# Patient Record
Sex: FEMALE | Race: WHITE | NOT HISPANIC OR LATINO | Employment: OTHER | URBAN - NONMETROPOLITAN AREA
[De-identification: names, ages, dates, MRNs, and addresses within clinical notes are randomized per-mention and may not be internally consistent; named-entity substitution may affect disease eponyms.]

---

## 2020-02-13 ENCOUNTER — HOSPITAL ENCOUNTER (OUTPATIENT)
Facility: HOSPITAL | Age: 85
Setting detail: OBSERVATION
Discharge: HOME | End: 2020-02-14
Attending: EMERGENCY MEDICINE | Admitting: FAMILY MEDICINE
Payer: MEDICARE

## 2020-02-13 ENCOUNTER — APPOINTMENT (EMERGENCY)
Dept: RADIOLOGY | Facility: HOSPITAL | Age: 85
End: 2020-02-13
Payer: MEDICARE

## 2020-02-13 ENCOUNTER — APPOINTMENT (EMERGENCY)
Dept: CT IMAGING | Facility: HOSPITAL | Age: 85
End: 2020-02-13
Payer: MEDICARE

## 2020-02-13 DIAGNOSIS — R42 DIZZINESS: Primary | ICD-10-CM

## 2020-02-13 PROBLEM — I10 HYPERTENSION: Status: ACTIVE | Noted: 2020-02-13

## 2020-02-13 PROBLEM — I25.10 CORONARY ARTERY DISEASE: Status: ACTIVE | Noted: 2020-02-13

## 2020-02-13 PROBLEM — J45.909 ASTHMA: Status: ACTIVE | Noted: 2020-02-13

## 2020-02-13 LAB
ALBUMIN SERPL BCP-MCNC: 3.5 G/DL (ref 3.5–5)
ALP SERPL-CCNC: 50 U/L (ref 46–116)
ALT SERPL W P-5'-P-CCNC: 17 U/L (ref 12–78)
ANION GAP SERPL CALCULATED.3IONS-SCNC: 8 MMOL/L (ref 4–13)
APTT PPP: 33 SECONDS (ref 23–37)
AST SERPL W P-5'-P-CCNC: 29 U/L (ref 5–45)
ATRIAL RATE: 59 BPM
ATRIAL RATE: 63 BPM
BASOPHILS # BLD AUTO: 0.04 THOUSANDS/ΜL (ref 0–0.1)
BASOPHILS NFR BLD AUTO: 1 % (ref 0–1)
BILIRUB SERPL-MCNC: 0.31 MG/DL (ref 0.2–1)
BUN SERPL-MCNC: 12 MG/DL (ref 5–25)
CALCIUM SERPL-MCNC: 8.7 MG/DL (ref 8.3–10.1)
CHLORIDE SERPL-SCNC: 102 MMOL/L (ref 100–108)
CO2 SERPL-SCNC: 27 MMOL/L (ref 21–32)
CREAT SERPL-MCNC: 0.97 MG/DL (ref 0.6–1.3)
EOSINOPHIL # BLD AUTO: 0.36 THOUSAND/ΜL (ref 0–0.61)
EOSINOPHIL NFR BLD AUTO: 7 % (ref 0–6)
ERYTHROCYTE [DISTWIDTH] IN BLOOD BY AUTOMATED COUNT: 12.9 % (ref 11.6–15.1)
GFR SERPL CREATININE-BSD FRML MDRD: 52 ML/MIN/1.73SQ M
GLUCOSE SERPL-MCNC: 97 MG/DL (ref 65–140)
HCT VFR BLD AUTO: 35.2 % (ref 34.8–46.1)
HGB BLD-MCNC: 11.8 G/DL (ref 11.5–15.4)
IMM GRANULOCYTES # BLD AUTO: 0.02 THOUSAND/UL (ref 0–0.2)
IMM GRANULOCYTES NFR BLD AUTO: 0 % (ref 0–2)
INR PPP: 0.92 (ref 0.84–1.19)
LACTATE SERPL-SCNC: 1.1 MMOL/L (ref 0.5–2)
LYMPHOCYTES # BLD AUTO: 2.12 THOUSANDS/ΜL (ref 0.6–4.47)
LYMPHOCYTES NFR BLD AUTO: 40 % (ref 14–44)
MCH RBC QN AUTO: 32.6 PG (ref 26.8–34.3)
MCHC RBC AUTO-ENTMCNC: 33.5 G/DL (ref 31.4–37.4)
MCV RBC AUTO: 97 FL (ref 82–98)
MONOCYTES # BLD AUTO: 0.56 THOUSAND/ΜL (ref 0.17–1.22)
MONOCYTES NFR BLD AUTO: 11 % (ref 4–12)
NEUTROPHILS # BLD AUTO: 2.22 THOUSANDS/ΜL (ref 1.85–7.62)
NEUTS SEG NFR BLD AUTO: 41 % (ref 43–75)
NRBC BLD AUTO-RTO: 0 /100 WBCS
P AXIS: 60 DEGREES
PLATELET # BLD AUTO: 147 THOUSANDS/UL (ref 149–390)
PLATELET # BLD AUTO: 161 THOUSANDS/UL (ref 149–390)
PMV BLD AUTO: 10.7 FL (ref 8.9–12.7)
PMV BLD AUTO: 10.9 FL (ref 8.9–12.7)
POTASSIUM SERPL-SCNC: 3.9 MMOL/L (ref 3.5–5.3)
PR INTERVAL: 180 MS
PROT SERPL-MCNC: 7.1 G/DL (ref 6.4–8.2)
PROTHROMBIN TIME: 12.3 SECONDS (ref 11.6–14.5)
QRS AXIS: 63 DEGREES
QRS AXIS: 67 DEGREES
QRSD INTERVAL: 86 MS
QRSD INTERVAL: 88 MS
QT INTERVAL: 422 MS
QT INTERVAL: 456 MS
QTC INTERVAL: 431 MS
QTC INTERVAL: 451 MS
RBC # BLD AUTO: 3.62 MILLION/UL (ref 3.81–5.12)
SODIUM SERPL-SCNC: 137 MMOL/L (ref 136–145)
T WAVE AXIS: 66 DEGREES
T WAVE AXIS: 69 DEGREES
TROPONIN I SERPL-MCNC: <0.02 NG/ML
VENTRICULAR RATE: 59 BPM
VENTRICULAR RATE: 63 BPM
WBC # BLD AUTO: 5.32 THOUSAND/UL (ref 4.31–10.16)

## 2020-02-13 PROCEDURE — 96374 THER/PROPH/DIAG INJ IV PUSH: CPT

## 2020-02-13 PROCEDURE — 85610 PROTHROMBIN TIME: CPT | Performed by: EMERGENCY MEDICINE

## 2020-02-13 PROCEDURE — 83605 ASSAY OF LACTIC ACID: CPT | Performed by: EMERGENCY MEDICINE

## 2020-02-13 PROCEDURE — 36415 COLL VENOUS BLD VENIPUNCTURE: CPT | Performed by: EMERGENCY MEDICINE

## 2020-02-13 PROCEDURE — 85025 COMPLETE CBC W/AUTO DIFF WBC: CPT | Performed by: EMERGENCY MEDICINE

## 2020-02-13 PROCEDURE — 96375 TX/PRO/DX INJ NEW DRUG ADDON: CPT

## 2020-02-13 PROCEDURE — NC001 PR NO CHARGE: Performed by: EMERGENCY MEDICINE

## 2020-02-13 PROCEDURE — 71046 X-RAY EXAM CHEST 2 VIEWS: CPT

## 2020-02-13 PROCEDURE — 80053 COMPREHEN METABOLIC PANEL: CPT | Performed by: EMERGENCY MEDICINE

## 2020-02-13 PROCEDURE — 93005 ELECTROCARDIOGRAM TRACING: CPT

## 2020-02-13 PROCEDURE — 99285 EMERGENCY DEPT VISIT HI MDM: CPT | Performed by: EMERGENCY MEDICINE

## 2020-02-13 PROCEDURE — 85730 THROMBOPLASTIN TIME PARTIAL: CPT | Performed by: EMERGENCY MEDICINE

## 2020-02-13 PROCEDURE — 99220 PR INITIAL OBSERVATION CARE/DAY 70 MINUTES: CPT | Performed by: FAMILY MEDICINE

## 2020-02-13 PROCEDURE — 85049 AUTOMATED PLATELET COUNT: CPT | Performed by: FAMILY MEDICINE

## 2020-02-13 PROCEDURE — 99285 EMERGENCY DEPT VISIT HI MDM: CPT

## 2020-02-13 PROCEDURE — 84484 ASSAY OF TROPONIN QUANT: CPT | Performed by: EMERGENCY MEDICINE

## 2020-02-13 PROCEDURE — 70450 CT HEAD/BRAIN W/O DYE: CPT

## 2020-02-13 PROCEDURE — 96361 HYDRATE IV INFUSION ADD-ON: CPT

## 2020-02-13 RX ORDER — PANTOPRAZOLE SODIUM 40 MG/1
40 TABLET, DELAYED RELEASE ORAL DAILY
Status: DISCONTINUED | OUTPATIENT
Start: 2020-02-13 | End: 2020-02-14 | Stop reason: HOSPADM

## 2020-02-13 RX ORDER — LIDOCAINE 40 MG/G
CREAM TOPICAL AS NEEDED
COMMUNITY
End: 2020-02-13

## 2020-02-13 RX ORDER — ASPIRIN 81 MG/1
81 TABLET ORAL DAILY
Status: ON HOLD | COMMUNITY
End: 2021-05-07 | Stop reason: SDUPTHER

## 2020-02-13 RX ORDER — METOPROLOL SUCCINATE 25 MG/1
12.5 TABLET, EXTENDED RELEASE ORAL DAILY
Status: DISCONTINUED | OUTPATIENT
Start: 2020-02-13 | End: 2020-02-14 | Stop reason: HOSPADM

## 2020-02-13 RX ORDER — FAMOTIDINE 20 MG/1
20 TABLET, FILM COATED ORAL 2 TIMES DAILY
Status: DISCONTINUED | OUTPATIENT
Start: 2020-02-13 | End: 2020-02-14 | Stop reason: HOSPADM

## 2020-02-13 RX ORDER — ONDANSETRON 2 MG/ML
4 INJECTION INTRAMUSCULAR; INTRAVENOUS EVERY 6 HOURS PRN
Status: DISCONTINUED | OUTPATIENT
Start: 2020-02-13 | End: 2020-02-14 | Stop reason: HOSPADM

## 2020-02-13 RX ORDER — ACETAMINOPHEN 325 MG/1
650 TABLET ORAL EVERY 6 HOURS PRN
Status: DISCONTINUED | OUTPATIENT
Start: 2020-02-13 | End: 2020-02-13

## 2020-02-13 RX ORDER — MECLIZINE HYDROCHLORIDE 25 MG/1
25 TABLET ORAL ONCE
Status: COMPLETED | OUTPATIENT
Start: 2020-02-13 | End: 2020-02-13

## 2020-02-13 RX ORDER — BUDESONIDE AND FORMOTEROL FUMARATE DIHYDRATE 80; 4.5 UG/1; UG/1
2 AEROSOL RESPIRATORY (INHALATION) 2 TIMES DAILY
Status: DISCONTINUED | OUTPATIENT
Start: 2020-02-13 | End: 2020-02-14 | Stop reason: HOSPADM

## 2020-02-13 RX ORDER — BUDESONIDE AND FORMOTEROL FUMARATE DIHYDRATE 80; 4.5 UG/1; UG/1
2 AEROSOL RESPIRATORY (INHALATION) 2 TIMES DAILY
COMMUNITY
End: 2022-07-31

## 2020-02-13 RX ORDER — FAMOTIDINE 20 MG/1
20 TABLET, FILM COATED ORAL 2 TIMES DAILY
COMMUNITY
End: 2022-07-31

## 2020-02-13 RX ORDER — FLUTICASONE PROPIONATE 50 MCG
1 SPRAY, SUSPENSION (ML) NASAL DAILY PRN
Status: DISCONTINUED | OUTPATIENT
Start: 2020-02-13 | End: 2020-02-14 | Stop reason: HOSPADM

## 2020-02-13 RX ORDER — AMOXICILLIN 250 MG
1 CAPSULE ORAL DAILY
Status: DISCONTINUED | OUTPATIENT
Start: 2020-02-13 | End: 2020-02-14 | Stop reason: HOSPADM

## 2020-02-13 RX ORDER — ALBUTEROL SULFATE 90 UG/1
2 AEROSOL, METERED RESPIRATORY (INHALATION) EVERY 6 HOURS PRN
Status: DISCONTINUED | OUTPATIENT
Start: 2020-02-13 | End: 2020-02-14 | Stop reason: HOSPADM

## 2020-02-13 RX ORDER — GUAIFENESIN 600 MG
1200 TABLET, EXTENDED RELEASE 12 HR ORAL EVERY 12 HOURS SCHEDULED
COMMUNITY
End: 2021-05-07 | Stop reason: HOSPADM

## 2020-02-13 RX ORDER — ALBUTEROL SULFATE 90 UG/1
2 AEROSOL, METERED RESPIRATORY (INHALATION) EVERY 6 HOURS PRN
COMMUNITY

## 2020-02-13 RX ORDER — DIAZEPAM 2 MG/1
2 TABLET ORAL EVERY 6 HOURS PRN
Status: DISCONTINUED | OUTPATIENT
Start: 2020-02-13 | End: 2020-02-14 | Stop reason: HOSPADM

## 2020-02-13 RX ORDER — ONDANSETRON 2 MG/ML
4 INJECTION INTRAMUSCULAR; INTRAVENOUS ONCE
Status: COMPLETED | OUTPATIENT
Start: 2020-02-13 | End: 2020-02-13

## 2020-02-13 RX ORDER — NITROGLYCERIN 0.4 MG/1
0.4 TABLET SUBLINGUAL
Status: DISCONTINUED | OUTPATIENT
Start: 2020-02-13 | End: 2020-02-14 | Stop reason: HOSPADM

## 2020-02-13 RX ORDER — MECLIZINE HCL 12.5 MG/1
12.5 TABLET ORAL EVERY 8 HOURS SCHEDULED
Status: DISCONTINUED | OUTPATIENT
Start: 2020-02-13 | End: 2020-02-14 | Stop reason: HOSPADM

## 2020-02-13 RX ORDER — METOPROLOL SUCCINATE 25 MG/1
12.5 TABLET, EXTENDED RELEASE ORAL DAILY
COMMUNITY

## 2020-02-13 RX ORDER — NITROGLYCERIN 0.4 MG/1
0.4 TABLET SUBLINGUAL
COMMUNITY
End: 2021-05-07 | Stop reason: HOSPADM

## 2020-02-13 RX ORDER — ACETAMINOPHEN 325 MG/1
650 TABLET ORAL EVERY 6 HOURS PRN
Status: DISCONTINUED | OUTPATIENT
Start: 2020-02-13 | End: 2020-02-14 | Stop reason: HOSPADM

## 2020-02-13 RX ORDER — PANTOPRAZOLE SODIUM 40 MG/1
40 TABLET, DELAYED RELEASE ORAL DAILY
COMMUNITY

## 2020-02-13 RX ORDER — FLUTICASONE PROPIONATE 50 MCG
1 SPRAY, SUSPENSION (ML) NASAL DAILY
COMMUNITY

## 2020-02-13 RX ORDER — DIAZEPAM 5 MG/ML
2.5 INJECTION, SOLUTION INTRAMUSCULAR; INTRAVENOUS ONCE
Status: COMPLETED | OUTPATIENT
Start: 2020-02-13 | End: 2020-02-13

## 2020-02-13 RX ORDER — ASPIRIN 81 MG/1
81 TABLET, CHEWABLE ORAL DAILY
Status: DISCONTINUED | OUTPATIENT
Start: 2020-02-13 | End: 2020-02-14 | Stop reason: HOSPADM

## 2020-02-13 RX ORDER — AMOXICILLIN 250 MG
1 CAPSULE ORAL DAILY
Status: ON HOLD | COMMUNITY
End: 2022-07-31 | Stop reason: SDUPTHER

## 2020-02-13 RX ADMIN — BUDESONIDE AND FORMOTEROL FUMARATE DIHYDRATE 2 PUFF: 80; 4.5 AEROSOL RESPIRATORY (INHALATION) at 17:28

## 2020-02-13 RX ADMIN — FAMOTIDINE 20 MG: 20 TABLET ORAL at 13:06

## 2020-02-13 RX ADMIN — Medication 400 MG: at 13:05

## 2020-02-13 RX ADMIN — ACETAMINOPHEN 650 MG: 325 TABLET ORAL at 10:52

## 2020-02-13 RX ADMIN — ASPIRIN 81 MG 81 MG: 81 TABLET ORAL at 13:06

## 2020-02-13 RX ADMIN — Medication 100 MCG: at 13:05

## 2020-02-13 RX ADMIN — PANTOPRAZOLE SODIUM 40 MG: 40 TABLET, DELAYED RELEASE ORAL at 13:05

## 2020-02-13 RX ADMIN — METOPROLOL SUCCINATE 12.5 MG: 25 TABLET, EXTENDED RELEASE ORAL at 13:33

## 2020-02-13 RX ADMIN — MECLIZINE 12.5 MG: 12.5 TABLET ORAL at 13:06

## 2020-02-13 RX ADMIN — FAMOTIDINE 20 MG: 20 TABLET ORAL at 17:27

## 2020-02-13 RX ADMIN — Medication 1 TABLET: at 13:06

## 2020-02-13 RX ADMIN — MECLIZINE HYDROCHLORIDE 25 MG: 25 TABLET ORAL at 08:05

## 2020-02-13 RX ADMIN — MECLIZINE 12.5 MG: 12.5 TABLET ORAL at 22:54

## 2020-02-13 RX ADMIN — BUDESONIDE AND FORMOTEROL FUMARATE DIHYDRATE 2 PUFF: 80; 4.5 AEROSOL RESPIRATORY (INHALATION) at 13:07

## 2020-02-13 RX ADMIN — DIAZEPAM 2.5 MG: 10 INJECTION, SOLUTION INTRAMUSCULAR; INTRAVENOUS at 08:07

## 2020-02-13 RX ADMIN — ENOXAPARIN SODIUM 30 MG: 30 INJECTION SUBCUTANEOUS at 13:09

## 2020-02-13 RX ADMIN — ONDANSETRON 4 MG: 2 INJECTION INTRAMUSCULAR; INTRAVENOUS at 08:05

## 2020-02-13 RX ADMIN — SENNOSIDES AND DOCUSATE SODIUM 1 TABLET: 8.6; 5 TABLET ORAL at 13:06

## 2020-02-13 RX ADMIN — SODIUM CHLORIDE 500 ML: 0.9 INJECTION, SOLUTION INTRAVENOUS at 08:09

## 2020-02-13 NOTE — ED NOTES
Ambulated pt through hallway w/1 assist  Pt stated her dizziness has improved but still felling "wabbly and off"  Dr Darya Castrejon aware        Rabia Calvert, RN  02/13/20 4625

## 2020-02-13 NOTE — H&P
H&P- [facility] 1/11/1932, 80 y o  female MRN: [id number]    Unit/Bed#: ED 11 Encounter: [id number]    Primary Care Provider: Kingsley Ashley MD   Date and time admitted to hospital: 2/13/2020  7:17 AM        * Dizziness  Assessment & Plan  · Patient came to the hospital with sudden onset of dizziness  Patient reported that she gets dizzy and feels like the room is is spinning when she changes position  She also gets nausea and vomiting associated with it  History suggestive of benign positional vertigo  · Will admit the patient PT OT evaluation  · Meclizine RTC and p r n  Valium  · Patient had a CT scan head today which was unremarkable,  · Had a recent MRI by her neurologist which was also negative for any acute pathology  · May benefit from vestibular rehab    Asthma  Assessment & Plan  · Patient is on Symbicort and also on albuterol as an outpatient which we will continue  · Respiratory protocol    Hypertension  Assessment & Plan  · Blood pressure remained stable  · Monitor  · Continue with the outpatient medication    Coronary artery disease  Assessment & Plan  · Patient do not have any chest pain or any shortness of breath  · Continue with aspirin and metoprolol      VTE Prophylaxis: Enoxaparin (Lovenox)  / sequential compression device   Code Status: dnr/ dni  POLST: POLST form is not discussed and not completed at this time  Discussion with family:     Anticipated Length of Stay:  Patient will be admitted on an Observation basis with an anticipated length of stay of  less than 2 midnights  Justification for Hospital Stay:  Vertigo    Total Time for Visit, including Counseling / Coordination of Care: 1 hour  Greater than 50% of this total time spent on direct patient counseling and coordination of care  Chief Complaint:   Dizziness    History of Present Illness:    Tayler Geller is a 80 y o  female who presents with dizziness    Patient reported that she woke up in the middle of the night and was trying to go to the bathroom and she was feeling dizzy and off balance  Patient reported that she has to hold onto things to get to the bathroom  Patient reported that she had dizziness in the past and is currently getting worked up by her neurologist   Patient had a recent MRI which was unremarkable  Patient reported that she had realignment of ear canal in the past approximately 10 years ago and her symptoms resolved instantaneously  Patient reported that she has chronic shortness of breath and it has not changed recently  Denies any fever or any chills  Denies any upper respiratory infection  Denies any focal weakness    Review of Systems:    Review of Systems   Constitutional: Negative  HENT: Positive for sinus pressure and tinnitus  Decreased hearing   Respiratory: Negative  Cardiovascular: Negative  Gastrointestinal: Negative  Genitourinary: Negative  Musculoskeletal: Negative  Neurological: Positive for dizziness  Hematological: Negative  Psychiatric/Behavioral: Negative  Past Medical and Surgical History:     Past Medical History:   Diagnosis Date    Cancer [facility])     skin cancer    Cardiac disease     Neuropathy     Spinal stenosis        Past Surgical History:   Procedure Laterality Date    APPENDECTOMY      BACK SURGERY      CATARACT EXTRACTION W/  INTRAOCULAR LENS IMPLANT Bilateral     CORONARY ANGIOPLASTY WITH STENT PLACEMENT  09/23/2011    REPLACEMENT TOTAL KNEE BILATERAL         Meds/Allergies:    Prior to Admission medications    Medication Sig Start Date End Date Taking?  Authorizing Provider   albuterol (PROVENTIL HFA,VENTOLIN HFA) 90 mcg/act inhaler Inhale 2 puffs every 6 (six) hours as needed for wheezing   Yes Historical Provider, MD   aspirin (ECOTRIN LOW STRENGTH) 81 mg EC tablet Take 81 mg by mouth daily   Yes Historical Provider, MD   budesonide-formoterol (SYMBICORT) 80-4 5 MCG/ACT inhaler Inhale 2 puffs 2 (two) times a day Rinse mouth after use  Yes Historical Provider, MD   cyanocobalamin (VITAMIN B-12) 100 MCG tablet Take 100 mcg by mouth daily   Yes Historical Provider, MD   denosumab (PROLIA) 60 mg/mL Inject 60 mg under the skin once   Yes Historical Provider, MD   famotidine (PEPCID) 20 mg tablet Take 20 mg by mouth 2 (two) times a day   Yes Historical Provider, MD   fluticasone (FLONASE) 50 mcg/act nasal spray 1 spray into each nostril daily   Yes Historical Provider, MD   guaiFENesin (MUCINEX) 600 mg 12 hr tablet Take 1,200 mg by mouth every 12 (twelve) hours   Yes Historical Provider, MD   MAGNESIUM SULFATE PO Take 100 mg by mouth daily   Yes Historical Provider, MD   metoprolol succinate (TOPROL-XL) 25 mg 24 hr tablet Take 12 5 mg by mouth daily   Yes Historical Provider, MD   Multiple Vitamins-Minerals (CENTRUM SILVER 50+WOMEN PO) Take 1 tablet by mouth   Yes Historical Provider, MD   NAPROXEN PO Take 220 mg by mouth as needed   Yes Historical Provider, MD   nitroglycerin (NITROSTAT) 0 4 mg SL tablet Place 0 4 mg under the tongue every 5 (five) minutes as needed for chest pain   Yes Historical Provider, MD   pantoprazole (PROTONIX) 40 mg tablet Take 40 mg by mouth daily   Yes Historical Provider, MD   senna-docusate sodium (SENOKOT S) 8 6-50 mg per tablet Take 1 tablet by mouth daily   Yes Historical Provider, MD   fluticasone-salmeterol (ADVAIR, WIXELA) 100-50 mcg/dose inhaler Inhale 1 puff 2 (two) times a day Rinse mouth after use   2/13/20 Yes Historical Provider, MD   lidocaine (LMX) 4 % cream Apply topically as needed for mild pain  2/13/20 Yes Historical Provider, MD     I have reviewed home medications with patient personally  Allergies: Allergies   Allergen Reactions    Penicillins Rash       Social History:     Marital Status:     Occupation: retired  Patient Pre-hospital Living Situation:   Patient Pre-hospital Level of Mobility:   Patient Pre-hospital Diet Restrictions:   Substance Use History:   Social History Substance and Sexual Activity   Alcohol Use Yes    Comment: occassionally     Social History     Tobacco Use   Smoking Status Never Smoker   Smokeless Tobacco Never Used     Social History     Substance and Sexual Activity   Drug Use Never       Family History:    History reviewed  No pertinent family history  Physical Exam:     Vitals:   Blood Pressure: 137/82 (02/13/20 1342)  Pulse: (!) 107 (02/13/20 1342)  Temperature: 97 5 °F (36 4 °C) (02/13/20 1342)  Temp Source: Oral (02/13/20 0725)  Respirations: (!) 30 (02/13/20 1342)  Height: 5' 3 5" (161 3 cm) (02/13/20 0725)  Weight - Scale: 64 9 kg (143 lb) (02/13/20 0725)  SpO2: 96 % (02/13/20 1342)    Physical Exam   Constitutional: She appears well-developed  No distress  HENT:   Head: Normocephalic and atraumatic  Eyes: Right eye exhibits no discharge  Left eye exhibits no discharge  Neck: No JVD present  Cardiovascular: Normal rate and regular rhythm  No murmur heard  Pulmonary/Chest: Effort normal and breath sounds normal  No stridor  No respiratory distress  Abdominal: Soft  She exhibits no distension  There is no tenderness  Musculoskeletal: Normal range of motion  She exhibits no edema  Neurological: She is alert  No cranial nerve deficit  No focal neurological deficits  Symptoms are reproducible with movement of the head   Skin: Skin is warm  Psychiatric: She has a normal mood and affect  Additional Data:     Lab Results: I have personally reviewed pertinent reports        Results from last 7 days   Lab Units 02/13/20  1728 02/13/20  0741   WBC Thousand/uL  --  5 32   HEMOGLOBIN g/dL  --  11 8   HEMATOCRIT %  --  35 2   PLATELETS Thousands/uL 147* 161   NEUTROS PCT %  --  41*   LYMPHS PCT %  --  40   MONOS PCT %  --  11   EOS PCT %  --  7*     Results from last 7 days   Lab Units 02/13/20  0741   SODIUM mmol/L 137   POTASSIUM mmol/L 3 9   CHLORIDE mmol/L 102   CO2 mmol/L 27   BUN mg/dL 12   CREATININE mg/dL 0 97   ANION GAP mmol/L 8   CALCIUM mg/dL 8 7   ALBUMIN g/dL 3 5   TOTAL BILIRUBIN mg/dL 0 31   ALK PHOS U/L 50   ALT U/L 17   AST U/L 29   GLUCOSE RANDOM mg/dL 97     Results from last 7 days   Lab Units 02/13/20  0741   INR  0 92             Results from last 7 days   Lab Units 02/13/20  0741   LACTIC ACID mmol/L 1 1       Imaging: I have personally reviewed pertinent reports  XR chest 2 views   Final Result by Nettie June MD (02/13 2041)      No acute cardiopulmonary disease  Workstation performed: [id number]         CT head without contrast   Final Result by Lyndsey Foreman MD (02/13 1853)      No acute intracranial abnormality  Workstation performed: [id number]             EKG, Pathology, and Other Studies Reviewed on Admission:   · EKG:     Allscripts / Epic Records Reviewed: Yes     ** Please Note: This note has been constructed using a voice recognition system   **

## 2020-02-13 NOTE — ASSESSMENT & PLAN NOTE
· Patient do not have any chest pain or any shortness of breath  · Continue with aspirin and metoprolol

## 2020-02-13 NOTE — ED PROVIDER NOTES
History  Chief Complaint   Patient presents with    Dizziness     pt c/o dizziness/lightheadedness w/nausea,sob and headache upon awakening around 0300 when going to the bathroom  denies v/d/fevers     Patient developed sudden onset of dizziness with a headache and shortness of breath and nausea starting at 3:00 a m  This morning  Got worse with certain movements  Felt like the whole room was moving  Does have a history of dizziness which she describes off balance but this is worse than her normal   Had an MRI on February 5th which was unremarkable  No meds taken for for her symptoms  No fevers or chills  No recent cough or cold symptoms  No rash  History provided by:  Patient   used: No    Dizziness   Quality:  Head spinning  Severity:  Mild  Onset quality:  Sudden  Duration:  4 hours  Timing:  Constant  Progression:  Waxing and waning  Chronicity:  Recurrent  Context: head movement and standing up    Relieved by:  Being still  Worsened by:  Nothing  Ineffective treatments:  None tried  Associated symptoms: headaches, nausea and shortness of breath    Associated symptoms: no blood in stool, no chest pain, no diarrhea, no hearing loss, no palpitations, no vision changes and no vomiting        Prior to Admission Medications   Prescriptions Last Dose Informant Patient Reported? Taking?   aspirin (ECOTRIN LOW STRENGTH) 81 mg EC tablet  Self Yes Yes   Sig: Take 81 mg by mouth daily      Facility-Administered Medications: None       Past Medical History:   Diagnosis Date    Cancer ([facility] [address] )     skin cancer    Cardiac disease        Past Surgical History:   Procedure Laterality Date    APPENDECTOMY      BACK SURGERY      CATARACT EXTRACTION W/  INTRAOCULAR LENS IMPLANT Bilateral     CORONARY ANGIOPLASTY WITH STENT PLACEMENT  09/23/2011    REPLACEMENT TOTAL KNEE BILATERAL         History reviewed  No pertinent family history    I have reviewed and agree with the history as documented  Social History     Tobacco Use    Smoking status: Never Smoker    Smokeless tobacco: Never Used   Substance Use Topics    Alcohol use: Yes     Comment: occassionally    Drug use: Never       Review of Systems   Constitutional: Negative for chills and fever  HENT: Negative for ear pain, hearing loss, sore throat, trouble swallowing and voice change  Eyes: Negative for pain and discharge  Respiratory: Positive for shortness of breath  Negative for cough and wheezing  Cardiovascular: Negative for chest pain and palpitations  Gastrointestinal: Positive for nausea  Negative for abdominal pain, blood in stool, constipation, diarrhea and vomiting  Genitourinary: Negative for dysuria, flank pain, frequency and hematuria  Musculoskeletal: Negative for joint swelling, neck pain and neck stiffness  Skin: Negative for rash and wound  Neurological: Positive for dizziness and headaches  Negative for seizures, syncope, facial asymmetry, speech difficulty and numbness  Psychiatric/Behavioral: Negative for hallucinations, self-injury and suicidal ideas  All other systems reviewed and are negative  Physical Exam  Physical Exam   Constitutional: She is oriented to person, place, and time  She appears well-developed and well-nourished  No distress  HENT:   Head: Normocephalic and atraumatic  Right Ear: External ear normal    Left Ear: External ear normal    Eyes: Pupils are equal, round, and reactive to light  Conjunctivae and EOM are normal    Neck: Normal range of motion  Neck supple  Cardiovascular: Normal rate, regular rhythm and normal heart sounds  No murmur heard  Pulmonary/Chest: Effort normal and breath sounds normal  She has no wheezes  She has no rales  Abdominal: Soft  Bowel sounds are normal  She exhibits no distension  There is no tenderness  There is no rebound and no guarding  Musculoskeletal: Normal range of motion  She exhibits no deformity     Neurological: She is alert and oriented to person, place, and time  No cranial nerve deficit  Skin: Skin is warm and dry  No rash noted  Psychiatric: She has a normal mood and affect  Her behavior is normal    Nursing note and vitals reviewed        Vital Signs  ED Triage Vitals   Temperature Pulse Respirations Blood Pressure SpO2   02/13/20 0725 02/13/20 0725 02/13/20 0725 02/13/20 0725 02/13/20 0723   98 1 °F (36 7 °C) 79 18 (!) 178/86 98 %      Temp Source Heart Rate Source Patient Position - Orthostatic VS BP Location FiO2 (%)   02/13/20 0725 02/13/20 0725 02/13/20 0725 02/13/20 0725 --   Oral Monitor Lying Left arm       Pain Score       02/13/20 0725       No Pain           Vitals:    02/13/20 0730 02/13/20 0815 02/13/20 0830 02/13/20 0900   BP: 157/70 146/74 142/71 152/72   Pulse: 66 79 57 62   Patient Position - Orthostatic VS:  Lying Lying Lying         Visual Acuity  Visual Acuity      Most Recent Value   L Pupil Size (mm)  3   R Pupil Size (mm)  3          ED Medications  Medications   meclizine (ANTIVERT) tablet 25 mg (25 mg Oral Given 2/13/20 0805)   sodium chloride 0 9 % bolus 500 mL (0 mL Intravenous Stopped 2/13/20 0850)   ondansetron (ZOFRAN) injection 4 mg (4 mg Intravenous Given 2/13/20 0805)   diazepam (VALIUM) injection 2 5 mg (2 5 mg Intravenous Given 2/13/20 0807)       Diagnostic Studies  Results Reviewed     Procedure Component Value Units Date/Time    Comprehensive metabolic panel [[id number]] Collected:  02/13/20 0741    Lab Status:  Final result Specimen:  Blood from Arm, Right Updated:  02/13/20 0829     Sodium 137 mmol/L      Potassium 3 9 mmol/L      Chloride 102 mmol/L      CO2 27 mmol/L      ANION GAP 8 mmol/L      BUN 12 mg/dL      Creatinine 0 97 mg/dL      Glucose 97 mg/dL      Calcium 8 7 mg/dL      AST 29 U/L      ALT 17 U/L      Alkaline Phosphatase 50 U/L      Total Protein 7 1 g/dL      Albumin 3 5 g/dL      Total Bilirubin 0 31 mg/dL      eGFR 52 ml/min/1 73sq m     Narrative: [place] guidelines for Chronic Kidney Disease (CKD):     Stage 1 with normal or high GFR (GFR > 90 mL/min/1 73 square meters)    Stage 2 Mild CKD (GFR = 60-89 mL/min/1 73 square meters)    Stage 3A Moderate CKD (GFR = 45-59 mL/min/1 73 square meters)    Stage 3B Moderate CKD (GFR = 30-44 mL/min/1 73 square meters)    Stage 4 Severe CKD (GFR = 15-29 mL/min/1 73 square meters)    Stage 5 End Stage CKD (GFR <15 mL/min/1 73 square meters)  Note: GFR calculation is accurate only with a steady state creatinine    Lactic acid, plasma x2 [[id number]]  (Normal) Collected:  02/13/20 0741    Lab Status:  Final result Specimen:  Blood from Arm, Right Updated:  02/13/20 5241     LACTIC ACID 1 1 mmol/L     Narrative:       Result may be elevated if tourniquet was used during collection      Troponin I [[id number]]  (Normal) Collected:  02/13/20 0741    Lab Status:  Final result Specimen:  Blood from Arm, Right Updated:  02/13/20 0821     Troponin I <0 02 ng/mL     Protime-INR [[id number]]  (Normal) Collected:  02/13/20 0741    Lab Status:  Final result Specimen:  Blood from Arm, Right Updated:  02/13/20 0813     Protime 12 3 seconds      INR 0 92    APTT [[id number]]  (Normal) Collected:  02/13/20 0741    Lab Status:  Final result Specimen:  Blood from Arm, Right Updated:  02/13/20 0813     PTT 33 seconds     CBC and differential [[id number]]  (Abnormal) Collected:  02/13/20 0741    Lab Status:  Final result Specimen:  Blood from Arm, Right Updated:  02/13/20 0800     WBC 5 32 Thousand/uL      RBC 3 62 Million/uL      Hemoglobin 11 8 g/dL      Hematocrit 35 2 %      MCV 97 fL      MCH 32 6 pg      MCHC 33 5 g/dL      RDW 12 9 %      MPV 10 9 fL      Platelets 997 Thousands/uL      nRBC 0 /100 WBCs      Neutrophils Relative 41 %      Immat GRANS % 0 %      Lymphocytes Relative 40 %      Monocytes Relative 11 %      Eosinophils Relative 7 %      Basophils Relative 1 %      Neutrophils Absolute 2 22 Thousands/µL      Immature Grans Absolute 0 02 Thousand/uL      Lymphocytes Absolute 2 12 Thousands/µL      Monocytes Absolute 0 56 Thousand/µL      Eosinophils Absolute 0 36 Thousand/µL      Basophils Absolute 0 04 Thousands/µL                  XR chest 2 views   Final Result by Nettie June MD (02/13 8217)      No acute cardiopulmonary disease  Workstation performed: [id number]         CT head without contrast   Final Result by Lyndsey Foreman MD (02/13 1409)      No acute intracranial abnormality  Workstation performed: [id number]                    Procedures  ECG 12 Lead Documentation Only  Date/Time: 2/13/2020 7:39 AM  Performed by: Jewel Beltran MD  Authorized by: Jewel Beltran MD     Patient location:  ED  Previous ECG:     Previous ECG:  Unavailable  Rate:     ECG rate:  60  Rhythm:     Rhythm: sinus rhythm    Ectopy:     Ectopy: PAC    QRS:     QRS axis:  Normal    ECG 12 Lead Documentation Only  Date/Time: 2/13/2020 8:25 AM  Performed by: Jewel Beltran MD  Authorized by: Jewel Beltran MD     ECG reviewed by me, the ED Provider: yes    Rate:     ECG rate:  60  Rhythm:     Rhythm: sinus rhythm    Ectopy:     Ectopy: none    QRS:     QRS axis:  Normal             ED Course  ED Course as of Feb 13 0912   u Feb 13, 2020   0905 Discussed with cardiology about the patient's EKG  Read is atrial bigeminy  4732 Patient seen  Had difficulty ambulating  Very unsteady on feet  Needed assistance and holding onto nourished while walking                                    [facility]  Number of Diagnoses or Management Options     Amount and/or Complexity of Data Reviewed  Clinical lab tests: reviewed  Obtain history from someone other than the patient: yes  Review and summarize past medical records: yes          Disposition  Final diagnoses:   Dizziness     Time reflects when diagnosis was documented in both MDM as applicable and the Disposition within this note     Time User Action Codes Description Comment    2/13/2020  9:11 AM Ti Melo Add [R42] Dizziness       ED Disposition     ED Disposition Condition Date/Time Comment    Admit Stable Thu Feb 13, 2020  9:11 AM Case was discussed with Dr Delia Paul and the patient's admission status was agreed to be to the service of Dr Delia Paul  Follow-up Information    None         Patient's Medications   Discharge Prescriptions    No medications on file     No discharge procedures on file      PDMP Review     None          ED Provider  Electronically Signed by           Cordelia Scanlon MD  02/13/20 6958

## 2020-02-13 NOTE — ASSESSMENT & PLAN NOTE
· Patient is on Symbicort and also on albuterol as an outpatient which we will continue  · Respiratory protocol

## 2020-02-13 NOTE — ED NOTES
Pt returned from CT via liter   Friend and call brunner within reach     Christina Nielsen RN  02/13/20 9576

## 2020-02-13 NOTE — ASSESSMENT & PLAN NOTE
· Patient came to the hospital with sudden onset of dizziness  Patient reported that she gets dizzy and feels like the room is is spinning when she changes position  She also gets nausea and vomiting associated with it  History suggestive of benign positional vertigo  · Will admit the patient PT OT evaluation  · Meclizine RTC and p r n   Valium  · Patient had a CT scan head today which was unremarkable,  · Had a recent MRI by her neurologist which was also negative for any acute pathology  · May benefit from vestibular rehab

## 2020-02-14 VITALS
SYSTOLIC BLOOD PRESSURE: 173 MMHG | RESPIRATION RATE: 20 BRPM | HEIGHT: 64 IN | HEART RATE: 64 BPM | WEIGHT: 143 LBS | BODY MASS INDEX: 24.41 KG/M2 | DIASTOLIC BLOOD PRESSURE: 95 MMHG | TEMPERATURE: 97.6 F | OXYGEN SATURATION: 96 %

## 2020-02-14 LAB
ALBUMIN SERPL BCP-MCNC: 3.3 G/DL (ref 3.5–5)
ALP SERPL-CCNC: 45 U/L (ref 46–116)
ALT SERPL W P-5'-P-CCNC: 18 U/L (ref 12–78)
ANION GAP SERPL CALCULATED.3IONS-SCNC: 4 MMOL/L (ref 4–13)
AST SERPL W P-5'-P-CCNC: 31 U/L (ref 5–45)
BASOPHILS # BLD AUTO: 0.03 THOUSANDS/ΜL (ref 0–0.1)
BASOPHILS NFR BLD AUTO: 1 % (ref 0–1)
BILIRUB SERPL-MCNC: 0.3 MG/DL (ref 0.2–1)
BUN SERPL-MCNC: 10 MG/DL (ref 5–25)
CALCIUM SERPL-MCNC: 8.5 MG/DL (ref 8.3–10.1)
CHLORIDE SERPL-SCNC: 103 MMOL/L (ref 100–108)
CO2 SERPL-SCNC: 32 MMOL/L (ref 21–32)
CREAT SERPL-MCNC: 0.93 MG/DL (ref 0.6–1.3)
EOSINOPHIL # BLD AUTO: 0.4 THOUSAND/ΜL (ref 0–0.61)
EOSINOPHIL NFR BLD AUTO: 7 % (ref 0–6)
ERYTHROCYTE [DISTWIDTH] IN BLOOD BY AUTOMATED COUNT: 13 % (ref 11.6–15.1)
GFR SERPL CREATININE-BSD FRML MDRD: 55 ML/MIN/1.73SQ M
GLUCOSE P FAST SERPL-MCNC: 83 MG/DL (ref 65–99)
GLUCOSE SERPL-MCNC: 83 MG/DL (ref 65–140)
HCT VFR BLD AUTO: 36.3 % (ref 34.8–46.1)
HGB BLD-MCNC: 11.9 G/DL (ref 11.5–15.4)
IMM GRANULOCYTES # BLD AUTO: 0.01 THOUSAND/UL (ref 0–0.2)
IMM GRANULOCYTES NFR BLD AUTO: 0 % (ref 0–2)
LYMPHOCYTES # BLD AUTO: 2.2 THOUSANDS/ΜL (ref 0.6–4.47)
LYMPHOCYTES NFR BLD AUTO: 41 % (ref 14–44)
MAGNESIUM SERPL-MCNC: 2.3 MG/DL (ref 1.6–2.6)
MCH RBC QN AUTO: 32.3 PG (ref 26.8–34.3)
MCHC RBC AUTO-ENTMCNC: 32.8 G/DL (ref 31.4–37.4)
MCV RBC AUTO: 99 FL (ref 82–98)
MONOCYTES # BLD AUTO: 0.5 THOUSAND/ΜL (ref 0.17–1.22)
MONOCYTES NFR BLD AUTO: 9 % (ref 4–12)
NEUTROPHILS # BLD AUTO: 2.23 THOUSANDS/ΜL (ref 1.85–7.62)
NEUTS SEG NFR BLD AUTO: 42 % (ref 43–75)
NRBC BLD AUTO-RTO: 0 /100 WBCS
PLATELET # BLD AUTO: 156 THOUSANDS/UL (ref 149–390)
PMV BLD AUTO: 10.6 FL (ref 8.9–12.7)
POTASSIUM SERPL-SCNC: 4.4 MMOL/L (ref 3.5–5.3)
PROT SERPL-MCNC: 7 G/DL (ref 6.4–8.2)
RBC # BLD AUTO: 3.68 MILLION/UL (ref 3.81–5.12)
SODIUM SERPL-SCNC: 139 MMOL/L (ref 136–145)
WBC # BLD AUTO: 5.37 THOUSAND/UL (ref 4.31–10.16)

## 2020-02-14 PROCEDURE — 83735 ASSAY OF MAGNESIUM: CPT | Performed by: FAMILY MEDICINE

## 2020-02-14 PROCEDURE — 97163 PT EVAL HIGH COMPLEX 45 MIN: CPT

## 2020-02-14 PROCEDURE — 97116 GAIT TRAINING THERAPY: CPT

## 2020-02-14 PROCEDURE — 97535 SELF CARE MNGMENT TRAINING: CPT

## 2020-02-14 PROCEDURE — 85025 COMPLETE CBC W/AUTO DIFF WBC: CPT | Performed by: FAMILY MEDICINE

## 2020-02-14 PROCEDURE — 80053 COMPREHEN METABOLIC PANEL: CPT | Performed by: FAMILY MEDICINE

## 2020-02-14 PROCEDURE — 36415 COLL VENOUS BLD VENIPUNCTURE: CPT | Performed by: FAMILY MEDICINE

## 2020-02-14 PROCEDURE — 99217 PR OBSERVATION CARE DISCHARGE MANAGEMENT: CPT | Performed by: FAMILY MEDICINE

## 2020-02-14 PROCEDURE — 97167 OT EVAL HIGH COMPLEX 60 MIN: CPT

## 2020-02-14 RX ORDER — MECLIZINE HCL 12.5 MG/1
TABLET ORAL
Qty: 30 TABLET | Refills: 0 | Status: SHIPPED | OUTPATIENT
Start: 2020-02-14 | End: 2021-05-07 | Stop reason: HOSPADM

## 2020-02-14 RX ADMIN — ASPIRIN 81 MG 81 MG: 81 TABLET ORAL at 08:53

## 2020-02-14 RX ADMIN — PANTOPRAZOLE SODIUM 40 MG: 40 TABLET, DELAYED RELEASE ORAL at 08:54

## 2020-02-14 RX ADMIN — METOPROLOL SUCCINATE 12.5 MG: 25 TABLET, EXTENDED RELEASE ORAL at 08:54

## 2020-02-14 RX ADMIN — MECLIZINE 12.5 MG: 12.5 TABLET ORAL at 05:29

## 2020-02-14 RX ADMIN — BUDESONIDE AND FORMOTEROL FUMARATE DIHYDRATE 2 PUFF: 80; 4.5 AEROSOL RESPIRATORY (INHALATION) at 08:59

## 2020-02-14 RX ADMIN — MECLIZINE 12.5 MG: 12.5 TABLET ORAL at 13:14

## 2020-02-14 RX ADMIN — ACETAMINOPHEN 650 MG: 325 TABLET ORAL at 08:44

## 2020-02-14 RX ADMIN — Medication 1 TABLET: at 08:54

## 2020-02-14 RX ADMIN — SENNOSIDES AND DOCUSATE SODIUM 1 TABLET: 8.6; 5 TABLET ORAL at 09:00

## 2020-02-14 RX ADMIN — ENOXAPARIN SODIUM 30 MG: 30 INJECTION SUBCUTANEOUS at 08:57

## 2020-02-14 RX ADMIN — Medication 100 MCG: at 08:54

## 2020-02-14 RX ADMIN — Medication 400 MG: at 08:54

## 2020-02-14 RX ADMIN — FAMOTIDINE 20 MG: 20 TABLET ORAL at 08:53

## 2020-02-14 NOTE — UTILIZATION REVIEW
Initial Clinical Review    Admission: Date/Time/Statement: Admission Orders (From admission, onward)     Ordered        02/13/20 0912  Place in Observation (expected length of stay for this patient is less than two midnights)  Once                   Orders Placed This Encounter   Procedures    Place in Observation (expected length of stay for this patient is less than two midnights)     Standing Status:   Standing     Number of Occurrences:   1     Order Specific Question:   Admitting Physician     Answer:   Catherine dAams [[id number]]     Order Specific Question:   Level of Care     Answer:   Med Surg [[id number]]     ED Arrival Information     Expected Arrival Acuity Means of Arrival Escorted By Service Admission Type    - 2/13/2020 07:17 Emergent Ambulance [facility] Emergency    Arrival Complaint    Dizziness        Chief Complaint   Patient presents with    Dizziness     pt c/o dizziness/lightheadedness w/nausea,sob and headache upon awakening around 0300 when going to the bathroom  denies v/d/fevers     Assessment/Plan: 80year old female to the ED from home via EMS with complaints of dizziness, lightheadedness with nausea, shortness of breath and headache  Admitted under observation for dizziness, asthma, hypertension  She has sinus pressure, tinnitus, dizziness  Patient reported that she woke up in the middle of the night and was trying to go to the bathroom and she was feeling dizzy and off balance  Patient reported that she has to hold onto things to get to the bathroom  Patient reported that she had dizziness in the past and is currently getting worked up by her neurologist   Patient had a recent MRI which was unremarkable  * Dizziness  Assessment & Plan  · Patient came to the hospital with sudden onset of dizziness  Patient reported that she gets dizzy and feels like the room is is spinning when she changes position  She also gets nausea and vomiting associated with it    History suggestive of benign positional vertigo  · Will admit the patient PT OT evaluation  · Meclizine RTC and p r n   Valium  · Patient had a CT scan head today which was unremarkable,  · Had a recent MRI by her neurologist which was also negative for any acute pathology  · May benefit from vestibular rehab     Asthma  Assessment & Plan  · Patient is on Symbicort and also on albuterol as an outpatient which we will continue  · Respiratory protocol     Hypertension  Assessment & Plan  · Blood pressure remained stable  · Monitor  · Continue with the outpatient medication     Coronary artery disease  Assessment & Plan  · Patient do not have any chest pain or any shortness of breath  · Continue with aspirin and metoprolol    ED Triage Vitals   Temperature Pulse Respirations Blood Pressure SpO2   02/13/20 0725 02/13/20 0725 02/13/20 0725 02/13/20 0725 02/13/20 0723   98 1 °F (36 7 °C) 79 18 (!) 178/86 98 %      Temp Source Heart Rate Source Patient Position - Orthostatic VS BP Location FiO2 (%)   02/13/20 0725 02/13/20 0725 02/13/20 0725 02/13/20 0725 --   Oral Monitor Lying Left arm       Pain Score       02/13/20 0725       No Pain        Wt Readings from Last 1 Encounters:   02/13/20 64 9 kg (143 lb)     Additional Vital Signs:  Date/Time  Temp  Pulse  Resp  BP  MAP (mmHg)  SpO2  O2 Device  Patient Position - Orthostatic VS   02/14/20 0525  97 6 °F (36 4 °C)  65  18  164/80  --  96 %  None (Room air)  Sitting   02/14/20 0054  97 4 °F (36 3 °C)Abnormal   65  16  148/69  --  97 %  None (Room air)  Lying   02/13/20 2350  --  --  --  --  --  96 %  --  --   02/13/20 2255  --  62  18  --  --  95 %  --  --   02/13/20 1950  --  58  20  120/64  --  95 %  None (Room air)  Lying   02/13/20 1342  97 5 °F (36 4 °C)  107Abnormal   30Abnormal   137/82  --  96 %  None (Room air)  Lying   02/13/20 1030  --  62  18  144/61  88  97 %  None (Room air)  Lying   02/13/20 0930  --  63  13  165/70  101  98 %  None (Room air)  Lying   02/13/20 0900 --  62  17  152/72  --  96 %  None (Room air)  Lying   02/13/20 0830  --  57  19  142/71  100  100 %  None (Room air)  Lying   02/13/20 0815  --  79  18  146/74  104  95 %         Pertinent Labs/Diagnostic Test Results:   CXR 2/13:  No acute cardiopulmonary disease  2/13 CT: No acute intracranial abnormality    Results from last 7 days   Lab Units 02/14/20  0526 02/13/20  1728 02/13/20  0741   WBC Thousand/uL 5 37  --  5 32   HEMOGLOBIN g/dL 11 9  --  11 8   HEMATOCRIT % 36 3  --  35 2   PLATELETS Thousands/uL 156 147* 161   NEUTROS ABS Thousands/µL 2 23  --  2 22         Results from last 7 days   Lab Units 02/14/20  0526 02/13/20  0741   SODIUM mmol/L 139 137   POTASSIUM mmol/L 4 4 3 9   CHLORIDE mmol/L 103 102   CO2 mmol/L 32 27   ANION GAP mmol/L 4 8   BUN mg/dL 10 12   CREATININE mg/dL 0 93 0 97   EGFR ml/min/1 73sq m 55 52   CALCIUM mg/dL 8 5 8 7   MAGNESIUM mg/dL 2 3  --      Results from last 7 days   Lab Units 02/14/20  0526 02/13/20  0741   AST U/L 31 29   ALT U/L 18 17   ALK PHOS U/L 45* 50   TOTAL PROTEIN g/dL 7 0 7 1   ALBUMIN g/dL 3 3* 3 5   TOTAL BILIRUBIN mg/dL 0 30 0 31         Results from last 7 days   Lab Units 02/14/20  0526 02/13/20  0741   GLUCOSE RANDOM mg/dL 83 97     Results from last 7 days   Lab Units 02/13/20  0741   TROPONIN I ng/mL <0 02         Results from last 7 days   Lab Units 02/13/20  0741   PROTIME seconds 12 3   INR  0 92   PTT seconds 33     Results from last 7 days   Lab Units 02/13/20  0741   LACTIC ACID mmol/L 1 1     ED Treatment:   Medication Administration from 02/13/2020 0717 to 02/14/2020 0806       Date/Time Order Dose Route Action     02/13/2020 0805 meclizine (ANTIVERT) tablet 25 mg 25 mg Oral Given     02/13/2020 0809 sodium chloride 0 9 % bolus 500 mL 500 mL Intravenous New Bag     02/13/2020 0805 ondansetron (ZOFRAN) injection 4 mg 4 mg Intravenous Given     02/13/2020 0807 diazepam (VALIUM) injection 2 5 mg 2 5 mg Intravenous Given     02/13/2020 1050 acetaminophen (TYLENOL) tablet 650 mg 650 mg Oral Given     02/13/2020 1306 aspirin chewable tablet 81 mg 81 mg Oral Given     02/13/2020 1728 budesonide-formoterol (SYMBICORT) 80-4 5 MCG/ACT inhaler 2 puff 2 puff Inhalation Given     02/13/2020 1307 budesonide-formoterol (SYMBICORT) 80-4 5 MCG/ACT inhaler 2 puff 2 puff Inhalation Given     02/13/2020 1305 cyanocobalamin (VITAMIN B-12) tablet 100 mcg 100 mcg Oral Given     02/13/2020 1727 famotidine (PEPCID) tablet 20 mg 20 mg Oral Given     02/13/2020 1306 famotidine (PEPCID) tablet 20 mg 20 mg Oral Given     02/13/2020 1305 magnesium oxide (MAG-OX) tablet 400 mg 400 mg Oral Given     02/13/2020 1333 metoprolol succinate (TOPROL-XL) 24 hr tablet 12 5 mg 12 5 mg Oral Given     02/13/2020 1306 multivitamin-minerals (CENTRUM) tablet 1 tablet 1 tablet Oral Given     02/13/2020 1305 pantoprazole (PROTONIX) EC tablet 40 mg 40 mg Oral Given     02/13/2020 1306 senna-docusate sodium (SENOKOT S) 8 6-50 mg per tablet 1 tablet 1 tablet Oral Given     02/13/2020 1309 enoxaparin (LOVENOX) subcutaneous injection 30 mg 30 mg Subcutaneous Given     02/14/2020 0529 meclizine (ANTIVERT) tablet 12 5 mg 12 5 mg Oral Given     02/13/2020 2254 meclizine (ANTIVERT) tablet 12 5 mg 12 5 mg Oral Given     02/13/2020 1306 meclizine (ANTIVERT) tablet 12 5 mg 12 5 mg Oral Given        Past Medical History:   Diagnosis Date    Cancer ([facility] [address] )     skin cancer    Cardiac disease     Neuropathy     Spinal stenosis      Admitting Diagnosis: Dizziness [R42]  Age/Sex: 80 y o  female  Admission Orders:  PT/OT  Scheduled Medications:    Medications:  aspirin 81 mg Oral Daily   budesonide-formoterol 2 puff Inhalation BID   cyanocobalamin 100 mcg Oral Daily   enoxaparin 30 mg Subcutaneous Daily   famotidine 20 mg Oral BID   magnesium oxide 400 mg Oral Daily   meclizine 12 5 mg Oral Q8H [address]   metoprolol succinate 12 5 mg Oral Daily   multivitamin-minerals 1 tablet Oral Daily   pantoprazole 40 mg Oral Daily   senna-docusate sodium 1 tablet Oral Daily     Continuous IV Infusions:     PRN Meds:    acetaminophen 650 mg Oral Q6H PRN   albuterol 2 puff Inhalation Q6H PRN   diazepam 2 mg Oral Q6H PRN   fluticasone 1 spray Nasal Daily PRN   nitroglycerin 0 4 mg Sublingual Q5 Min PRN   ondansetron 4 mg Intravenous Q6H PRN       Network Utilization Review Department  [email or web address]  ATTENTION: Please call with any questions or concerns to [phone] and carefully listen to the prompts so that you are directed to the right person  All voicemails are confidential   Taz Los all requests for admission clinical reviews, approved or denied determinations and any other requests to dedicated fax number below belonging to the campus where the patient is receiving treatment   List of dedicated fax numbers for the Facilities:  [address] DENIALS (Administrative/Medical Necessity) [phone]   [address] (Maternity/NICU/Pediatrics) [phone]   [facility] [phone]   [facility] [phone]   [facility] [phone]   Christean Half [phone]   1205 [facility] [address] [place] [address] [phone]   [facility]  [phone]   2205 [facility]  2401 [facility] 1000 [address] [facility] [phone]

## 2020-02-14 NOTE — ED NOTES
Tiger text sent to provider regarding pt request for referral for in home nursing services        Gypsy Stock RN  02/14/20 4575

## 2020-02-14 NOTE — SOCIAL WORK
CM met with patient at the bedside,baseline information was obtained  CM discussed the role of CM in helping the patient develop a discharge plan and assist the patient in carry out their plan  Pt lives alone in a 1 SH, 1 DENISA  Pt completes most ADLs and IADLs independently  Pt ambulates independently at home but does have a cane, rollator, and electric scooter for use in the community when she is feeling unsteady  Pt has a private caregiver (friend Umm Morton) that is with her M-W-F for 4 + hours and transport pt in the community any time she needs  Pt also has home therapy with [facility]  Pt states the owner comes bi-weekly and his assistant comes 3x's week  PCP: Dr Jerson Martin at [facility]: [address] [place] [address] in [address] [place] [address] Ave: Reymundo Santos (caregiver) [phone]  Pt states her children Jag Luis (son in [place]) [phone] and Marisol Frank (daughter in [place]) [phone] share POA  Pt caregiver Umm Morton at bedside will provide transportation home  CM spoke at length with caregiver Umm Morton states she is able to help pt as much as she needs however pt sometime declines extra help  CM discussed with pt a need for additional support  CM suggested additional help at home  Pt requesting [address]  [facility] referral was sent and scheduled for [facility] Monday, 02/16/20  CM scheduled a PCP appointment   All information has been added to the AVS

## 2020-02-14 NOTE — PHYSICAL THERAPY NOTE
PHYSICAL THERAPY EVALUATION  NAME:  Azul CACERES [facility]  DATE: 02/14/20    AGE:   80 y o  Mrn:   [id number]  ADMIT DX:  Dizziness [R42]    Past Medical History:   Diagnosis Date    Cancer [facility])     skin cancer    Cardiac disease     Neuropathy     Spinal stenosis      Length Of Stay: 0  Performed at least 2 patient identifiers during session: Name and Birthday  PHYSICAL THERAPY EVALUATION :      02/14/20 0808   Note Type   Note type Eval/Treat   Pain Assessment   Pain Assessment No/denies pain   Pain Score No Pain   Home Living   Type of Home House  ([facility])   Home Layout One level  (1 DENISA)   Bathroom Shower/Tub Walk-in shower   Bathroom Toilet Standard   Bathroom Equipment Shower chair;Grab bars in [address] [address] bars;Cane;Electric scooter  (rollator)   Additional Comments Pt reports not using AD within home, but has assistance outside of the home, but has "alot of canes"   Prior Function   Level of [place] Independent with ADLs and functional mobility   Lives With Alone   Receives Help From Family   ADL Assistance Independent   IADLs Needs assistance   Falls in the last 6 months 0   Comments Pt reports completing ADLs and functional mobility with no AD  Pt has an aide that comes 3x/wk for cleaning, community mobility, cooking PRN and S during showering  Restrictions/Precautions   Other Precautions Bed Alarm; Chair Alarm; Fall Risk;Telemetry;Multiple lines   Cognition   Orientation Level Oriented X4   Following Commands Follows all commands and directions without difficulty   RLE Assessment   RLE Assessment WFL   LLE Assessment   LLE Assessment WFL   Coordination   Movements are Fluid and Coordinated 1  (heel to shin and alternating ankle DF/PF B)   Light Touch   RLE Light Touch Grossly intact   LLE Light Touch Grossly intact   Bed Mobility   Supine to Sit 6  Modified independent   Additional items Increased time required   Sit to Supine 6  Modified independent   Additional items Increased time required   Additional Comments Pt supine with HOB flat without bedrails  Head rolls right and left without nystagmus or c/o dizziness  supine to long sit modified (I) with slight c/o dizziness  Sitting EOB with slight c/o dizziness resolving within 2'  With hip and trunk flexion reaching toward feet, patient without c/o dizziness  Transfers   Sit to Stand 3  Moderate assistance   Additional items Assist x 1; Increased time required;Verbal cues  (post lean upon standing, LOB inc c/o dizziness  no nystagmus)   Stand to Sit   (steadying assistance)   Additional items Increased time required;Verbal cues  (VCs for hand placement  no c/o dizziness with stand to sit)   Stand pivot 3  Moderate assistance   Additional items Verbal cues; Increased time required  (wide DYANA, reaching for external support)   Additional Comments No AD with transfers and ambulaiton  increased DYANA and pt reaching for external support due to decreasedbalance with c/o dizziness, but no nystagmus noted  Ambulation/Elevation   Gait pattern Excessively slow; Short stride; Wide DYANA  (guarded reaching for external support)   Gait Assistance 3  Moderate assist   Additional items Assist x 1;Verbal cues  (VCs for inc step length)   Assistive Device None   Distance 26' without AD    Balance   Static Sitting Fair +   Dynamic Sitting Fair   Static Standing Poor   Dynamic Standing Poor   Ambulatory Poor   Endurance Deficit   Endurance Deficit Yes   Endurance Deficit Description HR increasing from 90s to 120s at times  Activity Tolerance   Activity Tolerance Patient limited by fatigue   Medical Staff Made Aware Heather PUENTE   Nurse Made Aware RNStephen   Assessment   Prognosis Good   Problem List Decreased strength;Decreased endurance; Impaired balance;Decreased mobility; Impaired judgement;Decreased safety awareness   Barriers to Discharge Decreased caregiver support   Barriers to Discharge Comments Pending availablility of consistent assistance with mobility, recommend STR versus home with assistance and home PT   Goals   Patient Goals "To go home "   STG Expiration Date 02/24/20   PT Treatment Day 1   Plan   Treatment/Interventions Functional transfer training;LE strengthening/ROM; Elevations; Therapeutic exercise; Endurance training;Patient/family training;Equipment eval/education; Bed mobility;Gait training; Compensatory technique education;Spoke to nursing;Spoke to case management;OT   PT Frequency Other (Comment)  (3-5x/week)   Recommendation   Recommendation Home with family support;24 hour supervision/assist;Home PT; Short-term skilled PT  (Home versus STR pending availability of support)   Equipment Recommended   (pt has rollator; recommend walker)   Additional Comments [facility] 6 clicks 05/36 08/31/99 0810 02/14/20 0818   Vitals   Pulse 84 71   Heart Rate Source Monitor Monitor   Respirations 20  --    Blood Pressure 149/72  (supine) 155/69  (seated EOB)   MAP (mmHg)  --  99   BP Location Left arm Left arm   BP Method Automatic Automatic     (Please find full objective findings from PT assessment regarding body systems outlined above)  Assessment: Pt is a 80 y o  female seen for PT evaluation s/p admission to [address] [place] [address] on 2/13/2020 with Dizziness  CT head (-)  Order placed for PT services  Upon evaluation: Pt is presenting with impaired functional mobility due to decreased strength, decreased endurance, impaired balance, gait deviations, decreased safety awareness, impaired judgment and fall risk requiring moderate assistance for transfers and moderate assistance for ambulation with out AD   Pt's clinical presentation is currently unstable/unpredictable given the functional mobility deficits above, especially weakness, decreased endurance, gait deviations, decreased activity tolerance, decreased functional mobility tolerance, decreased safety awareness, impaired judgement and decreased balance, coupled with fall risks as indicated by AM-PAC 6-Clicks: 65/45 as well as impaired balance, polypharmacy, impaired judgement and decreased safety awareness and combined with medical complications of tachycardia, abnormal CBC, fear/retreat and need for input for mobility technique/safety  Pt's PMHx and comorbidities that may affect physical performance and progress include: HTN, asthma, CAD, cancer history and/or treatment, neuropathy and spinal stenosis  Personal factors affecting pt at time of IE include: limited home support, advanced age, inability to perform IADLs, inability to perform ADLs, inability to navigate level surfaces without external assistance and inability to ambulate household distances  Pt will benefit from continued skilled PT services to address deficits as defined above and to maximize level of functional mobility to facilitate return toward PLOF and improved QOL  From PT/mobility standpoint, recommendation at time of d/c would be Home PT vs  STR, home with family support and with walker pending progress in order to reduce fall risk and maximize pt's functional independence and consistency with mobility in order to facilitate return to PLOF  Recommend trial with walker next 1-2 sessions and ther ex next 1-2 sessions  Goals: Pt will: Pt will perform transfers with modified I to increase Indep in home environment, decrease burden of care and decrease risk for falls and prepare for ambulation  Pt will ambulate with least restrictive AD for >/= 200' with  modified I  to increase Indep in home environment, decrease burden of care and decrease risk for falls and to access home environment  Pt will complete >/= 4 steps with with unilateral handrail with Supervision to access community environment  Theron Damon, PT,DPT         PHYSICAL THERAPY TREATMENT NOTE  Time In:0830  Time [id number]  Total Time: 8'      S:  "I reach for furniture at home "  O:  Sit to stand with minAx1 with min cues for hand placement for safety with RW  Wide DYANA  Dynamic standing balance with steadying assistance with 1 UE support  Ambulated 100' with RW with steadying assistance to stand by assistance with increased path deviation and minimal cues for increased step length  Stand to sit with steadying assistance with min cues for hand placement  BP at end of session at 152/72  Patient without c/o dizziness during ambulation, but unsteadiness  A:  Pt was able to ambulate increased distance with decreased assistance with use of RW compared to no AD  She had minimal c/o dizziness with transfers during treatment and requires cues for posture, balance and improved gait pattern  She is limited by decreased balance, strength, endurance and safety  She will require consistent assistance and use of RW should she return to home, however would benefit from STR upon D/C for safe D/C and to maximize LOF prior to return to home     P:  Recommend addition of therapeutic exercises to current functional mobility program      Wendy Wilsl, PT, DPT

## 2020-02-14 NOTE — PLAN OF CARE
Problem: OCCUPATIONAL THERAPY ADULT  Goal: Performs self-care activities at highest level of function for planned discharge setting  See evaluation for individualized goals  Description  Treatment Interventions: ADL retraining, Functional transfer training, UE strengthening/ROM, Endurance training, Patient/family training, Equipment evaluation/education, Compensatory technique education, Continued evaluation, Energy conservation, Activityengagement          See flowsheet documentation for full assessment, interventions and recommendations  Note:   Limitation: Decreased ADL status, Decreased UE strength, Decreased Safe judgement during ADL, Decreased endurance, Decreased self-care trans, Decreased high-level ADLs  Prognosis: Good  Assessment: Pt is an 81 y/o F admitted to [address] [place] [address] 2/13/2020 d/t experiencing increase dizziness  Dx: dizziness  Pt with PMHx impacting performance during functional tasks including: neuropathy, spinal stenosis, cardiac disease, skin CA  Pt reports living in a 1 story home with 1 DENISA in [facility]  Pt has a walk-in shower with grab bars and a shower chair  Pt reports not utilizing any AD while ambulating in the home, "I use furniture for support"  Pt reports completing ADLs and functional mobility @ I  Pt has an aide that comes in 3x/wk to assist with IADLs, S for bathing, and community mobility  On evaluation, pt A&Ox4  Pt completing bed mobility @ Mod I  Pt sitting EOB while maintaining F+/G- balance  Pt completing donning socks @ S with reports of dizziness when leaning over  Pt completing LB dressing @ Min A d/t Pt being unsteady when standing and requiring assistance for balance as well as for CM  STS from EOB @ Mod A d/t posterior LOB and requiring Mod A to self-correct and regain balance  For remainder of treatment session, please refer to treatment note  Pt scoring 50/100 on Barthel Index   Pt presents with decrease activity tolerance, decrease standing balance, decrease sitting balance, decrease performance during ADL tasks, decrease safety awareness , decrease UB MS, decrease generalized strength, decrease activity engagement and decrease performance during functional transfers     Recommendation: ([place] OT with 24/7 assistance d/t significant dizziness)  OT Discharge Recommendation: [facility] with 24/7 assistance d/t significant dizziness)

## 2020-02-14 NOTE — PLAN OF CARE
Problem: PHYSICAL THERAPY ADULT  Goal: Performs mobility at highest level of function for planned discharge setting  See evaluation for individualized goals  Description  Treatment/Interventions: Functional transfer training, LE strengthening/ROM, Elevations, Therapeutic exercise, Endurance training, Patient/family training, Equipment eval/education, Bed mobility, Gait training, Compensatory technique education, Spoke to nursing, Spoke to case management, OT  Equipment Recommended: (pt has rollator; recommend walker)       See flowsheet documentation for full assessment, interventions and recommendations  Note:   Prognosis: Good  Problem List: Decreased strength, Decreased endurance, Impaired balance, Decreased mobility, Impaired judgement, Decreased safety awareness  Assessment: Pt is a 80 y o  female seen for PT evaluation s/p admission to [address] [place] [address] on 2/13/2020 with Dizziness  CT head (-)  Order placed for PT services  Upon evaluation: Pt is presenting with impaired functional mobility due to decreased strength, decreased endurance, impaired balance, gait deviations, decreased safety awareness, impaired judgment and fall risk requiring moderate assistance for transfers and moderate assistance for ambulation with out AD  Pt's clinical presentation is currently unstable/unpredictable given the functional mobility deficits above, especially weakness, decreased endurance, gait deviations, decreased activity tolerance, decreased functional mobility tolerance, decreased safety awareness, impaired judgement and decreased balance, coupled with fall risks as indicated by AM-PAC 6-Clicks: 39/29 as well as impaired balance, polypharmacy, impaired judgement and decreased safety awareness and combined with medical complications of tachycardia, abnormal CBC, fear/retreat and need for input for mobility technique/safety    Pt's PMHx and comorbidities that may affect physical performance and progress include: HTN, asthma, CAD, cancer history and/or treatment, neuropathy and spinal stenosis  Personal factors affecting pt at time of IE include: limited home support, advanced age, inability to perform IADLs, inability to perform ADLs, inability to navigate level surfaces without external assistance and inability to ambulate household distances  Pt will benefit from continued skilled PT services to address deficits as defined above and to maximize level of functional mobility to facilitate return toward PLOF and improved QOL  From PT/mobility standpoint, recommendation at time of d/c would be Home PT vs  STR, home with family support and with walker pending progress in order to reduce fall risk and maximize pt's functional independence and consistency with mobility in order to facilitate return to PLOF  Recommend trial with walker next 1-2 sessions and ther ex next 1-2 sessions  Barriers to Discharge: Decreased caregiver support  Barriers to Discharge Comments: Pending availablility of consistent assistance with mobility, recommend STR versus home with assistance and home PT  Recommendation: Home with family support, 24 hour supervision/assist, Home PT, Short-term skilled PT(Home versus STR pending availability of support)          See flowsheet documentation for full assessment

## 2020-02-14 NOTE — OCCUPATIONAL THERAPY NOTE
Occupational Therapy Evaluation   Evaluation- [id number]  Treatment- 5722-4793  Patient Name: Umm ORRBBMARVIN Date: 2/14/2020  Problem List  Principal Problem:    Dizziness  Active Problems:    Coronary artery disease    Hypertension    Asthma    Past Medical History  Past Medical History:   Diagnosis Date    Cancer [facility])     skin cancer    Cardiac disease     Neuropathy     Spinal stenosis      Past Surgical History  Past Surgical History:   Procedure Laterality Date    APPENDECTOMY      BACK SURGERY      CATARACT EXTRACTION W/  INTRAOCULAR LENS IMPLANT Bilateral     CORONARY ANGIOPLASTY WITH STENT PLACEMENT  09/23/2011    REPLACEMENT TOTAL KNEE BILATERAL             02/14/20 8952   Note Type   Note type Eval/Treat   Restrictions/Precautions   Other Precautions Fall Risk; Chair Alarm; Bed Alarm   Pain Assessment   Pain Assessment No/denies pain   Pain Score No Pain   Home Living   Type of Home House  ([place])   Home Layout One level  (1 [address]ISA)   Bathroom Shower/Tub Walk-in shower   Bathroom Toilet Standard   Bathroom Equipment Shower chair;Grab bars in [address] [address] bars; Walker;Cane;Electric scooter  (rollator)   Additional Comments Pt reports living in a 1 story home with 1 DENISA  Pt reports not utilizing AD in home   Prior Function   Level of Gantt Independent with ADLs and functional mobility   Lives With Alone   Receives Help From Family   ADL Assistance Independent   IADLs Needs assistance   Falls in the last 6 months 0   Comments Pt reports completing ADLs and functional mobility with no AD  Pt has an aide that comes 3x/wk for cleaning, community mobility, cooking PRN and S during showering  Lifestyle   Autonomy Pt reports being I with ADLs and functional mobility   Reciprocal Relationships Pt reports living alone but has assistance from aides      Service to Others P is currently retired   Intrinsic Gratification Pt enjoys riding around [employer] on her scooting    ADL Grooming Assistance   (CGA )   Grooming Deficit Steadying;Verbal cueing;Supervision/safety; Increased time to complete;Standing with assistive device   LB Dressing Assistance 4  Minimal Assistance   LB Dressing Deficit Steadying; Requires assistive device for steadying;Verbal cueing;Supervision/safety; Increased time to complete;Pull up over hips   Toileting Assistance  4  Minimal Assistance   Toileting Deficit Steadying;Verbal cueing;Supervison/safety; Increased time to complete;Clothing management up;Clothing management down;Perineal hygiene   Bed Mobility   Supine to Sit 6  Modified independent   Sit to Supine 6  Modified independent   Transfers   Sit to Stand 3  Moderate assistance   Additional items Increased time required;Verbal cues  (posterior LOB d/t "dizziness")   Stand to Sit 5  Supervision   Stand pivot 3  Moderate assistance   Additional items Increased time required;Verbal cues  (Mod A progressing to Min A)   Additional Comments Pt experiencing increased dizziness during functional transfers and during transitions    Functional Mobility   Functional Mobility 4  Minimal assistance  (Mod A progressing to Min A)   Additional Comments Mod with no AD  Min with RW   Balance   Static Sitting Fair +   Dynamic Sitting Fair   Static Standing Poor +   Dynamic Standing Poor   Activity Tolerance   Activity Tolerance Patient limited by fatigue   Medical Staff Made Aware Spoke with PTSudha   Nurse Made Aware Spoke with RN, Homa Manzanares Assessment   RUE Assessment WFL   LUE Assessment   LUE Assessment WFL   Hand Function   Gross Motor Coordination Functional   Fine Motor Coordination Functional   Cognition   Overall Cognitive Status WFL   Arousal/Participation Alert; Responsive; Cooperative   Attention Within functional limits   Orientation Level Oriented X4   Memory Within functional limits   Following Commands Follows all commands and directions without difficulty   Assessment   Limitation Decreased ADL status; Decreased UE strength;Decreased Safe judgement during ADL;Decreased endurance;Decreased self-care trans;Decreased high-level ADLs   Prognosis Good   Assessment Pt is an 79 y/o F admitted to [address] [place] [address] 2/13/2020 d/t experiencing increase dizziness  Dx: dizziness  Pt with PMHx impacting performance during functional tasks including: neuropathy, spinal stenosis, cardiac disease, skin CA  Pt reports living in a 1 story home with 1 DENISA in [facility]  Pt has a walk-in shower with grab bars and a shower chair  Pt reports not utilizing any AD while ambulating in the home, "I use furniture for support"  Pt reports completing ADLs and functional mobility @ I  Pt has an aide that comes in 3x/wk to assist with IADLs, S for bathing, and community mobility  On evaluation, pt A&Ox4  Pt completing bed mobility @ Mod I  Pt sitting EOB while maintaining F+/G- balance  Pt completing donning socks @ S with reports of dizziness when leaning over  Pt completing LB dressing @ Min A d/t Pt being unsteady when standing and requiring assistance for balance as well as for CM  STS from EOB @ Mod A d/t posterior LOB and requiring Mod A to self-correct and regain balance  Pt's vitals were charted t/o evaluation, BP consistently around 152/72  For remainder of treatment session, please refer to treatment note  Pt scoring 50/100 on Barthel Index  Pt presents with decrease activity tolerance, decrease standing balance, decrease sitting balance, decrease performance during ADL tasks, decrease safety awareness , decrease UB MS, decrease generalized strength, decrease activity engagement and decrease performance during functional transfers  Plan   Treatment Interventions ADL retraining;Functional transfer training;UE strengthening/ROM; Endurance training;Patient/family training;Equipment evaluation/education; Compensatory technique education;Continued evaluation; Energy conservation; Activityengagement   Goal Expiration Date 02/24/20   OT Treatment Day 1   OT Frequency 3-5x/wk   Additional Treatment Session   Start Time 1821   End Time 1438   Treatment Assessment Pt seen for treatment session #1 this date  Pt alert and agreeable to participate at this time  Pt completing SPT with use of RW @ Min A for balance, safety, and technique  Pt completing short distance ambulation to restroom with use of RW @ Min A  Pt completing toileting @ Min A for CM, thorough posterior pericare and balance  Pt then completing hygiene while standing at sinkside @ CGA with UB support PRN  Pt has potential to make good progress towards goals, although is limited by decrease activity tolerance, decrease standing balance, decrease safety awareness , decrease UB MS, decrease generalized strength, decrease activity engagement and decrease performance during functional transfers, increased dizziness  Pt would benefit from continued acute OT services to address deficits as well as  OT with 24/7 assistance upon d/t from [address] [place] [address] d/t Pt experiencing LOB on multiple occasions d/t dizziness  If dizziness is resolved, Pt may return home with previous assistance  Additional Treatment Day 1   Recommendation   Recommendation   ([facility] OT with 24/7 assistance d/t significant dizziness)   OT Discharge Recommendation   ([facility] OT with 24/7 assistance d/t significant dizziness)   Barthel Index   Feeding 10   Bathing 0   Grooming Score 5   Dressing Score 5   Bladder Score 5   Bowels Score 10   Toilet Use Score 5   Transfers (Bed/Chair) Score 10   Mobility (Level Surface) Score 0   Stairs Score 0   Barthel Index Score 50     Pt goals to bet met by 2/24/2020    Pt will demonstrate ability to complete LB dressing @ Mod I in order to increase safety and independence during meaningful tasks  1  Pt will demonstrate ability to kolton/doff socks/shoes while sitting EOB @ I in order to increase safety and independence during meaningful tasks     2  Pt will demonstrate ability to complete toileting tasks including CM and pericare @ Mod I in order to increase safety and independence during meaningful tasks  3  Pt will demonstrate ability to complete EOB, chair, toilet/commode transfers @ Mod I in order to increase performance and participation during functional tasks  4  Pt will demonstrate ability to stand for 20 minutes while maintaining G balance with use of RW for UB support PRN  5  Pt will demonstrate ability to complete light housekeeping IADL tasks @ Mod I with use of AD for safety and balance in order to return home at [facility]  6  Pt will demonstrate ability to tolerate 30-35 minute OT session with no vc'ing for deep breathing or use of energy conservation techniques in order to increase activity tolerance during functional tasks  7  Pt will demonstrate Good carryover of use of energy conservation/compensatory strategies during ADLs and functional tasks in order to increase safety and reduce risk for falls  8  Pt will demonstrate Good attention and participation in continued evaluation of functional ambulation house hold distances in order to assist with safe d/c planning  9  Pt will attend to continued cognitive assessments 100% of the time in order to provide most appropriate d/c recommendations  10  Pt will demonstrate 100% carryover of BUE HEP in order to increase BUE MS and increase performance during functional tasks upon d/c home      Yvonne Ontiveros OTR/L

## 2020-02-14 NOTE — ED NOTES
Provided pt with OptiChamber device to use with symbicort metered dose inhaler  Demonstrated and gave verbal instruction on the proper use  Pt verbalized understanding and demonstrated proper use of the device        Jane Gavin RN  02/14/20 5816

## 2020-02-14 NOTE — RESPIRATORY THERAPY NOTE
RT Protocol Note  Westley Older Mimm 80 y o  female MRN: [id number]  Unit/Bed#: ED 11 Encounter: [id number]    Assessment    Principal Problem:    Dizziness  Active Problems:    Coronary artery disease    Hypertension    Asthma      Home Pulmonary Medications:  MDI for rescue       Past Medical History:   Diagnosis Date    Cancer ([address] )     skin cancer    Cardiac disease     Neuropathy     Spinal stenosis      Social History     Socioeconomic History    Marital status:       Spouse name: None    Number of children: None    Years of education: None    Highest education level: None   Occupational History    None   Social Needs    Financial resource strain: None    Food insecurity:     Worry: None     Inability: None    Transportation needs:     Medical: None     Non-medical: None   Tobacco Use    Smoking status: Never Smoker    Smokeless tobacco: Never Used   Substance and Sexual Activity    Alcohol use: Yes     Comment: occassionally    Drug use: Never    Sexual activity: None   Lifestyle    Physical activity:     Days per week: None     Minutes per session: None    Stress: None   Relationships    Social connections:     Talks on phone: None     Gets together: None     Attends Hoahaoism service: None     Active member of club or organization: None     Attends meetings of clubs or organizations: None     Relationship status: None    Intimate partner violence:     Fear of current or ex partner: None     Emotionally abused: None     Physically abused: None     Forced sexual activity: None   Other Topics Concern    None   Social History Narrative    None       Subjective         Objective    Physical Exam:   Assessment Type: Assess only  General Appearance: Awake, Alert  Respiratory Pattern: Normal, Dyspnea with exertion  Chest Assessment: Chest expansion symmetrical, Trachea midline  Bilateral Breath Sounds: Clear  Location Specific: No  Cough: Non-productive    Vitals:  Blood pressure 120/64, pulse 62, temperature 97 5 °F (36 4 °C), resp  rate 18, height 5' 3 5" (1 613 m), weight 64 9 kg (143 lb), SpO2 95 %  Imaging and other studies: I have personally reviewed pertinent reports  Plan    Respiratory Plan: Home Bronchodilator Patient pathway        Resp Comments: Pt stable on room air - states Hx of COPD with rescue MDI  No home O2  Mild cough

## 2020-02-14 NOTE — ED NOTES
Patient ambulating to and from bathroom with assistance, patient very unsteady on feet       Octavia Cockayne, RN  02/14/20 0783

## 2020-02-14 NOTE — DISCHARGE INSTRUCTIONS
Dizziness, Ambulatory Care   GENERAL INFORMATION:   Dizziness  is a term used to describe a feeling of being off balance or unsteady  Common causes of dizziness are an inner ear fluid imbalance or a lack of oxygen in your blood  Your dizziness may be acute (lasts 3 days or less) or chronic (lasts longer than 3 days)  You may have dizzy spells that last from seconds to a few hours  Common symptoms related to dizziness include the following:   · A feeling that your surroundings are moving even though you are standing still    · Ringing in your ears or hearing loss     · Feeling faint or lightheaded     · Weakness or unsteadiness     · Double vision or eye movements you cannot control    · Nausea or vomiting     · Confusion  Seek immediate care for the following symptoms:   · You have a headache that does not go away with medicine  · You have shaking chills and a fever  · You vomit over and over with no relief  · Your vomit or bowel movements are red or black  · You have pain in your chest, back, or abdomen  · You have numbness, especially in your face, arms, or legs  · You have trouble moving your arms or legs  Treatment for dizziness  depends on the cause of your dizziness  You may need medicines to decrease your dizziness or nausea  You may need to be admitted to the hospital for treatment  Manage your symptoms:  Get up slowly from sitting or lying down  Do not drive or operate machinery when you are dizzy  Follow up with your healthcare provider as directed:  Write down your questions so you remember to ask them during your visits  CARE AGREEMENT:   You have the right to help plan your care  Learn about your health condition and how it may be treated  Discuss treatment options with your caregivers to decide what care you want to receive  You always have the right to refuse treatment  The above information is an  only   It is not intended as medical advice for individual conditions or treatments  Talk to your doctor, nurse or pharmacist before following any medical regimen to see if it is safe and effective for you  © 2014 [address] is for End User's use only and may not be sold, redistributed or otherwise used for commercial purposes  All illustrations and images included in CareNotes® are the copyrighted property of A D A M , Inc  or Rafi Jaylen  Dizziness   WHAT YOU NEED TO KNOW:   Dizziness is a feeling of being off balance or unsteady  Common causes of dizziness are an inner ear fluid imbalance or a lack of oxygen in your blood  Dizziness may be acute (lasts 3 days or less) or chronic (lasts longer than 3 days)  You may have dizzy spells that last from seconds to a few hours  DISCHARGE INSTRUCTIONS:   Return to the emergency department if:   · You have a headache and a stiff neck  · You have shaking chills and a fever  · You vomit over and over with no relief  · Your vomit or bowel movements are red or black  · You have pain in your chest, back, or abdomen  · You have numbness, especially in your face, arms, or legs  · You have trouble moving your arms or legs  · You are confused  Contact your healthcare provider if:   · You have a fever  · Your symptoms do not get better with treatment  · You have questions or concerns about your condition or care  Manage your symptoms:   · Do not drive  or operate heavy machinery when you are dizzy  · Get up slowly  from sitting or lying down  · Drink plenty of liquids  Liquids help prevent dehydration  Ask how much liquid to drink each day and which liquids are best for you  Follow up with your healthcare provider as directed:  Write down your questions so you remember to ask them during your visits     © 2017 [address] Information is for End User's use only and may not be sold, redistributed or otherwise used for commercial purposes  All illustrations and images included in CareNotes® are the copyrighted property of A D A M , Inc  or [employer]i York  The above information is an  only  It is not intended as medical advice for individual conditions or treatments  Talk to your doctor, nurse or pharmacist before following any medical regimen to see if it is safe and effective for you

## 2020-02-14 NOTE — ED NOTES
Pt offered shower, pt refused  Pt provided with personal hygiene items at the bedside        Salty Caldera RN  02/14/20 7204

## 2020-02-14 NOTE — PLAN OF CARE
Problem: PHYSICAL THERAPY ADULT  Goal: Performs mobility at highest level of function for planned discharge setting  See evaluation for individualized goals  Description  Treatment/Interventions: Functional transfer training, LE strengthening/ROM, Elevations, Therapeutic exercise, Endurance training, Patient/family training, Equipment eval/education, Bed mobility, Gait training, Compensatory technique education, Spoke to nursing, Spoke to case management, OT  Equipment Recommended: (pt has rollator; recommend walker)       See flowsheet documentation for full assessment, interventions and recommendations  3/75/0147 [id number] by Lizabeth Deng PT  Outcome: Progressing  Note:   Prognosis: Good  Problem List: Decreased strength, Decreased endurance, Impaired balance, Decreased mobility, Impaired judgement, Decreased safety awareness  Assessment: Pt is a 80 y o  female seen for PT evaluation s/p admission to [address] [place] [address] on 2/13/2020 with Dizziness  CT head (-)  Order placed for PT services  Upon evaluation: Pt is presenting with impaired functional mobility due to decreased strength, decreased endurance, impaired balance, gait deviations, decreased safety awareness, impaired judgment and fall risk requiring moderate assistance for transfers and moderate assistance for ambulation with out AD  Pt's clinical presentation is currently unstable/unpredictable given the functional mobility deficits above, especially weakness, decreased endurance, gait deviations, decreased activity tolerance, decreased functional mobility tolerance, decreased safety awareness, impaired judgement and decreased balance, coupled with fall risks as indicated by AM-PAC 6-Clicks: 11/92 as well as impaired balance, polypharmacy, impaired judgement and decreased safety awareness and combined with medical complications of tachycardia, abnormal CBC, fear/retreat and need for input for mobility technique/safety    Pt's PMHx and comorbidities that may affect physical performance and progress include: HTN, asthma, CAD, cancer history and/or treatment, neuropathy and spinal stenosis  Personal factors affecting pt at time of IE include: limited home support, advanced age, inability to perform IADLs, inability to perform ADLs, inability to navigate level surfaces without external assistance and inability to ambulate household distances  Pt will benefit from continued skilled PT services to address deficits as defined above and to maximize level of functional mobility to facilitate return toward PLOF and improved QOL  From PT/mobility standpoint, recommendation at time of d/c would be Home PT vs  STR, home with family support and with walker pending progress in order to reduce fall risk and maximize pt's functional independence and consistency with mobility in order to facilitate return to PLOF  Recommend trial with walker next 1-2 sessions and ther ex next 1-2 sessions  Barriers to Discharge: Decreased caregiver support  Pt was able to ambulate increased distance with decreased assistance with use of RW compared to no AD  She had minimal c/o dizziness with transfers during treatment and requires cues for posture, balance and improved gait pattern  She is limited by decreased balance, strength, endurance and safety  She will require consistent assistance and use of RW should she return to home, however would benefit from STR upon D/C for safe D/C and to maximize LOF prior to return to home  Recommendation: Home with family support, 24 hour supervision/assist, Home PT, Short-term skilled PT(Home versus STR pending availability of support)          See flowsheet documentation for full assessment

## 2020-02-15 NOTE — ASSESSMENT & PLAN NOTE
· Patient came to the hospital with sudden onset of dizziness  Patient reported that she gets dizzy and feels like the room is is spinning when she changes position  She also gets nausea and vomiting associated with it  History suggestive of benign positional vertigo  · PT evaluation appreciated-recommending rehab but the patient adamant about going home  · Recommended outpatient follow-up with the Neurology and arranging vestibular rehab through the primary care physician  · Symptomatically improved-recommended to take meclizine around the clock for the next 2-3 days and then make it p r n    · Patient reported that there will be with her 247 until she feels better - Currently on immunotherapy monthly with nivolumab, seems to have possible disease progression  -as per onc, plan for outpatient follow up

## 2020-02-15 NOTE — DISCHARGE SUMMARY
Discharge- Erika Rawls [facility] 1/11/1932, 80 y o  female MRN: [id number]    Unit/Bed#: ED 11 Encounter: [id number]    Primary Care Provider: Chavo Ayon MD   Date and time admitted to hospital: 2/13/2020  7:17 AM        * Dizziness  Assessment & Plan  · Patient came to the hospital with sudden onset of dizziness  Patient reported that she gets dizzy and feels like the room is is spinning when she changes position  She also gets nausea and vomiting associated with it  History suggestive of benign positional vertigo  · PT evaluation appreciated-recommending rehab but the patient adamant about going home  · Recommended outpatient follow-up with the Neurology and arranging vestibular rehab through the primary care physician  · Symptomatically improved-recommended to take meclizine around the clock for the next 2-3 days and then make it p r n    · Patient reported that there will be with her 200 until she feels better    Asthma  Assessment & Plan  · Patient is on Symbicort and also on albuterol as an outpatient which we will continue  · Respiratory protocol    Hypertension  Assessment & Plan  · Blood pressure remained stable  · Monitor  · Continue with the outpatient medication    Coronary artery disease  Assessment & Plan  · Patient do not have any chest pain or any shortness of breath  · Continue with aspirin and metoprolol        Discharging Physician / Practitioner: Shahla Wright MD  PCP: Chavo Ayon MD  Admission Date:   Admission Orders (From admission, onward)     Ordered        02/13/20 0912  Place in Observation (expected length of stay for this patient is less than two midnights)  Once                   Discharge Date:  2/14/2020    Resolved Problems  Date Reviewed: 2/15/2020    None          Consultations During Hospital Stay:  ·     Procedures Performed:   ·     Significant Findings / Test Results:   · CT head-no acute intracranial pathology  · Chest  x-ray-no acute cardiopulmonary disease    Incidental Findings:   ·     Test Results Pending at Discharge (will require follow up):   ·      Outpatient Tests Requested:  ·     Complications:   none    Reason for Admission:  Dizziness    Hospital Course:     Zully Burden is a 80 y o  female patient who originally presented to the hospital on 2/13/2020 due to dizziness  Patient reported that she woke up from sleep and she was feeling dizzy and lightheaded and feels like the room was spinning  Patient was also having difficulty ambulating because of the room spinning sensation  Patient also vomited once before coming to the hospital from the dizziness  Her symptoms were suggestive of benign positional vertigo  Patient had a CT scan which was unremarkable  Patient with history of chronic dizziness and is followed by neurologist as an outpatient she had a recent MRI done about a week ago which was unremarkable  Patient was started on meclizine with improvement in her symptoms  PT evaluated the patient and recommended rehabilitation but patient was insisting on going home with caregivers at home  Patient need to follow-up with the PCP and Neurology as an outpatient for further management recommended to get vestibular rehabilitation done through the primary care physician  Patient remained hemodynamically stable and afebrile for details refer to the chart        Please see above list of diagnoses and related plan for additional information  Condition at Discharge: good     Discharge Day Visit / Exam:     Subjective:  Patient seen and examined  Patient reported that she is feeling better    Wants to go home rather than going to the rehab  Vitals: Blood Pressure: (!) 173/95 (02/14/20 1525)  Pulse: 64 (02/14/20 1525)  Temperature: 97 6 °F (36 4 °C) (02/14/20 0525)  Temp Source: Oral (02/14/20 0525)  Respirations: 20 (02/14/20 1525)  Height: 5' 3 5" (161 3 cm) (02/13/20 0725)  Weight - Scale: 64 9 kg (143 lb) (02/13/20 0725)  SpO2: 96 % (02/14/20 1525)  Exam: Physical Exam   Constitutional: She appears well-developed  No distress  HENT:   Head: Normocephalic and atraumatic  Eyes: Right eye exhibits no discharge  Left eye exhibits no discharge  Neck: No JVD present  Cardiovascular: Normal rate and regular rhythm  No murmur heard  Pulmonary/Chest: Effort normal  No respiratory distress  Abdominal: Soft  Musculoskeletal: Normal range of motion  Neurological: She is alert  Skin: Skin is warm  Discussion with Family:  Patient    Discharge instructions/Information to patient and family:   See after visit summary for information provided to patient and family  Provisions for Follow-Up Care:  See after visit summary for information related to follow-up care and any pertinent home health orders  Disposition:     Home    For Discharges to [facility]:   · Not Applicable to this Patient - Not Applicable to this Patient    Planned Readmission:  none     Discharge Statement:  I spent  45  minutes discharging the patient  This time was spent on the day of discharge  I had direct contact with the patient on the day of discharge  Greater than 50% of the total time was spent examining patient, answering all patient questions, arranging and discussing plan of care with patient as well as directly providing post-discharge instructions  Additional time then spent on discharge activities  Discharge Medications:  See after visit summary for reconciled discharge medications provided to patient and family        ** Please Note: This note has been constructed using a voice recognition system **

## 2020-06-24 ENCOUNTER — HOSPITAL ENCOUNTER (EMERGENCY)
Facility: HOSPITAL | Age: 85
Discharge: HOME | End: 2020-06-24
Attending: EMERGENCY MEDICINE | Admitting: EMERGENCY MEDICINE
Payer: MEDICARE

## 2020-06-24 VITALS
HEART RATE: 85 BPM | WEIGHT: 143.08 LBS | DIASTOLIC BLOOD PRESSURE: 60 MMHG | SYSTOLIC BLOOD PRESSURE: 136 MMHG | OXYGEN SATURATION: 96 % | RESPIRATION RATE: 18 BRPM | BODY MASS INDEX: 24.95 KG/M2 | TEMPERATURE: 98.7 F

## 2020-06-24 DIAGNOSIS — N39.0 UTI (URINARY TRACT INFECTION): Primary | ICD-10-CM

## 2020-06-24 DIAGNOSIS — E87.1 HYPONATREMIA: ICD-10-CM

## 2020-06-24 LAB
ALBUMIN SERPL BCP-MCNC: 3.4 G/DL (ref 3.5–5)
ALP SERPL-CCNC: 48 U/L (ref 46–116)
ALT SERPL W P-5'-P-CCNC: 17 U/L (ref 12–78)
ANION GAP SERPL CALCULATED.3IONS-SCNC: 5 MMOL/L (ref 4–13)
APTT PPP: 33 SECONDS (ref 23–37)
AST SERPL W P-5'-P-CCNC: 23 U/L (ref 5–45)
BACTERIA UR QL AUTO: ABNORMAL /HPF
BASOPHILS # BLD AUTO: 0.07 THOUSANDS/ΜL (ref 0–0.1)
BASOPHILS NFR BLD AUTO: 1 % (ref 0–1)
BILIRUB SERPL-MCNC: 0.42 MG/DL (ref 0.2–1)
BILIRUB UR QL STRIP: NEGATIVE
BUN SERPL-MCNC: 11 MG/DL (ref 5–25)
CALCIUM SERPL-MCNC: 9 MG/DL (ref 8.3–10.1)
CHLORIDE SERPL-SCNC: 97 MMOL/L (ref 100–108)
CLARITY UR: CLEAR
CO2 SERPL-SCNC: 30 MMOL/L (ref 21–32)
COLOR UR: YELLOW
CREAT SERPL-MCNC: 1.12 MG/DL (ref 0.6–1.3)
EOSINOPHIL # BLD AUTO: 0.45 THOUSAND/ΜL (ref 0–0.61)
EOSINOPHIL NFR BLD AUTO: 8 % (ref 0–6)
ERYTHROCYTE [DISTWIDTH] IN BLOOD BY AUTOMATED COUNT: 13 % (ref 11.6–15.1)
GFR SERPL CREATININE-BSD FRML MDRD: 44 ML/MIN/1.73SQ M
GLUCOSE SERPL-MCNC: 97 MG/DL (ref 65–140)
GLUCOSE UR STRIP-MCNC: NEGATIVE MG/DL
HCT VFR BLD AUTO: 33 % (ref 34.8–46.1)
HGB BLD-MCNC: 11.1 G/DL (ref 11.5–15.4)
HGB UR QL STRIP.AUTO: NEGATIVE
IMM GRANULOCYTES # BLD AUTO: 0.02 THOUSAND/UL (ref 0–0.2)
IMM GRANULOCYTES NFR BLD AUTO: 0 % (ref 0–2)
INR PPP: 0.98 (ref 0.84–1.19)
KETONES UR STRIP-MCNC: NEGATIVE MG/DL
LACTATE SERPL-SCNC: 0.5 MMOL/L (ref 0.5–2)
LEUKOCYTE ESTERASE UR QL STRIP: ABNORMAL
LIPASE SERPL-CCNC: 92 U/L (ref 73–393)
LYMPHOCYTES # BLD AUTO: 1.97 THOUSANDS/ΜL (ref 0.6–4.47)
LYMPHOCYTES NFR BLD AUTO: 33 % (ref 14–44)
MCH RBC QN AUTO: 31.8 PG (ref 26.8–34.3)
MCHC RBC AUTO-ENTMCNC: 33.6 G/DL (ref 31.4–37.4)
MCV RBC AUTO: 95 FL (ref 82–98)
MONOCYTES # BLD AUTO: 0.49 THOUSAND/ΜL (ref 0.17–1.22)
MONOCYTES NFR BLD AUTO: 8 % (ref 4–12)
NEUTROPHILS # BLD AUTO: 2.91 THOUSANDS/ΜL (ref 1.85–7.62)
NEUTS SEG NFR BLD AUTO: 50 % (ref 43–75)
NITRITE UR QL STRIP: NEGATIVE
NON-SQ EPI CELLS URNS QL MICRO: ABNORMAL /HPF
NRBC BLD AUTO-RTO: 0 /100 WBCS
PH UR STRIP.AUTO: 7 [PH]
PLATELET # BLD AUTO: 164 THOUSANDS/UL (ref 149–390)
PMV BLD AUTO: 11.3 FL (ref 8.9–12.7)
POTASSIUM SERPL-SCNC: 4 MMOL/L (ref 3.5–5.3)
PROT SERPL-MCNC: 6.8 G/DL (ref 6.4–8.2)
PROT UR STRIP-MCNC: NEGATIVE MG/DL
PROTHROMBIN TIME: 12.9 SECONDS (ref 11.6–14.5)
RBC # BLD AUTO: 3.49 MILLION/UL (ref 3.81–5.12)
RBC #/AREA URNS AUTO: ABNORMAL /HPF
SODIUM SERPL-SCNC: 132 MMOL/L (ref 136–145)
SP GR UR STRIP.AUTO: 1.01 (ref 1–1.03)
UROBILINOGEN UR QL STRIP.AUTO: 0.2 E.U./DL
WBC # BLD AUTO: 5.91 THOUSAND/UL (ref 4.31–10.16)
WBC #/AREA URNS AUTO: ABNORMAL /HPF

## 2020-06-24 PROCEDURE — 96375 TX/PRO/DX INJ NEW DRUG ADDON: CPT

## 2020-06-24 PROCEDURE — 93005 ELECTROCARDIOGRAM TRACING: CPT

## 2020-06-24 PROCEDURE — 96374 THER/PROPH/DIAG INJ IV PUSH: CPT

## 2020-06-24 PROCEDURE — 87086 URINE CULTURE/COLONY COUNT: CPT | Performed by: EMERGENCY MEDICINE

## 2020-06-24 PROCEDURE — 83605 ASSAY OF LACTIC ACID: CPT | Performed by: EMERGENCY MEDICINE

## 2020-06-24 PROCEDURE — 85730 THROMBOPLASTIN TIME PARTIAL: CPT | Performed by: EMERGENCY MEDICINE

## 2020-06-24 PROCEDURE — 85025 COMPLETE CBC W/AUTO DIFF WBC: CPT | Performed by: EMERGENCY MEDICINE

## 2020-06-24 PROCEDURE — 99284 EMERGENCY DEPT VISIT MOD MDM: CPT

## 2020-06-24 PROCEDURE — 81001 URINALYSIS AUTO W/SCOPE: CPT | Performed by: EMERGENCY MEDICINE

## 2020-06-24 PROCEDURE — 96361 HYDRATE IV INFUSION ADD-ON: CPT

## 2020-06-24 PROCEDURE — 99285 EMERGENCY DEPT VISIT HI MDM: CPT | Performed by: EMERGENCY MEDICINE

## 2020-06-24 PROCEDURE — 85610 PROTHROMBIN TIME: CPT | Performed by: EMERGENCY MEDICINE

## 2020-06-24 PROCEDURE — 83690 ASSAY OF LIPASE: CPT | Performed by: EMERGENCY MEDICINE

## 2020-06-24 PROCEDURE — 36415 COLL VENOUS BLD VENIPUNCTURE: CPT | Performed by: EMERGENCY MEDICINE

## 2020-06-24 PROCEDURE — 80053 COMPREHEN METABOLIC PANEL: CPT | Performed by: EMERGENCY MEDICINE

## 2020-06-24 RX ORDER — CEPHALEXIN 500 MG/1
500 CAPSULE ORAL 4 TIMES DAILY
Qty: 20 CAPSULE | Refills: 0 | Status: SHIPPED | OUTPATIENT
Start: 2020-06-24 | End: 2020-06-29

## 2020-06-24 RX ORDER — ONDANSETRON 2 MG/ML
4 INJECTION INTRAMUSCULAR; INTRAVENOUS ONCE
Status: COMPLETED | OUTPATIENT
Start: 2020-06-24 | End: 2020-06-24

## 2020-06-24 RX ORDER — KETOROLAC TROMETHAMINE 30 MG/ML
15 INJECTION, SOLUTION INTRAMUSCULAR; INTRAVENOUS ONCE
Status: COMPLETED | OUTPATIENT
Start: 2020-06-24 | End: 2020-06-24

## 2020-06-24 RX ORDER — CEPHALEXIN 250 MG/1
500 CAPSULE ORAL ONCE
Status: COMPLETED | OUTPATIENT
Start: 2020-06-24 | End: 2020-06-24

## 2020-06-24 RX ORDER — ACETAMINOPHEN 325 MG/1
975 TABLET ORAL ONCE
Status: COMPLETED | OUTPATIENT
Start: 2020-06-24 | End: 2020-06-24

## 2020-06-24 RX ADMIN — SODIUM CHLORIDE 1000 ML: 0.9 INJECTION, SOLUTION INTRAVENOUS at 13:26

## 2020-06-24 RX ADMIN — SODIUM CHLORIDE 1000 ML: 0.9 INJECTION, SOLUTION INTRAVENOUS at 11:42

## 2020-06-24 RX ADMIN — CEPHALEXIN 500 MG: 250 CAPSULE ORAL at 14:22

## 2020-06-24 RX ADMIN — KETOROLAC TROMETHAMINE 15 MG: 30 INJECTION, SOLUTION INTRAMUSCULAR at 11:49

## 2020-06-24 RX ADMIN — ONDANSETRON 4 MG: 2 INJECTION INTRAMUSCULAR; INTRAVENOUS at 11:48

## 2020-06-24 RX ADMIN — ACETAMINOPHEN 975 MG: 325 TABLET ORAL at 11:48

## 2020-06-25 LAB — BACTERIA UR CULT: NORMAL

## 2020-06-29 LAB
ATRIAL RATE: 71 BPM
P AXIS: 78 DEGREES
PR INTERVAL: 184 MS
QRS AXIS: 69 DEGREES
QRSD INTERVAL: 88 MS
QT INTERVAL: 406 MS
QTC INTERVAL: 441 MS
T WAVE AXIS: 71 DEGREES
VENTRICULAR RATE: 71 BPM

## 2020-06-29 PROCEDURE — 93010 ELECTROCARDIOGRAM REPORT: CPT | Performed by: INTERNAL MEDICINE

## 2020-08-18 ENCOUNTER — TRANSCRIBE ORDERS (OUTPATIENT)
Dept: PHYSICAL THERAPY | Facility: CLINIC | Age: 85
End: 2020-08-18

## 2020-08-18 ENCOUNTER — EVALUATION (OUTPATIENT)
Dept: PHYSICAL THERAPY | Facility: CLINIC | Age: 85
End: 2020-08-18
Payer: MEDICARE

## 2020-08-18 DIAGNOSIS — M62.81 MUSCLE WEAKNESS: ICD-10-CM

## 2020-08-18 DIAGNOSIS — R42 VERTIGO: Primary | ICD-10-CM

## 2020-08-18 DIAGNOSIS — R29.6 FALLS FREQUENTLY: ICD-10-CM

## 2020-08-18 PROCEDURE — 97162 PT EVAL MOD COMPLEX 30 MIN: CPT | Performed by: PHYSICAL THERAPIST

## 2020-08-18 PROCEDURE — 97535 SELF CARE MNGMENT TRAINING: CPT | Performed by: PHYSICAL THERAPIST

## 2020-08-18 PROCEDURE — 97140 MANUAL THERAPY 1/> REGIONS: CPT | Performed by: PHYSICAL THERAPIST

## 2020-08-18 NOTE — PROGRESS NOTES
PT Evaluation   Today's date: 2020  Patient name: Estefany Smith  : 1932  MRN: [id number]  Referring provider: Curly Muller MD  Dx:   Encounter Diagnosis     ICD-10-CM    1  Vertigo  R42    2  Muscle weakness  M62 81    3  Falls frequently  R29 6      Assessment  Assessment details: Patient is unsteady with her gait and is a fall risk  Impairments: abnormal gait, activity intolerance, impaired balance, safety issue and weight-bearing intolerance  Understanding of Dx/Px/POC: excellent   Prognosis: good    Goals  STG 2-4 weeks:   Increase gait speed   5 ft/sec  Increase B LE strength by 3-8 lbs  Increase standing and walking tolerance to >20 minutes  Patient to navigate 5 steps / stairs with unilateral railing  Reduce C/O dizziness  Decrease pain by 25-50% with standing, walking, and stairs  Increase ROM by 2-5 degrees  Initiate HEP  LTG 8-10 weeks:   Increase gait speed 1 0 ft/sec  Increase B LE strength 15-20 lbs  Increase standing and walking tolerances and distances  Increase static supine flexion to normal    No C/O dizziness  Improve gait pattern and balance  No C/O falls  DC with HEP  Plan  Plan details: All planned modality interventions and planned therapy interventions are provided PRN    Patient would benefit from: PT eval and skilled physical therapy  Planned therapy interventions: manual therapy, neuromuscular re-education, patient education, postural training, self care, balance, balance/weight bearing training, strengthening, stretching, therapeutic activities, therapeutic exercise, transfer training, therapeutic training, home exercise program, graded exercise, gait training, flexibility, coordination and canalith repositioning  Frequency: 3x week  Duration in weeks: 12  Treatment plan discussed with: patient and caregiver      Subjective Evaluation    Pain  No pain reported  Aggravating factors: running, stair climbing, walking and standing  Progression: worsening    Treatments  Current treatment: physical therapy  Patient Goals  Patient goals for therapy: improved balance, increased motion, return to work, return to [employer] activities, independence with ADLs/IADLs and increased strength      Date of onset:  8/10/2020    Date of Surgery:  None    History of Present Episode: 8/18/2020  Hobson Day states she has had vertigo and balance issues for several years  She is worse right now and comes for treatment  No current history of accident or injury  Past Medical History:    8/18/2020  Hobson Day reports chronic vertigo / balance issues with several acute episodes  Heart stint placement  B TKR  Two back surgery  Previous Level of Functional Ability:  8/18/2020  Hobson Day states gradual decline with strength, endurance, energy, balance and increased fall risk  Inspection / Palpation:  8/18/2020  Mesomorphic body type  No signs of infection  No signs of wounds  No signs of drainage  No signs of ecchymotic regions  No signs of erythremic regions  No signs of muscle spasm  No signs of muscle guarding  No signs of tenderness reported to palpation  No signs of swelling  Positive signs of a surgery site  Current conditions appears consistent with recent acute episode  Chief Complaints:  8/18/2020  Hobson Day reports mild to moderate difficulty with standing  Hobson Day reports mild to moderate difficulty with walking  Hobson Day reports no difficulty with movement / use of her neck region  Hobson Day reports no difficulty with use of her arms  Hobson Day reports mild to moderate difficulty with sleeping  Hobson Day reports mild to moderate difficulty with her strength and endurance  Hobson Day reports mild to moderate limitations with her neck range of motion  Hobson Day reports no difficulty lying on her neck region  Hobson Day reports mild to moderate difficulty twisting / turning her neck region  Hobson Day reports moderate issues with her balance    Hobson Day reports moderate to severe symptoms with her dizziness  [place] Advanced Balance Scale (DELMER): Normal = 40   Date A B C D E F G H I J Total   8/18/2020 1 2 1 2 1 1 1 1 0 1 11     A )  (0, 1, 2, 3, 4)   Standing with feet together, eyes closed  B )  (0, 1, 2, 3, 4)  Reaching forward to an object (pencil) held at shoulder height with outstretched arm    C )   (0, 1, 2, 3, 4)   Turn 360°  D )  (0, 1, 2, 3, 4)  Tandem walk  E )  (0, 1, 2, 3, 4)  Standing on foam with eyes closed  F )  (0, 1, 2, 3, 4)  Walk with head turned  G )  (0, 1, 2, 3, 4)  Step up and over a 6   H )  (0, 1, 2, 3, 4)  Standing on one leg   I )  (0, 1, 2, 3, 4)  Two-footed jump for distance  J )  (0, 1, 2, 3, 4)  Reactive postural control      NECK PAIN  Resting Palpation Bending  Forward Rotate  Right Rotate  Left   8/18/2020 0 0 0 0 0     NECK PAIN Driving Sleeping Moving Extending Overhead   8/18/2020 0 0 0 0 0     NECK AROM Flexion Extension Rotation Right   8/18/2020 41° 42° 75°     NECK AROM Rotation Left Side Bend Right Side Bend Left   8/18/2020 76° 19° 21°     LE MMT / PAIN Dorsiflexion Plantarflexion Knee flexion Knee extension   8/18/2020 Rt 0/10  18 lbs 0/10  25 lbs 0/10  18 lbs 0/10  17 lbs   8/18/2020 Lt 0/10  24 lbs 0/10  32 lbs 0/10  25 lbs 0/10  26 lbs     LE MMT / PAIN Hip Flexion Hip Abduction Hip Adduction   8/18/2020  Rt 0/10  14 lbs 0/10  18 lbs 0/10  20 lbs   8/18/2020  Lt 0/10  21 lbs 0/10  22 lbs 0/10  25 lbs     Neck Screen Compression Distraction Slump Test Epting / Vertigo   8/18/2020 Rt Negative Negative Negative Negative   8/18/2020 Lt Negative Negative Negative Negative     Neck Screen Swallowing Valsalva Adson TMJ   8/18/2020 Rt Negative Negative Negative Negative   8/18/2020 Lt Negative Negative Negative Negative     Neck Screen Referred Pain Thoracic outlet Crepitus   8/18/2020 Rt Negative Negative Negative   8/18/2020 Lt Negative Negative Negative     Precautions:  Vertigo / Balance Issues    All treatments below will be provided with a focus on neural issues involving balance,   coordination and proprioception plus potential numbness and tingling issues without   ignoring strengthening, flexibility, ROM  Postural, endurance and any possible swelling  and pain which may be present which is also important and necessary to provide full   functional mobility and quality care        Daily Treatment Log  Manual  8/18       MT  ROM 15'       HEP 15'       Neur ExerLog 8/18       Balance Board        P-Bars - Chair squat        P-Bars-GT Forward/Backward/Side - level and dips - eyes open        P-Bars-GT Forward/Backward/Side - level and dips - eyes closed        BOSU-Walk        Foam Pad        Foam Beam        GT - Gym        Fitter-Slalom        Monster Steps        Wall sits        Lunges        Walking with head turned        Walking with ball toss         W/P- Hip Abd/Add/Fl/Ex        T/G-Squats/PF        NuStep        NK Table        TM        Bike        Stepper                Modalities  8/18

## 2020-08-18 NOTE — LETTER
2020  PT Evaluation Plan of Care  Evaristo Gomez MD  [address] [address]    Patient: Kristopher Smith   YOB: 1932   Date of Visit: 2020     Encounter Diagnosis     ICD-10-CM    1  Vertigo  R42    2  Muscle weakness  M62 81    3  Falls frequently  R29 6        Dear Dr Alicia Rice: Thank you for your recent referral of Kristopher Smith  Please review the attached evaluation summary from Angela's recent visit  Please verify that you agree with the plan of care by signing the attached order  If you have any questions or concerns, please do not hesitate to call  I sincerely appreciate the opportunity to share in the care of one of your patients and hope to have another opportunity to work with you in the near future  Sincerely,    Romayne Bay, PT    Referring Provider:      I certify that I have read the below Plan of Care and certify the need for these services furnished under this plan of treatment while under my care  Evaristo Gomez MD  [address] [address] [address]: [phone]    Please SIGN ABOVE and return THIS PAGE ONLY to Fax # [phone]        PT Evaluation   Today's date: 2020  Patient name: Kristopher Smith  : 1932  MRN: [id number]  Referring provider: Amber Mathews MD  Dx:   Encounter Diagnosis     ICD-10-CM    1  Vertigo  R42    2  Muscle weakness  M62 81    3  Falls frequently  R29 6      Assessment  Assessment details: Patient is unsteady with her gait and is a fall risk  Impairments: abnormal gait, activity intolerance, impaired balance, safety issue and weight-bearing intolerance  Understanding of Dx/Px/POC: excellent   Prognosis: good    Goals  STG 2-4 weeks:   Increase gait speed   5 ft/sec  Increase B LE strength by 3-8 lbs  Increase standing and walking tolerance to >20 minutes  Patient to navigate 5 steps / stairs with unilateral railing  Reduce C/O dizziness    Decrease pain by 25-50% with standing, walking, and stairs  Increase ROM by 2-5 degrees  Initiate HEP  LTG 8-10 weeks:   Increase gait speed 1 0 ft/sec  Increase B LE strength 15-20 lbs  Increase standing and walking tolerances and distances  Increase static supine flexion to normal    No C/O dizziness  Improve gait pattern and balance  No C/O falls  DC with HEP  Plan  Plan details: All planned modality interventions and planned therapy interventions are provided PRN  Patient would benefit from: PT eval and skilled physical therapy  Planned therapy interventions: manual therapy, neuromuscular re-education, patient education, postural training, self care, balance, balance/weight bearing training, strengthening, stretching, therapeutic activities, therapeutic exercise, transfer training, therapeutic training, home exercise program, graded exercise, gait training, flexibility, coordination and canalith repositioning  Frequency: 3x week  Duration in weeks: 12  Treatment plan discussed with: patient and caregiver      Subjective Evaluation    Pain  No pain reported  Aggravating factors: running, stair climbing, walking and standing  Progression: worsening    Treatments  Current treatment: physical therapy  Patient Goals  Patient goals for therapy: improved balance, increased motion, return to work, return to sport/leisure activities, independence with ADLs/IADLs and increased strength      Date of onset:  8/10/2020    Date of Surgery:  None    History of Present Episode: 8/18/2020  Kary Schmitz states she has had vertigo and balance issues for several years  She is worse right now and comes for treatment  No current history of accident or injury  Past Medical History:    8/18/2020  Kary Schmitz reports chronic vertigo / balance issues with several acute episodes  Heart stint placement  B TKR  Two back surgery      Previous Level of Functional Ability:  8/18/2020  Kary Schmitz states gradual decline with strength, endurance, energy, balance and increased fall risk  Inspection / Palpation:  8/18/2020  Mesomorphic body type  No signs of infection  No signs of wounds  No signs of drainage  No signs of ecchymotic regions  No signs of erythremic regions  No signs of muscle spasm  No signs of muscle guarding  No signs of tenderness reported to palpation  No signs of swelling  Positive signs of a surgery site  Current conditions appears consistent with recent acute episode  Chief Complaints:  8/18/2020  Nicole Anderson reports mild to moderate difficulty with standing  Nicole Anderson reports mild to moderate difficulty with walking  Nicole Anderson reports no difficulty with movement / use of her neck region  Nicole Anderson reports no difficulty with use of her arms  Nicole Anderson reports mild to moderate difficulty with sleeping  Nicole Anderson reports mild to moderate difficulty with her strength and endurance  Nicole Anderson reports mild to moderate limitations with her neck range of motion  Nicole Anderson reports no difficulty lying on her neck region  Nicole Anderson reports mild to moderate difficulty twisting / turning her neck region  Nicole Anderson reports moderate issues with her balance  Nicole Anderson reports moderate to severe symptoms with her dizziness  [place] Advanced Balance Scale (DELMER): Normal = 40   Date A B C D E F G H I J Total   8/18/2020 1 2 1 2 1 1 1 1 0 1 11     A )  (0, 1, 2, 3, 4)   Standing with feet together, eyes closed  B )  (0, 1, 2, 3, 4)  Reaching forward to an object (pencil) held at shoulder height with outstretched arm    C )   (0, 1, 2, 3, 4)   Turn 360°  D )  (0, 1, 2, 3, 4)  Tandem walk  E )  (0, 1, 2, 3, 4)  Standing on foam with eyes closed  F )  (0, 1, 2, 3, 4)  Walk with head turned  G )  (0, 1, 2, 3, 4)  Step up and over a 6   H )  (0, 1, 2, 3, 4)  Standing on one leg   I )  (0, 1, 2, 3, 4)  Two-footed jump for distance  J )  (0, 1, 2, 3, 4)  Reactive postural control      NECK PAIN  Resting Palpation Bending  Forward Rotate  Right Rotate  Left   8/18/2020 0 0 0 0 0     NECK PAIN Driving Sleeping Moving Extending Overhead   8/18/2020 0 0 0 0 0     NECK AROM Flexion Extension Rotation Right   8/18/2020 41° 42° 75°     NECK AROM Rotation Left Side Bend Right Side Bend Left   8/18/2020 76° 19° 21°     LE MMT / PAIN Dorsiflexion Plantarflexion Knee flexion Knee extension   8/18/2020 Rt 0/10  18 lbs 0/10  25 lbs 0/10  18 lbs 0/10  17 lbs   8/18/2020 Lt 0/10  24 lbs 0/10  32 lbs 0/10  25 lbs 0/10  26 lbs     LE MMT / PAIN Hip Flexion Hip Abduction Hip Adduction   8/18/2020  Rt 0/10  14 lbs 0/10  18 lbs 0/10  20 lbs   8/18/2020  Lt 0/10  21 lbs 0/10  22 lbs 0/10  25 lbs     Neck Screen Compression Distraction Slump Test Epting / Vertigo   8/18/2020 Rt Negative Negative Negative Negative   8/18/2020 Lt Negative Negative Negative Negative     Neck Screen Swallowing Valsalva Adson TMJ   8/18/2020 Rt Negative Negative Negative Negative   8/18/2020 Lt Negative Negative Negative Negative     Neck Screen Referred Pain Thoracic outlet Crepitus   8/18/2020 Rt Negative Negative Negative   8/18/2020 Lt Negative Negative Negative     Precautions:  Vertigo / Balance Issues    All treatments below will be provided with a focus on neural issues involving balance,   coordination and proprioception plus potential numbness and tingling issues without   ignoring strengthening, flexibility, ROM  Postural, endurance and any possible swelling  and pain which may be present which is also important and necessary to provide full   functional mobility and quality care        Daily Treatment Log  Manual  8/18       MT  ROM 15'       HEP 15'       Neur ExerLog 8/18       Balance Board        P-Bars - Chair squat        P-Bars-GT Forward/Backward/Side - level and dips - eyes open        P-Bars-GT Forward/Backward/Side - level and dips - eyes closed        BOSU-Walk        Foam Pad        Foam Beam        GT - Gym        Fitter-Slalom        Monster Steps        Wall sits        Lunges        Walking with head turned Walking with ball toss         W/P- Hip Abd/Add/Fl/Ex        T/G-Squats/PF        NuStep        NK Table        TM        Bike        Stepper                Modalities  8/18

## 2020-08-19 ENCOUNTER — OFFICE VISIT (OUTPATIENT)
Dept: PHYSICAL THERAPY | Facility: CLINIC | Age: 85
End: 2020-08-19
Payer: MEDICARE

## 2020-08-19 DIAGNOSIS — R42 VERTIGO: Primary | ICD-10-CM

## 2020-08-19 DIAGNOSIS — M62.81 MUSCLE WEAKNESS: ICD-10-CM

## 2020-08-19 DIAGNOSIS — R29.6 FALLS FREQUENTLY: ICD-10-CM

## 2020-08-19 PROCEDURE — 97110 THERAPEUTIC EXERCISES: CPT | Performed by: PHYSICAL THERAPIST

## 2020-08-19 PROCEDURE — 97140 MANUAL THERAPY 1/> REGIONS: CPT | Performed by: PHYSICAL THERAPIST

## 2020-08-19 PROCEDURE — 97112 NEUROMUSCULAR REEDUCATION: CPT | Performed by: PHYSICAL THERAPIST

## 2020-08-19 NOTE — PROGRESS NOTES
Today's date: 2020  Patient name: Justen Smith  : 1932  MRN: [id number]  Referring provider: Brenda Anthony MD  Dx:   Encounter Diagnosis     ICD-10-CM    1  Vertigo  R42    2  Muscle weakness  M62 81    3  Falls frequently  R29 6      Subjective:  Loraine Weiss states her balance feels better after treatment  Objective: See treatment log below  Loraine Weiss continues to be advised to follow her home exercise program as tolerated  When Loraine Weiss is feeling good she follows her rehab exercises in the gym and on other days she follows her home exercise program at home as tolerated  In the future we plan on having Loraine Weiss stay at home and follow her home exercise program for four to six weeks and than assess for either more Rehab treatments or discharge from Rehab care  Assessment: Tolerated treatment well  Patient exhibited good technique with therapeutic exercises and would benefit from continued PT  Angela's goals are to continue to improve with her rehab program and improve with functional mobility, speed, repetition and decreased c/o pain with her gym and home exercise program   Angela's final goal for her rehab is to be discharged from Rehab care after obtaining her full functional rehab potential     Plan: Continue per plan of care  Progress treatment as tolerated  Precautions:  Vertigo / Balance Issues    All treatments below will be provided with a focus on neural issues involving balance,   coordination and proprioception plus potential numbness and tingling issues without   ignoring strengthening, flexibility, ROM  Postural, endurance and any possible swelling  and pain which may be present which is also important and necessary to provide full   functional mobility and quality care        Daily Treatment Log  Manual        MT  ROM 15' 15'      HEP 15'       Neur ExerLog       Balance Board        P-Bars - Chair squat        P-Bars-GT Forward/Backward/Side - level and dips - eyes open 20x      P-Bars-GT Forward/Backward/Side - level and dips - eyes closed  20x      BOSU-Walk        Foam Pad        Foam Beam        GT - Gym        Fitter-Slalom        Monster Steps        Wall sits        Lunges        Walking with head turned  10x      Walking with ball toss         W/P- Hip Abd/Add/Fl/Ex        T/G-Squats/PF        NuStep  10' L5      NK Table        TM        Bike        Stepper                Modalities  8/18 8/19

## 2020-08-24 ENCOUNTER — APPOINTMENT (OUTPATIENT)
Dept: PHYSICAL THERAPY | Facility: CLINIC | Age: 85
End: 2020-08-24
Payer: MEDICARE

## 2020-08-24 NOTE — PROGRESS NOTES
Today's date: 2020  Patient name: Jolanta Smith  : 1932  MRN: [id number]  Referring provider: Duncan Madison MD  Dx:   Encounter Diagnosis     ICD-10-CM    1  Vertigo  R42    2  Muscle weakness  M62 81    3  Falls frequently  R29 6      Subjective:  No new c/o pain today  Objective: See treatment log below  Cole Bray continues to be advised to follow her home exercise program as tolerated  When Cole Bray is feeling good she follows her rehab exercises in the gym and on other days she follows her home exercise program at home as tolerated  In the future we plan on having Cole Bray stay at home and follow her home exercise program for four to six weeks and than assess for either more Rehab treatments or discharge from Rehab care  Assessment: Tolerated treatment well  Patient exhibited good technique with therapeutic exercises and would benefit from continued PT to increase ROM/strength and endurance to improve mobility  Angela's goals are to continue to improve with her rehab program and improve with functional mobility, speed, repetition and decreased c/o pain with her gym and home exercise program   Angela's final goal for her rehab is to be discharged from Rehab care after obtaining her full functional rehab potential     Plan: Continue per plan of care  Progress treatment as tolerated  Precautions:  Vertigo / Balance Issues    All treatments below will be provided with a focus on neural issues involving balance,   coordination and proprioception plus potential numbness and tingling issues without   ignoring strengthening, flexibility, ROM  Postural, endurance and any possible swelling  and pain which may be present which is also important and necessary to provide full   functional mobility and quality care        Daily Treatment Log  Manual       MT  ROM 15' 15'      HEP 15'       Neur ExerLog      Balance Board        P-Bars - Chair squat        P-Bars-GT Forward/Backward/Side - level and dips - eyes open  20x      P-Bars-GT Forward/Backward/Side - level and dips - eyes closed  20x      BOSU-Walk        Foam Pad        Foam Beam        GT - Gym        Fitter-Slalom        Monster Steps        Wall sits        Lunges        Walking with head turned  10x      Walking with ball toss         W/P- Hip Abd/Add/Fl/Ex        T/G-Squats/PF        NuStep  10' L5      NK Table        TM        Bike        Stepper                Modalities  8/18 8/19 8/24

## 2020-09-09 NOTE — PROGRESS NOTES
PT Discharge  Today's date: 2020  Patient name: Trip Smith  : 1932  MRN: [id number]  Referring provider: Louie Gusman MD  Dx:   Encounter Diagnosis     ICD-10-CM    1  Vertigo  R42    2  Muscle weakness  M62 81    3  Falls frequently  R29 6      Assessment/Plan    Subjective    Objective     2020  Shamar Smith has not returned for more treatment  Shamar CACERES Mimm did not attend today's appointment  I cannot provide you with a current progress report but I can provide you with information based on previous performance  Trip Smith is discharged at this time

## 2021-03-19 DIAGNOSIS — C18.2 MALIGNANT NEOPLASM OF ASCENDING COLON (HCC): ICD-10-CM

## 2021-03-19 DIAGNOSIS — R42 DIZZINESS AND GIDDINESS: ICD-10-CM

## 2021-03-19 DIAGNOSIS — I25.10 ATHEROSCLEROTIC HEART DISEASE OF NATIVE CORONARY ARTERY WITHOUT ANGINA PECTORIS: ICD-10-CM

## 2021-03-24 ENCOUNTER — HOSPITAL ENCOUNTER (OUTPATIENT)
Dept: NON INVASIVE DIAGNOSTICS | Facility: HOSPITAL | Age: 86
Discharge: HOME | End: 2021-03-24
Payer: MEDICARE

## 2021-03-24 DIAGNOSIS — R42 DIZZINESS AND GIDDINESS: ICD-10-CM

## 2021-03-24 DIAGNOSIS — I25.10 ATHEROSCLEROTIC HEART DISEASE OF NATIVE CORONARY ARTERY WITHOUT ANGINA PECTORIS: ICD-10-CM

## 2021-03-24 PROCEDURE — 93880 EXTRACRANIAL BILAT STUDY: CPT

## 2021-03-25 PROCEDURE — 93880 EXTRACRANIAL BILAT STUDY: CPT | Performed by: SURGERY

## 2021-03-29 ENCOUNTER — APPOINTMENT (EMERGENCY)
Dept: RADIOLOGY | Facility: HOSPITAL | Age: 86
End: 2021-03-29
Payer: MEDICARE

## 2021-03-29 ENCOUNTER — HOSPITAL ENCOUNTER (EMERGENCY)
Facility: HOSPITAL | Age: 86
Discharge: HOME | End: 2021-03-29
Attending: EMERGENCY MEDICINE
Payer: MEDICARE

## 2021-03-29 ENCOUNTER — APPOINTMENT (EMERGENCY)
Dept: CT IMAGING | Facility: HOSPITAL | Age: 86
End: 2021-03-29
Payer: MEDICARE

## 2021-03-29 VITALS
TEMPERATURE: 97.5 F | OXYGEN SATURATION: 97 % | RESPIRATION RATE: 20 BRPM | HEART RATE: 62 BPM | DIASTOLIC BLOOD PRESSURE: 64 MMHG | SYSTOLIC BLOOD PRESSURE: 153 MMHG | WEIGHT: 293 LBS | BODY MASS INDEX: 57.7 KG/M2

## 2021-03-29 DIAGNOSIS — J32.9 SINUSITIS: ICD-10-CM

## 2021-03-29 DIAGNOSIS — S09.90XA INJURY OF HEAD, INITIAL ENCOUNTER: ICD-10-CM

## 2021-03-29 DIAGNOSIS — S92.424A CLOSED NONDISPLACED FRACTURE OF DISTAL PHALANX OF RIGHT GREAT TOE, INITIAL ENCOUNTER: ICD-10-CM
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
## 2021-03-29 LAB
ALBUMIN SERPL BCP-MCNC: 3.3 G/DL (ref 3.5–5)
ALP SERPL-CCNC: 51 U/L (ref 46–116)
ALT SERPL W P-5'-P-CCNC: 15 U/L (ref 12–78)
ANION GAP SERPL CALCULATED.3IONS-SCNC: 3 MMOL/L (ref 4–13)
APTT PPP: 32 SECONDS (ref 23–37)
AST SERPL W P-5'-P-CCNC: 23 U/L (ref 5–45)
BASOPHILS # BLD AUTO: 0.06 THOUSANDS/ΜL (ref 0–0.1)
BASOPHILS NFR BLD AUTO: 1 % (ref 0–1)
BILIRUB SERPL-MCNC: 0.36 MG/DL (ref 0.2–1)
BUN SERPL-MCNC: 14 MG/DL (ref 5–25)
CALCIUM ALBUM COR SERPL-MCNC: 10.1 MG/DL (ref 8.3–10.1)
CALCIUM SERPL-MCNC: 9.5 MG/DL (ref 8.3–10.1)
CHLORIDE SERPL-SCNC: 99 MMOL/L (ref 100–108)
CO2 SERPL-SCNC: 29 MMOL/L (ref 21–32)
CREAT SERPL-MCNC: 1.08 MG/DL (ref 0.6–1.3)
EOSINOPHIL # BLD AUTO: 0.48 THOUSAND/ΜL (ref 0–0.61)
EOSINOPHIL NFR BLD AUTO: 6 % (ref 0–6)
ERYTHROCYTE [DISTWIDTH] IN BLOOD BY AUTOMATED COUNT: 13.1 % (ref 11.6–15.1)
GFR SERPL CREATININE-BSD FRML MDRD: 46 ML/MIN/1.73SQ M
GLUCOSE SERPL-MCNC: 99 MG/DL (ref 65–140)
HCT VFR BLD AUTO: 31.7 % (ref 34.8–46.1)
HGB BLD-MCNC: 10.5 G/DL (ref 11.5–15.4)
IMM GRANULOCYTES # BLD AUTO: 0.02 THOUSAND/UL (ref 0–0.2)
IMM GRANULOCYTES NFR BLD AUTO: 0 % (ref 0–2)
INR PPP: 0.97 (ref 0.84–1.19)
LYMPHOCYTES # BLD AUTO: 2.34 THOUSANDS/ΜL (ref 0.6–4.47)
LYMPHOCYTES NFR BLD AUTO: 31 % (ref 14–44)
MCH RBC QN AUTO: 31.1 PG (ref 26.8–34.3)
MCHC RBC AUTO-ENTMCNC: 33.1 G/DL (ref 31.4–37.4)
MCV RBC AUTO: 94 FL (ref 82–98)
MONOCYTES # BLD AUTO: 0.6 THOUSAND/ΜL (ref 0.17–1.22)
MONOCYTES NFR BLD AUTO: 8 % (ref 4–12)
NEUTROPHILS # BLD AUTO: 4.04 THOUSANDS/ΜL (ref 1.85–7.62)
NEUTS SEG NFR BLD AUTO: 54 % (ref 43–75)
NRBC BLD AUTO-RTO: 0 /100 WBCS
PLATELET # BLD AUTO: 181 THOUSANDS/UL (ref 149–390)
PMV BLD AUTO: 10.8 FL (ref 8.9–12.7)
POTASSIUM SERPL-SCNC: 4.5 MMOL/L (ref 3.5–5.3)
PROT SERPL-MCNC: 7 G/DL (ref 6.4–8.2)
PROTHROMBIN TIME: 12.7 SECONDS (ref 11.6–14.5)
RBC # BLD AUTO: 3.38 MILLION/UL (ref 3.81–5.12)
SODIUM SERPL-SCNC: 131 MMOL/L (ref 136–145)
WBC # BLD AUTO: 7.54 THOUSAND/UL (ref 4.31–10.16)

## 2021-03-29 PROCEDURE — 73110 X-RAY EXAM OF WRIST: CPT

## 2021-03-29 PROCEDURE — 93005 ELECTROCARDIOGRAM TRACING: CPT

## 2021-03-29 PROCEDURE — 73660 X-RAY EXAM OF TOE(S): CPT

## 2021-03-29 PROCEDURE — 85610 PROTHROMBIN TIME: CPT | Performed by: PHYSICIAN ASSISTANT

## 2021-03-29 PROCEDURE — 80053 COMPREHEN METABOLIC PANEL: CPT | Performed by: PHYSICIAN ASSISTANT

## 2021-03-29 PROCEDURE — 73564 X-RAY EXAM KNEE 4 OR MORE: CPT

## 2021-03-29 PROCEDURE — 72100 X-RAY EXAM L-S SPINE 2/3 VWS: CPT

## 2021-03-29 PROCEDURE — 72170 X-RAY EXAM OF PELVIS: CPT

## 2021-03-29 PROCEDURE — 99284 EMERGENCY DEPT VISIT MOD MDM: CPT

## 2021-03-29 PROCEDURE — 72125 CT NECK SPINE W/O DYE: CPT

## 2021-03-29 PROCEDURE — 71045 X-RAY EXAM CHEST 1 VIEW: CPT

## 2021-03-29 PROCEDURE — 70450 CT HEAD/BRAIN W/O DYE: CPT

## 2021-03-29 PROCEDURE — 36415 COLL VENOUS BLD VENIPUNCTURE: CPT | Performed by: PHYSICIAN ASSISTANT

## 2021-03-29 PROCEDURE — 99284 EMERGENCY DEPT VISIT MOD MDM: CPT | Performed by: PHYSICIAN ASSISTANT

## 2021-03-29 PROCEDURE — 85025 COMPLETE CBC W/AUTO DIFF WBC: CPT | Performed by: PHYSICIAN ASSISTANT

## 2021-03-29 PROCEDURE — 85730 THROMBOPLASTIN TIME PARTIAL: CPT | Performed by: PHYSICIAN ASSISTANT

## 2021-03-29 RX ORDER — AZITHROMYCIN 250 MG/1
TABLET, FILM COATED ORAL
Qty: 6 TABLET | Refills: 0 | Status: SHIPPED | OUTPATIENT
Start: 2021-03-29 | End: 2021-04-02

## 2021-03-29 RX ORDER — ACETAMINOPHEN 325 MG/1
650 TABLET ORAL ONCE
Status: COMPLETED | OUTPATIENT
Start: 2021-03-29 | End: 2021-03-29

## 2021-03-29 RX ADMIN — ACETAMINOPHEN 650 MG: 325 TABLET ORAL at 14:41

## 2021-03-29 NOTE — ED PROVIDER NOTES
Emergency Department Trauma Note  [facility] 80 [facility] MRN: [id number]  Unit/Bed#: ED 06/ED 06 Encounter: [id number]      Trauma Alert: Trauma Acuity: Trauma Evaluation  Model of Arrival:   via    Trauma Team: Current Providers  Attending Provider: Eze Mack DO  Physician Assistant: Chloe De Leon PA-C  Consultants: None      History of Present Illness     Chief Complaint:   Chief Complaint   Patient presents with    Fall     pt fell when trying to sit on toilet @0245 today   [place] forward striking top of head on shower door,then  landed on both knees   takes [id number] daily  HPI:  Fior Mccullough is a 80 y o  female who presents with headache, neck pain, left wrist pain, right great toe pain, bilateral knee back, back pain s/p fall 3 am today  Mechanism:Details of Incident: pt  was a,bulating into bathroom, while attempting to pull underwear downa nd sit on toilet, pt  had a loss of balance and fall forward into glass shower dooor, pt  states she went unconcious for an uknown amount of time, pt  ambulated back into bed, caretaker arrived at 0800 and was informed about fall, called pcp and pt  instructed to come to ED for eval Injury Date: 03/29/21 Injury Time: 0245      The patient is an 51-year-old female who presents emergency department today escorted by her caretaker for fall  Patient states around 3:00 a m  Today she was in the bathroom attempting to urinate when she bent over at the toilet she fell forward striking her head off the bathroom shower  Patient states she landed on both of her knees  Patient had injured her right great toe left wrist low back, neck and has a headache currently  Patient was able to get up the fall  Patient does not believe she had any loss of consciousness    Patient takes daily 81 mg of aspirin      Fall  Mechanism of injury: fall    Injury location:  Head/neck, leg, shoulder/arm and toe  Head/neck injury location:  Head  Shoulder/arm injury location:  L wrist  Leg injury location:  R knee and L knee  Toe injury location:  R great toe  Incident location:  Bathroom  Arrived directly from scene: no    Fall:     Fall occurred: bending over     Impact surface:  Hard floor    Point of impact:  Head, knees and feet    Entrapped after fall: no    Suspicion of alcohol use: no    Suspicion of drug use: no    Tetanus status:  Unknown  Prior to arrival data:     Patient ambulatory at scene: yes      Blood loss:  None    Responsiveness at scene:  Alert    Orientation at scene:  Person, place, situation and time    Loss of consciousness: no      Amnesic to event: no      Airway interventions:  None    Breathing interventions:  None    IV access status:  None    IO access:  None    Fluids administered:  None    Cardiac interventions:  None    Medications administered:  None    Immobilization:  None    Airway condition since incident:  Stable    Breathing condition since incident:  Stable    Circulation condition since incident:  Stable    Mental status condition since incident:  Stable    Disability condition since incident:  Stable  Associated symptoms: headaches and neck pain    Associated symptoms: no abdominal pain, no back pain, no blindness, no chest pain, no difficulty breathing, no hearing loss, no loss of consciousness, no nausea, no seizures and no vomiting    Headaches:     Severity:  Moderate    Onset quality:  Gradual    Timing:  Constant    Progression:  Unchanged    Chronicity:  New    Review of Systems   Constitutional: Negative for chills and fever  HENT: Negative for ear pain, hearing loss and sore throat  Eyes: Negative for blindness, pain and visual disturbance  Respiratory: Negative for cough, shortness of breath and wheezing  Cardiovascular: Negative for chest pain, palpitations and leg swelling  Gastrointestinal: Negative for abdominal pain, nausea and vomiting  Genitourinary: Negative for dysuria and hematuria     Musculoskeletal: Positive for neck pain  Negative for arthralgias and back pain  Skin: Negative for color change and rash  Neurological: Positive for headaches  Negative for dizziness, seizures, loss of consciousness and syncope  Historical Information     Immunizations: There is no immunization history on file for this patient  Past Medical History:   Diagnosis Date    Cancer [facility])     skin cancer    Cardiac disease     Neuropathy     Skin cancer     forehead    Spinal stenosis     UTI (urinary tract infection)      History reviewed  No pertinent family history  Past Surgical History:   Procedure Laterality Date    APPENDECTOMY      BACK SURGERY      x2    CATARACT EXTRACTION W/  INTRAOCULAR LENS IMPLANT Bilateral     CORONARY ANGIOPLASTY WITH STENT PLACEMENT  09/23/2011    REPLACEMENT TOTAL KNEE BILATERAL       Social History     Tobacco Use    Smoking status: Never Smoker    Smokeless tobacco: Never Used   Substance Use Topics    Alcohol use: Yes     Comment: occassionally    Drug use: Never     E-Cigarette/Vaping    E-Cigarette Use Never User      E-Cigarette/Vaping Substances       Family History: History reviewed  No pertinent family history  Meds/Allergies   Prior to Admission Medications   Prescriptions Last Dose Informant Patient Reported? Taking? MAGNESIUM SULFATE PO  Self Yes No   Sig: Take 100 mg by mouth daily   Multiple Vitamins-Minerals (CENTRUM SILVER 50+WOMEN PO)  Self Yes No   Sig: Take 1 tablet by mouth   NAPROXEN PO  Self Yes No   Sig: Take 220 mg by mouth as needed   albuterol (PROVENTIL HFA,VENTOLIN HFA) 90 mcg/act inhaler  Self Yes No   Sig: Inhale 2 puffs every 6 (six) hours as needed for wheezing   aspirin (ECOTRIN LOW STRENGTH) 81 mg EC tablet  Self Yes No   Sig: Take 81 mg by mouth daily   budesonide-formoterol (SYMBICORT) 80-4 5 MCG/ACT inhaler  Self Yes No   Sig: Inhale 2 puffs 2 (two) times a day Rinse mouth after use     cyanocobalamin (VITAMIN B-12) 100 MCG tablet  Self Yes No Sig: Take 100 mcg by mouth daily   denosumab (PROLIA) 60 mg/mL  Self Yes No   Sig: Inject 60 mg under the skin once   famotidine (PEPCID) 20 mg tablet  Self Yes No   Sig: Take 20 mg by mouth 2 (two) times a day   fluticasone (FLONASE) 50 mcg/act nasal spray  Self Yes No   Si spray into each nostril daily   guaiFENesin (MUCINEX) 600 mg 12 hr tablet  Self Yes No   Sig: Take 1,200 mg by mouth every 12 (twelve) hours   meclizine (ANTIVERT) 12 5 MG tablet   No No   Sig: Take 1 tablets 3 times daily for 3 days then tab 3 times daily as needed   metoprolol succinate (TOPROL-XL) 25 mg 24 hr tablet  Self Yes No   Sig: Take 12 5 mg by mouth daily   nitroglycerin (NITROSTAT) 0 4 mg SL tablet  Self Yes No   Sig: Place 0 4 mg under the tongue every 5 (five) minutes as needed for chest pain   pantoprazole (PROTONIX) 40 mg tablet  Self Yes No   Sig: Take 40 mg by mouth daily   senna-docusate sodium (SENOKOT S) 8 6-50 mg per tablet  Self Yes No   Sig: Take 1 tablet by mouth daily      Facility-Administered Medications: None       Allergies   Allergen Reactions    Ace Inhibitors Cough    Angiotensin Receptor Blockers      Other reaction(s): WEAK AND SOB    Atorvastatin      Other reaction(s): NOT SURE    Ezetimibe      Other reaction(s): N&V    Formoterol      Other reaction(s): NOT SURE, SHAKINESS    Gabapentin      Other reaction(s): SWELLING    Hydrocodone-Acetaminophen Vomiting     Other reaction(s): Nausea and Vomiting    Lovastatin      Other reaction(s): MUSCLE PAIN    Methylprednisolone      Other reaction(s): FLUSHING    Mometasone Furoate      Other reaction(s): HOARSENESS    Niacin      Other reaction(s): NOT SURE    Nsaids     Other      abx - pt cant remember name - got body aches and headache    Oxycodone GI Intolerance    Oxytetracycline      Other reaction(s): NOT SURE    Pravastatin     Rosuvastatin      Other reaction(s): MUSCLE PAIN    Statins      Other reaction(s):  Other (See Comments)  "bone pain"    Tramadol      Other reaction(s): N&V    Latex Other (See Comments) and Rash     "skin peeling"    Penicillins Rash and Hives     Other reaction(s): RASH       PHYSICAL EXAM    PE limited by: N/A    Objective   Vitals:   First set: Temperature: 98 8 °F (37 1 °C) (03/29/21 1240)  Pulse: 95 (03/29/21 1240)  Respirations: 16 (03/29/21 1240)  Blood Pressure: 108/89 (03/29/21 1240)  SpO2: 93 % (03/29/21 1240)    Primary Survey:   (A) Airway: normal  (B) Breathing: normal  (C) Circulation: Pulses:   normal  (D) Disabliity:  GCS Total:  15  (E) Expose:  Completed    Secondary Survey: (Click on Physical Exam tab above)  Physical Exam  Vitals signs and nursing note reviewed  Constitutional:       General: She is not in acute distress  Appearance: She is well-developed  Interventions: Cervical collar in place  HENT:      Head: Normocephalic and atraumatic  Mouth/Throat:      Pharynx: Oropharynx is clear  Uvula midline  Eyes:      Conjunctiva/sclera: Conjunctivae normal    Neck:      Musculoskeletal: Neck supple  Muscular tenderness present  No spinous process tenderness  Cardiovascular:      Rate and Rhythm: Normal rate and regular rhythm  Heart sounds: No murmur  Pulmonary:      Effort: Pulmonary effort is normal  No respiratory distress  Breath sounds: Normal breath sounds  Abdominal:      Palpations: Abdomen is soft  Tenderness: There is no abdominal tenderness  Musculoskeletal:      Right shoulder: Normal       Left shoulder: Normal       Left wrist: She exhibits tenderness  She exhibits no effusion and no crepitus  Right knee: Tenderness found  Left knee: Tenderness found  Right lower leg: No edema  Left lower leg: No edema  Right foot: Bony tenderness present  Comments: Right great toe bruised with tenderness   Skin:     General: Skin is warm and dry  Capillary Refill: Capillary refill takes less than 2 seconds  Neurological:      General: No focal deficit present  Mental Status: She is alert  Cervical spine cleared by clinical criteria?  No (imaging required)      Invasive Devices     None                 Lab Results:   Results Reviewed     Procedure Component Value Units Date/Time    Comprehensive metabolic panel [[id number]]  (Abnormal) Collected: 03/29/21 1333    Lab Status: Final result Specimen: Blood from Arm, Left Updated: 03/29/21 1357     Sodium 131 mmol/L      Potassium 4 5 mmol/L      Chloride 99 mmol/L      CO2 29 mmol/L      ANION GAP 3 mmol/L      BUN 14 mg/dL      Creatinine 1 08 mg/dL      Glucose 99 mg/dL      Calcium 9 5 mg/dL      Corrected Calcium 10 1 mg/dL      AST 23 U/L      ALT 15 U/L      Alkaline Phosphatase 51 U/L      Total Protein 7 0 g/dL      Albumin 3 3 g/dL      Total Bilirubin 0 36 mg/dL      eGFR 46 ml/min/1 73sq m     Narrative:      [place] guidelines for Chronic Kidney Disease (CKD):     Stage 1 with normal or high GFR (GFR > 90 mL/min/1 73 square meters)    Stage 2 Mild CKD (GFR = 60-89 mL/min/1 73 square meters)    Stage 3A Moderate CKD (GFR = 45-59 mL/min/1 73 square meters)    Stage 3B Moderate CKD (GFR = 30-44 mL/min/1 73 square meters)    Stage 4 Severe CKD (GFR = 15-29 mL/min/1 73 square meters)    Stage 5 End Stage CKD (GFR <15 mL/min/1 73 square meters)  Note: GFR calculation is accurate only with a steady state creatinine    Protime-INR [[id number]]  (Normal) Collected: 03/29/21 1333    Lab Status: Final result Specimen: Blood from Arm, Left Updated: 03/29/21 1353     Protime 12 7 seconds      INR 0 97    APTT [[id number]]  (Normal) Collected: 03/29/21 1333    Lab Status: Final result Specimen: Blood from Arm, Left Updated: 03/29/21 1353     PTT 32 seconds     CBC and differential [[id number]]  (Abnormal) Collected: 03/29/21 1333    Lab Status: Final result Specimen: Blood from Arm, Left Updated: 03/29/21 1340     WBC 7 54 Thousand/uL      RBC 3 38 Million/uL      Hemoglobin 10 5 g/dL      Hematocrit 31 7 %      MCV 94 fL      MCH 31 1 pg      MCHC 33 1 g/dL      RDW 13 1 %      MPV 10 8 fL      Platelets 243 Thousands/uL      nRBC 0 /100 WBCs      Neutrophils Relative 54 %      Immat GRANS % 0 %      Lymphocytes Relative 31 %      Monocytes Relative 8 %      Eosinophils Relative 6 %      Basophils Relative 1 %      Neutrophils Absolute 4 04 Thousands/µL      Immature Grans Absolute 0 02 Thousand/uL      Lymphocytes Absolute 2 34 Thousands/µL      Monocytes Absolute 0 60 Thousand/µL      Eosinophils Absolute 0 48 Thousand/µL      Basophils Absolute 0 06 Thousands/µL                  Imaging Studies:   Direct to CT: Yes  XR spine lumbar 2 or 3 views injury   Final Result by Verito Kendrick MD (03/29 1400)      Degenerative and chronic changes  No acute osseous abnormality         Workstation performed: [id number]         XR Trauma chest portable   Final Result by rPem Hernandez MD (03/29 1425)      No acute cardiopulmonary disease  Workstation performed: [id number]         XR Trauma pelvis ap only 1 or 2 vw   Final Result by Prem Hernandez MD (03/29 1423)      No acute bony abnormality in the pelvis or hips  Workstation performed: [id number]         XR wrist 3+ views LEFT   Final Result by Destiny Huston DO (03/29 1436)   Age-related osteopenia and diffuse degenerative changes  No discrete fracture or dislocation  Workstation performed: [id number]         XR toe great min 2 views RIGHT   Final Result by Verito Kendrick MD (03/29 1412)      1st proximal phalanx fracture      The study was marked in EPIC for immediate notification  Workstation performed: [id number]         XR knee 4+ vw right injury   Final Result by Verito Kendrick MD (03/29 1407)      No acute osseous abnormality              Workstation performed: [id number]         XR knee 4+ vw left injury   Final Result by Verito Kendrick MD (03/29 1408)      No acute osseous abnormality  Workstation performed: [id number]         TRAUMA - CT head wo contrast   Final Result by Sera Nowak MD (03/29 1320)      No acute intracranial abnormality  New mucoperiosteal thickening of the paranasal sinuses with air-fluid levels could be on the basis of sinusitis  The study was marked in [facility] for immediate notification  Workstation performed: [id number]         TRAUMA - CT spine cervical wo contrast   Final Result by Sera Nowak MD (03/29 1318)      No cervical spine fracture or traumatic malalignment  The study was marked in [facility] for immediate notification  Workstation performed: [id number]               Procedures  Procedures         ED Course  ED Course as of Mar 29 1545   Mon Mar 29, 2021   1238 Trauma evaluation       1413 1st proximal phalanx fracture     The study was marked in EPIC for immediate notification  1416 Cervical Collar Clearance: The patient had a CT scan of the cervical spine demonstrating no acute injury  On exam, the patient had no midline point tenderness or paresthesias/numbness/weakness in the extremities  The patient had full range of motion (was then able to flex, extend, and rotate head laterally) without pain  There were no distracting injuries and the patient was not intoxicated  The patient's cervical spine was cleared radiologically and clinically  Cervical collar removed at this time  Natasha Veliz PA-C  3/29/2021 2:16 PM           1433 Patient has great toe fracture given postop shoe patient uses walker at home              MDM  Number of Diagnoses or Management Options  Closed nondisplaced fracture of distal phalanx of right great toe, initial encounter:   Fall, initial encounter:   Injury of head, initial encounter:   Sinusitis:   Diagnosis management comments: Patient found to a broken right great toe was placed in postop shoe    Patient ambulates at home with a walker as instructed and was instructed to follow-up with orthopedics  Patient did not have any other traumatic findings  Patient did note to have sinusitis on CT scan and was placed on azithromycin secondary to allergies  Patient expressed understanding was in agreement treatment plan caretaker at bedside agreed  Amount and/or Complexity of Data Reviewed  Clinical lab tests: ordered and reviewed  Tests in the radiology section of CPT®: ordered and reviewed  Decide to obtain previous medical records or to obtain history from someone other than the patient: yes  Obtain history from someone other than the patient: yes  Review and summarize past medical records: yes  Independent visualization of images, tracings, or specimens: yes    Risk of Complications, Morbidity, and/or Mortality  Presenting problems: moderate  Diagnostic procedures: moderate  Management options: moderate    Patient Progress  Patient progress: stable          Disposition  [facility] Transfer: No  Final diagnoses:   Fall, initial encounter   Sinusitis   Closed nondisplaced fracture of distal phalanx of right great toe, initial encounter   Injury of head, initial encounter     Time reflects when diagnosis was documented in both MDM as applicable and the Disposition within this note     Time User Action Codes Description Comment    3/29/2021  2:28 PM Daylene Berrien Add [L56  JTZK] Fall, initial encounter     3/29/2021  2:28 PM Daylene Berrien Add [J32 9] Sinusitis     3/29/2021  2:29 PM Daylene Juan Manuel Add [S92 423S] Closed displaced fracture of distal phalanx of great toe, sequela     3/29/2021  2:29 PM Daylene Berrien Remove [V91 [id number]] Closed displaced fracture of distal phalanx of great toe, sequela     3/29/2021  2:29 PM Daylene Berrien Add [A51 211R] Closed nondisplaced fracture of distal phalanx of right great toe, initial encounter     3/29/2021  2:29 PM Daylene Berrien Add [S09 90XA] Injury of head, initial encounter ED Disposition     ED Disposition Condition Date/Time Comment    Discharge Stable Mon Mar 29, 2021  2:34 PM Bartolome CACERES [facility] discharge to home/self care              Follow-up Information     Follow up With Specialties Details Why [address] [facility] Orthopedic Surgery Schedule an appointment as soon as possible for a visit   [address]  [address] [facility]  [phone]          Discharge Medication List as of 3/29/2021  2:34 PM      START taking these medications    Details   azithromycin (ZITHROMAX) 250 mg tablet Take 2 tablets today then 1 tablet daily x 4 days, Normal         CONTINUE these medications which have NOT CHANGED    Details   albuterol (PROVENTIL HFA,VENTOLIN HFA) 90 mcg/act inhaler Inhale 2 puffs every 6 (six) hours as needed for wheezing, Historical Med      aspirin (ECOTRIN LOW STRENGTH) 81 mg EC tablet Take 81 mg by mouth daily, Historical Med      budesonide-formoterol (SYMBICORT) 80-4 5 MCG/ACT inhaler Inhale 2 puffs 2 (two) times a day Rinse mouth after use , Historical Med      cyanocobalamin (VITAMIN B-12) 100 MCG tablet Take 100 mcg by mouth daily, Historical Med      denosumab (PROLIA) 60 mg/mL Inject 60 mg under the skin once, Historical Med      famotidine (PEPCID) 20 mg tablet Take 20 mg by mouth 2 (two) times a day, Historical Med      fluticasone (FLONASE) 50 mcg/act nasal spray 1 spray into each nostril daily, Historical Med      guaiFENesin (MUCINEX) 600 mg 12 hr tablet Take 1,200 mg by mouth every 12 (twelve) hours, Historical Med      MAGNESIUM SULFATE PO Take 100 mg by mouth daily, Historical Med      meclizine (ANTIVERT) 12 5 MG tablet Take 1 tablets 3 times daily for 3 days then tab 3 times daily as needed, Normal      metoprolol succinate (TOPROL-XL) 25 mg 24 hr tablet Take 12 5 mg by mouth daily, Historical Med      Multiple Vitamins-Minerals (CENTRUM SILVER 50+WOMEN PO) Take 1 tablet by mouth, Historical Med      NAPROXEN PO Take 220 mg by mouth as needed, Historical Med      nitroglycerin (NITROSTAT) 0 4 mg SL tablet Place 0 4 mg under the tongue every 5 (five) minutes as needed for chest pain, Historical Med      pantoprazole (PROTONIX) 40 mg tablet Take 40 mg by mouth daily, Historical Med      senna-docusate sodium (SENOKOT S) 8 6-50 mg per tablet Take 1 tablet by mouth daily, Historical Med           No discharge procedures on file      PDMP Review     None          ED Provider  Electronically Signed by         Robbie Edmondson PA-C  03/29/21 9610

## 2021-04-02 ENCOUNTER — OFFICE VISIT (OUTPATIENT)
Dept: OBGYN CLINIC | Facility: CLINIC | Age: 86
End: 2021-04-02
Payer: MEDICARE

## 2021-04-02 VITALS
SYSTOLIC BLOOD PRESSURE: 118 MMHG | HEIGHT: 64 IN | TEMPERATURE: 97.6 F | HEART RATE: 55 BPM | WEIGHT: 150 LBS | DIASTOLIC BLOOD PRESSURE: 70 MMHG | BODY MASS INDEX: 25.61 KG/M2

## 2021-04-02 DIAGNOSIS — S92.414A CLOSED NONDISPLACED FRACTURE OF PROXIMAL PHALANX OF RIGHT GREAT TOE, INITIAL ENCOUNTER: ICD-10-CM

## 2021-04-02 DIAGNOSIS — M79.671 PAIN IN RIGHT FOOT: Primary | ICD-10-CM

## 2021-04-02 PROCEDURE — 99204 OFFICE O/P NEW MOD 45 MIN: CPT | Performed by: ORTHOPAEDIC SURGERY

## 2021-04-02 NOTE — PROGRESS NOTES
ASSESSMENT/PLAN:    Diagnoses and all orders for this visit:    Pain in right foot  -     Post Op Shoe    Closed nondisplaced fracture of proximal phalanx of right great toe, initial encounter         plan:  I would recommend follow-up in 2-3 weeks  X-rays of her foot will be obtained at follow-up  She was provided with a postop shoe which should be worn on a routine basis  She is permitted to bear weight  She was encouraged to contact the office if any questions or concerns were to arise  Return for 2-3 weeks  _____________________________________________________  CHIEF COMPLAINT:  Chief Complaint   Patient presents with    Right Great Toe - Fracture         SUBJECTIVE:  Fátima Jacinto is a 80y o  year old female who presents   For evaluation of her right foot injured when she stumbled and fell at home during the early morning hours of 03/29/2021  She states she was trying to get to the bathroom, lost her balance and fell  She is unsure of the exact mechanism of injury  She does believe she struck her head  She went back to bed  She was able to bear weight after this injury  Her caregiver arrived in the morning and she was seen in the emergency room at [facility]  X-rays were obtained  She now presents for orthopedic evaluation and treatment  She complains of diffuse  right foot pain including the entire distal half of the foot dorsally extending to the toes  She also complains of mild knee discomfort but this has notably improved  She states that she has a neuropathy and does not have significant sensation from the upper portion of her lower leg distally into the ankle and foot, both lower extremities       PAST MEDICAL HISTORY:  Past Medical History:   Diagnosis Date    Cancer [facility])     skin cancer    Cardiac disease     Neuropathy     Skin cancer     forehead    Spinal stenosis     UTI (urinary tract infection)        PAST SURGICAL HISTORY:  Past Surgical History:   Procedure Laterality Date    APPENDECTOMY      BACK SURGERY      x2    CATARACT EXTRACTION W/  INTRAOCULAR LENS IMPLANT Bilateral     CORONARY ANGIOPLASTY WITH STENT PLACEMENT  09/23/2011    REPLACEMENT TOTAL KNEE BILATERAL         FAMILY HISTORY:  History reviewed  No pertinent family history      SOCIAL HISTORY:  Social History     Tobacco Use    Smoking status: Never Smoker    Smokeless tobacco: Never Used   Substance Use Topics    Alcohol use: Yes     Comment: occassionally    Drug use: Never       MEDICATIONS:    Current Outpatient Medications:     albuterol (PROVENTIL HFA,VENTOLIN HFA) 90 mcg/act inhaler, Inhale 2 puffs every 6 (six) hours as needed for wheezing, Disp: , Rfl:     aspirin (ECOTRIN LOW STRENGTH) 81 mg EC tablet, Take 81 mg by mouth daily, Disp: , Rfl:     azithromycin (ZITHROMAX) 250 mg tablet, Take 2 tablets today then 1 tablet daily x 4 days, Disp: 6 tablet, Rfl: 0    cyanocobalamin (VITAMIN B-12) 100 MCG tablet, Take 100 mcg by mouth daily, Disp: , Rfl:     denosumab (PROLIA) 60 mg/mL, Inject 60 mg under the skin once, Disp: , Rfl:     famotidine (PEPCID) 20 mg tablet, Take 20 mg by mouth 2 (two) times a day, Disp: , Rfl:     fluticasone (FLONASE) 50 mcg/act nasal spray, 1 spray into each nostril daily, Disp: , Rfl:     guaiFENesin (MUCINEX) 600 mg 12 hr tablet, Take 1,200 mg by mouth every 12 (twelve) hours, Disp: , Rfl:     MAGNESIUM SULFATE PO, Take 100 mg by mouth daily, Disp: , Rfl:     meclizine (ANTIVERT) 12 5 MG tablet, Take 1 tablets 3 times daily for 3 days then tab 3 times daily as needed, Disp: 30 tablet, Rfl: 0    metoprolol succinate (TOPROL-XL) 25 mg 24 hr tablet, Take 12 5 mg by mouth daily, Disp: , Rfl:     Multiple Vitamins-Minerals (CENTRUM SILVER 50+WOMEN PO), Take 1 tablet by mouth, Disp: , Rfl:     nitroglycerin (NITROSTAT) 0 4 mg SL tablet, Place 0 4 mg under the tongue every 5 (five) minutes as needed for chest pain, Disp: , Rfl:     pantoprazole (PROTONIX) 40 mg tablet, Take 40 mg by mouth daily, Disp: , Rfl:     senna-docusate sodium (SENOKOT S) 8 6-50 mg per tablet, Take 1 tablet by mouth daily, Disp: , Rfl:     budesonide-formoterol (SYMBICORT) 80-4 5 MCG/ACT inhaler, Inhale 2 puffs 2 (two) times a day Rinse mouth after use , Disp: , Rfl:     NAPROXEN PO, Take 220 mg by mouth as needed, Disp: , Rfl:     ALLERGIES:  Allergies   Allergen Reactions    Ace Inhibitors Cough    Angiotensin Receptor Blockers      Other reaction(s): WEAK AND SOB    Atorvastatin      Other reaction(s): NOT SURE    Ezetimibe      Other reaction(s): N&V    Formoterol      Other reaction(s): NOT SURE, SHAKINESS    Gabapentin      Other reaction(s): SWELLING    Hydrocodone-Acetaminophen Vomiting     Other reaction(s): Nausea and Vomiting    Lovastatin      Other reaction(s): MUSCLE PAIN    Methylprednisolone      Other reaction(s): FLUSHING    Mometasone Furoate      Other reaction(s): HOARSENESS    Niacin      Other reaction(s): NOT SURE    Nsaids     Other      abx - pt cant remember name - got body aches and headache    Oxycodone GI Intolerance    Oxytetracycline      Other reaction(s): NOT SURE    Pravastatin     Rosuvastatin      Other reaction(s): MUSCLE PAIN    Statins      Other reaction(s): Other (See Comments)  "bone pain"    Tramadol      Other reaction(s): N&V    Latex - Food Allergy Other (See Comments) and Rash     "skin peeling"    Penicillins Rash and Hives     Other reaction(s): RASH       Review of systems:   Constitutional: Negative for fatigue, fever or loss of apetite  HENT: Negative  Respiratory: Negative for shortness of breath, dyspnea  Cardiovascular: Negative for chest pain/tightness  Gastrointestinal: Negative for abdominal pain, N/V  Endocrine: Negative for cold/heat intolerance, unexplained weight loss/gain  Genitourinary: Negative for flank pain, dysuria, hematuria     Musculoskeletal:   Positive as in the HPI   Skin: Negative for rash  Neurological:  Positive as in the HPI  Psychiatric/Behavioral: Negative for agitation  _____________________________________________________  PHYSICAL EXAMINATION:    Blood pressure 118/70, pulse 55, temperature 97 6 °F (36 4 °C), temperature source Temporal, height 5' 3 5" (1 613 m), weight 68 kg (150 lb)  General: alert, oriented times 3 and appears comfortable  Psychiatric: Normal  HEENT: Benign  Cardiovascular: Regular    Pulmonary: No wheezing or stridor  Abdomen: Soft, Nontender  Skin: No masses, erythema, lacerations, fluctation, ulcerations  Neurovascular: Motor and sensory exams are grossly intact in the upper extremities  She indicates decreased sensation throughout the lower extremities from the proximal lower leg distally  Pulses are palpable and good color and capillary refill is noted  MUSCULOSKELETAL EXAMINATION:      The right foot exam demonstrates mild swelling and ecchymosis  Ecchymosis involves the dorsum of the foot from the mid metatarsals distally to the toes from the lateral to the medial aspect  She has a significant bunion deformity as well as hammertoe deformities which are fixed  She denies any tenderness with palpation of the great toe, denies pain with manipulation of the MTP joint and IP joint of the great toe  She does complain of some tenderness to palpation of the dorsum of the foot with the ecchymosis is noted  She has good range of motion of the ankle without complaints of pain during dorsiflexion, plantar flexion, inversion and eversion  The remainder of the lower extremity exam is grossly benign  _____________________________________________________  STUDIES REVIEWED:    X-rays of her foot demonstrates a significant bunion deformity and degenerative changes  There is a suggestion of a nondisplaced proximal phalanx fracture of the great toe     In addition to the x-ray of her foot, x-rays of both knees and of her left wrist were reviewed demonstrating prostheses in place in both knees without acute injury and without injury noted to the left wrist with degenerative changes throughout the wrist and hand  The reports were reviewed        Lenora Mondragon

## 2021-04-04 LAB
ATRIAL RATE: 69 BPM
P AXIS: 92 DEGREES
PR INTERVAL: 172 MS
QRS AXIS: 69 DEGREES
QRSD INTERVAL: 82 MS
QT INTERVAL: 390 MS
QTC INTERVAL: 417 MS
T WAVE AXIS: 69 DEGREES
VENTRICULAR RATE: 69 BPM

## 2021-04-04 PROCEDURE — 93010 ELECTROCARDIOGRAM REPORT: CPT | Performed by: INTERNAL MEDICINE

## 2021-04-17 ENCOUNTER — HOSPITAL ENCOUNTER (EMERGENCY)
Facility: HOSPITAL | Age: 86
Discharge: HOME | End: 2021-04-17
Attending: STUDENT IN AN ORGANIZED HEALTH CARE EDUCATION/TRAINING PROGRAM | Admitting: STUDENT IN AN ORGANIZED HEALTH CARE EDUCATION/TRAINING PROGRAM
Payer: MEDICARE

## 2021-04-17 ENCOUNTER — APPOINTMENT (EMERGENCY)
Dept: RADIOLOGY | Facility: HOSPITAL | Age: 86
End: 2021-04-17
Payer: MEDICARE

## 2021-04-17 ENCOUNTER — APPOINTMENT (EMERGENCY)
Dept: CT IMAGING | Facility: HOSPITAL | Age: 86
End: 2021-04-17
Payer: MEDICARE

## 2021-04-17 VITALS
TEMPERATURE: 97.7 F | DIASTOLIC BLOOD PRESSURE: 87 MMHG | RESPIRATION RATE: 16 BRPM | SYSTOLIC BLOOD PRESSURE: 147 MMHG | HEART RATE: 71 BPM | WEIGHT: 150 LBS | OXYGEN SATURATION: 97 % | BODY MASS INDEX: 26.15 KG/M2

## 2021-04-17 DIAGNOSIS — S00.12XA CONTUSION OF LEFT PERIOCULAR REGION, INITIAL ENCOUNTER: Primary | ICD-10-CM
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
## 2021-04-17 DIAGNOSIS — S00.81XA ABRASION OF PERIORBITAL REGION OF FACE, INITIAL ENCOUNTER: ICD-10-CM

## 2021-04-17 LAB
ALBUMIN SERPL BCP-MCNC: 3.7 G/DL (ref 3.5–5)
ALP SERPL-CCNC: 54 U/L (ref 46–116)
ALT SERPL W P-5'-P-CCNC: 16 U/L (ref 12–78)
ANION GAP SERPL CALCULATED.3IONS-SCNC: 7 MMOL/L (ref 4–13)
AST SERPL W P-5'-P-CCNC: 23 U/L (ref 5–45)
BASE EX.OXY STD BLDV CALC-SCNC: 70 % (ref 60–80)
BASE EXCESS BLDV CALC-SCNC: -0.2 MMOL/L
BASOPHILS # BLD AUTO: 0.07 THOUSANDS/ΜL (ref 0–0.1)
BASOPHILS NFR BLD AUTO: 1 % (ref 0–1)
BILIRUB DIRECT SERPL-MCNC: 0.1 MG/DL (ref 0–0.2)
BILIRUB SERPL-MCNC: 0.34 MG/DL (ref 0.2–1)
BILIRUB UR QL STRIP: NEGATIVE
BUN SERPL-MCNC: 17 MG/DL (ref 5–25)
CALCIUM SERPL-MCNC: 8.8 MG/DL (ref 8.3–10.1)
CHLORIDE SERPL-SCNC: 99 MMOL/L (ref 100–108)
CK SERPL-CCNC: 66 U/L (ref 26–192)
CLARITY UR: CLEAR
CO2 SERPL-SCNC: 27 MMOL/L (ref 21–32)
COLOR UR: YELLOW
CREAT SERPL-MCNC: 1.38 MG/DL (ref 0.6–1.3)
EOSINOPHIL # BLD AUTO: 0.4 THOUSAND/ΜL (ref 0–0.61)
EOSINOPHIL NFR BLD AUTO: 5 % (ref 0–6)
ERYTHROCYTE [DISTWIDTH] IN BLOOD BY AUTOMATED COUNT: 13.2 % (ref 11.6–15.1)
GFR SERPL CREATININE-BSD FRML MDRD: 34 ML/MIN/1.73SQ M
GLUCOSE SERPL-MCNC: 112 MG/DL (ref 65–140)
GLUCOSE SERPL-MCNC: 113 MG/DL (ref 65–140)
GLUCOSE UR STRIP-MCNC: NEGATIVE MG/DL
HCO3 BLDV-SCNC: 25.1 MMOL/L (ref 24–30)
HCT VFR BLD AUTO: 33.4 % (ref 34.8–46.1)
HGB BLD-MCNC: 11 G/DL (ref 11.5–15.4)
HGB UR QL STRIP.AUTO: NEGATIVE
IMM GRANULOCYTES # BLD AUTO: 0.01 THOUSAND/UL (ref 0–0.2)
IMM GRANULOCYTES NFR BLD AUTO: 0 % (ref 0–2)
INR PPP: 0.96 (ref 0.84–1.19)
KETONES UR STRIP-MCNC: NEGATIVE MG/DL
LACTATE SERPL-SCNC: 1.2 MMOL/L (ref 0.5–2)
LEUKOCYTE ESTERASE UR QL STRIP: NEGATIVE
LYMPHOCYTES # BLD AUTO: 2.98 THOUSANDS/ΜL (ref 0.6–4.47)
LYMPHOCYTES NFR BLD AUTO: 38 % (ref 14–44)
MCH RBC QN AUTO: 31.3 PG (ref 26.8–34.3)
MCHC RBC AUTO-ENTMCNC: 32.9 G/DL (ref 31.4–37.4)
MCV RBC AUTO: 95 FL (ref 82–98)
MONOCYTES # BLD AUTO: 0.6 THOUSAND/ΜL (ref 0.17–1.22)
MONOCYTES NFR BLD AUTO: 8 % (ref 4–12)
NEUTROPHILS # BLD AUTO: 3.77 THOUSANDS/ΜL (ref 1.85–7.62)
NEUTS SEG NFR BLD AUTO: 48 % (ref 43–75)
NITRITE UR QL STRIP: NEGATIVE
NRBC BLD AUTO-RTO: 0 /100 WBCS
O2 CT BLDV-SCNC: 10.9 ML/DL
PCO2 BLDV: 43.6 MM HG (ref 42–50)
PH BLDV: 7.38 [PH] (ref 7.3–7.4)
PH UR STRIP.AUTO: 6.5 [PH]
PLATELET # BLD AUTO: 185 THOUSANDS/UL (ref 149–390)
PMV BLD AUTO: 11.3 FL (ref 8.9–12.7)
PO2 BLDV: 37.2 MM HG (ref 35–45)
POTASSIUM SERPL-SCNC: 4.4 MMOL/L (ref 3.5–5.3)
PROT SERPL-MCNC: 7.4 G/DL (ref 6.4–8.2)
PROT UR STRIP-MCNC: NEGATIVE MG/DL
PROTHROMBIN TIME: 12.6 SECONDS (ref 11.6–14.5)
RBC # BLD AUTO: 3.52 MILLION/UL (ref 3.81–5.12)
SODIUM SERPL-SCNC: 133 MMOL/L (ref 136–145)
SP GR UR STRIP.AUTO: 1.01 (ref 1–1.03)
TROPONIN I SERPL-MCNC: <0.02 NG/ML
UROBILINOGEN UR QL STRIP.AUTO: 0.2 E.U./DL
WBC # BLD AUTO: 7.83 THOUSAND/UL (ref 4.31–10.16)

## 2021-04-17 PROCEDURE — 73030 X-RAY EXAM OF SHOULDER: CPT

## 2021-04-17 PROCEDURE — 73110 X-RAY EXAM OF WRIST: CPT

## 2021-04-17 PROCEDURE — 83605 ASSAY OF LACTIC ACID: CPT | Performed by: STUDENT IN AN ORGANIZED HEALTH CARE EDUCATION/TRAINING PROGRAM

## 2021-04-17 PROCEDURE — 82550 ASSAY OF CK (CPK): CPT | Performed by: STUDENT IN AN ORGANIZED HEALTH CARE EDUCATION/TRAINING PROGRAM

## 2021-04-17 PROCEDURE — 71260 CT THORAX DX C+: CPT

## 2021-04-17 PROCEDURE — 99284 EMERGENCY DEPT VISIT MOD MDM: CPT

## 2021-04-17 PROCEDURE — 93005 ELECTROCARDIOGRAM TRACING: CPT

## 2021-04-17 PROCEDURE — 80076 HEPATIC FUNCTION PANEL: CPT | Performed by: STUDENT IN AN ORGANIZED HEALTH CARE EDUCATION/TRAINING PROGRAM

## 2021-04-17 PROCEDURE — 36415 COLL VENOUS BLD VENIPUNCTURE: CPT | Performed by: STUDENT IN AN ORGANIZED HEALTH CARE EDUCATION/TRAINING PROGRAM

## 2021-04-17 PROCEDURE — 80048 BASIC METABOLIC PNL TOTAL CA: CPT | Performed by: STUDENT IN AN ORGANIZED HEALTH CARE EDUCATION/TRAINING PROGRAM

## 2021-04-17 PROCEDURE — 99285 EMERGENCY DEPT VISIT HI MDM: CPT | Performed by: STUDENT IN AN ORGANIZED HEALTH CARE EDUCATION/TRAINING PROGRAM

## 2021-04-17 PROCEDURE — 74177 CT ABD & PELVIS W/CONTRAST: CPT

## 2021-04-17 PROCEDURE — 96374 THER/PROPH/DIAG INJ IV PUSH: CPT

## 2021-04-17 PROCEDURE — 73060 X-RAY EXAM OF HUMERUS: CPT

## 2021-04-17 PROCEDURE — 85610 PROTHROMBIN TIME: CPT | Performed by: STUDENT IN AN ORGANIZED HEALTH CARE EDUCATION/TRAINING PROGRAM

## 2021-04-17 PROCEDURE — 84484 ASSAY OF TROPONIN QUANT: CPT | Performed by: STUDENT IN AN ORGANIZED HEALTH CARE EDUCATION/TRAINING PROGRAM

## 2021-04-17 PROCEDURE — 82805 BLOOD GASES W/O2 SATURATION: CPT | Performed by: STUDENT IN AN ORGANIZED HEALTH CARE EDUCATION/TRAINING PROGRAM

## 2021-04-17 PROCEDURE — 70450 CT HEAD/BRAIN W/O DYE: CPT

## 2021-04-17 PROCEDURE — 96361 HYDRATE IV INFUSION ADD-ON: CPT

## 2021-04-17 PROCEDURE — 82948 REAGENT STRIP/BLOOD GLUCOSE: CPT

## 2021-04-17 PROCEDURE — 72125 CT NECK SPINE W/O DYE: CPT

## 2021-04-17 PROCEDURE — 85025 COMPLETE CBC W/AUTO DIFF WBC: CPT | Performed by: STUDENT IN AN ORGANIZED HEALTH CARE EDUCATION/TRAINING PROGRAM

## 2021-04-17 PROCEDURE — 73080 X-RAY EXAM OF ELBOW: CPT

## 2021-04-17 PROCEDURE — 81003 URINALYSIS AUTO W/O SCOPE: CPT | Performed by: STUDENT IN AN ORGANIZED HEALTH CARE EDUCATION/TRAINING PROGRAM

## 2021-04-17 RX ORDER — FENTANYL CITRATE 50 UG/ML
25 INJECTION, SOLUTION INTRAMUSCULAR; INTRAVENOUS ONCE
Status: COMPLETED | OUTPATIENT
Start: 2021-04-17 | End: 2021-04-17

## 2021-04-17 RX ADMIN — SODIUM CHLORIDE 1000 ML: 0.9 INJECTION, SOLUTION INTRAVENOUS at 16:46

## 2021-04-17 RX ADMIN — IOHEXOL 100 ML: 350 INJECTION, SOLUTION INTRAVENOUS at 16:23

## 2021-04-17 RX ADMIN — FENTANYL CITRATE 25 MCG: 0.05 INJECTION, SOLUTION INTRAMUSCULAR; INTRAVENOUS at 16:46

## 2021-04-17 NOTE — DISCHARGE INSTRUCTIONS
You were evaluated in the emergency department after a fall  The laboratory and imaging studies that were obtained did not show any significant abnormalities  Please follow-up with the primary care physician  For pain, you can take Tylenol 1000 every 6 hours  Do not hesitate to return to the emergency department for any concerning signs or symptoms

## 2021-04-19 LAB
ATRIAL RATE: 125 BPM
P AXIS: 69 DEGREES
QRS AXIS: 66 DEGREES
QRSD INTERVAL: 78 MS
QT INTERVAL: 394 MS
QTC INTERVAL: 445 MS
T WAVE AXIS: 67 DEGREES
VENTRICULAR RATE: 77 BPM

## 2021-05-03 ENCOUNTER — HOSPITAL ENCOUNTER (INPATIENT)
Facility: HOSPITAL | Age: 86
LOS: 4 days | Discharge: NURSING, REHAB OR LONG-TERM CARE | DRG: 481 | End: 2021-05-07
Attending: EMERGENCY MEDICINE | Admitting: STUDENT IN AN ORGANIZED HEALTH CARE EDUCATION/TRAINING PROGRAM
Payer: MEDICARE

## 2021-05-03 ENCOUNTER — APPOINTMENT (EMERGENCY)
Dept: RADIOLOGY | Facility: HOSPITAL | Age: 86
DRG: 481 | End: 2021-05-03
Payer: MEDICARE
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
## 2021-05-03 DIAGNOSIS — S72.91XA CLOSED FRACTURE OF RIGHT FEMUR, UNSPECIFIED FRACTURE MORPHOLOGY, UNSPECIFIED PORTION OF FEMUR, INITIAL ENCOUNTER (HCC): ICD-10-CM

## 2021-05-03 DIAGNOSIS — R42 DIZZINESS: ICD-10-CM

## 2021-05-03 DIAGNOSIS — I25.10 CORONARY ARTERY DISEASE INVOLVING NATIVE CORONARY ARTERY OF NATIVE HEART WITHOUT ANGINA PECTORIS: ICD-10-CM

## 2021-05-03 PROBLEM — D64.9 ANEMIA: Status: ACTIVE | Noted: 2021-05-03

## 2021-05-03 PROBLEM — J44.9 COPD NOT AFFECTING CURRENT EPISODE OF CARE (HCC): Status: ACTIVE | Noted: 2020-02-13

## 2021-05-03 LAB
ABO GROUP BLD: NORMAL
ABO GROUP BLD: NORMAL
ALBUMIN SERPL BCP-MCNC: 3.4 G/DL (ref 3.5–5)
ALP SERPL-CCNC: 65 U/L (ref 46–116)
ALT SERPL W P-5'-P-CCNC: 16 U/L (ref 12–78)
ANION GAP SERPL CALCULATED.3IONS-SCNC: 8 MMOL/L (ref 4–13)
APTT PPP: 31 SECONDS (ref 23–37)
AST SERPL W P-5'-P-CCNC: 23 U/L (ref 5–45)
BASOPHILS # BLD AUTO: 0.06 THOUSANDS/ΜL (ref 0–0.1)
BASOPHILS NFR BLD AUTO: 1 % (ref 0–1)
BILIRUB SERPL-MCNC: 0.26 MG/DL (ref 0.2–1)
BLD GP AB SCN SERPL QL: NEGATIVE
BUN SERPL-MCNC: 17 MG/DL (ref 5–25)
CALCIUM ALBUM COR SERPL-MCNC: 8.9 MG/DL (ref 8.3–10.1)
CALCIUM SERPL-MCNC: 8.4 MG/DL (ref 8.3–10.1)
CHLORIDE SERPL-SCNC: 103 MMOL/L (ref 100–108)
CO2 SERPL-SCNC: 26 MMOL/L (ref 21–32)
CREAT SERPL-MCNC: 1.23 MG/DL (ref 0.6–1.3)
EOSINOPHIL # BLD AUTO: 0.45 THOUSAND/ΜL (ref 0–0.61)
EOSINOPHIL NFR BLD AUTO: 5 % (ref 0–6)
ERYTHROCYTE [DISTWIDTH] IN BLOOD BY AUTOMATED COUNT: 13.5 % (ref 11.6–15.1)
GFR SERPL CREATININE-BSD FRML MDRD: 39 ML/MIN/1.73SQ M
GLUCOSE SERPL-MCNC: 98 MG/DL (ref 65–140)
HCT VFR BLD AUTO: 31 % (ref 34.8–46.1)
HGB BLD-MCNC: 10.1 G/DL (ref 11.5–15.4)
IMM GRANULOCYTES # BLD AUTO: 0.02 THOUSAND/UL (ref 0–0.2)
IMM GRANULOCYTES NFR BLD AUTO: 0 % (ref 0–2)
INR PPP: 0.94 (ref 0.84–1.19)
LYMPHOCYTES # BLD AUTO: 4.66 THOUSANDS/ΜL (ref 0.6–4.47)
LYMPHOCYTES NFR BLD AUTO: 51 % (ref 14–44)
MCH RBC QN AUTO: 31 PG (ref 26.8–34.3)
MCHC RBC AUTO-ENTMCNC: 32.6 G/DL (ref 31.4–37.4)
MCV RBC AUTO: 95 FL (ref 82–98)
MONOCYTES # BLD AUTO: 0.71 THOUSAND/ΜL (ref 0.17–1.22)
MONOCYTES NFR BLD AUTO: 8 % (ref 4–12)
NEUTROPHILS # BLD AUTO: 3.19 THOUSANDS/ΜL (ref 1.85–7.62)
NEUTS SEG NFR BLD AUTO: 35 % (ref 43–75)
NRBC BLD AUTO-RTO: 0 /100 WBCS
PLATELET # BLD AUTO: 206 THOUSANDS/UL (ref 149–390)
PMV BLD AUTO: 10.8 FL (ref 8.9–12.7)
POTASSIUM SERPL-SCNC: 4.3 MMOL/L (ref 3.5–5.3)
PROT SERPL-MCNC: 6.9 G/DL (ref 6.4–8.2)
PROTHROMBIN TIME: 12.4 SECONDS (ref 11.6–14.5)
RBC # BLD AUTO: 3.26 MILLION/UL (ref 3.81–5.12)
RH BLD: POSITIVE
RH BLD: POSITIVE
SODIUM SERPL-SCNC: 137 MMOL/L (ref 136–145)
SPECIMEN EXPIRATION DATE: NORMAL
WBC # BLD AUTO: 9.09 THOUSAND/UL (ref 4.31–10.16)

## 2021-05-03 PROCEDURE — 86920 COMPATIBILITY TEST SPIN: CPT

## 2021-05-03 PROCEDURE — 99285 EMERGENCY DEPT VISIT HI MDM: CPT

## 2021-05-03 PROCEDURE — 86900 BLOOD TYPING SEROLOGIC ABO: CPT | Performed by: EMERGENCY MEDICINE

## 2021-05-03 PROCEDURE — 85610 PROTHROMBIN TIME: CPT | Performed by: EMERGENCY MEDICINE

## 2021-05-03 PROCEDURE — 86850 RBC ANTIBODY SCREEN: CPT | Performed by: EMERGENCY MEDICINE

## 2021-05-03 PROCEDURE — 73502 X-RAY EXAM HIP UNI 2-3 VIEWS: CPT

## 2021-05-03 PROCEDURE — 96376 TX/PRO/DX INJ SAME DRUG ADON: CPT

## 2021-05-03 PROCEDURE — 80053 COMPREHEN METABOLIC PANEL: CPT | Performed by: EMERGENCY MEDICINE

## 2021-05-03 PROCEDURE — 93005 ELECTROCARDIOGRAM TRACING: CPT

## 2021-05-03 PROCEDURE — 86901 BLOOD TYPING SEROLOGIC RH(D): CPT | Performed by: EMERGENCY MEDICINE

## 2021-05-03 PROCEDURE — 96375 TX/PRO/DX INJ NEW DRUG ADDON: CPT

## 2021-05-03 PROCEDURE — 99223 1ST HOSP IP/OBS HIGH 75: CPT | Performed by: NURSE PRACTITIONER

## 2021-05-03 PROCEDURE — 85730 THROMBOPLASTIN TIME PARTIAL: CPT | Performed by: EMERGENCY MEDICINE

## 2021-05-03 PROCEDURE — 99285 EMERGENCY DEPT VISIT HI MDM: CPT | Performed by: EMERGENCY MEDICINE

## 2021-05-03 PROCEDURE — 36415 COLL VENOUS BLD VENIPUNCTURE: CPT | Performed by: EMERGENCY MEDICINE

## 2021-05-03 PROCEDURE — 71045 X-RAY EXAM CHEST 1 VIEW: CPT

## 2021-05-03 PROCEDURE — 1124F ACP DISCUSS-NO DSCNMKR DOCD: CPT | Performed by: EMERGENCY MEDICINE

## 2021-05-03 PROCEDURE — 85025 COMPLETE CBC W/AUTO DIFF WBC: CPT | Performed by: EMERGENCY MEDICINE

## 2021-05-03 PROCEDURE — 96374 THER/PROPH/DIAG INJ IV PUSH: CPT

## 2021-05-03 RX ORDER — HYDROMORPHONE HCL/PF 1 MG/ML
0.2 SYRINGE (ML) INJECTION EVERY 4 HOURS PRN
Status: DISCONTINUED | OUTPATIENT
Start: 2021-05-03 | End: 2021-05-04

## 2021-05-03 RX ORDER — ALBUTEROL SULFATE 90 UG/1
2 AEROSOL, METERED RESPIRATORY (INHALATION) EVERY 6 HOURS PRN
Status: DISCONTINUED | OUTPATIENT
Start: 2021-05-03 | End: 2021-05-07 | Stop reason: HOSPADM

## 2021-05-03 RX ORDER — ONDANSETRON 2 MG/ML
4 INJECTION INTRAMUSCULAR; INTRAVENOUS EVERY 8 HOURS PRN
Status: DISCONTINUED | OUTPATIENT
Start: 2021-05-03 | End: 2021-05-04

## 2021-05-03 RX ORDER — PANTOPRAZOLE SODIUM 40 MG/1
40 TABLET, DELAYED RELEASE ORAL DAILY
Status: DISCONTINUED | OUTPATIENT
Start: 2021-05-04 | End: 2021-05-07 | Stop reason: HOSPADM

## 2021-05-03 RX ORDER — SODIUM CHLORIDE 9 MG/ML
50 INJECTION, SOLUTION INTRAVENOUS CONTINUOUS
Status: DISCONTINUED | OUTPATIENT
Start: 2021-05-03 | End: 2021-05-06

## 2021-05-03 RX ORDER — METOPROLOL SUCCINATE 25 MG/1
12.5 TABLET, EXTENDED RELEASE ORAL DAILY
Status: DISCONTINUED | OUTPATIENT
Start: 2021-05-04 | End: 2021-05-07 | Stop reason: HOSPADM

## 2021-05-03 RX ORDER — CHLORHEXIDINE GLUCONATE 0.12 MG/ML
15 RINSE ORAL ONCE
Status: COMPLETED | OUTPATIENT
Start: 2021-05-03 | End: 2021-05-04

## 2021-05-03 RX ORDER — ASPIRIN 81 MG/1
81 TABLET ORAL DAILY
Status: DISCONTINUED | OUTPATIENT
Start: 2021-05-04 | End: 2021-05-06

## 2021-05-03 RX ORDER — ONDANSETRON 2 MG/ML
4 INJECTION INTRAMUSCULAR; INTRAVENOUS ONCE
Status: COMPLETED | OUTPATIENT
Start: 2021-05-03 | End: 2021-05-03

## 2021-05-03 RX ADMIN — MORPHINE SULFATE 2 MG: 2 INJECTION, SOLUTION INTRAMUSCULAR; INTRAVENOUS at 20:11

## 2021-05-03 RX ADMIN — SODIUM CHLORIDE 50 ML/HR: 0.9 INJECTION, SOLUTION INTRAVENOUS at 22:39

## 2021-05-03 RX ADMIN — ONDANSETRON 4 MG: 2 INJECTION INTRAMUSCULAR; INTRAVENOUS at 20:11

## 2021-05-03 RX ADMIN — MORPHINE SULFATE 2 MG: 2 INJECTION, SOLUTION INTRAMUSCULAR; INTRAVENOUS at 20:53

## 2021-05-04 ENCOUNTER — APPOINTMENT (INPATIENT)
Dept: RADIOLOGY | Facility: HOSPITAL | Age: 86
DRG: 481 | End: 2021-05-04
Payer: MEDICARE

## 2021-05-04 ENCOUNTER — ANESTHESIA (INPATIENT)
Dept: PERIOP | Facility: HOSPITAL | Age: 86
DRG: 481 | End: 2021-05-04
Payer: MEDICARE

## 2021-05-04 ENCOUNTER — ANESTHESIA EVENT (INPATIENT)
Dept: PERIOP | Facility: HOSPITAL | Age: 86
DRG: 481 | End: 2021-05-04
Payer: MEDICARE

## 2021-05-04 PROBLEM — Z98.890 PONV (POSTOPERATIVE NAUSEA AND VOMITING): Status: ACTIVE | Noted: 2021-05-04

## 2021-05-04 PROBLEM — R11.2 PONV (POSTOPERATIVE NAUSEA AND VOMITING): Status: ACTIVE | Noted: 2021-05-04

## 2021-05-04 LAB
ANION GAP SERPL CALCULATED.3IONS-SCNC: 7 MMOL/L (ref 4–13)
ATRIAL RATE: 82 BPM
BUN SERPL-MCNC: 17 MG/DL (ref 5–25)
CALCIUM SERPL-MCNC: 8.1 MG/DL (ref 8.3–10.1)
CHLORIDE SERPL-SCNC: 106 MMOL/L (ref 100–108)
CO2 SERPL-SCNC: 25 MMOL/L (ref 21–32)
CREAT SERPL-MCNC: 1.11 MG/DL (ref 0.6–1.3)
ERYTHROCYTE [DISTWIDTH] IN BLOOD BY AUTOMATED COUNT: 13.3 % (ref 11.6–15.1)
GFR SERPL CREATININE-BSD FRML MDRD: 44 ML/MIN/1.73SQ M
GLUCOSE SERPL-MCNC: 116 MG/DL (ref 65–140)
HCT VFR BLD AUTO: 26.2 % (ref 34.8–46.1)
HGB BLD-MCNC: 8.6 G/DL (ref 11.5–15.4)
MCH RBC QN AUTO: 31 PG (ref 26.8–34.3)
MCHC RBC AUTO-ENTMCNC: 32.8 G/DL (ref 31.4–37.4)
MCV RBC AUTO: 95 FL (ref 82–98)
PLATELET # BLD AUTO: 169 THOUSANDS/UL (ref 149–390)
PMV BLD AUTO: 10.1 FL (ref 8.9–12.7)
POTASSIUM SERPL-SCNC: 4.7 MMOL/L (ref 3.5–5.3)
QRS AXIS: 71 DEGREES
QRSD INTERVAL: 74 MS
QT INTERVAL: 386 MS
QTC INTERVAL: 450 MS
RBC # BLD AUTO: 2.77 MILLION/UL (ref 3.81–5.12)
SODIUM SERPL-SCNC: 138 MMOL/L (ref 136–145)
T WAVE AXIS: 98 DEGREES
VENTRICULAR RATE: 82 BPM
WBC # BLD AUTO: 8.87 THOUSAND/UL (ref 4.31–10.16)

## 2021-05-04 PROCEDURE — 80048 BASIC METABOLIC PNL TOTAL CA: CPT | Performed by: NURSE PRACTITIONER

## 2021-05-04 PROCEDURE — 73552 X-RAY EXAM OF FEMUR 2/>: CPT

## 2021-05-04 PROCEDURE — C1713 ANCHOR/SCREW BN/BN,TIS/BN: HCPCS | Performed by: ORTHOPAEDIC SURGERY

## 2021-05-04 PROCEDURE — 99223 1ST HOSP IP/OBS HIGH 75: CPT | Performed by: ORTHOPAEDIC SURGERY

## 2021-05-04 PROCEDURE — 94760 N-INVAS EAR/PLS OXIMETRY 1: CPT

## 2021-05-04 PROCEDURE — 85027 COMPLETE CBC AUTOMATED: CPT | Performed by: NURSE PRACTITIONER

## 2021-05-04 PROCEDURE — 27245 TREAT THIGH FRACTURE: CPT | Performed by: ORTHOPAEDIC SURGERY

## 2021-05-04 PROCEDURE — 99232 SBSQ HOSP IP/OBS MODERATE 35: CPT | Performed by: FAMILY MEDICINE

## 2021-05-04 PROCEDURE — C1769 GUIDE WIRE: HCPCS | Performed by: ORTHOPAEDIC SURGERY

## 2021-05-04 PROCEDURE — 27245 TREAT THIGH FRACTURE: CPT | Performed by: PHYSICIAN ASSISTANT

## 2021-05-04 PROCEDURE — 30233N1 TRANSFUSION OF NONAUTOLOGOUS RED BLOOD CELLS INTO PERIPHERAL VEIN, PERCUTANEOUS APPROACH: ICD-10-PCS | Performed by: STUDENT IN AN ORGANIZED HEALTH CARE EDUCATION/TRAINING PROGRAM

## 2021-05-04 PROCEDURE — G9197 ORDER FOR CEPH: HCPCS | Performed by: ORTHOPAEDIC SURGERY

## 2021-05-04 PROCEDURE — 0QS606Z REPOSITION RIGHT UPPER FEMUR WITH INTRAMEDULLARY INTERNAL FIXATION DEVICE, OPEN APPROACH: ICD-10-PCS | Performed by: ORTHOPAEDIC SURGERY

## 2021-05-04 PROCEDURE — P9016 RBC LEUKOCYTES REDUCED: HCPCS

## 2021-05-04 DEVICE — 5.0MM TI LOCKING SCREW W/T25 STARDRIVE 50MM F/IM NAIL-STER: Type: IMPLANTABLE DEVICE | Status: FUNCTIONAL

## 2021-05-04 DEVICE — 11MM/130 DEG TI CANN TFNA 380MM/RIGHT - STERILE
Type: IMPLANTABLE DEVICE | Status: FUNCTIONAL
Brand: TFN-ADVANCE

## 2021-05-04 DEVICE — 5.0MM TI LOCKING SCREW W/T25 STARDRIVE 60MM F/IM NAIL-STER: Type: IMPLANTABLE DEVICE | Status: FUNCTIONAL

## 2021-05-04 DEVICE — TFNA FENESTRATED HELICAL BLADE 100MM - STERILE
Type: IMPLANTABLE DEVICE | Status: FUNCTIONAL
Brand: TFN-ADVANCE

## 2021-05-04 RX ORDER — DIPHENHYDRAMINE HYDROCHLORIDE 50 MG/ML
12.5 INJECTION INTRAMUSCULAR; INTRAVENOUS ONCE AS NEEDED
Status: DISCONTINUED | OUTPATIENT
Start: 2021-05-04 | End: 2021-05-04 | Stop reason: HOSPADM

## 2021-05-04 RX ORDER — SODIUM CHLORIDE 9 MG/ML
INJECTION, SOLUTION INTRAVENOUS CONTINUOUS PRN
Status: DISCONTINUED | OUTPATIENT
Start: 2021-05-04 | End: 2021-05-04

## 2021-05-04 RX ORDER — CEFAZOLIN SODIUM 2 G/50ML
2000 SOLUTION INTRAVENOUS ONCE
Status: COMPLETED | OUTPATIENT
Start: 2021-05-04 | End: 2021-05-04

## 2021-05-04 RX ORDER — HYDROMORPHONE HCL/PF 1 MG/ML
0.5 SYRINGE (ML) INJECTION EVERY 4 HOURS PRN
Status: DISCONTINUED | OUTPATIENT
Start: 2021-05-04 | End: 2021-05-05

## 2021-05-04 RX ORDER — CALCIUM CARBONATE 200(500)MG
1000 TABLET,CHEWABLE ORAL DAILY PRN
Status: DISCONTINUED | OUTPATIENT
Start: 2021-05-04 | End: 2021-05-07 | Stop reason: HOSPADM

## 2021-05-04 RX ORDER — FENTANYL CITRATE 50 UG/ML
INJECTION, SOLUTION INTRAMUSCULAR; INTRAVENOUS AS NEEDED
Status: DISCONTINUED | OUTPATIENT
Start: 2021-05-04 | End: 2021-05-04

## 2021-05-04 RX ORDER — SUCCINYLCHOLINE/SOD CL,ISO/PF 100 MG/5ML
SYRINGE (ML) INTRAVENOUS AS NEEDED
Status: DISCONTINUED | OUTPATIENT
Start: 2021-05-04 | End: 2021-05-04

## 2021-05-04 RX ORDER — FENTANYL CITRATE/PF 50 MCG/ML
25 SYRINGE (ML) INJECTION
Status: DISCONTINUED | OUTPATIENT
Start: 2021-05-04 | End: 2021-05-04 | Stop reason: HOSPADM

## 2021-05-04 RX ORDER — CEFAZOLIN SODIUM 2 G/50ML
SOLUTION INTRAVENOUS AS NEEDED
Status: DISCONTINUED | OUTPATIENT
Start: 2021-05-04 | End: 2021-05-04

## 2021-05-04 RX ORDER — DOCUSATE SODIUM 100 MG/1
100 CAPSULE, LIQUID FILLED ORAL 2 TIMES DAILY
Status: DISCONTINUED | OUTPATIENT
Start: 2021-05-04 | End: 2021-05-07 | Stop reason: HOSPADM

## 2021-05-04 RX ORDER — CEFAZOLIN SODIUM 2 G/50ML
2000 SOLUTION INTRAVENOUS EVERY 8 HOURS
Status: COMPLETED | OUTPATIENT
Start: 2021-05-04 | End: 2021-05-05

## 2021-05-04 RX ORDER — ONDANSETRON 2 MG/ML
4 INJECTION INTRAMUSCULAR; INTRAVENOUS EVERY 6 HOURS PRN
Status: DISCONTINUED | OUTPATIENT
Start: 2021-05-04 | End: 2021-05-07 | Stop reason: HOSPADM

## 2021-05-04 RX ORDER — ROCURONIUM BROMIDE 10 MG/ML
INJECTION, SOLUTION INTRAVENOUS AS NEEDED
Status: DISCONTINUED | OUTPATIENT
Start: 2021-05-04 | End: 2021-05-04

## 2021-05-04 RX ORDER — MAGNESIUM HYDROXIDE/ALUMINUM HYDROXICE/SIMETHICONE 120; 1200; 1200 MG/30ML; MG/30ML; MG/30ML
30 SUSPENSION ORAL EVERY 6 HOURS PRN
Status: DISCONTINUED | OUTPATIENT
Start: 2021-05-04 | End: 2021-05-07 | Stop reason: HOSPADM

## 2021-05-04 RX ORDER — PROPOFOL 10 MG/ML
INJECTION, EMULSION INTRAVENOUS AS NEEDED
Status: DISCONTINUED | OUTPATIENT
Start: 2021-05-04 | End: 2021-05-04

## 2021-05-04 RX ORDER — OXYCODONE HYDROCHLORIDE 10 MG/1
10 TABLET ORAL EVERY 4 HOURS PRN
Status: DISCONTINUED | OUTPATIENT
Start: 2021-05-04 | End: 2021-05-07 | Stop reason: HOSPADM

## 2021-05-04 RX ORDER — EPHEDRINE SULFATE 50 MG/ML
INJECTION INTRAVENOUS AS NEEDED
Status: DISCONTINUED | OUTPATIENT
Start: 2021-05-04 | End: 2021-05-04

## 2021-05-04 RX ORDER — PROPOFOL 10 MG/ML
INJECTION, EMULSION INTRAVENOUS CONTINUOUS PRN
Status: DISCONTINUED | OUTPATIENT
Start: 2021-05-04 | End: 2021-05-04

## 2021-05-04 RX ORDER — MAGNESIUM HYDROXIDE 1200 MG/15ML
LIQUID ORAL AS NEEDED
Status: DISCONTINUED | OUTPATIENT
Start: 2021-05-04 | End: 2021-05-04 | Stop reason: HOSPADM

## 2021-05-04 RX ORDER — ONDANSETRON 2 MG/ML
INJECTION INTRAMUSCULAR; INTRAVENOUS AS NEEDED
Status: DISCONTINUED | OUTPATIENT
Start: 2021-05-04 | End: 2021-05-04

## 2021-05-04 RX ORDER — GINSENG 100 MG
CAPSULE ORAL AS NEEDED
Status: DISCONTINUED | OUTPATIENT
Start: 2021-05-04 | End: 2021-05-04 | Stop reason: HOSPADM

## 2021-05-04 RX ORDER — ACETAMINOPHEN 325 MG/1
650 TABLET ORAL EVERY 6 HOURS PRN
Status: DISCONTINUED | OUTPATIENT
Start: 2021-05-04 | End: 2021-05-07 | Stop reason: HOSPADM

## 2021-05-04 RX ORDER — OXYCODONE HYDROCHLORIDE 5 MG/1
5 TABLET ORAL EVERY 4 HOURS PRN
Status: DISCONTINUED | OUTPATIENT
Start: 2021-05-04 | End: 2021-05-07 | Stop reason: HOSPADM

## 2021-05-04 RX ORDER — SODIUM CHLORIDE, SODIUM LACTATE, POTASSIUM CHLORIDE, CALCIUM CHLORIDE 600; 310; 30; 20 MG/100ML; MG/100ML; MG/100ML; MG/100ML
INJECTION, SOLUTION INTRAVENOUS CONTINUOUS PRN
Status: DISCONTINUED | OUTPATIENT
Start: 2021-05-04 | End: 2021-05-04

## 2021-05-04 RX ORDER — LIDOCAINE HYDROCHLORIDE 10 MG/ML
INJECTION, SOLUTION EPIDURAL; INFILTRATION; INTRACAUDAL; PERINEURAL AS NEEDED
Status: DISCONTINUED | OUTPATIENT
Start: 2021-05-04 | End: 2021-05-04

## 2021-05-04 RX ORDER — BUPIVACAINE HYDROCHLORIDE 5 MG/ML
INJECTION, SOLUTION PERINEURAL AS NEEDED
Status: DISCONTINUED | OUTPATIENT
Start: 2021-05-04 | End: 2021-05-04 | Stop reason: HOSPADM

## 2021-05-04 RX ADMIN — PHENYLEPHRINE HYDROCHLORIDE 100 MCG: 10 INJECTION INTRAVENOUS at 13:45

## 2021-05-04 RX ADMIN — PHENYLEPHRINE HYDROCHLORIDE 200 MCG: 10 INJECTION INTRAVENOUS at 14:40

## 2021-05-04 RX ADMIN — FENTANYL CITRATE 25 MCG: 50 INJECTION, SOLUTION INTRAMUSCULAR; INTRAVENOUS at 13:53

## 2021-05-04 RX ADMIN — ROCURONIUM BROMIDE 15 MG: 10 INJECTION, SOLUTION INTRAVENOUS at 14:09

## 2021-05-04 RX ADMIN — PHENYLEPHRINE HYDROCHLORIDE 200 MCG: 10 INJECTION INTRAVENOUS at 14:25

## 2021-05-04 RX ADMIN — SUGAMMADEX 140 MG: 100 INJECTION, SOLUTION INTRAVENOUS at 15:20

## 2021-05-04 RX ADMIN — SODIUM CHLORIDE 50 ML/HR: 0.9 INJECTION, SOLUTION INTRAVENOUS at 23:25

## 2021-05-04 RX ADMIN — OXYCODONE HYDROCHLORIDE 10 MG: 10 TABLET ORAL at 19:49

## 2021-05-04 RX ADMIN — PHENYLEPHRINE HYDROCHLORIDE 100 MCG: 10 INJECTION INTRAVENOUS at 14:02

## 2021-05-04 RX ADMIN — PROPOFOL 20 MG: 10 INJECTION, EMULSION INTRAVENOUS at 13:53

## 2021-05-04 RX ADMIN — PHENYLEPHRINE HYDROCHLORIDE 100 MCG: 10 INJECTION INTRAVENOUS at 14:16

## 2021-05-04 RX ADMIN — CEFAZOLIN SODIUM 2000 MG: 2 SOLUTION INTRAVENOUS at 13:45

## 2021-05-04 RX ADMIN — PHENYLEPHRINE HYDROCHLORIDE 100 MCG: 10 INJECTION INTRAVENOUS at 14:57

## 2021-05-04 RX ADMIN — PHENYLEPHRINE HYDROCHLORIDE 100 MCG: 10 INJECTION INTRAVENOUS at 13:59

## 2021-05-04 RX ADMIN — FENTANYL CITRATE 25 MCG: 50 INJECTION, SOLUTION INTRAMUSCULAR; INTRAVENOUS at 14:14

## 2021-05-04 RX ADMIN — FENTANYL CITRATE 50 MCG: 50 INJECTION, SOLUTION INTRAMUSCULAR; INTRAVENOUS at 15:28

## 2021-05-04 RX ADMIN — PHENYLEPHRINE HYDROCHLORIDE 100 MCG: 10 INJECTION INTRAVENOUS at 14:01

## 2021-05-04 RX ADMIN — LIDOCAINE HYDROCHLORIDE 50 MG: 10 INJECTION, SOLUTION EPIDURAL; INFILTRATION; INTRACAUDAL; PERINEURAL at 13:41

## 2021-05-04 RX ADMIN — SODIUM CHLORIDE: 9 INJECTION, SOLUTION INTRAVENOUS at 13:45

## 2021-05-04 RX ADMIN — PHENYLEPHRINE HYDROCHLORIDE 100 MCG: 10 INJECTION INTRAVENOUS at 14:00

## 2021-05-04 RX ADMIN — SODIUM CHLORIDE, SODIUM LACTATE, POTASSIUM CHLORIDE, AND CALCIUM CHLORIDE: .6; .31; .03; .02 INJECTION, SOLUTION INTRAVENOUS at 13:37

## 2021-05-04 RX ADMIN — PROPOFOL 30 MG: 10 INJECTION, EMULSION INTRAVENOUS at 13:48

## 2021-05-04 RX ADMIN — Medication 60 MG: at 13:45

## 2021-05-04 RX ADMIN — PROPOFOL 80 MCG/KG/MIN: 10 INJECTION, EMULSION INTRAVENOUS at 13:45

## 2021-05-04 RX ADMIN — CHLORHEXIDINE GLUCONATE 0.12% ORAL RINSE 15 ML: 1.2 LIQUID ORAL at 13:24

## 2021-05-04 RX ADMIN — PROPOFOL 70 MG: 10 INJECTION, EMULSION INTRAVENOUS at 13:45

## 2021-05-04 RX ADMIN — EPHEDRINE SULFATE 10 MG: 50 INJECTION, SOLUTION INTRAVENOUS at 14:03

## 2021-05-04 RX ADMIN — HYDROMORPHONE HYDROCHLORIDE 0.2 MG: 1 INJECTION, SOLUTION INTRAMUSCULAR; INTRAVENOUS; SUBCUTANEOUS at 00:05

## 2021-05-04 RX ADMIN — DOCUSATE SODIUM 100 MG: 100 CAPSULE ORAL at 18:28

## 2021-05-04 RX ADMIN — ONDANSETRON 4 MG: 2 INJECTION INTRAMUSCULAR; INTRAVENOUS at 15:13

## 2021-05-04 RX ADMIN — PHENYLEPHRINE HYDROCHLORIDE 200 MCG: 10 INJECTION INTRAVENOUS at 13:47

## 2021-05-04 RX ADMIN — PHENYLEPHRINE HYDROCHLORIDE 100 MCG: 10 INJECTION INTRAVENOUS at 14:18

## 2021-05-04 RX ADMIN — CEFAZOLIN SODIUM 2000 MG: 2 SOLUTION INTRAVENOUS at 23:24

## 2021-05-04 RX ADMIN — PHENYLEPHRINE HYDROCHLORIDE 200 MCG: 10 INJECTION INTRAVENOUS at 14:05

## 2021-05-04 RX ADMIN — PHENYLEPHRINE HYDROCHLORIDE 100 MCG: 10 INJECTION INTRAVENOUS at 14:48

## 2021-05-04 NOTE — H&P
[address]  H&P- Bekah CACERES [facility] 1/11/1932, 80 y o  female MRN: [id number]  Unit/Bed#: -01 Encounter: [id number]  Primary Care Provider: Joslyn Hadley MD   Date and time admitted to hospital: 5/3/2021  7:43 PM    * Closed fracture of right femur ([address] )  Assessment & Plan  · Status post mechanical fall  · Right hip X-ray:  Comminuted right femur fracture  · Admit to medicine with ortho consult  · NPO after midnight  · Neurovascular checks  · Pain control  · Buck's traction  · Patient preference is Dr Porsche Dee who is on-call  · Type and cross 2 units PRBCs-patient will need to be consented by physician     [address]  · Trauma alert for fall  · CT head:  No acute intracranial hemorrhage  Moderate cerebral chronic microangiopathic disease  · Chest x-ray:  No evidence of consolidation or infiltrate  Radiology read is pending  · X-ray left shoulder wrist and elbow:  No acute interosseous abnormality      Anemia  Assessment & Plan  · Hemoglobin 10 1  Baseline is around 11  · Daily CBC and trend hemoglobin  · Monitor hemoglobin closely postoperatively    COPD not affecting current episode of care [facility])  Assessment & Plan  · Never smoker-although around family member who smoked heavily  · Currently controlled  · Continue albuterol inhaler as needed  · Respiratory protocol    Hypertension  Assessment & Plan  · BP reviewed  · Continue metoprolol  · Monitor blood pressure    Coronary artery disease  Assessment & Plan  · No chest pain  · History of stent x 1 three years ago  · Continue aspirin, metoprolol    VTE Prophylaxis: Pharmacologic VTE Prophylaxis contraindicated due to Hold preoperatively  / sequential compression device   Code Status:  Full  POLST: POLST form is not discussed and not completed at this time    Discussion with family:  Patient    Anticipated Length of Stay:  Patient will be admitted on an Inpatient basis with an anticipated length of stay of  less than 2 midnights  Justification for Hospital Stay:  Per plan above    Total Time for Visit, including Counseling / Coordination of Care: 30 minutes  Greater than 50% of this total time spent on direct patient counseling and coordination of care  Chief Complaint:   Right hip pain status post fall    History of Present Illness:    Vito Smith is a 80 y o  female with history of essential hypertension, CAD, and COPD who presents with right hip pain status post fall  She said she was turning to get into her scooter, and she fell onto her right side  She says the pain is moderate, is aching, and worsened with movement  She is unable to ambulate  She activated her Life Alert system  She denies fever chills, congestion, , shortness of breath , wheezing, chest pain, abdominal pain , vomiting, diarrhea, back pain , dysuria, syncope, or focal weakness  Review of Systems:    Review of Systems   Constitutional: Negative for chills and fever  HENT: Negative for congestion  Eyes: Negative for visual disturbance  Respiratory: Negative for cough and shortness of breath  Cardiovascular: Negative for chest pain, palpitations and leg swelling  Gastrointestinal: Negative for abdominal distention, abdominal pain, constipation, diarrhea, nausea and vomiting  Genitourinary: Negative for dysuria and frequency  Musculoskeletal: Positive for arthralgias and gait problem  Negative for myalgias  Neurological: Negative for dizziness, syncope, weakness and headaches  Psychiatric/Behavioral: Negative for dysphoric mood  All other systems reviewed and are negative        Past Medical and Surgical History:     Past Medical History:   Diagnosis Date    Cancer [facility])     skin cancer    Cardiac disease     Multiple falls     Neuropathy     Skin cancer     forehead    Spinal stenosis     UTI (urinary tract infection)        Past Surgical History:   Procedure Laterality Date    APPENDECTOMY      BACK SURGERY      x2  CATARACT EXTRACTION W/  INTRAOCULAR LENS IMPLANT Bilateral     CORONARY ANGIOPLASTY WITH STENT PLACEMENT  09/23/2011    REPLACEMENT TOTAL KNEE BILATERAL         Meds/Allergies:    Prior to Admission medications    Medication Sig Start Date End Date Taking? Authorizing Provider   albuterol (PROVENTIL HFA,VENTOLIN HFA) 90 mcg/act inhaler Inhale 2 puffs every 6 (six) hours as needed for wheezing   Yes Historical Provider, MD   aspirin (ECOTRIN LOW STRENGTH) 81 mg EC tablet Take 81 mg by mouth daily   Yes Historical Provider, MD   cyanocobalamin (VITAMIN B-12) 100 MCG tablet Take 100 mcg by mouth daily   Yes Historical Provider, MD   famotidine (PEPCID) 20 mg tablet Take 20 mg by mouth 2 (two) times a day   Yes Historical Provider, MD   metoprolol succinate (TOPROL-XL) 25 mg 24 hr tablet Take 12 5 mg by mouth daily   Yes Historical Provider, MD   Multiple Vitamins-Minerals (CENTRUM SILVER 50+WOMEN PO) Take 1 tablet by mouth   Yes Historical Provider, MD   pantoprazole (PROTONIX) 40 mg tablet Take 40 mg by mouth daily   Yes Historical Provider, MD   senna-docusate sodium (SENOKOT S) 8 6-50 mg per tablet Take 1 tablet by mouth daily   Yes Historical Provider, MD   budesonide-formoterol (SYMBICORT) 80-4 5 MCG/ACT inhaler Inhale 2 puffs 2 (two) times a day Rinse mouth after use      Historical Provider, MD   denosumab (PROLIA) 60 mg/mL Inject 60 mg under the skin once    Historical Provider, MD   fluticasone (FLONASE) 50 mcg/act nasal spray 1 spray into each nostril daily    Historical Provider, MD   guaiFENesin (MUCINEX) 600 mg 12 hr tablet Take 1,200 mg by mouth every 12 (twelve) hours    Historical Provider, MD   MAGNESIUM SULFATE PO Take 100 mg by mouth daily    Historical Provider, MD   meclizine (ANTIVERT) 12 5 MG tablet Take 1 tablets 3 times daily for 3 days then tab 3 times daily as needed  Patient not taking: Reported on 5/3/2021 2/14/20   Beverly Sahu MD   NAPROXEN PO Take 220 mg by mouth as needed Historical Provider, MD   nitroglycerin (NITROSTAT) 0 4 mg SL tablet Place 0 4 mg under the tongue every 5 (five) minutes as needed for chest pain    Historical Provider, MD     I have reviewed home medications with patient personally  Allergies: Allergies   Allergen Reactions    Ace Inhibitors Cough    Angiotensin Receptor Blockers      Other reaction(s): WEAK AND SOB    Atorvastatin      Other reaction(s): NOT SURE    Ezetimibe      Other reaction(s): N&V    Formoterol      Other reaction(s): NOT SURE, SHAKINESS    Gabapentin      Other reaction(s): SWELLING    Hydrocodone-Acetaminophen Vomiting     Other reaction(s): Nausea and Vomiting    Lovastatin      Other reaction(s): MUSCLE PAIN    Methylprednisolone      Other reaction(s): FLUSHING    Mometasone Furoate      Other reaction(s): HOARSENESS    Niacin      Other reaction(s): NOT SURE    Nsaids     Other      abx - pt cant remember name - got body aches and headache    Oxycodone GI Intolerance    Oxytetracycline      Other reaction(s): NOT SURE    Pravastatin     Rosuvastatin      Other reaction(s): MUSCLE PAIN    Statins      Other reaction(s): Other (See Comments)  "bone pain"    Tramadol      Other reaction(s): N&V    Latex Other (See Comments) and Rash     "skin peeling"    Penicillins Rash and Hives     Other reaction(s): RASH       Social History:     Marital Status:     Occupation:  Retired  Patient Pre-hospital Living Situation:  Home  Patient Pre-hospital Level of Mobility:  Assistive device  Patient Pre-hospital Diet Restrictions:  Cardiac  Substance Use History:   Social History     Substance and Sexual Activity   Alcohol Use Yes    Comment: Occasionally     Social History     Tobacco Use   Smoking Status Never Smoker   Smokeless Tobacco Never Used     Social History     Substance and Sexual Activity   Drug Use Never       Family History:    non-contributory    Physical Exam:     Vitals:   Blood Pressure: 143/58 (05/03/21 2206)  Pulse: (!) 42 (05/03/21 2206)  Temperature: 98 °F (36 7 °C) (05/03/21 2206)  Temp Source: Temporal (05/03/21 1945)  Respirations: 16 (05/03/21 2206)  Height: 5' 5" (165 1 cm) (05/03/21 1945)  Weight - Scale: 68 4 kg (150 lb 12 7 oz) (05/03/21 2206)  SpO2: 96 % (05/03/21 2207)    Physical Exam  Vitals signs and nursing note reviewed  HENT:      Head: Normocephalic and atraumatic  Mouth/Throat:      Mouth: Mucous membranes are moist       Pharynx: Oropharynx is clear  Eyes:      Extraocular Movements: Extraocular movements intact  Pupils: Pupils are equal, round, and reactive to light  Neck:      Musculoskeletal: Normal range of motion and neck supple  Cardiovascular:      Rate and Rhythm: Normal rate and regular rhythm  Pulses: Normal pulses  Heart sounds: Normal heart sounds  Pulmonary:      Effort: Pulmonary effort is normal       Breath sounds: Normal breath sounds  Abdominal:      General: Abdomen is flat  Bowel sounds are normal       Palpations: Abdomen is soft  Musculoskeletal:         General: Tenderness and signs of injury present  Comments: Right lower extremity shortened  DP pulse intact 2+  Skin:     General: Skin is warm and dry  Capillary Refill: Capillary refill takes less than 2 seconds  Neurological:      General: No focal deficit present  Mental Status: She is alert and oriented to person, place, and time  Additional Data:     Lab Results: I have personally reviewed pertinent reports        Results from last 7 days   Lab Units 05/03/21 2010   WBC Thousand/uL 9 09   HEMOGLOBIN g/dL 10 1*   HEMATOCRIT % 31 0*   PLATELETS Thousands/uL 206   NEUTROS PCT % 35*   LYMPHS PCT % 51*   MONOS PCT % 8   EOS PCT % 5     Results from last 7 days   Lab Units 05/03/21 2010   SODIUM mmol/L 137   POTASSIUM mmol/L 4 3   CHLORIDE mmol/L 103   CO2 mmol/L 26   BUN mg/dL 17   CREATININE mg/dL 1 23   ANION GAP mmol/L 8   CALCIUM mg/dL 8 4 ALBUMIN g/dL 3 4*   TOTAL BILIRUBIN mg/dL 0 26   ALK PHOS U/L 65   ALT U/L 16   AST U/L 23   GLUCOSE RANDOM mg/dL 98     Results from last 7 days   Lab Units 05/03/21 2010   INR  0 94                   Imaging: I have personally reviewed pertinent reports  and I have personally reviewed pertinent films in PACS    XR chest 1 view portable   ED Interpretation by Maximino Oliva DO (05/03 2038)   No acute disease      XR hip/pelv 2-3 vws right if performed   ED Interpretation by Maximino Oliva DO (05/03 2038)   Comminuted proximal femur fracture          EKG, Pathology, and Other Studies Reviewed on Admission:   · EKG:  Normal sinus rhythm    Allscripts / Epic Records Reviewed: Yes     ** Please Note: This note has been constructed using a voice recognition system   **

## 2021-05-04 NOTE — CASE MANAGEMENT
Chart Review    Pt lives alone in a 1 [address], 1 [address]  Pt completes most ADLs and IADLs independently  Pt ambulates independently at home but does have a cane, rollator, and electric scooter for use in the community when she is feeling unsteady  Pt has a private caregiver (friend Raf Antoine) that is with her M-W-F for 4 + hours and transport pt in the community any time she needs  Pt also has utilized [facility] in the past and [facility]     PCP: Dr Hua Kerr at [facility]: [address] in  [place]: Alyson Stewart (caregiver) [phone]  Pt states her children Jeannie Cabello (son in [place]) [phone] and Martita Noel (daughter in [place]) [phone] share POA

## 2021-05-04 NOTE — PROGRESS NOTES
[address]  Progress Note Esha Guerrero [facility] 1/11/1932, 80 y o  female MRN: [id number]  Unit/Bed#: OR [place] Encounter: [id number]  Primary Care Provider: Cholo Odom MD   Date and time admitted to hospital: 5/3/2021  7:43 PM    * Closed fracture of right femur ([address] )  Assessment & Plan  · Status post mechanical fall  · Right hip X-ray:  Comminuted right femur fracture  · Admit to medicine with ortho consult  · NPO   Surgery today  · Neurovascular checks  · Pain control  · Buck's traction    Fall  Assessment & Plan  · Trauma alert for fall  · CT head:  No acute intracranial hemorrhage  Moderate cerebral chronic microangiopathic disease  · Chest x-ray:  No evidence of consolidation or infiltrate  · X-ray left shoulder wrist and elbow:  No acute interosseous abnormality  · EKG shows sinus rhythm with bigeminy  · Will probably require subacute rehab once medically cleared      Acute on chronic anemia  Assessment & Plan  · Hemoglobin 10 1 on admission and dropped to 8 6 today due to recent fall and fracture  Baseline is around 11  · Daily CBC and trend hemoglobin  · Monitor hemoglobin closely postoperatively  Transfuse if hemoglobin is less than 7 5    COPD not affecting current episode of care [facility])  Assessment & Plan  · Never smoker-although around family member who smoked heavily  · Currently controlled  · Continue albuterol inhaler as needed  · Respiratory protocol    Essential hypertension  Assessment & Plan  · BP reviewed  · Continue metoprolol  · Monitor blood pressure    Coronary artery disease  Assessment & Plan  · No chest pain  · History of stent x 1 three years ago  · Continue aspirin, metoprolol      VTE Pharmacologic Prophylaxis:   Pharmacologic: Enoxaparin (Lovenox)  Mechanical VTE Prophylaxis in Place: Yes    Patient Centered Rounds: I have performed bedside rounds with nursing staff today      Discussions with Specialists or Other Care Team Provider:  Discussed with Ortho    Education and Discussions with Family / Patient:  Discussed with patient at bedside    Time Spent for Care: 30 minutes  More than 50% of total time spent on counseling and coordination of care as described above  Current Length of Stay: 1 day(s)    Current Patient Status: Inpatient   Certification Statement: The patient will continue to require additional inpatient hospital stay due to femur Fracture    Discharge Plan:  Pending progress  OR today    Code Status: Level 1 - Full Code      Subjective:   Patient denies any chest pain or shortness of breath  complains of right leg pain 5/10    Objective:     Vitals:   Temp (24hrs), Av 9 °F (36 6 °C), Min:97 3 °F (36 3 °C), Max:98 2 °F (36 8 °C)    Temp:  [97 3 °F (36 3 °C)-98 2 °F (36 8 °C)] 98 2 °F (36 8 °C)  HR:  [] 87  Resp:  [16-27] 20  BP: ()/(53-73) 114/67  SpO2:  [90 %-98 %] 97 %  Body mass index is 24 96 kg/m²  Input and Output Summary (last 24 hours):     No intake or output data in the 24 hours ending 21 1318    Physical Exam:     Physical Exam  Vitals signs and nursing note reviewed  Constitutional:       Appearance: Normal appearance  HENT:      Head: Normocephalic and atraumatic  Right Ear: External ear normal       Left Ear: External ear normal       Nose: Nose normal       Mouth/Throat:      Pharynx: Oropharynx is clear  Neck:      Musculoskeletal: Normal range of motion and neck supple  Cardiovascular:      Rate and Rhythm: Normal rate and regular rhythm  Heart sounds: Normal heart sounds  Pulmonary:      Effort: Pulmonary effort is normal       Breath sounds: Normal breath sounds  Abdominal:      General: Bowel sounds are normal       Palpations: Abdomen is soft  Tenderness: There is no abdominal tenderness  Musculoskeletal:      Comments: Right leg in Kerr's traction   Skin:     General: Skin is warm and dry  Capillary Refill: Capillary refill takes less than 2 seconds  Neurological:      General: No focal deficit present  Mental Status: She is alert and oriented to person, place, and time  Psychiatric:         Mood and Affect: Mood normal            Additional Data:     Labs:    Results from last 7 days   Lab Units 05/04/21 0458 05/03/21 2010   WBC Thousand/uL 8 87 9 09   HEMOGLOBIN g/dL 8 6* 10 1*   HEMATOCRIT % 26 2* 31 0*   PLATELETS Thousands/uL 169 206   NEUTROS PCT %  --  35*   LYMPHS PCT %  --  51*   MONOS PCT %  --  8   EOS PCT %  --  5     Results from last 7 days   Lab Units 05/04/21 0458 05/03/21 2010   SODIUM mmol/L 138 137   POTASSIUM mmol/L 4 7 4 3   CHLORIDE mmol/L 106 103   CO2 mmol/L 25 26   BUN mg/dL 17 17   CREATININE mg/dL 1 11 1 23   ANION GAP mmol/L 7 8   CALCIUM mg/dL 8 1* 8 4   ALBUMIN g/dL  --  3 4*   TOTAL BILIRUBIN mg/dL  --  0 26   ALK PHOS U/L  --  65   ALT U/L  --  16   AST U/L  --  23   GLUCOSE RANDOM mg/dL 116 98     Results from last 7 days   Lab Units 05/03/21 2010   INR  0 94                       * I Have Reviewed All Lab Data Listed Above  * Additional Pertinent Lab Tests Reviewed:  [address] Admission Reviewed    Imaging:    Imaging Reports Reviewed Today Include:  Hip x-ray, chest x-ray,  Imaging Personally Reviewed by Myself Includes:  Chest x-ray    Recent Cultures (last 7 days):           Last 24 Hours Medication List:   Current Facility-Administered Medications   Medication Dose Route Frequency Provider Last Rate    [[facility] Hold] albuterol  2 puff Inhalation Q6H PRN NARA Adair      [facility] Hold] aspirin  81 mg Oral Daily NARA Adair      chlorhexidine  15 mL Swish & Spit Once NARA Adair      [facility]] cyanocobalamin  100 mcg Oral Daily NARA Adair      [facility]] HYDROmorphone  0 2 mg Intravenous Q4H PRN NARA Adair      [facility]] metoprolol succinate  12 5 mg Oral Daily NARA Adair      [facility] Hold] ondansetron  4 mg Intravenous Q8H PRN Kobe Child, CRNP      [facility] Hold] pantoprazole  40 mg Oral Daily Kobe Child, CRNP      sodium chloride  50 mL/hr Intravenous Continuous Kobe Child, CRNP 50 mL/hr (05/03/21 2239)        Today, Patient Was Seen By: Isidro Post MD    ** Please Note: Dictation voice to text software may have been used in the creation of this document   **

## 2021-05-04 NOTE — ANESTHESIA PREPROCEDURE EVALUATION
Procedure:  INSERTION NAIL IM FEMUR ANTEGRADE (TROCHANTERIC) (Right Leg Upper)    Relevant Problems   ANESTHESIA   (+) PONV (postoperative nausea and vomiting)      CARDIO  EKGs with NSR with PACs  Pt hypotensive today   (+) Coronary artery disease (hx stent 2011, cardiology note 2018 noting normal heart structure/function on echo/stress test   Pt reports chest sensation she was experiencing prior to stenting resolved subsequently and has not recurred)   (+) Hypertension   (-) MI (myocardial infarction) ([address] )      /RENAL   (+) Stage 3 chronic kidney disease (HCC)      HEMATOLOGY   (+) Anemia      PULMONARY   (+) COPD not affecting current episode of care [facility]) (pt nonsmoker but with hx heavy second hand exposure  Breathing feels good today)   (-) URI (upper respiratory infection)      Other   (+) Closed fracture of right femur (HCC)   (+) Dizziness   (+) Multiple falls   (+) Spinal stenosis      Physical Exam    Airway    Mallampati score: II  TM Distance: >3 FB  Neck ROM: limited     Dental   upper dentures and lower dentures,     Cardiovascular      Pulmonary      Other Findings       Lab Results   Component Value Date    WBC 8 87 05/04/2021    HGB 8 6 (L) 05/04/2021     05/04/2021     Lab Results   Component Value Date    SODIUM 138 05/04/2021    K 4 7 05/04/2021    BUN 17 05/04/2021    CREATININE 1 11 05/04/2021    EGFR 44 05/04/2021     Lab Results   Component Value Date    PTT 31 05/03/2021      Lab Results   Component Value Date    INR 0 94 05/03/2021     Blood type O+/antibody neg  I ordered 2 unit T&C now @ 1145    Anesthesia Plan  ASA Score- 3     Anesthesia Type- general with ASA Monitors  Additional Monitors:   Airway Plan: ETT  Comment: Likely blood transfusion  Plan Factors-    Chart reviewed  EKG reviewed  Imaging results reviewed  Existing labs reviewed  Patient summary reviewed  Patient is not a current smoker  Induction- intravenous      Postoperative Plan- Informed Consent- Anesthetic plan and risks discussed with patient  I personally reviewed this patient with the CRNA  Discussed and agreed on the Anesthesia Plan with the CRNA  Homero Kaufman

## 2021-05-04 NOTE — ED PROVIDER NOTES
Emergency Department Trauma Note  Surya Smith 80 y o  female MRN: [id number]  Unit/Bed#: /-01 Encounter: [id number]      Trauma Alert: Trauma Acuity: Trauma Evaluation  Model of Arrival: Mode of Arrival: BLS via Trauma Squad Name and Number: [facility]  Trauma Team: Current Providers  Attending Provider: Michela Rdz DO  Attending Provider: Renetta Petersen MD  Registered Nurse: Og Avalos RN  Registered Nurse: Jp Clarke RN  Nurse Practitioner: NARA Adiar  Patient Care Technician: Grace Arteaga  Consultants: None      History of Present Illness     Chief Complaint:   Chief Complaint   Patient presents with    Fall     Patient fell in hallway while turning off fire place  Reports she fell onto right hip, activated life alert and EMS was called  Denies head strike, LOC  HPI:  Carter Frost is a 80 y o  female who presents with see n  Mechanism:Details of Incident: Patient reports falling after turning off fire place  States she fell onto her right hip  Denies head strike, LOC  Injury Date: 05/03/21 Injury Time: 1900 Injury Occurence Location - [address] [place] [address] Way: [facility]    Patient is a very pleasant 80-year-old female presents emergency room status post fall  Patient reports that she was bending over to fix her furnace and turned when she felt slightly lightheaded which she reports is normal for her but became more dizzy and fell landing on her right hip  She did not strike her head  She denies any other traumatic injury  She has no head neck chest or abdomen pain  She did not injure her other extremities  She is neurologically intact and other than the right hip pain is resting comfortably on the stretcher provided her right leg does not move  She is holding it flexed in a position of comfort        History provided by:  Patient  Fall  Mechanism of injury: fall    Injury location:  Pelvis  Pelvic injury location:  R hip  Fall:     Fall occurred:  Standing    Height of fall:  Standing    Impact surface:  Hard floor (Floor)    Point of impact:  Buttocks    Entrapped after fall: no (Patient used her Life Alert to summon help)    Prior to arrival data:     Bystander interventions:  None    Patient ambulatory at scene: no      Blood loss:  None    Orientation at scene:  Person, place, situation and time    Loss of consciousness: no      Amnesic to event: no      Airway interventions:  None    Breathing interventions:  None    Airway condition since incident:  Stable    Breathing condition since incident:  Stable    Circulation condition since incident:  Stable    Mental status condition since incident:  Stable    Disability condition since incident:  Stable  Associated symptoms: no abdominal pain, no back pain, no chest pain, no difficulty breathing, no headaches, no loss of consciousness, no nausea and no vomiting    Risk factors: CAD    Risk factors: no anticoagulation therapy      Review of Systems   Constitutional: Negative for activity change, fatigue and fever  HENT: Negative for congestion, ear pain, rhinorrhea, sinus pressure and sore throat  Eyes: Negative  Negative for redness and itching  Respiratory: Negative for cough, chest tightness, shortness of breath and wheezing  Cardiovascular: Negative for chest pain, palpitations and leg swelling  Gastrointestinal: Negative for abdominal pain, diarrhea, nausea and vomiting  Endocrine: Negative  Genitourinary: Negative for dysuria, flank pain and frequency  Musculoskeletal: Positive for arthralgias and gait problem  Negative for back pain and myalgias  Skin: Negative  Negative for pallor and rash  Allergic/Immunologic: Negative  Neurological: Positive for dizziness  Negative for loss of consciousness, weakness and headaches  Hematological: Negative  Psychiatric/Behavioral: Negative  All other systems reviewed and are negative        Historical Information Immunizations: There is no immunization history on file for this patient  Past Medical History:   Diagnosis Date    Cancer [facility])     skin cancer    Cardiac disease     Multiple falls     Neuropathy     Skin cancer     forehead    Spinal stenosis     UTI (urinary tract infection)      History reviewed  No pertinent family history  Past Surgical History:   Procedure Laterality Date    APPENDECTOMY      BACK SURGERY      x2    CATARACT EXTRACTION W/  INTRAOCULAR LENS IMPLANT Bilateral     CORONARY ANGIOPLASTY WITH STENT PLACEMENT  09/23/2011    REPLACEMENT TOTAL KNEE BILATERAL       Social History     Tobacco Use    Smoking status: Never Smoker    Smokeless tobacco: Never Used   Substance Use Topics    Alcohol use: Yes     Comment: Occasionally    Drug use: Never     E-Cigarette/Vaping    E-Cigarette Use Never User      E-Cigarette/Vaping Substances       Family History: non-contributory    Meds/Allergies   Prior to Admission Medications   Prescriptions Last Dose Informant Patient Reported? Taking? MAGNESIUM SULFATE PO Not Taking at Unknown time Self Yes No   Sig: Take 100 mg by mouth daily   Multiple Vitamins-Minerals (CENTRUM SILVER 50+WOMEN PO) 5/3/2021 at Unknown time Self Yes Yes   Sig: Take 1 tablet by mouth   NAPROXEN PO Not Taking at Unknown time Self Yes No   Sig: Take 220 mg by mouth as needed   albuterol (PROVENTIL HFA,VENTOLIN HFA) 90 mcg/act inhaler 5/3/2021 at Unknown time Self Yes Yes   Sig: Inhale 2 puffs every 6 (six) hours as needed for wheezing   aspirin (ECOTRIN LOW STRENGTH) 81 mg EC tablet 5/3/2021 at Unknown time Self Yes Yes   Sig: Take 81 mg by mouth daily   budesonide-formoterol (SYMBICORT) 80-4 5 MCG/ACT inhaler Not Taking at Unknown time Self Yes No   Sig: Inhale 2 puffs 2 (two) times a day Rinse mouth after use     cyanocobalamin (VITAMIN B-12) 100 MCG tablet 5/3/2021 at Unknown time Self Yes Yes   Sig: Take 100 mcg by mouth daily   denosumab (PROLIA) 60 mg/mL More than a month at Unknown time Self Yes No   Sig: Inject 60 mg under the skin once   famotidine (PEPCID) 20 mg tablet Past Week at Unknown time Self Yes Yes   Sig: Take 20 mg by mouth 2 (two) times a day   fluticasone (FLONASE) 50 mcg/act nasal spray Unknown at Unknown time Self Yes No   Si spray into each nostril daily   guaiFENesin (MUCINEX) 600 mg 12 hr tablet Not Taking at Unknown time Self Yes No   Sig: Take 1,200 mg by mouth every 12 (twelve) hours   meclizine (ANTIVERT) 12 5 MG tablet Not Taking at Unknown time  No No   Sig: Take 1 tablets 3 times daily for 3 days then tab 3 times daily as needed   Patient not taking: Reported on 5/3/2021   metoprolol succinate (TOPROL-XL) 25 mg 24 hr tablet 5/3/2021 at Unknown time Self Yes Yes   Sig: Take 12 5 mg by mouth daily   nitroglycerin (NITROSTAT) 0 4 mg SL tablet Unknown at Unknown time Self Yes No   Sig: Place 0 4 mg under the tongue every 5 (five) minutes as needed for chest pain   pantoprazole (PROTONIX) 40 mg tablet 5/3/2021 at Unknown time Self Yes Yes   Sig: Take 40 mg by mouth daily   senna-docusate sodium (SENOKOT S) 8 6-50 mg per tablet 2021 at Unknown time Self Yes Yes   Sig: Take 1 tablet by mouth daily      Facility-Administered Medications: None       Allergies   Allergen Reactions    Ace Inhibitors Cough    Angiotensin Receptor Blockers      Other reaction(s): WEAK AND SOB    Atorvastatin      Other reaction(s): NOT SURE    Ezetimibe      Other reaction(s): N&V    Formoterol      Other reaction(s): NOT SURE, SHAKINESS    Gabapentin      Other reaction(s): SWELLING    Hydrocodone-Acetaminophen Vomiting     Other reaction(s): Nausea and Vomiting    Lovastatin      Other reaction(s):  MUSCLE PAIN    Methylprednisolone      Other reaction(s): FLUSHING    Mometasone Furoate      Other reaction(s): HOARSENESS    Niacin      Other reaction(s): NOT SURE    Nsaids     Other      abx - pt cant remember name - got body aches and headache  Oxycodone GI Intolerance    Oxytetracycline      Other reaction(s): NOT SURE    Pravastatin     Rosuvastatin      Other reaction(s): MUSCLE PAIN    Statins      Other reaction(s): Other (See Comments)  "bone pain"    Tramadol      Other reaction(s): N&V    Latex Other (See Comments) and Rash     "skin peeling"    Penicillins Rash and Hives     Other reaction(s): RASH       PHYSICAL EXAM    PE limited by: not applicable    Objective   Vitals:   First set: Temperature: (!) 97 3 °F (36 3 °C) (05/03/21 1945)  Pulse: 80 (05/03/21 1945)  Respirations: 16 (05/03/21 1945)  Blood Pressure: 135/73 (05/03/21 1945)  SpO2: 90 % (05/03/21 1945)    Primary Survey:   (A) Airway: normal  (B) Breathing: nornal  (C) Circulation: Pulses:   normal  (D) Disabliity:  GCS Total:  15  (E) Expose:  Completed    Secondary Survey: (Click on Physical Exam tab above)  Physical Exam  Vitals signs and nursing note reviewed  Constitutional:       General: She is awake  She is in acute distress  Appearance: Normal appearance  She is well-developed  She is not toxic-appearing  HENT:      Head: Normocephalic and atraumatic  Hair is normal       Jaw: No pain on movement  Right Ear: External ear normal       Left Ear: External ear normal       Nose: Nose normal  No congestion  Mouth/Throat:      Mouth: Mucous membranes are moist    Eyes:      General: Lids are normal  No scleral icterus  Right eye: No discharge  Left eye: No discharge  Extraocular Movements: Extraocular movements intact  Pupils: Pupils are equal, round, and reactive to light  Neck:      Musculoskeletal: Normal range of motion and neck supple  Cardiovascular:      Rate and Rhythm: Normal rate and regular rhythm  Heart sounds: Normal heart sounds  No murmur  Pulmonary:      Effort: Pulmonary effort is normal  No respiratory distress  Breath sounds: Normal breath sounds  No stridor  No wheezing or rales     Abdominal: General: Abdomen is flat  There is no distension  Palpations: Abdomen is soft  There is no mass  Tenderness: There is no abdominal tenderness  There is no guarding  Musculoskeletal:         General: Deformity and signs of injury present  No swelling  Right hip: She exhibits decreased range of motion, decreased strength, tenderness, bony tenderness and deformity  She exhibits no swelling and no crepitus  Right lower leg: No edema  Left lower leg: No edema  Legs:       Comments: Holding right lower extremity in flexed and externally rotated position   Skin:     General: Skin is warm and dry  Coloration: Skin is not jaundiced or pale  Findings: No rash  Neurological:      Mental Status: She is alert and oriented to person, place, and time  Mental status is at baseline  Cranial Nerves: No cranial nerve deficit  Motor: No weakness  Psychiatric:         Attention and Perception: Attention normal          Mood and Affect: Mood normal          Speech: Speech normal          Behavior: Behavior normal          Thought Content: Thought content normal          Judgment: Judgment normal          Cervical spine cleared by clinical criteria?  Yes     Invasive Devices     Peripheral Intravenous Line            Peripheral IV 05/03/21 Left Antecubital less than 1 day                Lab Results:   Results Reviewed     Procedure Component Value Units Date/Time    Comprehensive metabolic panel [[id number]]  (Abnormal) Collected: 05/03/21 2010    Lab Status: Final result Specimen: Blood from Arm, Left Updated: 05/03/21 2033     Sodium 137 mmol/L      Potassium 4 3 mmol/L      Chloride 103 mmol/L      CO2 26 mmol/L      ANION GAP 8 mmol/L      BUN 17 mg/dL      Creatinine 1 23 mg/dL      Glucose 98 mg/dL      Calcium 8 4 mg/dL      Corrected Calcium 8 9 mg/dL      AST 23 U/L      ALT 16 U/L      Alkaline Phosphatase 65 U/L      Total Protein 6 9 g/dL      Albumin 3 4 g/dL Total Bilirubin 0 26 mg/dL      eGFR 39 ml/min/1 73sq m     Narrative:      National Kid[employer] Disease Foundation guidelines for Chronic Kidney Disease (CKD):     Stage 1 with normal or high GFR (GFR > 90 mL/min/1 73 square meters)    Stage 2 Mild CKD (GFR = 60-89 mL/min/1 73 square meters)    Stage 3A Moderate CKD (GFR = 45-59 mL/min/1 73 square meters)    Stage 3B Moderate CKD (GFR = 30-44 mL/min/1 73 square meters)    Stage 4 Severe CKD (GFR = 15-29 mL/min/1 73 square meters)    Stage 5 End Stage CKD (GFR <15 mL/min/1 73 square meters)  Note: GFR calculation is accurate only with a steady state creatinine    Protime-INR [[id number]]  (Normal) Collected: 05/03/21 2010    Lab Status: Final result Specimen: Blood from Arm, Left Updated: 05/03/21 2029     Protime 12 4 seconds      INR 0 94    APTT [[id number]]  (Normal) Collected: 05/03/21 2010    Lab Status: Final result Specimen: Blood from Arm, Left Updated: 05/03/21 2029     PTT 31 seconds     CBC and differential [[id number]]  (Abnormal) Collected: 05/03/21 2010    Lab Status: Final result Specimen: Blood from Arm, Left Updated: 05/03/21 2017     WBC 9 09 Thousand/uL      RBC 3 26 Million/uL      Hemoglobin 10 1 g/dL      Hematocrit 31 0 %      MCV 95 fL      MCH 31 0 pg      MCHC 32 6 g/dL      RDW 13 5 %      MPV 10 8 fL      Platelets 065 Thousands/uL      nRBC 0 /100 WBCs      Neutrophils Relative 35 %      Immat GRANS % 0 %      Lymphocytes Relative 51 %      Monocytes Relative 8 %      Eosinophils Relative 5 %      Basophils Relative 1 %      Neutrophils Absolute 3 19 Thousands/µL      Immature Grans Absolute 0 02 Thousand/uL      Lymphocytes Absolute 4 66 Thousands/µL      Monocytes Absolute 0 71 Thousand/µL      Eosinophils Absolute 0 45 Thousand/µL      Basophils Absolute 0 06 Thousands/µL                  Imaging Studies:   Direct to CT:  Yes  XR chest 1 view portable   ED Interpretation by Jesica Hansen DO (05/03 2038)   No acute disease      XR hip/pelv 2-3 vws right if performed   ED Interpretation by Leandro Herrera DO (05/03 2038)   Comminuted proximal femur fracture            Procedures  ECG 12 Lead Documentation Only    Date/Time: 5/3/2021 9:14 PM  Performed by: Leandro Herrera DO  Authorized by: Leandro Herrera DO     Indications / Diagnosis:  Fall  ECG reviewed by me, the ED Provider: yes    Patient location:  ED  Previous ECG:     Previous ECG:  Compared to current    Comparison ECG info:  4/17/2021    Similarity:  No change  Interpretation:     Interpretation: abnormal    Rate:     ECG rate assessment: normal    Rhythm:     Rhythm: sinus rhythm    Ectopy:     Ectopy: bigeminy    QRS:     QRS axis:  Normal  ST segments:     ST segments:  Non-specific  T waves:     T waves: non-specific               ED Course  ED Course as of May 04 0026   Mon May 03, 2021   2038 Patient with comminuted proximal femur fracture  Will discuss with Orthopedics  2054 Discussed with Dr Inderjit Conner and patient may be admitted to our facility for surgical care tomorrow  Request admission to hospitalist service  Discussed with hospitalist service and patient admitted to their service  MDM  Number of Diagnoses or Management Options  Closed fracture of right femur, unspecified fracture morphology, unspecified portion of femur, initial encounter [facility]): new and requires workup  Fall, initial encounter:   Diagnosis management comments: Patient with a simple fall from standing same height landing on right hip  While patient will require a chest x-ray and a right hip and pelvis this does not need to be done emergently as part of a trauma evaluation in the room until the patient has appropriate pain medications  Patient with an isolated right hip fracture, comminuted proximal femur fracture cause from a fall from standing/same level fall  No other traumatic injury  Discussed with orthopedics    Patient will be admitted to hospitalist service and have surgery tomorrow  Patient presented to the emergency department and a MSE was performed  The patient was evaluated and diagnosed with acute fall from same height/standing with isolated right femur fracture  This is a new issue that will require additional planned work-up and treatment in a hospitalized setting  As may have been required as part of this evaluation, clinical laboratory test, radiology imaging and medical testing (I e  EKG) were ordered as necessitated by the patient's presentation  I independently reviewed these studies, imaging and testing  This patient's case is considered to be a considerable risk secondary to the above listed disease process and poses a threat to the patient's well-being and baseline function  Further in-patient diagnostic testing and management, which may include the administration of parenteral medications, is required  Also updated family at patient's request via phone  Amount and/or Complexity of Data Reviewed  Clinical lab tests: ordered and reviewed  Tests in the radiology section of CPT®: ordered and reviewed  Obtain history from someone other than the patient: yes  Discuss the patient with other providers: yes  Independent visualization of images, tracings, or specimens: yes    Risk of Complications, Morbidity, and/or Mortality  Presenting problems: high  Diagnostic procedures: moderate  Management options: moderate    Patient Progress  Patient progress: stable           Disposition  [facility] Transfer: No  Final diagnoses:   Fall, initial encounter   Closed fracture of right femur, unspecified fracture morphology, unspecified portion of femur, [facility] - Comminuted     Time reflects when diagnosis was documented in both MDM as applicable and the Disposition within this note     Time User Action Codes Description Comment    5/3/2021  8:58 PM Josef Samuels  TDEH] Fall, initial encounter     5/3/2021  8:59 PM Josef Pizarro [S72 91XA] Closed fracture of right femur, unspecified fracture morphology, unspecified portion of femur, [facility]     5/3/2021  8:59 PM Portillo Wasserman Modify [S72 [id number]] Closed fracture of right femur, unspecified fracture morphology, unspecified portion of femur, initial encounter [facility]) Comminuted      ED Disposition     ED Disposition Condition Date/Time Comment    Admit Stable Mon May 3, 2021  8:52 PM         Follow-up Information    None       Current Discharge Medication List      CONTINUE these medications which have NOT CHANGED    Details   albuterol (PROVENTIL HFA,VENTOLIN HFA) 90 mcg/act inhaler Inhale 2 puffs every 6 (six) hours as needed for wheezing    Comments: Substitution to a formulary equivalent within the same pharmaceutical class is authorized  aspirin (ECOTRIN LOW STRENGTH) 81 mg EC tablet Take 81 mg by mouth daily      cyanocobalamin (VITAMIN B-12) 100 MCG tablet Take 100 mcg by mouth daily      famotidine (PEPCID) 20 mg tablet Take 20 mg by mouth 2 (two) times a day      metoprolol succinate (TOPROL-XL) 25 mg 24 hr tablet Take 12 5 mg by mouth daily      Multiple Vitamins-Minerals (CENTRUM SILVER 50+WOMEN PO) Take 1 tablet by mouth      pantoprazole (PROTONIX) 40 mg tablet Take 40 mg by mouth daily      senna-docusate sodium (SENOKOT S) 8 6-50 mg per tablet Take 1 tablet by mouth daily      budesonide-formoterol (SYMBICORT) 80-4 5 MCG/ACT inhaler Inhale 2 puffs 2 (two) times a day Rinse mouth after use        denosumab (PROLIA) 60 mg/mL Inject 60 mg under the skin once      fluticasone (FLONASE) 50 mcg/act nasal spray 1 spray into each nostril daily      guaiFENesin (MUCINEX) 600 mg 12 hr tablet Take 1,200 mg by mouth every 12 (twelve) hours      MAGNESIUM SULFATE PO Take 100 mg by mouth daily      meclizine (ANTIVERT) 12 5 MG tablet Take 1 tablets 3 times daily for 3 days then tab 3 times daily as needed  Qty: 30 tablet, Refills: 0    Associated Diagnoses: Dizziness NAPROXEN PO Take 220 mg by mouth as needed      nitroglycerin (NITROSTAT) 0 4 mg SL tablet Place 0 4 mg under the tongue every 5 (five) minutes as needed for chest pain           No discharge procedures on file      PDMP Review     None          ED Provider  Electronically Signed by         Yudy Floyd DO  05/04/21 2732

## 2021-05-04 NOTE — ASSESSMENT & PLAN NOTE
· Status post mechanical fall  · Right hip X-ray:  Comminuted right femur fracture  · Admit to medicine with ortho consult  · NPO     Surgery today  · Neurovascular checks  · Pain control  · Kerr's traction

## 2021-05-04 NOTE — OP NOTE
OPERATIVE REPORT  PATIENT NAME: Aminata Smith    :  1932  MRN: [id number]  Pt Location:  OR ROOM 02    SURGERY DATE: 2021    Surgeon(s) and Role:     * Artis Mars - Nando Boudreaux PA-C    Preop Diagnosis:  Closed fracture of right femur, unspecified fracture morphology, unspecified portion of femur, initial encounter ([place] [address] ) [S72 91XA]    Post-Op Diagnosis Codes:     * Closed fracture of right femur, unspecified fracture morphology, unspecified portion of femur, initial encounter ([place] [address] ) [S72 91XA]    Procedure(s) (LRB):  INSERTION NAIL IM FEMUR ANTEGRADE (TROCHANTERIC) (Right)    Fluids:  1000 cc of fluid, 2 units PRBCs    Estimated Blood Loss:   150 mL    Anesthesia Type:   General with supplemental local utilizing 0 5% Marcaine with epinephrine    Operative Indications:  Closed fracture of right femur, unspecified fracture morphology, unspecified portion of femur, initial encounter ([place] [address] ) Johnny Ewing is a an 49-year-old female who fell at home on 2021 injuring her right lower extremity  She was seen in the emergency room, evaluated, x-rays obtained and she was subsequently admitted to the hospital with a comminuted, subtrochanteric fracture of the right femur  Orthopedic consultation was requested  The patient was seen by myself and the risks, benefits, options and alternatives of treatment discussed it was elected to proceed with surgical fixation  Operative Findings: At the time of the procedure, the fracture was reduced into an acceptable position and then stably fixated utilizing a long TFNA nail measuring 380 mm in length, 11 mm diameter with 95 mm proximal spiral blade and 2 distal locking screws measuring 50 mm and 60 mm  Fluoroscopic images at the conclusion of the procedure demonstrated the fracture to be acceptably aligned with stable fixation        Complications:   None    Procedure and Technique:  Mary Ríos was taken to the operating room where she was administered a spinal anesthetic  She was then placed on the fracture table and the left lower extremity abducted, externally rotated and flexed to allow for fluoroscopic visualization of the right hip and femoral shaft  The right lower extremity is placed into traction  Reduction maneuvers were performed demonstrating the fracture to be adequately aligned although the proximal fragment did remains somewhat externally rotated  The right lower extremity was prepped and draped in standard fashion  An incision was made extending from the greater trochanter proximally in line with the femoral shaft for approximately 12-15 cm  Sharp dissection was carried down through the skin and subcutaneous tissues and bleeding controlled with cautery  Soft tissues were dissected down to the fascia which was then divided in line with the incision  A Brunner elevator was used to access the greater trochanter and the greater trochanter was then elevated with a Brunner to internally rotate the proximal segment  Once this was noted to be in good alignment on fluoroscopic imaging, a guide pin was inserted and advanced into the femoral shaft ensuring good position on both AP and lateral images  The proximal drill was then used to access the intramedullary space  A long, ball-tipped guidewire was then inserted and advanced past the fracture site into the distal fragment and to the distal aspect of the femur, confirmed in good position on both AP and lateral images  Sequential reaming was then performed to a size 12-1/2 mm Reamer  The measuring device was utilized and a 380 mm length nail 11 mm in diameter was chosen and then inserted under direct fluoroscopic image in standard fashion  This was advanced to the proper position  An incision was then made over the proximal, lateral thigh for placement of the spiral blade  Sharp dissection was carried down through the skin and subcutaneous tissues    Bleeding controlled with cautery and the fascia divided in line with the incision  An elevated was then used to expose the proximal femoral shaft  A guide pin was then inserted to the subchondral bone ensuring proper position on both AP and lateral images and then the depth gauge utilized to choose the proper length spiral blade  The lateral cortex was drilled and then the femoral neck and head drilled  The spiral blade was inserted under fluoroscopic image to the proper position within the femoral head  This was then secured in position  Fluoroscopic images demonstrated good placement of the spiral blade in the fracture to be in acceptable alignment  The leg was abducted and then utilizing fluoroscopic guidance in the lateral position and the perfect [place] technique, 2 distal locking screws were inserted through a small incision made over the lateral, distal femur  Fluoroscopic images demonstrated good placement the screws at the conclusion of the procedure  Final fluoroscopic images were obtained demonstrating the fracture to be well aligned in acceptable position with stable fixation  All wounds were irrigated with saline solution  Local anesthetic was infiltrated into the soft tissues and the fascia then approximated with 0 Vicryl  Subcutaneous tissues were approximated with 2-0 Vicryl and the skin secured with skin staples  Dressings were then applied consisting of bacitracin, Adaptic, a Mepilex dressing for the proximal incision and gauze and tape to secure the more distal incisions  The patient was then awakened from the general anesthetic and transferred to the recovery room in stable and satisfactory postoperative condition     I was present for the entire procedure, A qualified resident physician was not available and A physician assistant was required during the procedure for retraction tissue handling,dissection and suturing  The PA was needed to assist with maintaining reduction of the fracture, to assist with retraction of soft tissues and to assist with stabilization of the bone and soft tissues during reaming of the femoral canal and insertion of the femoral jacob      Patient Disposition:  [facility]    Marjorie Vieyra  DATE: May 4, 2021  TIME: 3:45 PM

## 2021-05-04 NOTE — ASSESSMENT & PLAN NOTE
· Trauma alert for fall  · CT head:  No acute intracranial hemorrhage  Moderate cerebral chronic microangiopathic disease  · Chest x-ray:  No evidence of consolidation or infiltrate  · X-ray left shoulder wrist and elbow:  No acute interosseous abnormality  · EKG shows sinus rhythm with bigeminy    · Will probably require subacute rehab once medically cleared

## 2021-05-04 NOTE — ANESTHESIA PREPROCEDURE EVALUATION
Procedure:  INSERTION NAIL IM FEMUR ANTEGRADE (TROCHANTERIC) (Right Leg Upper)    Relevant Problems   No relevant active problems

## 2021-05-04 NOTE — PLAN OF CARE
Problem: Potential for Falls  Goal: Patient will remain free of falls  Description: INTERVENTIONS:  - Assess patient frequently for physical needs  -  Identify cognitive and physical deficits and behaviors that affect risk of falls    -  [place] fall precautions as indicated by assessment   - Educate patient/family on patient safety including physical limitations  - Instruct patient to call for assistance with activity based on assessment  - Modify environment to reduce risk of injury  - Consider OT/PT consult to assist with strengthening/mobility  Outcome: Progressing     Problem: PAIN - ADULT  Goal: Verbalizes/displays adequate comfort level or baseline comfort level  Description: Interventions:  - Encourage patient to monitor pain and request assistance  - Assess pain using appropriate pain scale  - Administer analgesics based on type and severity of pain and evaluate response  - Implement non-pharmacological measures as appropriate and evaluate response  - Consider cultural and social influences on pain and pain management  - Notify physician/advanced practitioner if interventions unsuccessful or patient reports new pain  Outcome: Progressing     Problem: INFECTION - ADULT  Goal: Absence or prevention of progression during hospitalization  Description: INTERVENTIONS:  - Assess and monitor for signs and symptoms of infection  - Monitor lab/diagnostic results  - Monitor all insertion sites, i e  indwelling lines, tubes, and drains  - Monitor endotracheal if appropriate and nasal secretions for changes in amount and color  - [place] appropriate cooling/warming therapies per order  - Administer medications as ordered  - Instruct and encourage patient and family to use good hand hygiene technique  - Identify and instruct in appropriate isolation precautions for identified infection/condition  Outcome: Progressing  Goal: Absence of fever/infection during neutropenic period  Description: INTERVENTIONS:  - Monitor WBC    Outcome: Progressing     Problem: SAFETY ADULT  Goal: Patient will remain free of falls  Description: INTERVENTIONS:  - Assess patient frequently for physical needs  -  Identify cognitive and physical deficits and behaviors that affect risk of falls    -  [place] fall precautions as indicated by assessment   - Educate patient/family on patient safety including physical limitations  - Instruct patient to call for assistance with activity based on assessment  - Modify environment to reduce risk of injury  - Consider OT/PT consult to assist with strengthening/mobility  Outcome: Progressing  Goal: Maintain or return to baseline ADL function  Description: INTERVENTIONS:  -  Assess patient's ability to carry out ADLs; assess patient's baseline for ADL function and identify physical deficits which impact ability to perform ADLs (bathing, care of mouth/teeth, toileting, grooming, dressing, etc )  - Assess/evaluate cause of self-care deficits   - Assess range of motion  - Assess patient's mobility; develop plan if impaired  - Assess patient's need for assistive devices and provide as appropriate  - Encourage maximum independence but intervene and supervise when necessary  - Involve family in performance of ADLs  - Assess for home care needs following discharge   - Consider OT consult to assist with ADL evaluation and planning for discharge  - Provide patient education as appropriate  Outcome: Progressing  Goal: Maintain or return mobility status to optimal level  Description: INTERVENTIONS:  - Assess patient's baseline mobility status (ambulation, transfers, stairs, etc )    - Identify cognitive and physical deficits and behaviors that affect mobility  - Identify mobility aids required to assist with transfers and/or ambulation (gait belt, sit-to-stand, lift, walker, cane, etc )  - [place] fall precautions as indicated by assessment  - Record patient progress and toleration of activity level on Mobility SBAR; progress patient to next Phase/Stage  - Instruct patient to call for assistance with activity based on assessment  - Consider rehabilitation consult to assist with strengthening/weightbearing, etc   Outcome: Progressing     Problem: DISCHARGE PLANNING  Goal: Discharge to home or other facility with appropriate resources  Description: INTERVENTIONS:  - Identify barriers to discharge w/patient and caregiver  - Arrange for needed discharge resources and transportation as appropriate  - Identify discharge learning needs (meds, wound care, etc )  - Arrange for interpretive services to assist at discharge as needed  - Refer to Case Management Department for coordinating discharge planning if the patient needs post-hospital services based on physician/advanced practitioner order or complex needs related to functional status, cognitive ability, or social support system  Outcome: Progressing     Problem: Knowledge Deficit  Goal: Patient/family/caregiver demonstrates understanding of disease process, treatment plan, medications, and discharge instructions  Description: Complete learning assessment and assess knowledge base    Interventions:  - Provide teaching at level of understanding  - Provide teaching via preferred learning methods  Outcome: Progressing

## 2021-05-04 NOTE — ASSESSMENT & PLAN NOTE
· Never smoker-although around family member who smoked heavily  · Currently controlled  · Continue albuterol inhaler as needed  · Respiratory protocol

## 2021-05-04 NOTE — ASSESSMENT & PLAN NOTE
· Status post mechanical fall  · Right hip X-ray:  Comminuted right femur fracture  · Admit to medicine with ortho consult  · NPO after midnight  · Neurovascular checks  · Pain control  · Buck's traction  · Patient preference is Dr Yasmin De León who is on-call  · Type and cross 2 units PRBCs-patient will need to be consented by physician

## 2021-05-04 NOTE — ASSESSMENT & PLAN NOTE
· Trauma alert for fall  · CT head:  No acute intracranial hemorrhage  Moderate cerebral chronic microangiopathic disease  · Chest x-ray:  No evidence of consolidation or infiltrate    Radiology read is pending  · X-ray left shoulder wrist and elbow:  No acute interosseous abnormality

## 2021-05-04 NOTE — RESPIRATORY THERAPY NOTE
RT Protocol Note  Miroslava Martinez Mimm 80 y o  female MRN: [id number]  Unit/Bed#: -01 Encounter: [id number]    Assessment    Principal Problem:    Closed fracture of right femur [facility])  Active Problems:    Coronary artery disease    Hypertension    COPD not affecting current episode of care [facility])    Anemia    Fall      Home Pulmonary Medications:  PRN Albuterol, BID symbicort       Past Medical History:   Diagnosis Date    Cancer [facility])     skin cancer    Cardiac disease     Multiple falls     Neuropathy     Skin cancer     forehead    Spinal stenosis     UTI (urinary tract infection)      Social History     Socioeconomic History    Marital status:       Spouse name: None    Number of children: None    Years of education: None    Highest education level: None   Occupational History    None   Social Needs    Financial resource strain: None    Food insecurity     Worry: None     Inability: None    Transportation needs     Medical: None     Non-medical: None   Tobacco Use    Smoking status: Never Smoker    Smokeless tobacco: Never Used   Substance and Sexual Activity    Alcohol use: Yes     Comment: Occasionally    Drug use: Never    Sexual activity: None   Lifestyle    Physical activity     Days per week: None     Minutes per session: None    Stress: None   Relationships    Social connections     Talks on phone: None     Gets together: None     Attends Oriental orthodox service: None     Active member of club or organization: None     Attends meetings of clubs or organizations: None     Relationship status: None    Intimate partner violence     Fear of current or ex partner: None     Emotionally abused: None     Physically abused: None     Forced sexual activity: None   Other Topics Concern    None   Social History Narrative    None       Subjective         Objective    Physical Exam:   Assessment Type: Assess only  General Appearance: Alert, Awake  Respiratory Pattern: Normal  Chest Assessment: Chest expansion symmetrical  Bilateral Breath Sounds: Clear  Cough: Non-productive  O2 Device: RA    Vitals:  Blood pressure 100/53, pulse 55, temperature 98 °F (36 7 °C), resp  rate 16, height 5' 5" (1 651 m), weight 68 4 kg (150 lb 12 7 oz), SpO2 97 %  Imaging and other studies: I have personally reviewed pertinent reports  O2 Device: RA     Plan    Respiratory Plan: Home Bronchodilator Patient pathway        Resp Comments: (P) Pt amditted for fracture of right femur  Pt has hx of COPD currently taking BID symbicort PRN albuterol  Pt not in any resp distress at this time will cont with home bronchodilator  Will cont to monitor

## 2021-05-04 NOTE — CONSULTS
Consultation - Ирина CACERES [facility] 80 y o  female MRN: [id number]  Unit/Bed#: -01 Encounter: [id number]      Assessment/Plan     Assessment:  Displaced, comminuted, subtrochanteric fracture right femur; ambulatory dysfunction; vertigo; COPD; hypertension; CAD  Plan:  The risks, benefits, options and alternatives of treatment were discussed with Eduarda Degroot and the plan will be to proceed with IM rodding of the right femur  This will be planned for today  Postoperatively, she will be weight-bearing as tolerated  History of Present Illness   Physician Requesting Consult: Campbell Molina MD  Reason for Consult / Principal Problem:  Right femur fracture  HPI: Fátima Jacinto is a 80y o  year old female who presents with fall at home on 05/03/2021  She states she was going to shut off her gas fireplace, stood up, became dizzy and fell twisting to her right side  She denies any pain prior to the fall but noted immediate pain after her fall and was unable to stand or move  She was taken by ambulance to the emergency room at [facility] where she was evaluated, x-rays obtained and she was subsequently admitted to the medical service  She denies any lower extremity paresthesias other than her chronic neuropathy  She denies any additional injuries  She denies any head trauma or loss of consciousness  She denies chest pain, shortness of breath or difficulty breathing  She does admit that she has had episodes of dizziness and will take meclizine on a p r n  Basis  She states that her physician has ruled out crystals in her years  Consults    Review of Systems   Constitutional: Positive for activity change  HENT: Negative  Eyes: Negative  Respiratory:        Stable COPD   Cardiovascular: Negative  Gastrointestinal: Negative  Endocrine: Negative  Genitourinary: Negative  Musculoskeletal:        Positive as in the HPI   Skin: Negative  Neurological: Positive for dizziness  Hematological: Negative  Historical Information   Past Medical History:   Diagnosis Date    Cancer [facility])     skin cancer    Cardiac disease     Multiple falls     Neuropathy     Skin cancer     forehead    Spinal stenosis     UTI (urinary tract infection)      Past Surgical History:   Procedure Laterality Date    APPENDECTOMY      BACK SURGERY      x2    CATARACT EXTRACTION W/  INTRAOCULAR LENS IMPLANT Bilateral     CORONARY ANGIOPLASTY WITH STENT PLACEMENT  09/23/2011    REPLACEMENT TOTAL KNEE BILATERAL       Social History   Social History     Substance and Sexual Activity   Alcohol Use Yes    Comment: Occasionally     Social History     Substance and Sexual Activity   Drug Use Never     E-Cigarette/Vaping    E-Cigarette Use Never User      E-Cigarette/Vaping Substances     Social History     Tobacco Use   Smoking Status Never Smoker   Smokeless Tobacco Never Used     Family History: non-contributory    Meds/Allergies   all current active meds have been reviewed  Allergies   Allergen Reactions    Ace Inhibitors Cough    Angiotensin Receptor Blockers      Other reaction(s): WEAK AND SOB    Atorvastatin      Other reaction(s): NOT SURE    Ezetimibe      Other reaction(s): N&V    Formoterol      Other reaction(s): NOT SURE, SHAKINESS    Gabapentin      Other reaction(s): SWELLING    Hydrocodone-Acetaminophen Vomiting     Other reaction(s): Nausea and Vomiting    Lovastatin      Other reaction(s): MUSCLE PAIN    Methylprednisolone      Other reaction(s): FLUSHING    Mometasone Furoate      Other reaction(s): HOARSENESS    Niacin      Other reaction(s): NOT SURE    Nsaids     Other      abx - pt cant remember name - got body aches and headache    Oxycodone GI Intolerance    Oxytetracycline      Other reaction(s): NOT SURE    Pravastatin     Rosuvastatin      Other reaction(s): MUSCLE PAIN    Statins      Other reaction(s):  Other (See Comments)  "bone pain"    Tramadol      Other reaction(s): N&V    Latex Other (See Comments) and Rash     "skin peeling"    Penicillins Rash and Hives     Other reaction(s): RASH       Objective   Vitals: Blood pressure 100/53, pulse 55, temperature 98 °F (36 7 °C), resp  rate 16, height 5' 5" (1 651 m), weight 68 4 kg (150 lb 12 7 oz), SpO2 97 %  ,Body mass index is 25 09 kg/m²  No intake or output data in the 24 hours ending 05/04/21 1052  No intake/output data recorded  Invasive Devices     Peripheral Intravenous Line            Peripheral IV 05/03/21 Left Antecubital less than 1 day                Physical Exam:  Abe Wong is alert, oriented and seems to be in no acute distress lying in bed with Kerr's traction in place right lower extremity  HEENT exam is grossly benign  Heart regular with peripheral pulses palpable  Lungs clear  Abdomen soft, nontender  Clavicles and ribs nontender  Skin without lacerations or abrasions  Motor and sensory exam is grossly intact excluding as limited by her injury  Ortho Exam:  The right lower extremity is immobilized in Kerr's traction  She has no tenderness to palpation of the distal thigh, knee, lower leg, ankle or foot  She is able to actively dorsiflex and plantar toes and ankle to some degree, limited by the Kerr's traction  She has tenderness to palpation of the proximal portion of her right thigh  The skin is without lacerations, abrasions and no ecchymosis is noted  Range of motion was not tested  The left lower extremity examination and bilateral upper extremity examinations demonstrate no acute abnormalities  Both knees demonstrate scars consistent with knee replacement arthroplasty which are well healed and benign      Lab Results:   CBC:   Lab Results   Component Value Date    WBC 8 87 05/04/2021    HGB 8 6 (L) 05/04/2021    HCT 26 2 (L) 05/04/2021    MCV 95 05/04/2021     05/04/2021    MCH 31 0 05/04/2021    MCHC 32 8 05/04/2021    RDW 13 3 05/04/2021    MPV 10 1 05/04/2021    NRBC 0 05/03/2021 CMP:   Lab Results   Component Value Date    SODIUM 138 05/04/2021     05/04/2021    CO2 25 05/04/2021    BUN 17 05/04/2021    CREATININE 1 11 05/04/2021    CALCIUM 8 1 (L) 05/04/2021    AST 23 05/03/2021    ALT 16 05/03/2021    ALKPHOS 65 05/03/2021    EGFR 44 05/04/2021     PT/INR:   Lab Results   Component Value Date    INR 0 94 05/03/2021     Imaging Studies: X-rays of the pelvis and right femur demonstrate a comminuted, displaced subtrochanteric fracture right femur  X-rays of her knees which had been obtained several months ago demonstrate a right knee prosthesis in place which should not preclude the ability to place a long IM nail  EKG, Pathology, and Other Studies: I have personally reviewed pertinent reports      VTE Prophylaxis: Reason for no pharmacologic prophylaxis Preoperative

## 2021-05-04 NOTE — ASSESSMENT & PLAN NOTE
· Hemoglobin 10 1    Baseline is around 11  · Daily CBC and trend hemoglobin  · Monitor hemoglobin closely postoperatively

## 2021-05-04 NOTE — ASSESSMENT & PLAN NOTE
· Hemoglobin 10 1 on admission and dropped to 8 6 today due to recent fall and fracture  Baseline is around 11  · Daily CBC and trend hemoglobin  · Monitor hemoglobin closely postoperatively    Transfuse if hemoglobin is less than 7 5

## 2021-05-04 NOTE — CASE MANAGEMENT
STR has been recommended for pt at time of dc  Pt requesting a referral be placed to [facility], as per request, a referral has been placed in [address]  Awaiting reply    A post acute care recommendation was made by your care team for STR  Discussed Freedom of Choice with patient  List of facilities given to patient via in person  patient aware the list is custom filtered for them by preference  and that [facility]'s post acute providers are designated

## 2021-05-05 LAB
ABO GROUP BLD BPU: NORMAL
ABO GROUP BLD BPU: NORMAL
ANION GAP SERPL CALCULATED.3IONS-SCNC: 6 MMOL/L (ref 4–13)
BPU ID: NORMAL
BPU ID: NORMAL
BUN SERPL-MCNC: 15 MG/DL (ref 5–25)
CALCIUM SERPL-MCNC: 7.1 MG/DL (ref 8.3–10.1)
CHLORIDE SERPL-SCNC: 106 MMOL/L (ref 100–108)
CO2 SERPL-SCNC: 25 MMOL/L (ref 21–32)
CREAT SERPL-MCNC: 0.97 MG/DL (ref 0.6–1.3)
CROSSMATCH: NORMAL
CROSSMATCH: NORMAL
ERYTHROCYTE [DISTWIDTH] IN BLOOD BY AUTOMATED COUNT: 15.3 % (ref 11.6–15.1)
GFR SERPL CREATININE-BSD FRML MDRD: 52 ML/MIN/1.73SQ M
GLUCOSE SERPL-MCNC: 108 MG/DL (ref 65–140)
HCT VFR BLD AUTO: 25.8 % (ref 34.8–46.1)
HGB BLD-MCNC: 8.6 G/DL (ref 11.5–15.4)
MCH RBC QN AUTO: 30.6 PG (ref 26.8–34.3)
MCHC RBC AUTO-ENTMCNC: 33.3 G/DL (ref 31.4–37.4)
MCV RBC AUTO: 92 FL (ref 82–98)
PLATELET # BLD AUTO: 118 THOUSANDS/UL (ref 149–390)
PMV BLD AUTO: 10.5 FL (ref 8.9–12.7)
POTASSIUM SERPL-SCNC: 4.4 MMOL/L (ref 3.5–5.3)
RBC # BLD AUTO: 2.81 MILLION/UL (ref 3.81–5.12)
SODIUM SERPL-SCNC: 137 MMOL/L (ref 136–145)
UNIT DISPENSE STATUS: NORMAL
UNIT DISPENSE STATUS: NORMAL
UNIT PRODUCT CODE: NORMAL
UNIT PRODUCT CODE: NORMAL
UNIT RH: NORMAL
UNIT RH: NORMAL
WBC # BLD AUTO: 8.68 THOUSAND/UL (ref 4.31–10.16)

## 2021-05-05 PROCEDURE — 80048 BASIC METABOLIC PNL TOTAL CA: CPT | Performed by: PHYSICIAN ASSISTANT

## 2021-05-05 PROCEDURE — 97110 THERAPEUTIC EXERCISES: CPT

## 2021-05-05 PROCEDURE — 97163 PT EVAL HIGH COMPLEX 45 MIN: CPT

## 2021-05-05 PROCEDURE — 85027 COMPLETE CBC AUTOMATED: CPT | Performed by: PHYSICIAN ASSISTANT

## 2021-05-05 PROCEDURE — 94760 N-INVAS EAR/PLS OXIMETRY 1: CPT

## 2021-05-05 PROCEDURE — 97167 OT EVAL HIGH COMPLEX 60 MIN: CPT

## 2021-05-05 PROCEDURE — 99233 SBSQ HOSP IP/OBS HIGH 50: CPT | Performed by: FAMILY MEDICINE

## 2021-05-05 PROCEDURE — 99024 POSTOP FOLLOW-UP VISIT: CPT | Performed by: PHYSICIAN ASSISTANT

## 2021-05-05 RX ADMIN — CEFAZOLIN SODIUM 2000 MG: 2 SOLUTION INTRAVENOUS at 06:05

## 2021-05-05 RX ADMIN — ASPIRIN 81 MG: 81 TABLET, COATED ORAL at 08:29

## 2021-05-05 RX ADMIN — OXYCODONE HYDROCHLORIDE 5 MG: 5 TABLET ORAL at 16:43

## 2021-05-05 RX ADMIN — OXYCODONE HYDROCHLORIDE 10 MG: 10 TABLET ORAL at 06:05

## 2021-05-05 RX ADMIN — OXYCODONE HYDROCHLORIDE 10 MG: 10 TABLET ORAL at 21:56

## 2021-05-05 RX ADMIN — Medication 100 MCG: at 08:29

## 2021-05-05 RX ADMIN — CEFAZOLIN SODIUM 2000 MG: 2 SOLUTION INTRAVENOUS at 13:27

## 2021-05-05 RX ADMIN — ENOXAPARIN SODIUM 40 MG: 40 INJECTION SUBCUTANEOUS at 06:05

## 2021-05-05 RX ADMIN — SODIUM CHLORIDE 75 ML/HR: 0.9 INJECTION, SOLUTION INTRAVENOUS at 16:42

## 2021-05-05 RX ADMIN — DOCUSATE SODIUM 100 MG: 100 CAPSULE ORAL at 16:43

## 2021-05-05 RX ADMIN — DOCUSATE SODIUM 100 MG: 100 CAPSULE ORAL at 08:29

## 2021-05-05 RX ADMIN — PANTOPRAZOLE SODIUM 40 MG: 40 TABLET, DELAYED RELEASE ORAL at 08:24

## 2021-05-05 NOTE — OCCUPATIONAL THERAPY NOTE
Occupational Therapy Evaluation     Evaluation: [id number]  Treatment: [id number]    Patient Name: Ludy Hartman Date: 5/5/2021  Problem List  Principal Problem:    Closed fracture of right femur [facility])  Active Problems:    Coronary artery disease    Essential hypertension    COPD not affecting current episode of care [facility])    Acute on chronic anemia    Fall    Past Medical History  Past Medical History:   Diagnosis Date    Cancer [facility])     skin cancer    Cardiac disease     Multiple falls     Neuropathy     Skin cancer     forehead    Spinal stenosis     UTI (urinary tract infection)      Past Surgical History  Past Surgical History:   Procedure Laterality Date    APPENDECTOMY      BACK SURGERY      x2    CATARACT EXTRACTION W/  INTRAOCULAR LENS IMPLANT Bilateral     CORONARY ANGIOPLASTY WITH STENT PLACEMENT  09/23/2011    MA OPEN RX FEMUR FX+INTRAMED RASHIDA Right 5/4/2021    Procedure: INSERTION NAIL IM FEMUR ANTEGRADE (TROCHANTERIC); Surgeon: Sharon Wallace; Location:  MAIN OR;  Service: Orthopedics    REPLACEMENT TOTAL KNEE BILATERAL           05/05/21 0973   Note Type   Note type Evaluation   Restrictions/Precautions   Weight Bearing Precautions Per Order Yes   RLE Weight Bearing Per Order WBAT   Other Precautions Chair Alarm; Bed Alarm; Fall Risk;WBS   Pain Assessment   Pain Assessment Tool 0-10   Pain Score 7   Pain Location/Orientation Orientation: Right;Location: Hip   Home Living   Type of Home Apartment  (1 [address])   Home Layout One level;Performs ADLs on one level; Able to live on main level with bedroom/bathroom   Bathroom Shower/Tub Walk-in shower   Bathroom Toilet Raised   Bathroom Equipment Built-in shower seat;Grab bars in shower;Grab bars around toilet   [address] Walker;Cane;Electric scooter; Wheelchair-manual   Additional Comments Pt reports living in a 1 story apartment with 1 DENISA  Pt ambulates with RW at baseline   Has an electric scooter for community mobility   Prior Function   Level of [place] Independent with ADLs and functional mobility   Lives With Alone   Receives Help From Personal care attendant; Neighbor  (Help with cleaning, driving into community, 3x/week)   ADL Assistance Independent   IADLs Needs assistance  (Has cleaning lady)   Falls in the last 6 months 1 to 4   Vocational Retired   Comments Pt reports completing ADLs, functional ambulatino and light IADL tasks @ Mod I  Pt has aides that assist with IADLs and community mobility PRN   ADL   Where Assessed Edge of bed   Grooming Assistance 5  Supervision/Setup   Grooming Deficit Setup   UB Dressing Assistance 5  Supervision/Setup   UB Dressing Deficit Setup   LB Dressing Assistance 2  Maximal Assistance   LB Dressing Deficit Steadying;Verbal cueing;Supervision/safety; Increased time to complete; Don/doff R sock; Don/doff L sock; Thread RLE into pants; Thread LLE into pants;Pull up over hips   Additional Comments Pt completing ADLs while seated at EOB  UB dressing and grooming tasks @ S after set-up  LB dressing @ Max A  Pt with increased pain, unable to complete doning socks or threading pants around feet  Unable to assess CM around waist while standing d/t Pt's low BP, please refer to chart below for vitals  Max A for CM around waist while supine in bed   Bed Mobility   Supine to Sit 3  Moderate assistance   Additional items Assist x 1;HOB elevated; Bedrails; Increased time required;Verbal cues;LE management  (trunk management)   Sit to Supine 2  Maximal assistance   Additional items Assist x 2; Increased time required;Verbal cues;LE management  (trunk management)   Additional Comments Pt completing supine>sit @ Mod A x's 1  Pt seated at EOB while maintaining F+ balance  Pt begining to get lethargic with decreased alertness  Pt was then assisted back to supine in bed @ Max x's 2  Transfers   Sit to Stand Unable to assess   Additional Comments Unable to assess functional transfers d/t Pt experiencing a drop in BP   Pt symptomatic at EOB  Please refer to cahrt below for vitals   Balance   Static Sitting Fair +   Dynamic Sitting Fair   Activity Tolerance   Activity Tolerance Patient limited by fatigue   Medical Staff Made Aware Spoke with PT, Kelsey Stockton and Jade NANCE   Nurse Made Aware Spoke with RN, Adam CANO Assessment   RUE Assessment WFL   LUE Assessment   LUE Assessment WFL   Hand Function   Gross Motor Coordination Functional   Fine Motor Coordination Functional   Sensation   Light Touch No apparent deficits   Cognition   Overall Cognitive Status Impaired   Arousal/Participation Alert; Responsive; Cooperative   Attention Attends with cues to redirect   Orientation Level Oriented to person;Oriented to place;Oriented to situation;Disoriented to time   Memory Within functional limits   Following Commands Follows one step commands without difficulty   Assessment   Limitation Decreased ADL status; Decreased UE strength;Decreased Safe judgement during ADL;Decreased endurance;Decreased self-care trans;Decreased high-level ADLs; Decreased cognition   Prognosis Good   Assessment Pt is an 79 y/o F admitted to [address] [place] [address] 5/3/2021 d/t experiencing a fall at home resulting in R hip pain  Dx: closed fracture of R femur  Pt underwent IM nail insertion on 5/4/2021  Pt is currently WBAT RLE with no hip precautions  Pt with PMHx impacting performance during functional tasks including: hx skin CA, cardiac disease, multiple falls, neuropathy, spinal stenosis, UTI  Pt reports living in a 1 story apartment with 1 DENISA  Pt reports completing ADLs and functional ambulation @ Mod I  Pt has assistance from family and [facility] aides for IADLs and community mobility  On evaluation, Pt A&Ox3  Pt completing supine>sit @ Mod A  UB Dressing and grooming tasks @ S after set-up  LB Dressing @ Max A  Unable to assess functional transfers at this time d/t Pt experiencing low BP and required to return to supine in bed  Pt's BUE ROM and MS WFL   Pt's barriers to d/c include: decrease activity tolerance, decrease standing balance, decrease performance during ADL tasks, decrease cognition, decrease safety awareness , decrease UB MS, increased pain, decrease generalized strength, decrease activity engagement and decrease performance during functional transfers  Pt would benefit from continued acute OT services to address deficits as well as post acute rehab upon d/c from [address] [place] [address]  Plan   Treatment Interventions ADL retraining;UE strengthening/ROM; Functional transfer training; Endurance training;Cognitive reorientation;Patient/family training;Equipment evaluation/education; Neuromuscular reeducation; Compensatory technique education;Continued evaluation; Energy conservation; Activityengagement   Goal Expiration Date 05/15/21   OT Treatment Day 1   OT Frequency 3-5x/wk   Additional Treatment Session   Start Time 0950   End Time 1004   Treatment Assessment Pt seen for treatment session #1 this date  Pt alert and agreeable to participate at this time  Pt seated upright in bed with back supported while completing BUE HEP  Pt completing tricep, chest, and shoulder exercises with Good tolerance and maintenance of movements  Pt completing 2 sets of 10 with increased time PRN  Pt encouraged to completed these exercises throughout the day to assist with functional transfers and ADL tasks moving forward  Pt verbalized understanding  Pt would benefit from continued review to ensure proper carryover      Additional Treatment Day 1   Recommendation   Recommendation   (post acute rehab)   OT Discharge Recommendation Post acute rehabilitation services   AM-PAC Daily Activity Inpatient   Lower Body Dressing 1   Bathing 1   Toileting 1   Upper Body Dressing 3   Grooming 3   Eating 4   Daily Activity Raw Score 13   Daily Activity Standardized Score (Calc for Raw Score >=11) 32 03   AM-PAC Applied Cognition Inpatient   Following a Speech/Presentation 4   Understanding Ordinary Conversation 4   Taking Medications 3 Remembering Where Things Are Placed or Put Away 3   Remembering List of 4-5 Errands 3   Taking Care of Complicated Tasks 3   Applied Cognition Raw Score 20   Applied Cognition Standardized Score 41 76        05/05/21 0938 05/05/21 0946 05/05/21 0949   Vitals   Pulse 66 61 68   Blood Pressure 102/50  (supine>sit seated at EOB - "dizzy") (!) 84/45  (Seated at EOB - "a little off") (!) 81/45  (Seated at EOB - "its starting to get black" )   MAP (mmHg) 67 58 57   Oxygen Therapy   SpO2 95 % 96 % 95 %   Patient Observation   Observations  --   --  Pt appearing to become lethargic, decreased alertness      05/05/21 0950 05/05/21 0951   Vitals   Pulse 58 (!) 106   Blood Pressure (!) 81/44  (Supine in bed ) 101/50  (Supine in bed - more alert "I feel pretty good")   MAP (mmHg) 56 67   Oxygen Therapy   SpO2 94 % 95 %   Patient Observation   Observations  --  Pt laying supine in bed, more alert       The patient's raw score on the AM-PAC Daily Activity inpatient short form is 13, standardized score is 32 03, less than 39 4  Patients at this level are likely to benefit from DC to post-acute rehabilitation services  Please refer to the recommendation of the Occupational Therapist for safe DC planning  Pt goals to be met by 5/15/2021    1  Pt will demonstrate ability to complete supine<>sit @ S in order to increase safety and independence during ADL tasks  2  Pt will demonstrate ability to complete LB dressing @ Min A in order to increase safety and independence during meaningful tasks  3  Pt will demonstrate ability to [place]/doff socks/shoes while sitting EOB @ Min A in order to increase safety and independence during meaningful tasks  4  Pt will demonstrate ability to complete toileting tasks including CM and pericare @ Min A in order to increase safety and independence during meaningful tasks    5  Pt will demonstrate ability to complete EOB, chair, toilet/commode transfers @ Min A in order to increase performance and participation during functional tasks  6  Pt will demonstrate ability to stand for 1-2 minutes while maintaining G balance with use of RW for UB support PRN  7  Pt will demonstrate ability to tolerate 30-35 minute OT session with no vc'ing for deep breathing or use of energy conservation techniques in order to increase activity tolerance during functional tasks  8  Pt will demonstrate Good carryover of use of energy conservation/compensatory strategies during ADLs and functional tasks in order to increase safety and reduce risk for falls  9  Pt will demonstrate Good attention and participation in continued evaluation of functional ambulation house hold distances in order to assist with safe d/c planning  10  Pt will attend to continued cognitive assessments 100% of the time in order to provide most appropriate d/c recommendations  11  Pt will follow 100% simple 2-step commands and be A&O x4 consistently with environmental cues to increase participation in functional activities  12  Pt will identify 3 areas of interest/hobbies and 1 intervention on how to incorporate into daily life in order to increase interaction with environment and peers as well as increase participation in meaningful tasks  13  Pt will demonstrate 100% carryover of BUE HEP in order to increase BUE MS and increase performance during functional tasks upon d/c home      Joy Denny OTR/L

## 2021-05-05 NOTE — PLAN OF CARE
Problem: OCCUPATIONAL THERAPY ADULT  Goal: Performs self-care activities at highest level of function for planned discharge setting  See evaluation for individualized goals  Description: Treatment Interventions: ADL retraining, UE strengthening/ROM, Functional transfer training, Endurance training, Cognitive reorientation, Patient/family training, Equipment evaluation/education, Neuromuscular reeducation, Compensatory technique education, Continued evaluation, Energy conservation, Activityengagement          See flowsheet documentation for full assessment, interventions and recommendations  Note: Limitation: Decreased ADL status, Decreased UE strength, Decreased Safe judgement during ADL, Decreased endurance, Decreased self-care trans, Decreased high-level ADLs, Decreased cognition  Prognosis: Good  Assessment: Pt is an 79 y/o F admitted to [address] [place] [address] 5/3/2021 d/t experiencing a fall at home resulting in R hip pain  Dx: closed fracture of R femur  Pt underwent IM nail insertion on 5/4/2021  Pt is currently WBAT RLE with no hip precautions  Pt with PMHx impacting performance during functional tasks including: hx skin CA, cardiac disease, multiple falls, neuropathy, spinal stenosis, UTI  Pt reports living in a 1 story apartment with 1 DENISA  Pt reports completing ADLs and functional ambulation @ Mod I  Pt has assistance from family and [facility] aides for IADLs and community mobility  On evaluation, Pt A&Ox3  Pt completing supine>sit @ Mod A  UB Dressing and grooming tasks @ S after set-up  LB Dressing @ Max A  Unable to assess functional transfers at this time d/t Pt experiencing low BP and required to return to supine in bed  Pt's BUE ROM and MS WFL   Pt's barriers to d/c include: decrease activity tolerance, decrease standing balance, decrease performance during ADL tasks, decrease cognition, decrease safety awareness , decrease UB MS, increased pain, decrease generalized strength, decrease activity engagement and decrease performance during functional transfers  Pt would benefit from continued acute OT services to address deficits as well as post acute rehab upon d/c from [address] [place] [address] [place]    Recommendation: (post acute rehab)  OT Discharge Recommendation: Post acute rehabilitation services

## 2021-05-05 NOTE — PLAN OF CARE
Problem: Potential for Falls  Goal: Patient will remain free of falls  Description: INTERVENTIONS:  - Assess patient frequently for physical needs  -  Identify cognitive and physical deficits and behaviors that affect risk of falls    -  [place] fall precautions as indicated by assessment   - Educate patient/family on patient safety including physical limitations  - Instruct patient to call for assistance with activity based on assessment  - Modify environment to reduce risk of injury  - Consider OT/PT consult to assist with strengthening/mobility  5/5/2021 0933 by Lizzie Valerio RN  Outcome: Progressing  5/5/2021 0920 by Adam Kerns RN  Outcome: Progressing     Problem: PAIN - ADULT  Goal: Verbalizes/displays adequate comfort level or baseline comfort level  Description: Interventions:  - Encourage patient to monitor pain and request assistance  - Assess pain using appropriate pain scale  - Administer analgesics based on type and severity of pain and evaluate response  - Implement non-pharmacological measures as appropriate and evaluate response  - Consider cultural and social influences on pain and pain management  - Notify physician/advanced practitioner if interventions unsuccessful or patient reports new pain  5/5/2021 0933 by Adam Kerns RN  Outcome: Progressing  5/5/2021 0920 by Adam Kerns RN  Outcome: Progressing     Problem: INFECTION - ADULT  Goal: Absence or prevention of progression during hospitalization  Description: INTERVENTIONS:  - Assess and monitor for signs and symptoms of infection  - Monitor lab/diagnostic results  - Monitor all insertion sites, i e  indwelling lines, tubes, and drains  - Monitor endotracheal if appropriate and nasal secretions for changes in amount and color  - [place] appropriate cooling/warming therapies per order  - Administer medications as ordered  - Instruct and encourage patient and family to use good hand hygiene technique  - Identify and instruct in appropriate isolation precautions for identified infection/condition  5/5/2021 0933 by Deni White RN  Outcome: Progressing  5/5/2021 0920 by Deni White RN  Outcome: Progressing  Goal: Absence of fever/infection during neutropenic period  Description: INTERVENTIONS:  - Monitor WBC    5/5/2021 0933 by Deni White RN  Outcome: Progressing  5/5/2021 0920 by Deni White RN  Outcome: Progressing     Problem: SAFETY ADULT  Goal: Patient will remain free of falls  Description: INTERVENTIONS:  - Assess patient frequently for physical needs  -  Identify cognitive and physical deficits and behaviors that affect risk of falls    -  [place] fall precautions as indicated by assessment   - Educate patient/family on patient safety including physical limitations  - Instruct patient to call for assistance with activity based on assessment  - Modify environment to reduce risk of injury  - Consider OT/PT consult to assist with strengthening/mobility  5/5/2021 0933 by Adam Kerns RN  Outcome: Progressing  5/5/2021 0920 by Adam Kerns RN  Outcome: Progressing  Goal: Maintain or return to baseline ADL function  Description: INTERVENTIONS:  -  Assess patient's ability to carry out ADLs; assess patient's baseline for ADL function and identify physical deficits which impact ability to perform ADLs (bathing, care of mouth/teeth, toileting, grooming, dressing, etc )  - Assess/evaluate cause of self-care deficits   - Assess range of motion  - Assess patient's mobility; develop plan if impaired  - Assess patient's need for assistive devices and provide as appropriate  - Encourage maximum independence but intervene and supervise when necessary  - Involve family in performance of ADLs  - Assess for home care needs following discharge   - Consider OT consult to assist with ADL evaluation and planning for discharge  - Provide patient education as appropriate  5/5/2021 0933 by Adam Kerns RN  Outcome: Progressing  5/5/2021 0920 by Adam Kerns RN  Outcome: Progressing  Goal: Maintain or return mobility status to optimal level  Description: INTERVENTIONS:  - Assess patient's baseline mobility status (ambulation, transfers, stairs, etc )    - Identify cognitive and physical deficits and behaviors that affect mobility  - Identify mobility aids required to assist with transfers and/or ambulation (gait belt, sit-to-stand, lift, walker, cane, etc )  - [place] fall precautions as indicated by assessment  - Record patient progress and toleration of activity level on Mobility SBAR; progress patient to next Phase/Stage  - Instruct patient to call for assistance with activity based on assessment  - Consider rehabilitation consult to assist with strengthening/weightbearing, etc   5/5/2021 0933 by Kev Stearns RN  Outcome: Progressing  5/5/2021 0920 by Adam Kerns RN  Outcome: Progressing     Problem: DISCHARGE PLANNING  Goal: Discharge to home or other facility with appropriate resources  Description: INTERVENTIONS:  - Identify barriers to discharge w/patient and caregiver  - Arrange for needed discharge resources and transportation as appropriate  - Identify discharge learning needs (meds, wound care, etc )  - Arrange for interpretive services to assist at discharge as needed  - Refer to Case Management Department for coordinating discharge planning if the patient needs post-hospital services based on physician/advanced practitioner order or complex needs related to functional status, cognitive ability, or social support system  5/5/2021 0933 by Kev Stearns RN  Outcome: Progressing  5/5/2021 0920 by Adam Kerns RN  Outcome: Progressing     Problem: Knowledge Deficit  Goal: Patient/family/caregiver demonstrates understanding of disease process, treatment plan, medications, and discharge instructions  Description: Complete learning assessment and assess knowledge base    Interventions:  - Provide teaching at level of understanding  - Provide teaching via preferred learning methods  5/5/2021 0933 by Dora Aguilera RN  Outcome: Progressing  5/5/2021 0920 by Adam Kerns RN  Outcome: Progressing     Problem: Nutrition/Hydration-ADULT  Goal: Nutrient/Hydration intake appropriate for improving, restoring or maintaining nutritional needs  Description: Monitor and assess patient's nutrition/hydration status for malnutrition  Collaborate with interdisciplinary team and initiate plan and interventions as ordered  Monitor patient's weight and dietary intake as ordered or per policy  Utilize nutrition screening tool and intervene as necessary  Determine patient's food preferences and provide high-protein, high-caloric foods as appropriate       INTERVENTIONS:  - Monitor oral intake, urinary output, labs, and treatment plans  - Assess nutrition and hydration status and recommend course of action  - Evaluate amount of meals eaten  - Assist patient with eating if necessary   - Allow adequate time for meals  - Recommend/ encourage appropriate diets, oral nutritional supplements, and vitamin/mineral supplements  - Order, calculate, and assess calorie counts as needed  - Recommend, monitor, and adjust tube feedings and TPN/PPN based on assessed needs  - Assess need for intravenous fluids  - Provide specific nutrition/hydration education as appropriate  - Include patient/family/caregiver in decisions related to nutrition  5/5/2021 0933 by Adam Kerns, RN  Outcome: Progressing  5/5/2021 0920 by Adam Kerns, RN  Outcome: Progressing

## 2021-05-05 NOTE — ASSESSMENT & PLAN NOTE
· Hemoglobin 10 1 on admission and dropped to 8 6 today due to recent fall and fracture  Baseline is around 11  · Daily CBC and trend hemoglobin  · Monitor hemoglobin closely postoperatively    Transfuse if hemoglobin is less than 8

## 2021-05-05 NOTE — RESPIRATORY THERAPY NOTE
RT Protocol Note  Magaly Raya Mimm 80 y o  female MRN: [id number]  Unit/Bed#: -01 Encounter: [id number]    Assessment    Principal Problem:    Closed fracture of right femur [facility])  Active Problems:    Coronary artery disease    Essential hypertension    COPD not affecting current episode of care [facility])    Acute on chronic anemia    Fall      Home Pulmonary Medications:         Past Medical History:   Diagnosis Date    Cancer [facility])     skin cancer    Cardiac disease     Multiple falls     Neuropathy     Skin cancer     forehead    Spinal stenosis     UTI (urinary tract infection)      Social History     Socioeconomic History    Marital status:       Spouse name: None    Number of children: None    Years of education: None    Highest education level: None   Occupational History    None   Social Needs    Financial resource strain: None    Food insecurity     Worry: None     Inability: None    Transportation needs     Medical: None     Non-medical: None   Tobacco Use    Smoking status: Never Smoker    Smokeless tobacco: Never Used   Substance and Sexual Activity    Alcohol use: Yes     Comment: Occasionally    Drug use: Never    Sexual activity: None   Lifestyle    Physical activity     Days per week: None     Minutes per session: None    Stress: None   Relationships    Social connections     Talks on phone: None     Gets together: None     Attends Gnosticism service: None     Active member of club or organization: None     Attends meetings of clubs or organizations: None     Relationship status: None    Intimate partner violence     Fear of current or ex partner: None     Emotionally abused: None     Physically abused: None     Forced sexual activity: None   Other Topics Concern    None   Social History Narrative    None       Subjective         Objective    Physical Exam:   Assessment Type: (P) Assess only  General Appearance: (P) Sleeping  Respiratory Pattern: (P) Normal  Chest Assessment: (P) Chest expansion symmetrical  Bilateral Breath Sounds: (P) Clear  O2 Device: (P) room air     Vitals:  Blood pressure 105/60, pulse (!) (P) 113, temperature 98 5 °F (36 9 °C), resp  rate (P) 16, height 5' 5" (1 651 m), weight 71 kg (156 lb 8 4 oz), SpO2 94 %  Imaging and other studies: I have personally reviewed pertinent reports        O2 Device: (P) room air      Plan    Respiratory Plan: Home Bronchodilator Patient pathway        Resp Comments: (P) Stable on room air no distress or need for prn

## 2021-05-05 NOTE — PROGRESS NOTES
[address]  Progress Note Dulce Laureano [facility] 1/11/1932, 80 y o  female MRN: [id number]  Unit/Bed#: -01 Encounter: [id number]  Primary Care Provider: Becca Fregoso MD   Date and time admitted to hospital: 5/3/2021  7:43 PM    * Closed fracture of right femur ([address] )  Assessment & Plan  · Status post mechanical fall  · Right hip X-ray:  Comminuted right femur fracture  Pod 1:INSERTION NAIL IM FEMUR ANTEGRADE (TROCHANTERIC) (Right)  · Neurovascular checks  · Pain control  Stop Dilaudid and continue oral agents at patient's request  · Buck's traction  · Patient experiencing hypotension and hence monitor closely and continue IV fluids today    Fall  Assessment & Plan  · Trauma alert for fall  · CT head:  No acute intracranial hemorrhage  Moderate cerebral chronic microangiopathic disease  · Chest x-ray:  No evidence of consolidation or infiltrate  · X-ray left shoulder wrist and elbow:  No acute interosseous abnormality  · EKG shows sinus rhythm with bigeminy  · Will probably require subacute rehab once medically cleared      Acute on chronic anemia  Assessment & Plan  · Hemoglobin 10 1 on admission and dropped to 8 6 today due to recent fall and fracture  Baseline is around 11  · Daily CBC and trend hemoglobin  · Monitor hemoglobin closely postoperatively  Transfuse if hemoglobin is less than 8    COPD not affecting current episode of care [facility])  Assessment & Plan  · Never smoker-although around family member who smoked heavily  · Currently controlled  · Continue albuterol inhaler as needed  · Respiratory protocol    Essential hypertension  Assessment & Plan  · Patient has postop hypotension  Blood pressure is between  systolic and symptomatic  Continue IV fluid hydration  Hold metoprolol based on holding parameters    · Keep hemoglobin above 8    Coronary artery disease  Assessment & Plan  · No chest pain  · History of stent x 1 three years ago  · Continue aspirin, metoprolol    Acute thrombocytopenia:  Probably secondary to recent fracture fall  Also on Lovenox  Monitor for now  Postop hypotension:  Increase IV fluids to 75 cc/hour  Hold metoprolol with holding parameters  Continue to monitor for now  Try to keep hemoglobin more than 8  VTE Pharmacologic Prophylaxis:   Pharmacologic: Enoxaparin (Lovenox)  Mechanical VTE Prophylaxis in Place: Yes    Patient Centered Rounds: I have performed bedside rounds with nursing staff today  Discussions with Specialists or Other Care Team Provider:  None    Education and Discussions with Family / Patient:  Discussed with patient at bedside and will update family    Time Spent for Care: 45 minutes  More than 50% of total time spent on counseling and coordination of care as described above  Current Length of Stay: 2 day(s)    Current Patient Status: Inpatient   Certification Statement: The patient will continue to require additional inpatient hospital stay due to Femur fracture    Discharge Plan:  Patient having postop hypotension  Monitor for now  Will eventually need subacute rehab in the next 48 hours pending progress    Code Status: Level 1 - Full Code      Subjective:   Patient denies any chest pain or shortness of breath but did get lightheaded and dizzy when she did some physical therapy today due to hypotension  Objective:     Vitals:   Temp (24hrs), Av 3 °F (36 8 °C), Min:97 7 °F (36 5 °C), Max:98 7 °F (37 1 °C)    Temp:  [97 7 °F (36 5 °C)-98 7 °F (37 1 °C)] 98 7 °F (37 1 °C)  HR:  [] 91  Resp:  [14-20] 20  BP: ()/(40-90) 104/51  SpO2:  [91 %-100 %] 94 %  Body mass index is 26 05 kg/m²  Input and Output Summary (last 24 hours): Intake/Output Summary (Last 24 hours) at 2021 1146  Last data filed at 2021 0553  Gross per 24 hour   Intake 2988 33 ml   Output 850 ml   Net 2138 33 ml       Physical Exam:     Physical Exam  Vitals signs and nursing note reviewed  Constitutional:       Appearance: Normal appearance  HENT:      Head: Normocephalic and atraumatic  Right Ear: External ear normal       Left Ear: External ear normal       Nose: Nose normal       Mouth/Throat:      Pharynx: Oropharynx is clear  Eyes:      Pupils: Pupils are equal, round, and reactive to light  Neck:      Musculoskeletal: Normal range of motion and neck supple  Cardiovascular:      Rate and Rhythm: Normal rate and regular rhythm  Heart sounds: Normal heart sounds  Pulmonary:      Effort: Pulmonary effort is normal       Breath sounds: Normal breath sounds  Abdominal:      General: Bowel sounds are normal       Palpations: Abdomen is soft  Tenderness: There is no abdominal tenderness  Musculoskeletal: Normal range of motion  Comments: Dressing on right hip and limited range of motion   Skin:     General: Skin is warm and dry  Capillary Refill: Capillary refill takes less than 2 seconds  Neurological:      General: No focal deficit present  Mental Status: She is alert and oriented to person, place, and time  Psychiatric:         Mood and Affect: Mood normal            Additional Data:     Labs:    Results from last 7 days   Lab Units 05/05/21 0523 05/03/21 2010   WBC Thousand/uL 8 68   < > 9 09   HEMOGLOBIN g/dL 8 6*   < > 10 1*   HEMATOCRIT % 25 8*   < > 31 0*   PLATELETS Thousands/uL 118*   < > 206   NEUTROS PCT %  --   --  35*   LYMPHS PCT %  --   --  51*   MONOS PCT %  --   --  8   EOS PCT %  --   --  5    < > = values in this interval not displayed       Results from last 7 days   Lab Units 05/05/21 0523 05/03/21 2010   SODIUM mmol/L 137   < > 137   POTASSIUM mmol/L 4 4   < > 4 3   CHLORIDE mmol/L 106   < > 103   CO2 mmol/L 25   < > 26   BUN mg/dL 15   < > 17   CREATININE mg/dL 0 97   < > 1 23   ANION GAP mmol/L 6   < > 8   CALCIUM mg/dL 7 1*   < > 8 4   ALBUMIN g/dL  --   --  3 4*   TOTAL BILIRUBIN mg/dL  --   --  0 26   ALK PHOS U/L  -- --  65   ALT U/L  --   --  16   AST U/L  --   --  23   GLUCOSE RANDOM mg/dL 108   < > 98    < > = values in this interval not displayed  Results from last 7 days   Lab Units 05/03/21 2010   INR  0 94                       * I Have Reviewed All Lab Data Listed Above  * Additional Pertinent Lab Tests Reviewed: Deepali [address] Admission Reviewed    Imaging:    Imaging Reports Reviewed Today Include:  None  Imaging Personally Reviewed by Myself Includes:  None    Recent Cultures (last 7 days):           Last 24 Hours Medication List:   Current Facility-Administered Medications   Medication Dose Route Frequency Provider Last Rate    acetaminophen  650 mg Oral Q6H PRN Autumn S Hodapp, PA-C      albuterol  2 puff Inhalation Q6H PRN Autumn S Hodapp, PA-C      aluminum-magnesium hydroxide-simethicone  30 mL Oral Q6H PRN Autumn S Hodapp, PA-C      aspirin  81 mg Oral Daily Autumn S Hodapp, PA-C      calcium carbonate  1,000 mg Oral Daily PRN Autumn S Hodapp, PA-C      cefazolin  2,000 mg Intravenous Q8H Autumn S Hodapp, PA-C 2,000 mg (05/05/21 5668)    cyanocobalamin  100 mcg Oral Daily Autumn S Hodapp, PA-C      docusate sodium  100 mg Oral BID Autumn S Hodapp, PA-C      enoxaparin  40 mg Subcutaneous Daily Autumn S Hodapp, PA-C      metoprolol succinate  12 5 mg Oral Daily Autumn S Hodapp, PA-C      ondansetron  4 mg Intravenous Q6H PRN Autumn S Hodapp, PA-C      oxyCODONE  10 mg Oral Q4H PRN Autumn S Hodapp, PA-C      oxyCODONE  5 mg Oral Q4H PRN Autumn S Hodapp, PA-C      pantoprazole  40 mg Oral Daily Autumn S Hodapp, PA-C      sodium chloride  75 mL/hr Intravenous Continuous Anil Jimenez MD 50 mL/hr (05/04/21 1537)        Today, Patient Was Seen By: Anil Jimenez MD    ** Please Note: Dictation voice to text software may have been used in the creation of this document   **

## 2021-05-05 NOTE — PROGRESS NOTES
Progress Note - Orthopedics   Brandy Woodruff 80 y o  female MRN: [id number]  Unit/Bed#: -01      Subjective:    80 y  o female  Postoperative day 1 status post right long TFN  No acute events, no complaints  Pt doing well  Pain controlled  Denies fevers chills, CP, SOB      Labs:  0   Lab Value Date/Time    HCT 25 8 (L) 05/05/2021 0523    HCT 26 2 (L) 05/04/2021 0458    HCT 31 0 (L) 05/03/2021 2010    HGB 8 6 (L) 05/05/2021 0523    HGB 8 6 (L) 05/04/2021 0458    HGB 10 1 (L) 05/03/2021 2010    INR 0 94 05/03/2021 2010    WBC 8 68 05/05/2021 0523    WBC 8 87 05/04/2021 0458    WBC 9 09 05/03/2021 2010       Meds:    Current Facility-Administered Medications:     acetaminophen (TYLENOL) tablet 650 mg, 650 mg, Oral, Q6H PRN, Ioana Boudreaux PA-C    albuterol (PROVENTIL HFA,VENTOLIN HFA) inhaler 2 puff, 2 puff, Inhalation, Q6H PRN, Ioana Boudreaux PA-C    aluminum-magnesium hydroxide-simethicone (MYLANTA) oral suspension 30 mL, 30 mL, Oral, Q6H PRN, Ioana Boudreaux PA-C    aspirin (ECOTRIN LOW STRENGTH) EC tablet 81 mg, 81 mg, Oral, Daily, Ioana Boudreaux PA-C, 81 mg at 05/05/21 8223    calcium carbonate (TUMS) chewable tablet 1,000 mg, 1,000 mg, Oral, Daily PRN, Ioana Boudreaux PA-C    ceFAZolin (ANCEF) IVPB (premix in dextrose) 2,000 mg 50 mL, 2,000 mg, Intravenous, Q8H, Ioana Boudreaux PA-C, Last Rate: 100 mL/hr at 05/05/21 0605, 2,000 mg at 05/05/21 0605    cyanocobalamin (VITAMIN B-12) tablet 100 mcg, 100 mcg, Oral, Daily, Ioana Boudreaux PA-C, 100 mcg at 05/05/21 5046    docusate sodium (COLACE) capsule 100 mg, 100 mg, Oral, BID, Autumn KATHY Boudreaux PA-C, 100 mg at 05/05/21 2176    enoxaparin (LOVENOX) subcutaneous injection 40 mg, 40 mg, Subcutaneous, Daily, Autumn KATHY Boudreaux PA-C, 40 mg at 05/05/21 0605    metoprolol succinate (TOPROL-XL) 24 hr tablet 12 5 mg, 12 5 mg, Oral, Daily, Ioana Boudreaux PA-C    ondansetron (ZOFRAN) injection 4 mg, 4 mg, Intravenous, Q6H PRN, Ioana KATHY Boudreaux PA-C   oxyCODONE (ROXICODONE) immediate release tablet 10 mg, 10 mg, Oral, Q4H PRN, Autumn KATHY Boudreaux PA-C, 10 mg at 05/05/21 0605    oxyCODONE (ROXICODONE) IR tablet 5 mg, 5 mg, Oral, Q4H PRN, Autumn KATHY Boudreaux PA-C    pantoprazole (PROTONIX) EC tablet 40 mg, 40 mg, Oral, Daily, Autumn KATHY Boudreaux PA-C, 40 mg at 05/05/21 2627    sodium chloride 0 9 % infusion, 50 mL/hr, Intravenous, Continuous, Autumn KATHY Boudreaux PA-C, Last Rate: 50 mL/hr at 05/04/21 2325, 50 mL/hr at 05/04/21 2325    Blood Culture:   No results found for: BLOODCX    Wound Culture:   No results found for: WOUNDCULT    Ins and Outs:  I/O last 24 hours: In: 2988 3 [I V :2238 3; Blood:700; IV Piggyback:50]  Out: 850 [Urine:700; Blood:150]          Physical:  Vitals:    05/05/21 0958   BP: 104/51   Pulse: 91   Resp:    Temp:    SpO2: 94%     Musculoskeletal: right Lower Extremity  · Skin right thigh and lower extremity swelling as to be expected postoperatively  · Dressing : Clean, dry, and intact  · TTP: Funmilayo-incisional tenderness as to be expected  · SILT s/s/sp/dp/t  +fhl/ehl, +ankle dorsi/plantar flexion  2+ DP pulse    Assessment:    80 y  o female  Postoperative day 1 status post right long TFN insertion, ambulatory dysfunction, ABLA    Plan:  · WBAT RLE  · Hgb 8 6 today  Stable from yesterday  11 0 prior to injury on 4/17/21  Greater than 2 gram drop which qualifies for diagnosis of acute blood loss anemia, will monitor and administer IVF/prbc as indicated   · PT/OT  · Pain control prn  · DVT ppx: Lovenox, SCDs, ambulation  · D/C planning  Will require outpatient follow-up with Dr Tatianna Hall in 2 weeks       Ioana Boudreaux PA-C

## 2021-05-05 NOTE — ASSESSMENT & PLAN NOTE
· Status post mechanical fall  · Right hip X-ray:  Comminuted right femur fracture  Pod 1:INSERTION NAIL IM FEMUR ANTEGRADE (TROCHANTERIC) (Right)  · Neurovascular checks  · Pain control    Stop Dilaudid and continue oral agents at patient's request  · Kerr's traction  · Patient experiencing hypotension and hence monitor closely and continue IV fluids today

## 2021-05-05 NOTE — ASSESSMENT & PLAN NOTE
· Patient has postop hypotension  Blood pressure is between  systolic and symptomatic  Continue IV fluid hydration  Hold metoprolol based on holding parameters    · Keep hemoglobin above 8

## 2021-05-05 NOTE — PHYSICAL THERAPY NOTE
PHYSICAL THERAPY EVALUATION  NAME:  Tesha CACERES [facility]  DATE: 05/05/21    AGE:   80 y o   Mrn:   [id number]  ADMIT DX:  Fall, initial encounter Neri Si  XXXA]  Hip injury, right, initial encounter [[id number]]  Closed fracture of right femur, unspecified fracture morphology, unspecified portion of [facility] Danielle Sin    Past Medical History:   Diagnosis Date    Cancer ([place] [address] )     skin cancer    Cardiac disease     Multiple falls     Neuropathy     Skin cancer     forehead    Spinal stenosis     UTI (urinary tract infection)      Length Of Stay: 2  Performed at least 2 patient identifiers during session: Name and Birthday  PHYSICAL THERAPY EVALUATION :        05/05/21 0927   PT Last Visit   PT Visit Date 05/05/21   Note Type   Note type Evaluation   Pain Assessment   Pain Assessment Tool 0-10   Pain Score 7   Pain Location/Orientation Location: Hip;Orientation: Right   Home Living   Type of Home Apartment  (Condo)   Home Layout One level  (1 DENISA with L handrail)   Bathroom Shower/Tub Walk-in shower   Bathroom Toilet Raised   Bathroom Equipment Built-in shower seat;Grab bars in shower;Grab bars around toilet   Home Equipment Walker;Cane;Wheelchair-manual;Wheelchair-electric   Prior Function   Level of [place] Independent with ADLs and functional mobility   Lives With Alone   Receives Help From Personal care attendant; Neighbor  (Help with cleaning, driving into community, 5x/week)   ADL Assistance Independent   IADLs Needs assistance  (Has cleaning lady)   Falls in the last 6 months 1 to 4   Vocational Retired   Comments Pt reports ambulating mod I with RW PTA  She states she was able to complete ADLs such as dressing but has an assistant at the home 5x/week to assist with cleaning and providing rides and assistance with community mobility  No longer driving   Restrictions/Precautions   Weight Bearing Precautions Per Order Yes   RLE Weight Bearing Per Order WBAT   Other Precautions Pain; Fall Risk;Multiple lines; Chair Alarm; Bed Alarm;Cognitive   General   Additional Pertinent History Pt is s/p R insertional nail IM femur on 5/4 to repair closed R femur fracture   Cognition   Orientation Level Oriented to person;Oriented to place;Oriented to situation;Disoriented to time   Following Commands Follows one step commands without difficulty   Comments Pt conversation unrelated to topic at times, requiring re-orientation to questioning for appropriate response   RLE Overall AROM   R Hip ABduction Approx 10 deg   R Knee Flexion Approx 30 deg   R Ankle Dorsiflexion Approx 5 deg   R Knee Extension 0 deg (full extension) in supine, approx -30 deg in sitting   R Hip Flexion Approx 20 deg in supine   RLE Overall PROM   R Knee Flexion Approx 80 deg supine and 90 deg sitting   R Hip Flexion Approx 70 deg in supine, 90 deg sitting   R Hip ABduction Approx 20 deg in supine   R Knee Extension 0 deg (full extension)   Strength RLE   R Ankle Dorsiflexion 3-/5   R Knee Extension 3-/5   R Hip Flexion 2-/5   R Hip ABduction 2-/5   R Knee Flexion 2-/5   LLE Assessment   LLE Assessment WFL  (3+/5)   Coordination   Movements are Fluid and Coordinated 1  (Functional movements appear coordinated)   Light Touch   RLE Light Touch Grossly intact  (Increased time to answer, pt reports hx neuropathy)   LLE Light Touch Grossly intact  (Increased time to answer, pt reports hx neuropathy)   Bed Mobility   Supine to Sit 3  Moderate assistance   Additional items Increased time required;Verbal cues;LE management;HOB elevated; Bedrails;Assist x 1  (Trunk management)   Sit to Supine 2  Maximal assistance   Additional items Assist x 2; Increased time required;Verbal cues;LE management  (Trunk management)   Additional Comments Bed mobility performed with HOB elevated and bedrails raised  Mod manual assistance x1 required for LE management to achieve sitting position on EOB  Min v/c provided for UE use to assist with transfer   Blood pressure monitored over course of sitting, without reading returning to baseline (see values below)  Pt with increasing complaint of dizziness with lethargy with decreased verbal responses  Pt then assisted back into supine position with max x2 manual assistance for trunk and LE management  Just prior to returning to supine, pt stated things were "starting to go black"   Transfers   Sit to Stand Unable to assess  (Not safe at this time 2/2 orthostatic hypotn and dizziness)   Ambulation/Elevation   Distance Unable to assess 2/2 reduced BP and reports of dizziness   Balance   Static Sitting Fair +   Dynamic Sitting Fair   Activity Tolerance   Activity Tolerance Patient limited by pain;Treatment limited secondary to medical complications (Comment)  (Orthostatic hypotension)   Medical Staff Made Aware Spoke with Pepe Rahman and OTKingsley Dr with RN, Adam   Assessment   Prognosis Fair   Problem List Decreased strength;Pain;Decreased skin integrity; Decreased cognition; Impaired balance;Decreased mobility; Decreased safety awareness;Decreased endurance;Decreased range of motion   Barriers to Discharge Decreased caregiver support; Inaccessible home environment   Barriers to Discharge Comments Pt reports living alone  Despite having personal care assistants 5x/week, pt would require consistent assistance  Pt also unable to transfer with assistance x2 or ambulate household distances at this time   Goals   Patient Goals To get better   STG Expiration Date 05/15/21   PT Treatment Day 1   Plan   Treatment/Interventions ADL retraining;Functional transfer training;LE strengthening/ROM; Elevations; Therapeutic exercise; Endurance training;Patient/family training;Equipment eval/education;Cognitive reorientation; Bed mobility;Gait training;Spoke to nursing;OT;Spoke to case management   PT Frequency 5x/wk   Recommendation   PT Discharge Recommendation Post acute rehabilitation services   Equipment Recommended   (tbd by rehab) PT - OK to Discharge Yes  (Once medically cleared)   Additional Comments Pt in bed at end of session with bed alarm on and all needs within reach   [facility] 8 in Bed Without Bedrails 3   Lying on Back to Sitting on Edge of Flat Bed 2   Moving Bed to Chair 2   Standing Up From Chair 2   Walk in Room 2   Climb 3-5 Stairs 1   Basic Mobility Inpatient Raw Score 12   Basic Mobility Standardized Score 32 23        05/05/21 0938 05/05/21 0946 05/05/21 0949   Vitals   Pulse 66 61 68   Blood Pressure 102/50  (supine>sit seated at EOB - "dizzy") (!) 84/45  (Seated at EOB - "a little off") (!) 81/45  (Seated at EOB - "its starting to get black" )   MAP (mmHg) 67 58 57   Oxygen Therapy   SpO2 95 % 96 % 95 %      05/05/21 0950 05/05/21 0951   Vitals   Pulse 58 (!) 106   Blood Pressure (!) 81/44  (Supine in bed ) 101/50  (Supine in bed - more alert "I feel pretty good")   MAP (mmHg) 56 67   Oxygen Therapy   SpO2 94 % 95 %      (Please find full objective findings from PT assessment regarding body systems outlined above)  Assessment: Pt is a 80 y o  female seen for PT evaluation s/p admission to [address] [place] [address] on 5/3/2021 with Closed fracture of right femur ([facility] )  Pt s/p surgical repair on 5/4/2021 with WBAT orders  Order placed for PT services  Upon evaluation: Pt is presenting with impaired functional mobility due to pain, decreased strength, decreased ROM, decreased endurance, impaired balance, impaired cognition, decreased safety awareness, impaired judgment, fall risk and impaired skin integrity requiring mod x1 -> max x2 assistance for bed mobility  Pt unsafe to perform transfers or ambulation due to symptomatic orthostatic hypotension upon sitting at EOB   Pt's clinical presentation is currently unpredictable given the functional mobility deficits above, especially weakness, decreased ROM, decreased skin integrity, decreased endurance, pain, decreased activity tolerance, decreased functional mobility tolerance, decreased safety awareness, impaired judgement and decreased cognition, coupled with fall risks as indicated by AM-PAC 6-Clicks: 88/78 as well as hx of falls, impaired balance, polypharmacy, impaired judgement, decreased safety awareness and decreased cognition and combined with medical complications of R femur fracture s/p repair, recent fall, acute anemia, abnormal CBC values, abnormal calcium values, dizziness, and orthostatic hypotension  Pt's PMHx and comorbidities that may affect physical performance and progress include: skin cancer, neuropathy, UTI, spinal stenosis, cardiac disease, multiple falls, back surgery, CAD with stent placement, B/L TKAs, cataracts with lens implants, and COPD  Personal factors affecting pt at time of IE include: step(s) to enter environment, limited home support, advanced age, inability to perform ADLs, inability to navigate level surfaces without external assistance, inability to ambulate household distances and recent fall(s)/fall history  Pt will benefit from continued skilled PT services to address deficits as defined above and to maximize level of functional mobility to facilitate return toward PLOF and improved QOL  From PT/mobility standpoint, recommendation at time of d/c would be Short term rehab pending progress in order to reduce fall risk and maximize pt's functional independence and consistency with mobility in order to facilitate return to PLOF  Recommend ther ex next 1-2 sessions  The patient's AM-[facility] Basic Mobility Inpatient Short Form Raw Score is 12, Standardized Score is 32 23  A standardized score less than 42 9 suggests the patient may benefit from discharge to post-acute rehabilitation services  Please also refer to the recommendation of the Physical Therapist for safe discharge planning  Goals: Pt will: Perform bed mobility tasks with Supervision to reposition in bed and prepare for transfers   Pt will perform transfers with steadying assistance to decrease risk for falls, improve ease of transfers and improve activity tolerance and prepare for ambulation  Pt will ambulate with RW for >/= 48' with  Po[place] x1  to decrease risk for falls, improve activity tolerance, improve gait quality and promote proper use of assistive device and to access home environment  Pt will complete >/= 1 steps with with unilateral handrail with modA x1 to return to home with DENISA, decrease risk for falls and improve activity tolerance  Pt will increase B LE strength >/= 1/2 MMT grade to facilitate functional mobility  James Cabello, SPT    PHYSICAL THERAPY TREATMENT NOTE  Time In: 1509  Time Out: 1003  Total Time: 6'     S: "I'm feeling better " - following sit->supine transfer, reports of dizziness subsided      O: Heel slides, supine, AAROM, R LE, 1x10    Ankle pumps, supine, AROM, B/L, 1x20    Hip abduction, supine, AAROM, R LE, 1x10      05/05/21 0958   Vitals   Pulse 91   Blood Pressure 104/51 (in bed during ther ex) -asymptomatic   MAP (mmHg) 69   Oxygen Therapy   SpO2 94 %     Pt educated on orthostatic hypotension and positioning  Reviewed simple exercise such as ankle pumps for improved LE circulation    A: After 7 minutes lying supine, pt displayed improved systolic BP and reported being asymptomatic  Pt therefore only able to tolerate therapeutic exercise supine in bed  She is also limited by pain, impaired balance, and weakness  She requires frequent verbal cuing for active participation with exercise, as well as AAROM to increase range of motion during exercise  Pt will continue to benefit from skilled physical therapy services to address current impairments  P: Once medically stable, D/C to PAR   Recommend LE strengthening, range of motion, bed mobility, transfer training, gait and endurance training, education      James Cabello, SPT

## 2021-05-05 NOTE — PLAN OF CARE
Problem: Potential for Falls  Goal: Patient will remain free of falls  Description: INTERVENTIONS:  - Assess patient frequently for physical needs  -  Identify cognitive and physical deficits and behaviors that affect risk of falls    -  [place] fall precautions as indicated by assessment   - Educate patient/family on patient safety including physical limitations  - Instruct patient to call for assistance with activity based on assessment  - Modify environment to reduce risk of injury  - Consider OT/PT consult to assist with strengthening/mobility  Outcome: Progressing     Problem: PAIN - ADULT  Goal: Verbalizes/displays adequate comfort level or baseline comfort level  Description: Interventions:  - Encourage patient to monitor pain and request assistance  - Assess pain using appropriate pain scale  - Administer analgesics based on type and severity of pain and evaluate response  - Implement non-pharmacological measures as appropriate and evaluate response  - Consider cultural and social influences on pain and pain management  - Notify physician/advanced practitioner if interventions unsuccessful or patient reports new pain  Outcome: Progressing     Problem: INFECTION - ADULT  Goal: Absence or prevention of progression during hospitalization  Description: INTERVENTIONS:  - Assess and monitor for signs and symptoms of infection  - Monitor lab/diagnostic results  - Monitor all insertion sites, i e  indwelling lines, tubes, and drains  - Monitor endotracheal if appropriate and nasal secretions for changes in amount and color  - [place] appropriate cooling/warming therapies per order  - Administer medications as ordered  - Instruct and encourage patient and family to use good hand hygiene technique  - Identify and instruct in appropriate isolation precautions for identified infection/condition  Outcome: Progressing  Goal: Absence of fever/infection during neutropenic period  Description: INTERVENTIONS:  - Monitor WBC    Outcome: Progressing     Problem: SAFETY ADULT  Goal: Patient will remain free of falls  Description: INTERVENTIONS:  - Assess patient frequently for physical needs  -  Identify cognitive and physical deficits and behaviors that affect risk of falls    -  [place] fall precautions as indicated by assessment   - Educate patient/family on patient safety including physical limitations  - Instruct patient to call for assistance with activity based on assessment  - Modify environment to reduce risk of injury  - Consider OT/PT consult to assist with strengthening/mobility  Outcome: Progressing  Goal: Maintain or return to baseline ADL function  Description: INTERVENTIONS:  -  Assess patient's ability to carry out ADLs; assess patient's baseline for ADL function and identify physical deficits which impact ability to perform ADLs (bathing, care of mouth/teeth, toileting, grooming, dressing, etc )  - Assess/evaluate cause of self-care deficits   - Assess range of motion  - Assess patient's mobility; develop plan if impaired  - Assess patient's need for assistive devices and provide as appropriate  - Encourage maximum independence but intervene and supervise when necessary  - Involve family in performance of ADLs  - Assess for home care needs following discharge   - Consider OT consult to assist with ADL evaluation and planning for discharge  - Provide patient education as appropriate  Outcome: Progressing  Goal: Maintain or return mobility status to optimal level  Description: INTERVENTIONS:  - Assess patient's baseline mobility status (ambulation, transfers, stairs, etc )    - Identify cognitive and physical deficits and behaviors that affect mobility  - Identify mobility aids required to assist with transfers and/or ambulation (gait belt, sit-to-stand, lift, walker, cane, etc )  - [place] fall precautions as indicated by assessment  - Record patient progress and toleration of activity level on Mobility SBAR; progress patient to next Phase/Stage  - Instruct patient to call for assistance with activity based on assessment  - Consider rehabilitation consult to assist with strengthening/weightbearing, etc   Outcome: Progressing     Problem: DISCHARGE PLANNING  Goal: Discharge to home or other facility with appropriate resources  Description: INTERVENTIONS:  - Identify barriers to discharge w/patient and caregiver  - Arrange for needed discharge resources and transportation as appropriate  - Identify discharge learning needs (meds, wound care, etc )  - Arrange for interpretive services to assist at discharge as needed  - Refer to Case Management Department for coordinating discharge planning if the patient needs post-hospital services based on physician/advanced practitioner order or complex needs related to functional status, cognitive ability, or social support system  Outcome: Progressing     Problem: Knowledge Deficit  Goal: Patient/family/caregiver demonstrates understanding of disease process, treatment plan, medications, and discharge instructions  Description: Complete learning assessment and assess knowledge base  Interventions:  - Provide teaching at level of understanding  - Provide teaching via preferred learning methods  Outcome: Progressing     Problem: Nutrition/Hydration-ADULT  Goal: Nutrient/Hydration intake appropriate for improving, restoring or maintaining nutritional needs  Description: Monitor and assess patient's nutrition/hydration status for malnutrition  Collaborate with interdisciplinary team and initiate plan and interventions as ordered  Monitor patient's weight and dietary intake as ordered or per policy  Utilize nutrition screening tool and intervene as necessary  Determine patient's food preferences and provide high-protein, high-caloric foods as appropriate       INTERVENTIONS:  - Monitor oral intake, urinary output, labs, and treatment plans  - Assess nutrition and hydration status and recommend course of action  - Evaluate amount of meals eaten  - Assist patient with eating if necessary   - Allow adequate time for meals  - Recommend/ encourage appropriate diets, oral nutritional supplements, and vitamin/mineral supplements  - Order, calculate, and assess calorie counts as needed  - Recommend, monitor, and adjust tube feedings and TPN/PPN based on assessed needs  - Assess need for intravenous fluids  - Provide specific nutrition/hydration education as appropriate  - Include patient/family/caregiver in decisions related to nutrition  Outcome: Progressing

## 2021-05-05 NOTE — PLAN OF CARE
Problem: PHYSICAL THERAPY ADULT  Goal: Performs mobility at highest level of function for planned discharge setting  See evaluation for individualized goals  Description: Treatment/Interventions: ADL retraining, Functional transfer training, LE strengthening/ROM, Elevations, Therapeutic exercise, Endurance training, Patient/family training, Equipment eval/education, Cognitive reorientation, Bed mobility, Gait training, Spoke to nursing, OT, Spoke to case management  Equipment Recommended: (tbd by rehab)       See flowsheet documentation for full assessment, interventions and recommendations  Note: Prognosis: Fair  Problem List: Decreased strength, Pain, Decreased skin integrity, Decreased cognition, Impaired balance, Decreased mobility, Decreased safety awareness, Decreased endurance, Decreased range of motion  Assessment: Pt is a 80 y o  female seen for PT evaluation s/p admission to [address] [place] [address] on 5/3/2021 with Closed fracture of right femur ([facility] [address] )  Pt s/p surgical repair on 5/4/2021 with WBAT orders  Order placed for PT services  Upon evaluation: Pt is presenting with impaired functional mobility due to pain, decreased strength, decreased ROM, decreased endurance, impaired balance, impaired cognition, decreased safety awareness, impaired judgment, fall risk and impaired skin integrity requiring mod x1 -> max x2 assistance for bed mobility  Pt unsafe to perform transfers or ambulation due to symptomatic orthostatic hypotension upon sitting at EOB   Pt's clinical presentation is currently unpredictable given the functional mobility deficits above, especially weakness, decreased ROM, decreased skin integrity, decreased endurance, pain, decreased activity tolerance, decreased functional mobility tolerance, decreased safety awareness, impaired judgement and decreased cognition, coupled with fall risks as indicated by AM-PAC 6-Clicks: 41/57 as well as hx of falls, impaired balance, polypharmacy, impaired judgement, decreased safety awareness and decreased cognition and combined with medical complications of R femur fracture s/p repair, recent fall, acute anemia, abnormal CBC values, abnormal calcium values, dizziness, and orthostatic hypotension  Pt's PMHx and comorbidities that may affect physical performance and progress include: skin cancer, neuropathy, UTI, spinal stenosis, cardiac disease, multiple falls, back surgery, CAD with stent placement, B/L TKAs, cataracts with lens implants, and COPD  Personal factors affecting pt at time of IE include: step(s) to enter environment, limited home support, advanced age, inability to perform ADLs, inability to navigate level surfaces without external assistance, inability to ambulate household distances and recent fall(s)/fall history  Pt will benefit from continued skilled PT services to address deficits as defined above and to maximize level of functional mobility to facilitate return toward PLOF and improved QOL  From PT/mobility standpoint, recommendation at time of d/c would be Short term rehab pending progress in order to reduce fall risk and maximize pt's functional independence and consistency with mobility in order to facilitate return to PLOF  Recommend ther ex next 1-2 sessions  Barriers to Discharge: Decreased caregiver support, Inaccessible home environment  Barriers to Discharge Comments: Pt reports living alone  Despite having personal care assistants 5x/week, pt would require consistent assistance  Pt also unable to transfer with assistance x2 or ambulate household distances at this time     PT Discharge Recommendation: Post acute rehabilitation services     PT - OK to Discharge: Yes(Once medically cleared)    See flowsheet documentation for full assessment

## 2021-05-05 NOTE — PROGRESS NOTES
Pt with reported poor appetite PTA, says it has improved some since admission, was able to eat small amounts of each item at B per her report  s/p surgery and noting advanced age  Would benefit from supplementation  Reports perviously trying ensure, did not like it though likes ensure clear, will trial daily  Will also order ensure pudding BID  Maintain regular diet

## 2021-05-05 NOTE — PLAN OF CARE
Problem: Potential for Falls  Goal: Patient will remain free of falls  Description: INTERVENTIONS:  - Assess patient frequently for physical needs  -  Identify cognitive and physical deficits and behaviors that affect risk of falls    -  [place] fall precautions as indicated by assessment   - Educate patient/family on patient safety including physical limitations  - Instruct patient to call for assistance with activity based on assessment  - Modify environment to reduce risk of injury  - Consider OT/PT consult to assist with strengthening/mobility  Outcome: Not Progressing     Problem: PAIN - ADULT  Goal: Verbalizes/displays adequate comfort level or baseline comfort level  Description: Interventions:  - Encourage patient to monitor pain and request assistance  - Assess pain using appropriate pain scale  - Administer analgesics based on type and severity of pain and evaluate response  - Implement non-pharmacological measures as appropriate and evaluate response  - Consider cultural and social influences on pain and pain management  - Notify physician/advanced practitioner if interventions unsuccessful or patient reports new pain  Outcome: Not Progressing     Problem: INFECTION - ADULT  Goal: Absence or prevention of progression during hospitalization  Description: INTERVENTIONS:  - Assess and monitor for signs and symptoms of infection  - Monitor lab/diagnostic results  - Monitor all insertion sites, i e  indwelling lines, tubes, and drains  - Monitor endotracheal if appropriate and nasal secretions for changes in amount and color  - [place] appropriate cooling/warming therapies per order  - Administer medications as ordered  - Instruct and encourage patient and family to use good hand hygiene technique  - Identify and instruct in appropriate isolation precautions for identified infection/condition  Outcome: Not Progressing  Goal: Absence of fever/infection during neutropenic period  Description: INTERVENTIONS:  - Monitor WBC    Outcome: Not Progressing     Problem: SAFETY ADULT  Goal: Patient will remain free of falls  Description: INTERVENTIONS:  - Assess patient frequently for physical needs  -  Identify cognitive and physical deficits and behaviors that affect risk of falls    -  [place] fall precautions as indicated by assessment   - Educate patient/family on patient safety including physical limitations  - Instruct patient to call for assistance with activity based on assessment  - Modify environment to reduce risk of injury  - Consider OT/PT consult to assist with strengthening/mobility  Outcome: Not Progressing  Goal: Maintain or return to baseline ADL function  Description: INTERVENTIONS:  -  Assess patient's ability to carry out ADLs; assess patient's baseline for ADL function and identify physical deficits which impact ability to perform ADLs (bathing, care of mouth/teeth, toileting, grooming, dressing, etc )  - Assess/evaluate cause of self-care deficits   - Assess range of motion  - Assess patient's mobility; develop plan if impaired  - Assess patient's need for assistive devices and provide as appropriate  - Encourage maximum independence but intervene and supervise when necessary  - Involve family in performance of ADLs  - Assess for home care needs following discharge   - Consider OT consult to assist with ADL evaluation and planning for discharge  - Provide patient education as appropriate  Outcome: Not Progressing  Goal: Maintain or return mobility status to optimal level  Description: INTERVENTIONS:  - Assess patient's baseline mobility status (ambulation, transfers, stairs, etc )    - Identify cognitive and physical deficits and behaviors that affect mobility  - Identify mobility aids required to assist with transfers and/or ambulation (gait belt, sit-to-stand, lift, walker, cane, etc )  - [place] fall precautions as indicated by assessment  - Record patient progress and toleration of activity level on Mobility SBAR; progress patient to next Phase/Stage  - Instruct patient to call for assistance with activity based on assessment  - Consider rehabilitation consult to assist with strengthening/weightbearing, etc   Outcome: Not Progressing     Problem: DISCHARGE PLANNING  Goal: Discharge to home or other facility with appropriate resources  Description: INTERVENTIONS:  - Identify barriers to discharge w/patient and caregiver  - Arrange for needed discharge resources and transportation as appropriate  - Identify discharge learning needs (meds, wound care, etc )  - Arrange for interpretive services to assist at discharge as needed  - Refer to Case Management Department for coordinating discharge planning if the patient needs post-hospital services based on physician/advanced practitioner order or complex needs related to functional status, cognitive ability, or social support system  Outcome: Not Progressing     Problem: Knowledge Deficit  Goal: Patient/family/caregiver demonstrates understanding of disease process, treatment plan, medications, and discharge instructions  Description: Complete learning assessment and assess knowledge base  Interventions:  - Provide teaching at level of understanding  - Provide teaching via preferred learning methods  Outcome: Not Progressing     Problem: Nutrition/Hydration-ADULT  Goal: Nutrient/Hydration intake appropriate for improving, restoring or maintaining nutritional needs  Description: Monitor and assess patient's nutrition/hydration status for malnutrition  Collaborate with interdisciplinary team and initiate plan and interventions as ordered  Monitor patient's weight and dietary intake as ordered or per policy  Utilize nutrition screening tool and intervene as necessary  Determine patient's food preferences and provide high-protein, high-caloric foods as appropriate       INTERVENTIONS:  - Monitor oral intake, urinary output, labs, and treatment plans  - Assess nutrition and hydration status and recommend course of action  - Evaluate amount of meals eaten  - Assist patient with eating if necessary   - Allow adequate time for meals  - Recommend/ encourage appropriate diets, oral nutritional supplements, and vitamin/mineral supplements  - Order, calculate, and assess calorie counts as needed  - Recommend, monitor, and adjust tube feedings and TPN/PPN based on assessed needs  - Assess need for intravenous fluids  - Provide specific nutrition/hydration education as appropriate  - Include patient/family/caregiver in decisions related to nutrition  Outcome: Not Progressing

## 2021-05-05 NOTE — PLAN OF CARE
Problem: PHYSICAL THERAPY ADULT  Goal: Performs mobility at highest level of function for planned discharge setting  See evaluation for individualized goals  Description: Treatment/Interventions: ADL retraining, Functional transfer training, LE strengthening/ROM, Elevations, Therapeutic exercise, Endurance training, Patient/family training, Equipment eval/education, Cognitive reorientation, Bed mobility, Gait training, Spoke to nursing, OT, Spoke to case management  Equipment Recommended: (tbd by rehab)       See flowsheet documentation for full assessment, interventions and recommendations  5/5/2021 1432 by Lin Gonzalez  Note: Prognosis: Fair  Problem List: Decreased strength, Pain, Decreased skin integrity, Decreased cognition, Impaired balance, Decreased mobility, Decreased safety awareness, Decreased endurance, Decreased range of motion  After 7 minutes lying supine, pt displayed improved systolic BP and reported being asymptomatic  Pt therefore only able to tolerate therapeutic exercise supine in bed  She is also limited by pain, impaired balance, and weakness  She requires frequent verbal cuing for active participation with exercise, as well as AAROM to increase range of motion during exercise  Pt will continue to benefit from skilled physical therapy services to address current impairments  Barriers to Discharge: Decreased caregiver support, Inaccessible home environment  Barriers to Discharge Comments: Pt reports living alone  Despite having personal care assistants 5x/week, pt would require consistent assistance  Pt also unable to transfer with assistance x2 or ambulate household distances at this time     PT Discharge Recommendation: Post acute rehabilitation services     PT - OK to Discharge: Yes(Once medically cleared)    See flowsheet documentation for full assessment

## 2021-05-06 PROBLEM — I95.81 POSTOPERATIVE HYPOTENSION: Status: ACTIVE | Noted: 2020-02-13

## 2021-05-06 PROBLEM — E83.51 HYPOCALCEMIA: Status: ACTIVE | Noted: 2021-05-06

## 2021-05-06 LAB
ANION GAP SERPL CALCULATED.3IONS-SCNC: 6 MMOL/L (ref 4–13)
BUN SERPL-MCNC: 13 MG/DL (ref 5–25)
CALCIUM SERPL-MCNC: 6.9 MG/DL (ref 8.3–10.1)
CHLORIDE SERPL-SCNC: 104 MMOL/L (ref 100–108)
CO2 SERPL-SCNC: 25 MMOL/L (ref 21–32)
CREAT SERPL-MCNC: 0.94 MG/DL (ref 0.6–1.3)
ERYTHROCYTE [DISTWIDTH] IN BLOOD BY AUTOMATED COUNT: 14.7 % (ref 11.6–15.1)
GFR SERPL CREATININE-BSD FRML MDRD: 54 ML/MIN/1.73SQ M
GLUCOSE SERPL-MCNC: 105 MG/DL (ref 65–140)
HCT VFR BLD AUTO: 22.1 % (ref 34.8–46.1)
HGB BLD-MCNC: 7.1 G/DL (ref 11.5–15.4)
MCH RBC QN AUTO: 30.2 PG (ref 26.8–34.3)
MCHC RBC AUTO-ENTMCNC: 32.1 G/DL (ref 31.4–37.4)
MCV RBC AUTO: 94 FL (ref 82–98)
PLATELET # BLD AUTO: 98 THOUSANDS/UL (ref 149–390)
PMV BLD AUTO: 10.4 FL (ref 8.9–12.7)
POTASSIUM SERPL-SCNC: 4.1 MMOL/L (ref 3.5–5.3)
RBC # BLD AUTO: 2.35 MILLION/UL (ref 3.81–5.12)
SODIUM SERPL-SCNC: 135 MMOL/L (ref 136–145)
WBC # BLD AUTO: 8.11 THOUSAND/UL (ref 4.31–10.16)

## 2021-05-06 PROCEDURE — P9016 RBC LEUKOCYTES REDUCED: HCPCS

## 2021-05-06 PROCEDURE — 99024 POSTOP FOLLOW-UP VISIT: CPT | Performed by: PHYSICIAN ASSISTANT

## 2021-05-06 PROCEDURE — 99233 SBSQ HOSP IP/OBS HIGH 50: CPT | Performed by: FAMILY MEDICINE

## 2021-05-06 PROCEDURE — 85027 COMPLETE CBC AUTOMATED: CPT | Performed by: FAMILY MEDICINE

## 2021-05-06 PROCEDURE — 94760 N-INVAS EAR/PLS OXIMETRY 1: CPT

## 2021-05-06 PROCEDURE — 80048 BASIC METABOLIC PNL TOTAL CA: CPT | Performed by: FAMILY MEDICINE

## 2021-05-06 RX ORDER — CALCIUM GLUCONATE 20 MG/ML
1 INJECTION, SOLUTION INTRAVENOUS ONCE
Status: COMPLETED | OUTPATIENT
Start: 2021-05-06 | End: 2021-05-06

## 2021-05-06 RX ORDER — FUROSEMIDE 10 MG/ML
20 INJECTION INTRAMUSCULAR; INTRAVENOUS ONCE
Status: COMPLETED | OUTPATIENT
Start: 2021-05-06 | End: 2021-05-06

## 2021-05-06 RX ADMIN — ASPIRIN 81 MG: 81 TABLET, COATED ORAL at 09:17

## 2021-05-06 RX ADMIN — Medication 100 MCG: at 09:17

## 2021-05-06 RX ADMIN — SODIUM CHLORIDE 75 ML/HR: 0.9 INJECTION, SOLUTION INTRAVENOUS at 07:24

## 2021-05-06 RX ADMIN — DOCUSATE SODIUM 100 MG: 100 CAPSULE ORAL at 17:26

## 2021-05-06 RX ADMIN — CALCIUM GLUCONATE 1 G: 20 INJECTION, SOLUTION INTRAVENOUS at 11:30

## 2021-05-06 RX ADMIN — ENOXAPARIN SODIUM 40 MG: 40 INJECTION SUBCUTANEOUS at 09:24

## 2021-05-06 RX ADMIN — FUROSEMIDE 20 MG: 10 INJECTION, SOLUTION INTRAMUSCULAR; INTRAVENOUS at 16:29

## 2021-05-06 RX ADMIN — OXYCODONE HYDROCHLORIDE 5 MG: 5 TABLET ORAL at 22:44

## 2021-05-06 RX ADMIN — PANTOPRAZOLE SODIUM 40 MG: 40 TABLET, DELAYED RELEASE ORAL at 09:17

## 2021-05-06 NOTE — ASSESSMENT & PLAN NOTE
· Patient has postop hypotension  Blood pressure is between  systolic and symptomatic  on IV fluid hydration  Hold IV fluids while receiving blood transfusion  Hold metoprolol based on holding parameters    Also will receive 2 unit PRBC today  · Keep hemoglobin above 8

## 2021-05-06 NOTE — PHYSICAL THERAPY NOTE
PHYSICAL THERAPY CANCELLATION NOTE          Patient Name: Yanira Springer Date: 5/6/2021 05/06/21 1310   Note Type   Note Type Treatment   Cancel Reasons Medical status       Chart reviewed  Pt's hemoglobin decreased to 7 1 this date from 8 6 yesterday  Pt continues to have low BP  She is currently receiving blood transfusion at this time due to low hemoglobin  Not appropriate for OOB at this time  Will hold PT services this date and see patient as medically appropriate at a later time       Tiffanie Valdez, PT,DPT

## 2021-05-06 NOTE — ASSESSMENT & PLAN NOTE
· No chest pain  · History of stent x 1 three years ago  · Continue metoprolol    Temporarily hold aspirin due to drop in hemoglobin and platelets

## 2021-05-06 NOTE — PLAN OF CARE
Problem: Potential for Falls  Goal: Patient will remain free of falls  Description: INTERVENTIONS:  - Assess patient frequently for physical needs  -  Identify cognitive and physical deficits and behaviors that affect risk of falls    -  [place] fall precautions as indicated by assessment   - Educate patient/family on patient safety including physical limitations  - Instruct patient to call for assistance with activity based on assessment  - Modify environment to reduce risk of injury  - Consider OT/PT consult to assist with strengthening/mobility  Outcome: Progressing     Problem: PAIN - ADULT  Goal: Verbalizes/displays adequate comfort level or baseline comfort level  Description: Interventions:  - Encourage patient to monitor pain and request assistance  - Assess pain using appropriate pain scale  - Administer analgesics based on type and severity of pain and evaluate response  - Implement non-pharmacological measures as appropriate and evaluate response  - Consider cultural and social influences on pain and pain management  - Notify physician/advanced practitioner if interventions unsuccessful or patient reports new pain  Outcome: Progressing     Problem: INFECTION - ADULT  Goal: Absence or prevention of progression during hospitalization  Description: INTERVENTIONS:  - Assess and monitor for signs and symptoms of infection  - Monitor lab/diagnostic results  - Monitor all insertion sites, i e  indwelling lines, tubes, and drains  - Monitor endotracheal if appropriate and nasal secretions for changes in amount and color  - [place] appropriate cooling/warming therapies per order  - Administer medications as ordered  - Instruct and encourage patient and family to use good hand hygiene technique  - Identify and instruct in appropriate isolation precautions for identified infection/condition  Outcome: Progressing  Goal: Absence of fever/infection during neutropenic period  Description: INTERVENTIONS:  - Monitor WBC    Outcome: Progressing     Problem: SAFETY ADULT  Goal: Patient will remain free of falls  Description: INTERVENTIONS:  - Assess patient frequently for physical needs  -  Identify cognitive and physical deficits and behaviors that affect risk of falls    -  [place] fall precautions as indicated by assessment   - Educate patient/family on patient safety including physical limitations  - Instruct patient to call for assistance with activity based on assessment  - Modify environment to reduce risk of injury  - Consider OT/PT consult to assist with strengthening/mobility  Outcome: Progressing  Goal: Maintain or return to baseline ADL function  Description: INTERVENTIONS:  -  Assess patient's ability to carry out ADLs; assess patient's baseline for ADL function and identify physical deficits which impact ability to perform ADLs (bathing, care of mouth/teeth, toileting, grooming, dressing, etc )  - Assess/evaluate cause of self-care deficits   - Assess range of motion  - Assess patient's mobility; develop plan if impaired  - Assess patient's need for assistive devices and provide as appropriate  - Encourage maximum independence but intervene and supervise when necessary  - Involve family in performance of ADLs  - Assess for home care needs following discharge   - Consider OT consult to assist with ADL evaluation and planning for discharge  - Provide patient education as appropriate  Outcome: Progressing  Goal: Maintain or return mobility status to optimal level  Description: INTERVENTIONS:  - Assess patient's baseline mobility status (ambulation, transfers, stairs, etc )    - Identify cognitive and physical deficits and behaviors that affect mobility  - Identify mobility aids required to assist with transfers and/or ambulation (gait belt, sit-to-stand, lift, walker, cane, etc )  - [place] fall precautions as indicated by assessment  - Record patient progress and toleration of activity level on Mobility SBAR; progress patient to next Phase/Stage  - Instruct patient to call for assistance with activity based on assessment  - Consider rehabilitation consult to assist with strengthening/weightbearing, etc   Outcome: Progressing     Problem: DISCHARGE PLANNING  Goal: Discharge to home or other facility with appropriate resources  Description: INTERVENTIONS:  - Identify barriers to discharge w/patient and caregiver  - Arrange for needed discharge resources and transportation as appropriate  - Identify discharge learning needs (meds, wound care, etc )  - Arrange for interpretive services to assist at discharge as needed  - Refer to Case Management Department for coordinating discharge planning if the patient needs post-hospital services based on physician/advanced practitioner order or complex needs related to functional status, cognitive ability, or social support system  Outcome: Progressing     Problem: Knowledge Deficit  Goal: Patient/family/caregiver demonstrates understanding of disease process, treatment plan, medications, and discharge instructions  Description: Complete learning assessment and assess knowledge base  Interventions:  - Provide teaching at level of understanding  - Provide teaching via preferred learning methods  Outcome: Progressing     Problem: Nutrition/Hydration-ADULT  Goal: Nutrient/Hydration intake appropriate for improving, restoring or maintaining nutritional needs  Description: Monitor and assess patient's nutrition/hydration status for malnutrition  Collaborate with interdisciplinary team and initiate plan and interventions as ordered  Monitor patient's weight and dietary intake as ordered or per policy  Utilize nutrition screening tool and intervene as necessary  Determine patient's food preferences and provide high-protein, high-caloric foods as appropriate       INTERVENTIONS:  - Monitor oral intake, urinary output, labs, and treatment plans  - Assess nutrition and hydration status and recommend course of action  - Evaluate amount of meals eaten  - Assist patient with eating if necessary   - Allow adequate time for meals  - Recommend/ encourage appropriate diets, oral nutritional supplements, and vitamin/mineral supplements  - Order, calculate, and assess calorie counts as needed  - Recommend, monitor, and adjust tube feedings and TPN/PPN based on assessed needs  - Assess need for intravenous fluids  - Provide specific nutrition/hydration education as appropriate  - Include patient/family/caregiver in decisions related to nutrition  Outcome: Progressing

## 2021-05-06 NOTE — ASSESSMENT & PLAN NOTE
· Status post mechanical fall  · Right hip X-ray:  Comminuted right femur fracture  Pod 2:INSERTION NAIL IM FEMUR ANTEGRADE (TROCHANTERIC) (Right)  · Neurovascular checks  · Pain control  continue oral agents  · Patient will need subacute rehab

## 2021-05-06 NOTE — PROGRESS NOTES
[address]  Progress Note Cirilo Cervantes [facility] 1/11/1932, 80 y o  female MRN: [id number]  Unit/Bed#: -Subha Encounter: [id number]  Primary Care Provider: Teri Sadler MD   Date and time admitted to hospital: 5/3/2021  7:43 PM    * Closed fracture of right femur ([address] )  Assessment & Plan  · Status post mechanical fall  · Right hip X-ray:  Comminuted right femur fracture  Pod 2:INSERTION NAIL IM FEMUR ANTEGRADE (TROCHANTERIC) (Right)  · Neurovascular checks  · Pain control  continue oral agents  · Patient will need subacute rehab  Fall  Assessment & Plan  · Trauma alert for fall  · CT head:  No acute intracranial hemorrhage  Moderate cerebral chronic microangiopathic disease  · Chest x-ray:  No evidence of consolidation or infiltrate  · X-ray left shoulder wrist and elbow:  No acute interosseous abnormality  · EKG shows sinus rhythm with bigeminy  Acute on chronic anemia  Assessment & Plan  · Hemoglobin 10 1 on admission and dropped to 7 1 today due to recent fall and fracture  Baseline is around 11  · Patient has acute blood loss anemia is secondary to recent fall, fracture and surgery  Also noted to have drop in platelets  Need to monitor closely for now  · Will transfuse 2 unit of PRBC and recheck CBC tomorrow  · Continues to have mild hypotension  · Lovenox as per Ortho    Hypocalcemia  Assessment & Plan  Will give 1 g of calcium gluconate today and recheck ionized calcium tomorrow    COPD not affecting current episode of care [facility])  Assessment & Plan  · Never smoker-although around family member who smoked heavily  · Currently controlled  · Continue albuterol inhaler as needed  · Respiratory protocol    Postoperative hypotension  Assessment & Plan  · Patient has postop hypotension  Blood pressure is between  systolic and symptomatic  on IV fluid hydration  Hold IV fluids while receiving blood transfusion  Hold metoprolol based on holding parameters    Also will receive 2 unit PRBC today  · Keep hemoglobin above 8    Coronary artery disease  Assessment & Plan  · No chest pain  · History of stent x 1 three years ago  · Continue metoprolol  Temporarily hold aspirin due to drop in hemoglobin and platelets      VTE Pharmacologic Prophylaxis:   Pharmacologic: Enoxaparin (Lovenox)  Mechanical VTE Prophylaxis in Place: Yes    Patient Centered Rounds: I have performed bedside rounds with nursing staff today  Discussions with Specialists or Other Care Team Provider:  Discussed with Ortho    Education and Discussions with Family / Patient:  Discussed with patient bedside and update family    Time Spent for Care: 45 minutes  More than 50% of total time spent on counseling and coordination of care as described above  Current Length of Stay: 3 day(s)    Current Patient Status: Inpatient   Certification Statement: The patient will continue to require additional inpatient hospital stay due to Femur fracture    Discharge Plan:  Pending progress  Once medically cleared will need subacute rehab placement    Code Status: Level 1 - Full Code      Subjective:   Patient denies any chest pain or shortness of breath or abdominal pain  She states her hip pain is also little better today and she feels a little bit better but feels quite weak    Objective:     Vitals:   Temp (24hrs), Av 7 °F (37 1 °C), Min:98 4 °F (36 9 °C), Max:98 9 °F (37 2 °C)    Temp:  [98 4 °F (36 9 °C)-98 9 °F (37 2 °C)] 98 4 °F (36 9 °C)  HR:  [] 94  Resp:  [16-20] 20  BP: (104-107)/(50-62) 107/60  SpO2:  [94 %-96 %] 96 %  Body mass index is 28 21 kg/m²  Input and Output Summary (last 24 hours): Intake/Output Summary (Last 24 hours) at 2021 1029  Last data filed at 2021 0915  Gross per 24 hour   Intake 2572 5 ml   Output 460 ml   Net 2112 5 ml       Physical Exam:     Physical Exam  Vitals signs and nursing note reviewed  Constitutional:       Appearance: Normal appearance     HENT: Head: Normocephalic and atraumatic  Right Ear: External ear normal       Left Ear: External ear normal       Nose: Nose normal       Mouth/Throat:      Pharynx: Oropharynx is clear  Eyes:      Pupils: Pupils are equal, round, and reactive to light  Neck:      Musculoskeletal: Normal range of motion and neck supple  Cardiovascular:      Rate and Rhythm: Normal rate and regular rhythm  Heart sounds: Normal heart sounds  Pulmonary:      Effort: Pulmonary effort is normal       Breath sounds: Normal breath sounds  Abdominal:      General: Bowel sounds are normal       Palpations: Abdomen is soft  Tenderness: There is no abdominal tenderness  Musculoskeletal:      Comments: Mild swelling noted around the incision site with no bleeding or redness or drainage noted   Skin:     General: Skin is warm and dry  Capillary Refill: Capillary refill takes less than 2 seconds  Neurological:      General: No focal deficit present  Mental Status: She is alert and oriented to person, place, and time  Psychiatric:         Mood and Affect: Mood normal            Additional Data:     Labs:    Results from last 7 days   Lab Units 05/06/21 0552 05/03/21 2010   WBC Thousand/uL 8 11   < > 9 09   HEMOGLOBIN g/dL 7 1*   < > 10 1*   HEMATOCRIT % 22 1*   < > 31 0*   PLATELETS Thousands/uL 98*   < > 206   NEUTROS PCT %  --   --  35*   LYMPHS PCT %  --   --  51*   MONOS PCT %  --   --  8   EOS PCT %  --   --  5    < > = values in this interval not displayed       Results from last 7 days   Lab Units 05/06/21 0552 05/03/21 2010   SODIUM mmol/L 135*   < > 137   POTASSIUM mmol/L 4 1   < > 4 3   CHLORIDE mmol/L 104   < > 103   CO2 mmol/L 25   < > 26   BUN mg/dL 13   < > 17   CREATININE mg/dL 0 94   < > 1 23   ANION GAP mmol/L 6   < > 8   CALCIUM mg/dL 6 9*   < > 8 4   ALBUMIN g/dL  --   --  3 4*   TOTAL BILIRUBIN mg/dL  --   --  0 26   ALK PHOS U/L  --   --  65   ALT U/L  --   --  16   AST U/L  --   --  23 GLUCOSE RANDOM mg/dL 105   < > 98    < > = values in this interval not displayed  Results from last 7 days   Lab Units 05/03/21 2010   INR  0 94                       * I Have Reviewed All Lab Data Listed Above  * Additional Pertinent Lab Tests Reviewed: [address] Admission Reviewed    Imaging:    Imaging Reports Reviewed Today Include:  None  Imaging Personally Reviewed by Myself Includes:  None    Recent Cultures (last 7 days):           Last 24 Hours Medication List:   Current Facility-Administered Medications   Medication Dose Route Frequency Provider Last Rate    acetaminophen  650 mg Oral Q6H PRN Autumn S Hodana maria, PA-C      albuterol  2 puff Inhalation Q6H PRN Autumn S Hodapp, PA-C      aluminum-magnesium hydroxide-simethicone  30 mL Oral Q6H PRN Autumn S Hodapp, PA-C      calcium carbonate  1,000 mg Oral Daily PRN Autumn S Hodapp, PA-C      calcium gluconate  1 g Intravenous Once Lane Rebollar MD      cyanocobalamin  100 mcg Oral Daily Autumn S Hodapp, PA-C      docusate sodium  100 mg Oral BID Autumn S Hodapp, PA-C      enoxaparin  40 mg Subcutaneous Daily Autumn S Hodapp, PA-C      metoprolol succinate  12 5 mg Oral Daily Autumn S Hodapp, PA-C      ondansetron  4 mg Intravenous Q6H PRN Autumn S Hodapp, PA-C      oxyCODONE  10 mg Oral Q4H PRN Autumn S Hodapp, PA-C      oxyCODONE  5 mg Oral Q4H PRN Autumn S Hodapp, PA-C      pantoprazole  40 mg Oral Daily Autumn S Hodapp, PA-C          Today, Patient Was Seen By: Lane Rebollar MD    ** Please Note: Dictation voice to text software may have been used in the creation of this document   **

## 2021-05-06 NOTE — ASSESSMENT & PLAN NOTE
· Trauma alert for fall  · CT head:  No acute intracranial hemorrhage  Moderate cerebral chronic microangiopathic disease  · Chest x-ray:  No evidence of consolidation or infiltrate  · X-ray left shoulder wrist and elbow:  No acute interosseous abnormality  · EKG shows sinus rhythm with bigeminy

## 2021-05-06 NOTE — PROGRESS NOTES
Progress Note - Orthopedics   Vito Parkinson Mimm 80 y o  female MRN: [id number]  Unit/Bed#: -01      Subjective:    80 y  o female seen and examined this morning  POD 2 s/p right hip long TFN insertion  Patient reports she tried to get out of bed this morning but was too dizzy to do so  She denies fevers, chills, SOB, CP     Labs:  0   Lab Value Date/Time    HCT 22 1 (L) 05/06/2021 0552    HCT 25 8 (L) 05/05/2021 0523    HCT 26 2 (L) 05/04/2021 0458    HGB 7 1 (L) 05/06/2021 0552    HGB 8 6 (L) 05/05/2021 0523    HGB 8 6 (L) 05/04/2021 0458    INR 0 94 05/03/2021 2010    WBC 8 11 05/06/2021 0552    WBC 8 68 05/05/2021 0523    WBC 8 87 05/04/2021 0458       Meds:    Current Facility-Administered Medications:     acetaminophen (TYLENOL) tablet 650 mg, 650 mg, Oral, Q6H PRN, Ioana Boudreaux PA-C    albuterol (PROVENTIL HFA,VENTOLIN HFA) inhaler 2 puff, 2 puff, Inhalation, Q6H PRN, Ioana Boudreaux PA-C    aluminum-magnesium hydroxide-simethicone (MYLANTA) oral suspension 30 mL, 30 mL, Oral, Q6H PRN, Ioana KATHY Boudreaux PA-C    aspirin (ECOTRIN LOW STRENGTH) EC tablet 81 mg, 81 mg, Oral, Daily, Ioana KATHY Boudreaux PA-C, 81 mg at 05/06/21 2701    calcium carbonate (TUMS) chewable tablet 1,000 mg, 1,000 mg, Oral, Daily PRN, Ioana Boudreaux PA-C    cyanocobalamin (VITAMIN B-12) tablet 100 mcg, 100 mcg, Oral, Daily, Ioana KATHY Boudreaux PA-C, 100 mcg at 05/06/21 4029    docusate sodium (COLACE) capsule 100 mg, 100 mg, Oral, BID, Ioana KATHY Boudreaux PA-C, 100 mg at 05/05/21 1643    enoxaparin (LOVENOX) subcutaneous injection 40 mg, 40 mg, Subcutaneous, Daily, Autumn S VALENTIN Boudreaux, 40 mg at 05/06/21 8357    metoprolol succinate (TOPROL-XL) 24 hr tablet 12 5 mg, 12 5 mg, Oral, Daily, Autumn S VALENTIN Boudreaux    ondansetron [facility] injection 4 mg, 4 mg, Intravenous, Q6H PRN, Autumn KATHY Boudreaux PA-C    oxyCODONE (ROXICODONE) immediate release tablet 10 mg, 10 mg, Oral, Q4H PRN, Autumn S VALENTIN Boudreaux, 10 mg at 05/05/21 7513   oxyCODONE (ROXICODONE) IR tablet 5 mg, 5 mg, Oral, Q4H PRN, Ioana Boudreaux PA-C, 5 mg at 05/05/21 1643    pantoprazole (PROTONIX) EC tablet 40 mg, 40 mg, Oral, Daily, Ioana Boudreaux PA-C, 40 mg at 05/06/21 7528    sodium chloride 0 9 % infusion, 50 mL/hr, Intravenous, Continuous, Ashwini Bowen MD, Last Rate: 50 mL/hr at 05/06/21 0915, 50 mL/hr at 05/06/21 0915    Blood Culture:   No results found for: BLOODCX    Wound Culture:   No results found for: WOUNDCULT    Ins and Outs:  I/O last 24 hours: In: 2692 5 [P O :480; I V :2212 5]  Out: 460 [Urine:460]          Physical:  Vitals:    05/06/21 0917   BP: 107/60   Pulse: 94   Resp:    Temp:    SpO2: 96%     Musculoskeletal: right Lower Extremity  · Skin: moderate thigh and lower extremity swelling as to be expected  No erythema or active drainage  · Dressing: C/D/I  · TTP: fabien-incisional tenderness as to be expected  · SILT s/s/sp/dp/t  +fhl/ehl, +ankle dorsi/plantar flexion  · 2+ DP pulse, limb is warm and well perfused with good color and capillary refill distally  Assessment:    80 y  o female POD 2 s/p right hip long TFN insertion, ABLA, ambulatory dysfunction  Plan:  · WBAT   · Hgb 7 1 today, down from 8 6 yesterday  Greater than 2 gram drop which qualifies for diagnosis of acute blood loss anemia, will monitor and administer IVF/prbc as indicated  Patient will be receiving blood today due to symptomatic anemia  · PT/OT: OOTB   · Pain control prn   · DVT ppx: Lovenox daily, SCDs, ambulation when stable  · D/c planning  Patient will need to go to SNF or [facility] hospital d/c  She would like to go to [facility]       Ioana Boudreaux PA-C

## 2021-05-06 NOTE — ASSESSMENT & PLAN NOTE
· Hemoglobin 10 1 on admission and dropped to 7 1 today due to recent fall and fracture  Baseline is around 11  · Patient has acute blood loss anemia is secondary to recent fall, fracture and surgery  Also noted to have drop in platelets  Need to monitor closely for now    · Will transfuse 2 unit of PRBC and recheck CBC tomorrow  · Continues to have mild hypotension  · Lovenox as per Ortho

## 2021-05-07 VITALS
RESPIRATION RATE: 18 BRPM | OXYGEN SATURATION: 95 % | BODY MASS INDEX: 27.47 KG/M2 | TEMPERATURE: 98.3 F | WEIGHT: 164.9 LBS | SYSTOLIC BLOOD PRESSURE: 126 MMHG | DIASTOLIC BLOOD PRESSURE: 64 MMHG | HEART RATE: 85 BPM | HEIGHT: 65 IN

## 2021-05-07 LAB
ANION GAP SERPL CALCULATED.3IONS-SCNC: 7 MMOL/L (ref 4–13)
ATRIAL RATE: 85 BPM
ATRIAL RATE: 88 BPM
BUN SERPL-MCNC: 12 MG/DL (ref 5–25)
CA-I BLD-SCNC: 1.07 MMOL/L (ref 1.12–1.32)
CALCIUM SERPL-MCNC: 7.6 MG/DL (ref 8.3–10.1)
CHLORIDE SERPL-SCNC: 102 MMOL/L (ref 100–108)
CO2 SERPL-SCNC: 25 MMOL/L (ref 21–32)
CREAT SERPL-MCNC: 0.94 MG/DL (ref 0.6–1.3)
ERYTHROCYTE [DISTWIDTH] IN BLOOD BY AUTOMATED COUNT: 13.7 % (ref 11.6–15.1)
GFR SERPL CREATININE-BSD FRML MDRD: 54 ML/MIN/1.73SQ M
GLUCOSE SERPL-MCNC: 98 MG/DL (ref 65–140)
HCT VFR BLD AUTO: 30.3 % (ref 34.8–46.1)
HGB BLD-MCNC: 10.2 G/DL (ref 11.5–15.4)
MCH RBC QN AUTO: 30.8 PG (ref 26.8–34.3)
MCHC RBC AUTO-ENTMCNC: 33.7 G/DL (ref 31.4–37.4)
MCV RBC AUTO: 92 FL (ref 82–98)
P AXIS: 100 DEGREES
PLATELET # BLD AUTO: 97 THOUSANDS/UL (ref 149–390)
PMV BLD AUTO: 10.7 FL (ref 8.9–12.7)
POTASSIUM SERPL-SCNC: 4 MMOL/L (ref 3.5–5.3)
PR INTERVAL: 162 MS
QRS AXIS: 41 DEGREES
QRS AXIS: 42 DEGREES
QRSD INTERVAL: 78 MS
QRSD INTERVAL: 84 MS
QT INTERVAL: 356 MS
QT INTERVAL: 356 MS
QTC INTERVAL: 423 MS
QTC INTERVAL: 430 MS
RBC # BLD AUTO: 3.31 MILLION/UL (ref 3.81–5.12)
SARS-COV-2 RNA RESP QL NAA+PROBE: NEGATIVE
SODIUM SERPL-SCNC: 134 MMOL/L (ref 136–145)
T WAVE AXIS: 53 DEGREES
T WAVE AXIS: 57 DEGREES
VENTRICULAR RATE: 85 BPM
VENTRICULAR RATE: 88 BPM
WBC # BLD AUTO: 7.43 THOUSAND/UL (ref 4.31–10.16)

## 2021-05-07 PROCEDURE — U0003 INFECTIOUS AGENT DETECTION BY NUCLEIC ACID (DNA OR RNA); SEVERE ACUTE RESPIRATORY SYNDROME CORONAVIRUS 2 (SARS-COV-2) (CORONAVIRUS DISEASE [COVID-19]), AMPLIFIED PROBE TECHNIQUE, MAKING USE OF HIGH THROUGHPUT TECHNOLOGIES AS DESCRIBED BY CMS-2020-01-R: HCPCS | Performed by: FAMILY MEDICINE

## 2021-05-07 PROCEDURE — 99024 POSTOP FOLLOW-UP VISIT: CPT | Performed by: ORTHOPAEDIC SURGERY

## 2021-05-07 PROCEDURE — 93005 ELECTROCARDIOGRAM TRACING: CPT

## 2021-05-07 PROCEDURE — 80048 BASIC METABOLIC PNL TOTAL CA: CPT | Performed by: FAMILY MEDICINE

## 2021-05-07 PROCEDURE — 99239 HOSP IP/OBS DSCHRG MGMT >30: CPT | Performed by: FAMILY MEDICINE

## 2021-05-07 PROCEDURE — 93010 ELECTROCARDIOGRAM REPORT: CPT | Performed by: INTERNAL MEDICINE

## 2021-05-07 PROCEDURE — U0005 INFEC AGEN DETEC AMPLI PROBE: HCPCS | Performed by: FAMILY MEDICINE

## 2021-05-07 PROCEDURE — 85027 COMPLETE CBC AUTOMATED: CPT | Performed by: FAMILY MEDICINE

## 2021-05-07 PROCEDURE — 82330 ASSAY OF CALCIUM: CPT | Performed by: FAMILY MEDICINE

## 2021-05-07 RX ORDER — ASPIRIN 81 MG/1
81 TABLET ORAL DAILY
Refills: 0
Start: 2021-05-14 | End: 2022-07-31

## 2021-05-07 RX ORDER — OXYCODONE HYDROCHLORIDE 5 MG/1
5 TABLET ORAL EVERY 6 HOURS PRN
Qty: 30 TABLET | Refills: 0 | Status: SHIPPED | OUTPATIENT
Start: 2021-05-07 | End: 2021-05-17

## 2021-05-07 RX ORDER — ACETAMINOPHEN 325 MG/1
650 TABLET ORAL EVERY 6 HOURS PRN
Refills: 0
Start: 2021-05-07 | End: 2022-07-31

## 2021-05-07 RX ORDER — DOCUSATE SODIUM 100 MG/1
100 CAPSULE, LIQUID FILLED ORAL 2 TIMES DAILY
Qty: 10 CAPSULE | Refills: 0 | Status: SHIPPED | OUTPATIENT
Start: 2021-05-07 | End: 2022-07-31

## 2021-05-07 RX ADMIN — METOPROLOL SUCCINATE 12.5 MG: 25 TABLET, EXTENDED RELEASE ORAL at 07:57

## 2021-05-07 RX ADMIN — OXYCODONE HYDROCHLORIDE 5 MG: 5 TABLET ORAL at 07:56

## 2021-05-07 RX ADMIN — DOCUSATE SODIUM 100 MG: 100 CAPSULE ORAL at 07:57

## 2021-05-07 RX ADMIN — Medication 100 MCG: at 07:57

## 2021-05-07 RX ADMIN — ENOXAPARIN SODIUM 30 MG: 30 INJECTION SUBCUTANEOUS at 07:57

## 2021-05-07 RX ADMIN — PANTOPRAZOLE SODIUM 40 MG: 40 TABLET, DELAYED RELEASE ORAL at 07:57

## 2021-05-07 NOTE — ASSESSMENT & PLAN NOTE
· Status post mechanical fall  · Right hip X-ray:  Comminuted right femur fracture  Pod 3:INSERTION NAIL IM FEMUR ANTEGRADE (TROCHANTERIC) (Right)  · Neurovascular checks  · Pain controlled  continue oral agents  · Patient for subacute rehab today

## 2021-05-07 NOTE — CASE MANAGEMENT
Pt is medically stable for discharge  CM placed call to [facility] to inquire if they have availability to transport pt today to their facility  Awaiting reply    Gauri Willis will  pt via [address]  at 1300 to transport to their facility

## 2021-05-07 NOTE — DISCHARGE INSTRUCTIONS
Follow-up with Dr Maranda Liao week of 05/17/2021  Weight-bearing as tolerated right lower extremity  Remove remaining dressing from proximal right femur 1 week postoperatively, 05/11/2021 and applied new dressing only if needed  The incisions may be cleansed beginning today  Do not submerge the incisions beneath water  Contact the office if questions or concerns arise  Contact the office at [phone] to schedule follow-up appointment

## 2021-05-07 NOTE — DISCHARGE SUMMARY
[address]  Discharge- Reji CACERES [facility] 1/11/1932, 80 y o  female MRN: [id number]  Unit/Bed#: -01 Encounter: [id number]  Primary Care Provider: Manjeet Munoz MD   Date and time admitted to hospital: 5/3/2021  7:43 PM    * Closed fracture of right femur ([address] )  Assessment & Plan  · Status post mechanical fall  · Right hip X-ray:  Comminuted right femur fracture  Pod 3:INSERTION NAIL IM FEMUR ANTEGRADE (TROCHANTERIC) (Right)  · Neurovascular checks  · Pain controlled  continue oral agents  · Patient for subacute rehab today  Fall  Assessment & Plan  · Trauma alert for fall  · CT head:  No acute intracranial hemorrhage  Moderate cerebral chronic microangiopathic disease  · Chest x-ray:  No evidence of consolidation or infiltrate  · X-ray left shoulder wrist and elbow:  No acute interosseous abnormality  · EKG shows sinus rhythm with bigeminy  Acute on chronic anemia  Assessment & Plan  · Hemoglobin 10 1 on admission and dropped to 7 1  due to recent fall and fracture  Baseline is around 11  · Patient has acute blood loss anemia is secondary to recent fall, fracture and surgery  Also noted to have drop in platelets  Need to monitor closely for now  · received 2 unit of PRBC on 5/6/21 and repeat hemoglobin is 10 2  · Lovenox as per Ortho    Hypocalcemia  Assessment & Plan  Will place on oral calcium supplements    COPD not affecting current episode of care [facility])  Assessment & Plan  · Never smoker-although around family member who smoked heavily  · Currently controlled  · Continue albuterol inhaler as needed  · Respiratory protocol    Postoperative hypotension  Assessment & Plan  · Patient has postop hypotension  Blood pressure is between 322-643 systolic and symptomatic  · Hemoglobin is better today  Continue metoprolol as per holding parameters    Avoid hypotension    Coronary artery disease  Assessment & Plan  · No chest pain  · History of stent x 1 three years ago  · Continue metoprolol  Restart aspirin after 5 days  Repeat CBC to make sure that platelet count is improving      Discharging Physician / Practitioner: Sumit Rivera MD  PCP: Pratima Álvarez MD  Admission Date:   Admission Orders (From admission, onward)     Ordered        05/03/21 2059  Inpatient Admission  Once                   Discharge Date: 05/07/21    Resolved Problems  Date Reviewed: 5/7/2021    None          Consultations During Hospital Stay:  · Orthopedics, physical therapy and occupational therapy    Procedures Performed:   · Insertion nail IM femur antegrade trochanteric right 05/04/2021    Significant Findings / Test Results:   Xr Chest 1 View Portable    Result Date: 5/4/2021  Impression: No acute cardiopulmonary disease  Workstation performed: [id number]     Xr Hip/pelv 2-3 Vws Right If Performed    Result Date: 5/4/2021  Impression: Acute, displaced mildly comminuted proximal right femoral fracture  Workstation performed: [id number]     Xr Femur 2 Vw Right    Result Date: 5/5/2021  Impression: Fluoroscopic guidance provided for procedure guidance  Please refer to the separate procedure notes for additional details  Workstation performed: [id number]     Incidental Findings:   · Thrombocytopenia    Test Results Pending at Discharge (will require follow up): · None     Outpatient Tests Requested:  · CBC and BMP in 5 days  · Restart aspirin after 5 days if platelet count is over 307 K    Complications:  None    Reason for Admission:  [facility] Course:     Shelbie Burrows is a 80 y o  female patient who originally presented to the hospital on 5/3/2021 due to fall  Patient presented with a fall which led to a comminuted right femur fracture  Patient underwent insertion nail IM femur antegrade the trochanteric region and postop developed hypotension and acute on chronic anemia due to blood loss due to recent fall fracture and surgery    Received 2 units of PRBC and hemoglobin improved from 7  1-10 2  Also held aspirin due to thrombocytopenia noted  So far patient is now improving after receiving blood transfusion and IV fluid hydration  Blood pressures are still low normal and need to be monitored to prevent orthostatic hypotension  Will discharge to skilled nursing facility with outpatient follow-up with orthopedics  Repeat labs to be done in 5 days including a CBC and if platelet count is over 125 K than aspirin 81 mg daily may be resumed as per her home regimen    Please see above list of diagnoses and related plan for additional information  Condition at Discharge: fair     Discharge Day Visit / Exam:     Subjective:  Patient denies any chest pain or shortness of breath or abdominal pain today  States that the pain in her right hip is controlled  Vitals: Blood Pressure: 126/64 (05/07/21 0601)  Pulse: 85 (05/07/21 0601)  Temperature: 98 3 °F (36 8 °C) (05/07/21 0601)  Temp Source: Oral (05/06/21 1654)  Respirations: 18 (05/07/21 0601)  Height: 5' 5" (165 1 cm) (05/05/21 1121)  Weight - Scale: 74 8 kg (164 lb 14 5 oz) (05/07/21 0600)  SpO2: 95 % (05/07/21 0601)  Exam:   Physical Exam  Vitals signs and nursing note reviewed  Constitutional:       Appearance: Normal appearance  HENT:      Head: Normocephalic and atraumatic  Right Ear: External ear normal       Left Ear: External ear normal       Nose: Nose normal       Mouth/Throat:      Pharynx: Oropharynx is clear  Neck:      Musculoskeletal: Normal range of motion and neck supple  Cardiovascular:      Rate and Rhythm: Normal rate and regular rhythm  Heart sounds: Normal heart sounds  Pulmonary:      Effort: Pulmonary effort is normal       Comments: Moderate air entry bilaterally decreased breath sounds bilateral bases  Abdominal:      General: Bowel sounds are normal       Palpations: Abdomen is soft  Tenderness: There is no abdominal tenderness  Musculoskeletal:      Right lower leg: No edema        Left lower leg: No edema  Comments: Incision site intact with no redness or drainage noted   Skin:     General: Skin is warm and dry  Capillary Refill: Capillary refill takes less than 2 seconds  Neurological:      General: No focal deficit present  Mental Status: She is alert and oriented to person, place, and time  Psychiatric:         Mood and Affect: Mood normal          Discussion with Family:  Discussed with daughter-in-law    Discharge instructions/Information to patient and family:   See after visit summary for information provided to patient and family  Provisions for Follow-Up Care:  See after visit summary for information related to follow-up care and any pertinent home health orders  Disposition:     Other [facility]      For Discharges to [facility] SN[facility]:   · Not Applicable to this Patient - Not Applicable to this Patient    Planned Readmission:  None     Discharge Statement:  I spent 35 minutes discharging the patient  This time was spent on the day of discharge  I had direct contact with the patient on the day of discharge  Greater than 50% of the total time was spent examining patient, answering all patient questions, arranging and discussing plan of care with patient as well as directly providing post-discharge instructions  Additional time then spent on discharge activities  Discharge Medications:  See after visit summary for reconciled discharge medications provided to patient and family        ** Please Note: This note has been constructed using a voice recognition system **

## 2021-05-07 NOTE — PROGRESS NOTES
Progress Note - Orthopedics   Manju Sauceda Mimm 80 y o  female MRN: [id number]  Unit/Bed#: -Subha Encounter: [id number]    Assessment:  POD #3 IM rodding right femur; ambulatory dysfunction; anemia    Plan:  Orthopedically stable for discharge  Instructions placed in the discharge instruction area  Follow-up in approximately 10 days to 2 weeks  Proximal dressing may be removed 1 week postoperatively a new dressing applied if needed  Weight bearing:  WBAT    VTE Pharmacologic Prophylaxis: Enoxaparin (Lovenox)  VTE Mechanical Prophylaxis: sequential compression device    Subjective:  Layman Cheadle reports that continued improvement  She does continue to experience some activity related pain but admits to a notable improvement  She denies lower extremity paresthesias  Vitals: Blood pressure 126/64, pulse 85, temperature 98 3 °F (36 8 °C), resp  rate 18, height 5' 5" (1 651 m), weight 74 8 kg (164 lb 14 5 oz), SpO2 95 %  ,Body mass index is 27 44 kg/m²  Intake/Output Summary (Last 24 hours) at 5/7/2021 1256  Last data filed at 5/7/2021 0911  Gross per 24 hour   Intake 997 5 ml   Output 1900 ml   Net -902 5 ml       Invasive Devices     Drain            External Urinary Catheter 2 days                Physical Exam:  The right lower extremity dressings are intact  The distal, gauze dressings were removed without difficulty  The Prineo dressing was left in place  Distal sensation is intact  Calf compartments are soft and nontender  Good color and capillary refill is noted distally and pulses are palpable      Lab, Imaging and other studies:   CBC:   Lab Results   Component Value Date    WBC 7 43 05/07/2021    HGB 10 2 (L) 05/07/2021    HCT 30 3 (L) 05/07/2021    MCV 92 05/07/2021    PLT 97 (L) 05/07/2021    MCH 30 8 05/07/2021    MCHC 33 7 05/07/2021    RDW 13 7 05/07/2021    MPV 10 7 05/07/2021

## 2021-05-07 NOTE — ASSESSMENT & PLAN NOTE
· No chest pain  · History of stent x 1 three years ago  · Continue metoprolol  Restart aspirin after 5 days    Repeat CBC to make sure that platelet count is improving

## 2021-05-07 NOTE — NURSING NOTE
[facility] in to transport patient to [facility]  Copy of after visit summary and script for oxycodone provided

## 2021-05-07 NOTE — CONSULTS
Consultation - Cardiology   Miroslava Smith 80 y o  female MRN: [id number]  Unit/Bed#: -01 Encounter: [id number]       Inpatient consult to Cardiology  Consult performed by: NARA Gardner  Consult ordered by: William Oswald MD        Physician Requesting Consult: William Oswald MD  Reason for Consult / Principal Problem: dizziness    HPI: Yue Abreu is an 80 y o  female with known CAD s/p PCI (stenting to ostial RCA in 2011) and PMH significant for pAF / SVT,  HTN, CKD who presented to the [facility] on 5/3/2021 s/p fall  She suffered a comminuted right femur fracture and underwent internal fixation by Dr Fabricio Johnson on 5/4/2021  Today Ms Smith is evaluated while lying supine in bed  She reports that PTA she got up from watching television and felt lightheaded, then immediately fell and was unable to ambulate d/t right leg pain  She actually denies experiencing dizziness and denies CP or preceding palpitations  She acknowledges FIERRO (ie, when ambulating) which she feels has been worsening  She reports experiencing chest discomfort as "pressure," which is not a/w exertion and that she attributes to a history of GI problems  She reports palpitations described as heart racing that have woken her from sleep at times  Duration of these episodes is a few minutes before resolving spontaneously  She denies orthopnea, PND, peripheral edema or weight gain  Review of Systems   Constitutional: Negative for chills and fever  HEENT: Negative   Respiratory: (+) FIERRO    Cardiovascular: (+) palpitations  Negative for chest pain and leg swelling  Gastrointestinal: Negative for abdominal distention, abdominal pain, constipation, diarrhea, nausea and vomiting  Genitourinary: Negative for dysuria and frequency  Musculoskeletal: Positive for arthralgias and gait problem  Negative for myalgias  Neurological: (+) lightheadedness     Psychiatric/Behavioral: Negative      Historical Information   Past Medical History:   Diagnosis Date    Cancer [facility])     skin cancer    Cardiac disease     Multiple falls     Neuropathy     Skin cancer     forehead    Spinal stenosis     UTI (urinary tract infection)      Past Surgical History:   Procedure Laterality Date    APPENDECTOMY      BACK SURGERY      x2    CATARACT EXTRACTION W/  INTRAOCULAR LENS IMPLANT Bilateral     CORONARY ANGIOPLASTY WITH STENT PLACEMENT  09/23/2011    MD OPEN RX FEMUR FX+INTRAMED RASHIDA Right 5/4/2021    Procedure: INSERTION NAIL IM FEMUR ANTEGRADE (TROCHANTERIC); Surgeon: Enrike Barber;   Location:  MAIN OR;  Service: Orthopedics    REPLACEMENT TOTAL KNEE BILATERAL       Social History     Substance and Sexual Activity   Alcohol Use Yes    Comment: Occasionally     Social History     Substance and Sexual Activity   Drug Use Never     E-Cigarette/Vaping    E-Cigarette Use Never User      E-Cigarette/Vaping Substances     Social History     Tobacco Use   Smoking Status Never Smoker   Smokeless Tobacco Never Used     Meds/Allergies   all current active meds have been reviewed and current meds:   Current Facility-Administered Medications   Medication Dose Route Frequency    acetaminophen (TYLENOL) tablet 650 mg  650 mg Oral Q6H PRN    albuterol (PROVENTIL HFA,VENTOLIN HFA) inhaler 2 puff  2 puff Inhalation Q6H PRN    aluminum-magnesium hydroxide-simethicone (MYLANTA) oral suspension 30 mL  30 mL Oral Q6H PRN    calcium carbonate (TUMS) chewable tablet 1,000 mg  1,000 mg Oral Daily PRN    cyanocobalamin (VITAMIN B-12) tablet 100 mcg  100 mcg Oral Daily    docusate sodium (COLACE) capsule 100 mg  100 mg Oral BID    enoxaparin (LOVENOX) subcutaneous injection 30 mg  30 mg Subcutaneous Daily    metoprolol succinate (TOPROL-XL) 24 hr tablet 12 5 mg  12 5 mg Oral Daily    ondansetron (ZOFRAN) injection 4 mg  4 mg Intravenous Q6H PRN    oxyCODONE (ROXICODONE) immediate release tablet 10 mg  10 mg Oral Q4H PRN    oxyCODONE (ROXICODONE) IR tablet 5 mg  5 mg Oral Q4H PRN    pantoprazole (PROTONIX) EC tablet 40 mg  40 mg Oral Daily     Allergies   Allergen Reactions    Ace Inhibitors Cough    Angiotensin Receptor Blockers      Other reaction(s): WEAK AND SOB    Atorvastatin      Other reaction(s): NOT SURE    Ezetimibe      Other reaction(s): N&V    Formoterol      Other reaction(s): NOT SURE, SHAKINESS    Gabapentin      Other reaction(s): SWELLING    Hydrocodone-Acetaminophen Vomiting     Other reaction(s): Nausea and Vomiting    Lovastatin      Other reaction(s): MUSCLE PAIN    Methylprednisolone      Other reaction(s): FLUSHING    Mometasone Furoate      Other reaction(s): HOARSENESS    Niacin      Other reaction(s): NOT SURE    Nsaids     Other      abx - pt cant remember name - got body aches and headache    Oxycodone GI Intolerance    Oxytetracycline      Other reaction(s): NOT SURE    Pravastatin     Rosuvastatin      Other reaction(s): MUSCLE PAIN    Statins      Other reaction(s): Other (See Comments)  "bone pain"    Tramadol      Other reaction(s): N&V    Latex Other (See Comments) and Rash     "skin peeling"    Penicillins Rash and Hives     Other reaction(s): RASH       Objective   Vitals: Blood pressure 126/64, pulse 85, temperature 98 3 °F (36 8 °C), resp  rate 18, height 5' 5" (1 651 m), weight 74 8 kg (164 lb 14 5 oz), SpO2 95 %    Orthostatic Blood Pressures      Most Recent Value   Blood Pressure  126/64 filed at 05/07/2021 0601   Patient Position - Orthostatic VS  Lying filed at 05/05/2021 1200            Intake/Output Summary (Last 24 hours) at 5/7/2021 0947  Last data filed at 5/7/2021 0911  Gross per 24 hour   Intake 1267 5 ml   Output 1900 ml   Net -632 5 ml       Invasive Devices     Peripheral Intravenous Line            Peripheral IV 05/03/21 Left Antecubital 3 days    Peripheral IV 05/04/21 Left Hand 2 days          Drain            External Urinary Catheter 2 days                Physical Exam  Constitutional:       Appearance: Normal appearance  HENT:      Head: Normocephalic and atraumatic  Neck:      Musculoskeletal: Normal range of motion and neck supple  Vascular: No JVD  Cardiovascular:      Rate and Rhythm: Tachycardia present  Rhythm irregular  Pulses: Normal pulses  Heart sounds: Normal heart sounds  No murmur  No friction rub  No gallop  Pulmonary:      Effort: Pulmonary effort is normal       Breath sounds: Normal breath sounds  Abdominal:      General: Abdomen is flat  Bowel sounds are normal       Palpations: Abdomen is soft  Musculoskeletal:      Comments: s/p fixation of right femur   Skin:     General: Skin is warm and dry  Comments: Surgical incision s/p fixation of right femur   Neurological:      General: No focal deficit present  Mental Status: She is alert and oriented to person, place, and time  Psychiatric:         Mood and Affect: Mood normal          Behavior: Behavior normal        Lab Results:     I have personally reviewed pertinent lab results  CBC with diff:   Results from last 7 days   Lab Units 05/07/21  0600   WBC Thousand/uL 7 43   RBC Million/uL 3 31*   HEMOGLOBIN g/dL 10 2*   HEMATOCRIT % 30 3*   MCV fL 92   MCH pg 30 8   MCHC g/dL 33 7   RDW % 13 7   MPV fL 10 7   PLATELETS Thousands/uL 97*     CMP:   Results from last 7 days   Lab Units 05/07/21  0600  05/03/21 2010   SODIUM mmol/L 134*   < > 137   POTASSIUM mmol/L 4 0   < > 4 3   CHLORIDE mmol/L 102   < > 103   CO2 mmol/L 25   < > 26   BUN mg/dL 12   < > 17   CREATININE mg/dL 0 94   < > 1 23   CALCIUM mg/dL 7 6*   < > 8 4   AST U/L  --   --  23   ALT U/L  --   --  16   ALK PHOS U/L  --   --  65   EGFR ml/min/1 73sq m 54   < > 39    < > = values in this interval not displayed       Troponin:   0   Lab Value Date/Time    TROPONINI <0 02 04/17/2021 1618    TROPONINI <0 02 02/13/2020 0741     BNP:   Results from last 7 days   Lab Units 05/07/21  0600   POTASSIUM mmol/L 4  0   CHLORIDE mmol/L 102   CO2 mmol/L 25   BUN mg/dL 12   CREATININE mg/dL 0 94   CALCIUM mg/dL 7 6*   EGFR ml/min/1 73sq m 54     Coags:   Results from last 7 days   Lab Units 21   PTT seconds 31   INR  0 94     Pertinent Imaging:  I have personally reviewed pertinent reports  EK/3/2021, NSR with PACs in a pattern of bigeminy      Assessment/Plan     Assessment:  Recent fall with closed fracture of right femur, s/p internal fixation  FIERRO  Nonspecific chest discomfort  CAD s/p remote PCI  Palpitations, history of pAF / SVT    Plan:  Discharge to SNF for acute rehabilitation is anticipated within the next 24 hours  Patient S/E/D with Dr Sheron Espana today  Ms Smith's description of the events and symptoms preceding her recent fall may correlate with orthostasis  Ms Lesvia Downs had been followed by outpatient Cardiology providers through [facility] through   TTE and stress testing had been ordered at that time to further evaluate her c/o exertional dyspnea; though these records are not immediately available for review, documentation indicates that MPI did not show any obvious abnormalities and her LVSF was normal   She is denying exertional CP  She acknowledges FIERRO, which seems to be chronic for several years but is reportedly worsening  She may require further ischemic workup (MPI) for her symptoms on an outpatient basis  Continue BB as PTA  She has documented allergies / intolerance to many statins  Would resume ASA 81 mg daily s/p PCI, as soon as it is felt reasonable in terms of post-operative bleeding risk  There is a documented history of pAF by Holter monitor in   Ms Lesvia Downs is on no specific anticoagulation for [id number] > 2  The heart rhythm is irregular on PE with frequent ectopy noted by EKG, and a period of ambulatory cardiac monitoring may be useful to ascertain the presence of any significant cardiac arrhthymias that may be contributing to her symptoms

## 2021-05-07 NOTE — ASSESSMENT & PLAN NOTE
· Patient has postop hypotension  Blood pressure is between 867-642 systolic and symptomatic  · Hemoglobin is better today  Continue metoprolol as per holding parameters    Avoid hypotension

## 2021-05-07 NOTE — ASSESSMENT & PLAN NOTE
· Hemoglobin 10 1 on admission and dropped to 7 1  due to recent fall and fracture  Baseline is around 11  · Patient has acute blood loss anemia is secondary to recent fall, fracture and surgery  Also noted to have drop in platelets  Need to monitor closely for now    · received 2 unit of PRBC on 5/6/21 and repeat hemoglobin is 10 2  · Lovenox as per Ortho

## 2021-05-08 LAB
ABO GROUP BLD BPU: NORMAL
ABO GROUP BLD BPU: NORMAL
BPU ID: NORMAL
BPU ID: NORMAL
CROSSMATCH: NORMAL
CROSSMATCH: NORMAL
UNIT DISPENSE STATUS: NORMAL
UNIT DISPENSE STATUS: NORMAL
UNIT PRODUCT CODE: NORMAL
UNIT PRODUCT CODE: NORMAL
UNIT RH: NORMAL
UNIT RH: NORMAL

## 2021-05-24 ENCOUNTER — HOSPITAL ENCOUNTER (OUTPATIENT)
Dept: RADIOLOGY | Facility: HOSPITAL | Age: 86
Discharge: HOME | End: 2021-05-24

## 2021-05-24 ENCOUNTER — HOSPITAL ENCOUNTER (OUTPATIENT)
Dept: RADIOLOGY | Facility: CLINIC | Age: 86
Discharge: HOME | End: 2021-05-24
Payer: MEDICARE

## 2021-05-24 ENCOUNTER — HOSPITAL ENCOUNTER (OUTPATIENT)
Dept: RADIOLOGY | Facility: CLINIC | Age: 86
Discharge: HOME | End: 2021-05-24
Payer: COMMERCIAL

## 2021-05-24 ENCOUNTER — OFFICE VISIT (OUTPATIENT)
Dept: OBGYN CLINIC | Facility: CLINIC | Age: 86
End: 2021-05-24

## 2021-05-24 VITALS
BODY MASS INDEX: 27.32 KG/M2 | SYSTOLIC BLOOD PRESSURE: 118 MMHG | DIASTOLIC BLOOD PRESSURE: 70 MMHG | WEIGHT: 164 LBS | HEART RATE: 84 BPM | HEIGHT: 65 IN | TEMPERATURE: 97.6 F

## 2021-05-24 DIAGNOSIS — S72.91XD CLOSED FRACTURE OF RIGHT FEMUR WITH ROUTINE HEALING, UNSPECIFIED FRACTURE MORPHOLOGY, UNSPECIFIED PORTION OF FEMUR, SUBSEQUENT ENCOUNTER: ICD-10-CM

## 2021-05-24 DIAGNOSIS — S72.21XD CLOSED DISPLACED SUBTROCHANTERIC FRACTURE OF RIGHT FEMUR WITH ROUTINE HEALING, SUBSEQUENT ENCOUNTER: ICD-10-CM

## 2021-05-24 PROCEDURE — 99024 POSTOP FOLLOW-UP VISIT: CPT | Performed by: ORTHOPAEDIC SURGERY

## 2021-05-24 PROCEDURE — 73552 X-RAY EXAM OF FEMUR 2/>: CPT

## 2021-05-24 PROCEDURE — 72170 X-RAY EXAM OF PELVIS: CPT

## 2021-05-24 RX ORDER — BISACODYL 10 MG
10 SUPPOSITORY, RECTAL RECTAL DAILY
COMMUNITY
End: 2022-07-31

## 2021-05-24 RX ORDER — OXYCODONE HYDROCHLORIDE 5 MG/1
5 CAPSULE ORAL EVERY 4 HOURS PRN
COMMUNITY
End: 2022-07-31

## 2021-05-24 RX ORDER — LOPERAMIDE HYDROCHLORIDE 2 MG/1
2 CAPSULE ORAL 4 TIMES DAILY PRN
COMMUNITY
End: 2022-07-31

## 2021-05-24 NOTE — PROGRESS NOTES
Patient Name:  Evelia Pickering  MRN:  [id number]    Assessment     1  Closed displaced subtrochanteric fracture of right femur with routine healing, subsequent encounter  Ambulatory referral to Orthopedic Surgery    XR femur 2 vw right    CANCELED: XR hip/pelv 2-3 vws right if performed       Plan       Todd Major will continue to be weight-bearing as tolerated right lower extremity  She will continue working with PT/ OT at the nursing facility  Staples were removed today  A light gauze dressing may be put over the incisions if they do start to drain  We will see her back in 3-4 weeks for recheck  X-rays of the right hip and femur will be obtained upon arrival     Return in 3-4 weeks  Scribe Attestation    I,:  Ioana Boudreaux PA-C am acting as a scribe while in the presence of the attending physician :       I,:  Ramona Isaac personally performed the services described in this documentation    as scribed in my presence :             Subjective   Evelia Pickering returns for follow-up of subtrochanteric femur fracture  The patient is 3 week(s) post right long TFN and returns for routine follow-up  She reports overall, she is doing well  She has been working with physical therapy on a daily basis since discharge from the hospital   She reports that she has been ambulating from bed to bathroom with the nursing staff and ambulates quite far with the physical therapy staff  She denies numbness or tingling  She denies fever, chills, malaise  Objective     /70   Pulse 84   Temp 97 6 °F (36 4 °C) (Temporal)   Ht 5' 5" (1 651 m)   Wt 74 4 kg (164 lb)   LMP  (LMP Unknown)   BMI 27 29 kg/m²       The right hip exam demonstrates skin well approximated with staples  These were removed without difficulty  There is no erythema or active drainage  Thigh and lower extremity swelling as to be expected postoperatively  Motor and sensory exams are grossly intact, distal pulses are palpable    Limb is warm and well perfused good color and capillary refill the toes  The remainder of the right lower extremity exam is benign  Data Review     I have personally reviewed pertinent films and reports in PACS and my interpretation is as follows:       X-rays of the right hip and femur performed today in clinic demonstrate no interval changes in alignment of the fracture or the hardware        Ioana KATHY Boudreaux PA-C

## 2021-06-17 ENCOUNTER — OFFICE VISIT (OUTPATIENT)
Dept: OBGYN CLINIC | Facility: CLINIC | Age: 86
End: 2021-06-17

## 2021-06-17 ENCOUNTER — HOSPITAL ENCOUNTER (OUTPATIENT)
Dept: RADIOLOGY | Facility: CLINIC | Age: 86
Discharge: HOME | End: 2021-06-17
Payer: MEDICARE

## 2021-06-17 VITALS
BODY MASS INDEX: 23.49 KG/M2 | TEMPERATURE: 96.9 F | HEIGHT: 65 IN | DIASTOLIC BLOOD PRESSURE: 64 MMHG | HEART RATE: 71 BPM | SYSTOLIC BLOOD PRESSURE: 108 MMHG | WEIGHT: 141 LBS

## 2021-06-17 DIAGNOSIS — S72.21XD CLOSED DISPLACED SUBTROCHANTERIC FRACTURE OF RIGHT FEMUR WITH ROUTINE HEALING, SUBSEQUENT ENCOUNTER: Primary | ICD-10-CM

## 2021-06-17 DIAGNOSIS — S72.21XD CLOSED DISPLACED SUBTROCHANTERIC FRACTURE OF RIGHT FEMUR WITH ROUTINE HEALING, SUBSEQUENT ENCOUNTER: ICD-10-CM

## 2021-06-17 PROCEDURE — 73552 X-RAY EXAM OF FEMUR 2/>: CPT

## 2021-06-17 PROCEDURE — 73502 X-RAY EXAM HIP UNI 2-3 VIEWS: CPT

## 2021-06-17 PROCEDURE — 99024 POSTOP FOLLOW-UP VISIT: CPT | Performed by: ORTHOPAEDIC SURGERY

## 2021-06-17 NOTE — PROGRESS NOTES
Patient Name:  Huber Jones  MRN:  [id number]    Assessment     1  Closed displaced subtrochanteric fracture of right femur with routine healing, subsequent encounter  XR femur 2 vw right    XR hip/pelv 2-3 vws right if performed       Plan     80year-old female presents office today for follow-up status post long TFN of right femur  DOS 5/4/2021  The patient is improving as expected and is doing very well  - Continue weight bearing as tolerated right lower extremity  - Continue working with home PT, strengthening and stretching  - Return in 3 weeks for recheck, repeat xrays of the right hip and femur at that time  No follow-ups on file  Subjective   Iris Smith returns for follow-up of right subtrochanteric femur fracture  The patient is 6 weeks status-post long TFN  Date of surgery 5/4/2021  Patient is overall doing very well  The patient returned home from the rehab facility on Friday  She continues to work with home PT as instructed  Patient denies any new injury or trauma  She denies any numbness or tingling  In the lower right extremity  The patient also denies any fever chills  She has no other questions or concerns today  Objective     /64   Pulse 71   Temp (!) 96 9 °F (36 1 °C) (Temporal)   Ht 5' 5" (1 651 m)   Wt 64 kg (141 lb)   LMP  (LMP Unknown)   BMI 23 46 kg/m²     Right Hip:   The patient presents to the office today in a wheelchair  Well healed surgical incision  No lesions, ecchymosis, erythema, effusion, warmth, or other signs of infection  Patient is able to ambulate with assistance  No palpable tenderness  4/5 strength with leg raise  Good flexion and internal/external rotation of hip without pain  Sensation intact to light touch  Good plantarflexion and dorsiflexion of foot  Soft lower extremity compartments  Brisk capillary refill in all toes         Data Review     I have personally reviewed pertinent films and reports in PACS and my interpretation is as follows:     Xray of the right hip/pelvis and femur taken today reveal well aligned hip joint with long TFN intact and unchanged since last images taken        Johanna Galvez PA-C

## 2021-07-09 ENCOUNTER — OFFICE VISIT (OUTPATIENT)
Dept: OBGYN CLINIC | Facility: CLINIC | Age: 86
End: 2021-07-09

## 2021-07-09 ENCOUNTER — HOSPITAL ENCOUNTER (OUTPATIENT)
Dept: RADIOLOGY | Facility: CLINIC | Age: 86
Discharge: HOME | End: 2021-07-09
Payer: MEDICARE

## 2021-07-09 VITALS
TEMPERATURE: 96.7 F | BODY MASS INDEX: 23.32 KG/M2 | DIASTOLIC BLOOD PRESSURE: 66 MMHG | HEIGHT: 65 IN | SYSTOLIC BLOOD PRESSURE: 105 MMHG | WEIGHT: 140 LBS | HEART RATE: 41 BPM

## 2021-07-09 DIAGNOSIS — S72.21XD CLOSED DISPLACED SUBTROCHANTERIC FRACTURE OF RIGHT FEMUR WITH ROUTINE HEALING, SUBSEQUENT ENCOUNTER: ICD-10-CM

## 2021-07-09 DIAGNOSIS — S72.21XD CLOSED DISPLACED SUBTROCHANTERIC FRACTURE OF RIGHT FEMUR WITH ROUTINE HEALING, SUBSEQUENT ENCOUNTER: Primary | ICD-10-CM

## 2021-07-09 PROCEDURE — 99024 POSTOP FOLLOW-UP VISIT: CPT | Performed by: ORTHOPAEDIC SURGERY

## 2021-07-09 PROCEDURE — 73552 X-RAY EXAM OF FEMUR 2/>: CPT

## 2021-07-09 NOTE — PROGRESS NOTES
Patient Name:  Ruddy Nguyen  MRN:  [id number]    Assessment     1  Closed displaced subtrochanteric fracture of right femur with routine healing, subsequent encounter  XR femur 2 vw right       Plan     1  I would recommend follow-up in 6-8 weeks  She was discharged from physical therapy last week but I would encourage her to continue the home exercises on a routine basis  If she does see any deterioration in her functional activity level, initiating physical therapy again would be reasonable and I have recommended she contact me so I am able to place an order  X-rays will be obtained at follow-up including AP and lateral x-rays of her right femur  I again, she was encouraged to contact me if questions or concerns arise  Return for 6 - 8 weeks  Subjective   Idell Cons Mimm returns for follow-up of her right femoral subtrochanteric fracture  The patient is 9 1/2 week(s) post IM rodding and returns for routine follow-up  Patient complains of weakness in the right thigh when standing  She denies pain  She denies lower extremity paresthesias  She continues to participate with physical therapy  Objective     /66   Pulse (!) 41   Temp (!) 96 7 °F (35 9 °C)   Ht 5' 5" (1 651 m)   Wt 63 5 kg (140 lb)   LMP  (LMP Unknown)   BMI 23 30 kg/m²     The exam today demonstrates no tenderness throughout the thigh  Incisions are benign and well-healed  She has excellent strength of hip abduction, adduction, flexion, extension, rotation and of knee flexion and extension  Distal sensation is intact  Pulses palpable  Data Review     I have personally reviewed pertinent films in PACS demonstrating good progression of healing with the fracture in good alignment      Glena Shirts

## 2021-07-20 ENCOUNTER — HOSPITAL ENCOUNTER (EMERGENCY)
Facility: HOSPITAL | Age: 86
Discharge: HOME | End: 2021-07-20
Attending: EMERGENCY MEDICINE
Payer: MEDICARE

## 2021-07-20 VITALS
HEIGHT: 65 IN | DIASTOLIC BLOOD PRESSURE: 65 MMHG | BODY MASS INDEX: 23.14 KG/M2 | TEMPERATURE: 97.3 F | OXYGEN SATURATION: 96 % | SYSTOLIC BLOOD PRESSURE: 136 MMHG | RESPIRATION RATE: 16 BRPM | HEART RATE: 71 BPM | WEIGHT: 138.89 LBS

## 2021-07-20 DIAGNOSIS — T37.0X5A: ICD-10-CM

## 2021-07-20 DIAGNOSIS — N39.0 UTI (URINARY TRACT INFECTION): Primary | ICD-10-CM

## 2021-07-20 PROCEDURE — 99284 EMERGENCY DEPT VISIT MOD MDM: CPT | Performed by: EMERGENCY MEDICINE

## 2021-07-20 PROCEDURE — 99283 EMERGENCY DEPT VISIT LOW MDM: CPT

## 2021-07-20 RX ORDER — CEPHALEXIN 250 MG/1
500 CAPSULE ORAL ONCE
Status: COMPLETED | OUTPATIENT
Start: 2021-07-20 | End: 2021-07-20

## 2021-07-20 RX ORDER — CEPHALEXIN 500 MG/1
500 CAPSULE ORAL 4 TIMES DAILY
Qty: 20 CAPSULE | Refills: 0 | Status: SHIPPED | OUTPATIENT
Start: 2021-07-20 | End: 2021-07-25

## 2021-07-20 RX ORDER — ONDANSETRON 4 MG/1
4 TABLET, ORALLY DISINTEGRATING ORAL ONCE
Status: COMPLETED | OUTPATIENT
Start: 2021-07-20 | End: 2021-07-20

## 2021-07-20 RX ADMIN — CEPHALEXIN 500 MG: 250 CAPSULE ORAL at 02:58

## 2021-07-20 RX ADMIN — ONDANSETRON 4 MG: 4 TABLET, ORALLY DISINTEGRATING ORAL at 03:00

## 2021-07-20 NOTE — DISCHARGE INSTRUCTIONS
Stop using Bactrim due to intolerance an adverse reaction to this medication  Begin taking Keflex for urinary tract infection now and follow up promptly with her primary physician

## 2021-07-20 NOTE — ED NOTES
Pt attempting to call for ride      [address], RN  07/20/21 9385

## 2021-07-20 NOTE — ED PROVIDER NOTES
History  Chief Complaint   Patient presents with    Possible UTI     Patient reports recent UTI dx, states she has been taking bactrim but it is making her stomach upset  Reports feeling "shaky" tonight and nauseous  Patient is a an 77-year-old female with multiple medication and drug allergies who presents the emergency department due to nausea and vomiting secondary to taking a new medication she reports taking Bactrim initially about 4 days ago when she was prescribed this for urinary tract infection and caused her to vomit she stopped taking this and called her PCP who advised her that she needed to take this regardless for a urinary tract infection  The patient took the medication again this evening and again developed some nausea vomiting this is improving now with rest no fevers or chills  Patient does have some mild urinary tract infection symptoms of dysuria urgency and frequency  History provided by:  Parent and EMS personnel      Prior to Admission Medications   Prescriptions Last Dose Informant Patient Reported? Taking?    MAGNESIUM SULFATE PO Not Taking at Unknown time Self Yes No   Sig: Take 100 mg by mouth daily   Patient not taking: Reported on 7/9/2021   Magnesium Hydroxide (MILK OF MAGNESIA PO)   Yes Yes   Sig: Take by mouth   Multiple Vitamins-Minerals (CENTRUM SILVER 50+WOMEN PO)  Self Yes Yes   Sig: Take 1 tablet by mouth   Sulfamethoxazole-Trimethoprim (BACTRIM PO) 7/19/2021 at Unknown time  Yes Yes   Sig: Take by mouth 2 (two) times a day   acetaminophen (TYLENOL) 325 mg tablet   No Yes   Sig: Take 2 tablets (650 mg total) by mouth every 6 (six) hours as needed for mild pain, headaches or fever   albuterol (PROVENTIL HFA,VENTOLIN HFA) 90 mcg/act inhaler  Self Yes Yes   Sig: Inhale 2 puffs every 6 (six) hours as needed for wheezing   aspirin (ECOTRIN LOW STRENGTH) 81 mg EC tablet   No Yes   Sig: Take 1 tablet (81 mg total) by mouth daily   bisacodyl (DULCOLAX) 5 mg EC tablet Not Taking at Unknown time  Yes No   Sig: Take 5 mg by mouth daily as needed for constipation   Patient not taking: Reported on 2021   bisacodyl (Dulcolax) 10 mg suppository Not Taking at Unknown time  Yes No   Sig: Insert 10 mg into the rectum daily   Patient not taking: Reported on 2021   budesonide-formoterol (SYMBICORT) 80-4 5 MCG/ACT inhaler Not Taking at Unknown time Self Yes No   Sig: Inhale 2 puffs 2 (two) times a day Rinse mouth after use     Patient not taking: Reported on 2021   cyanocobalamin (VITAMIN B-12) 100 MCG tablet  Self Yes Yes   Sig: Take 100 mcg by mouth daily   denosumab (PROLIA) 60 mg/mL  Self Yes Yes   Sig: Inject 60 mg under the skin once   docusate sodium (COLACE) 100 mg capsule   No Yes   Sig: Take 1 capsule (100 mg total) by mouth 2 (two) times a day   famotidine (PEPCID) 20 mg tablet  Self Yes Yes   Sig: Take 20 mg by mouth 2 (two) times a day   fluticasone (FLONASE) 50 mcg/act nasal spray  Self Yes Yes   Si spray into each nostril daily   loperamide (IMODIUM) 2 mg capsule   Yes Yes   Sig: Take 2 mg by mouth 4 (four) times a day as needed for diarrhea   metoprolol succinate (TOPROL-XL) 25 mg 24 hr tablet  Self Yes Yes   Sig: Take 12 5 mg by mouth daily   oxyCODONE (OXY-IR) 5 MG capsule Not Taking at Unknown time  Yes No   Sig: Take 5 mg by mouth every 4 (four) hours as needed for moderate pain   Patient not taking: Reported on 2021   pantoprazole (PROTONIX) 40 mg tablet  Self Yes Yes   Sig: Take 40 mg by mouth daily   senna-docusate sodium (SENOKOT S) 8 6-50 mg per tablet  Self Yes Yes   Sig: Take 1 tablet by mouth daily      Facility-Administered Medications: None       Past Medical History:   Diagnosis Date    Cancer ([facility] [address] )     skin cancer    Cardiac disease     Multiple falls     Neuropathy     Skin cancer     forehead    Spinal stenosis     UTI (urinary tract infection)        Past Surgical History:   Procedure Laterality Date    APPENDECTOMY      BACK SURGERY x2    CATARACT EXTRACTION W/  INTRAOCULAR LENS IMPLANT Bilateral     CORONARY ANGIOPLASTY WITH STENT PLACEMENT  09/23/2011    CT OPEN RX FEMUR FX+INTRAMED RASHIDA Right 5/4/2021    Procedure: INSERTION NAIL IM FEMUR ANTEGRADE (TROCHANTERIC); Surgeon: Tez Segovia; Location:  MAIN OR;  Service: Orthopedics    REPLACEMENT TOTAL KNEE BILATERAL         History reviewed  No pertinent family history  I have reviewed and agree with the history as documented  E-Cigarette/Vaping    E-Cigarette Use Never User      E-Cigarette/Vaping Substances     Social History     Tobacco Use    Smoking status: Never Smoker    Smokeless tobacco: Never Used   Vaping Use    Vaping Use: Never used   Substance Use Topics    Alcohol use: Yes     Comment: Occasionally    Drug use: Never       Review of Systems   Constitutional: Negative for activity change, appetite change, chills, fatigue and fever  HENT: Negative for congestion, ear pain, rhinorrhea and sore throat  Eyes: Negative for discharge, redness and visual disturbance  Respiratory: Negative for cough, chest tightness, shortness of breath and wheezing  Cardiovascular: Negative for chest pain and palpitations  Gastrointestinal: Positive for nausea and vomiting  Negative for abdominal pain, constipation and diarrhea  Endocrine: Negative for polydipsia and polyuria  Genitourinary: Positive for dysuria, frequency and urgency  Negative for difficulty urinating and hematuria  Musculoskeletal: Negative for arthralgias and myalgias  Skin: Negative for color change, pallor and rash  Neurological: Negative for dizziness, weakness, light-headedness, numbness and headaches  Hematological: Negative for adenopathy  Does not bruise/bleed easily  All other systems reviewed and are negative  Physical Exam  Physical Exam  Vitals and nursing note reviewed  Constitutional:       Appearance: She is well-developed     HENT:      Head: Normocephalic and atraumatic  Right Ear: External ear normal       Left Ear: External ear normal       Nose: Nose normal    Eyes:      Conjunctiva/sclera: Conjunctivae normal       Pupils: Pupils are equal, round, and reactive to light  Cardiovascular:      Rate and Rhythm: Normal rate and regular rhythm  Heart sounds: Normal heart sounds  Pulmonary:      Effort: Pulmonary effort is normal  No respiratory distress  Breath sounds: Normal breath sounds  No wheezing or rales  Chest:      Chest wall: No tenderness  Abdominal:      General: Bowel sounds are normal  There is no distension  Palpations: Abdomen is soft  Tenderness: There is no abdominal tenderness  There is no guarding  Musculoskeletal:         General: Normal range of motion  Cervical back: Normal range of motion and neck supple  Skin:     General: Skin is warm and dry  Neurological:      Mental Status: She is alert and oriented to person, place, and time  Cranial Nerves: No cranial nerve deficit  Sensory: No sensory deficit           Vital Signs  ED Triage Vitals [07/20/21 0245]   Temperature Pulse Respirations Blood Pressure SpO2   (!) 97 3 °F (36 3 °C) 71 16 136/65 96 %      Temp src Heart Rate Source Patient Position - Orthostatic VS BP Location FiO2 (%)   -- Monitor Lying Right arm --      Pain Score       --           Vitals:    07/20/21 0245   BP: 136/65   Pulse: 71   Patient Position - Orthostatic VS: Lying         Visual Acuity      ED Medications  Medications   cephalexin (KEFLEX) capsule 500 mg (has no administration in time range)   ondansetron (ZOFRAN-ODT) dispersible tablet 4 mg (has no administration in time range)       Diagnostic Studies  Results Reviewed     None                 No orders to display              Procedures  Procedures         ED Course                                           MDM  Number of Diagnoses or Management Options  Adverse effect of sulfonamide antibiotic, initial encounter: new and does not require workup  UTI (urinary tract infection): new and does not require workup  Diagnosis management comments: Patient is afebrile nontoxic well-appearing clinically and hemodynamically stable in the emergency department history and examination is most consistent with urinary tract infection with adverse reaction/intolerance to medication prescribed  Advised patient to discontinue the Bactrim for now and start taking Keflex for urinary tract infection I do not have any culture or sensitivity results available to me in epic or Care everywhere at this time to confirm this treatment  Patient given Zofran oral disintegrating tablet for symptoms in the ED she is feeling much improved remained stable and wishes to return home  Advised prompt follow-up with PCP for further evaluation and treatment return precautions and anticipatory guidance discussed           Amount and/or Complexity of Data Reviewed  Decide to obtain previous medical records or to obtain history from someone other than the patient: yes  Review and summarize past medical records: yes    Risk of Complications, Morbidity, and/or Mortality  Presenting problems: low  Management options: low    Patient Progress  Patient progress: stable      Disposition  Final diagnoses:   UTI (urinary tract infection)   Adverse effect of sulfonamide antibiotic, initial encounter - Bactrim     Time reflects when diagnosis was documented in both MDM as applicable and the Disposition within this note     Time User Action Codes Description Comment    7/20/2021  2:50 AM Kaden Benitez Add [N39 0] UTI (urinary tract infection)     7/20/2021  2:51 AM Keesha Nieves Add [T36 95XA] Adverse reaction to antibiotic     7/20/2021  2:51 AM Edilberto Nieves Remove [T36 95XA] Adverse reaction to antibiotic     7/20/2021  2:51 AM Edilberto Nieves Add [T37 0X5A] Adverse effect of sulfonamide antibiotic, initial encounter     7/20/2021  2:51 AM Kaden Benitez Modify [T37 0X5A] Adverse effect of sulfonamide antibiotic, initial encounter Bactrim      ED Disposition     ED Disposition Condition Date/Time Comment    Discharge Stable Tue Jul 20, 2021  2:50 AM Darby Smith discharge to home/self care  Follow-up Information     Follow up With Specialties Details Why Contact Info    Alicia Parham MD Family Medicine Schedule an appointment as soon as possible for a visit in 2 days  [address] [place] [address]  [phone]            Current Discharge Medication List      CONTINUE these medications which have NOT CHANGED    Details   acetaminophen (TYLENOL) 325 mg tablet Take 2 tablets (650 mg total) by mouth every 6 (six) hours as needed for mild pain, headaches or fever  Qty:  , Refills: 0    Associated Diagnoses: Closed fracture of right femur, unspecified fracture morphology, unspecified portion of femur, initial encounter ([facility])      albuterol (PROVENTIL HFA,VENTOLIN HFA) 90 mcg/act inhaler Inhale 2 puffs every 6 (six) hours as needed for wheezing    Comments: Substitution to a formulary equivalent within the same pharmaceutical class is authorized        aspirin (ECOTRIN LOW STRENGTH) 81 mg EC tablet Take 1 tablet (81 mg total) by mouth daily  Qty:  , Refills: 0    Associated Diagnoses: Coronary artery disease involving native coronary artery of native heart without angina pectoris      cyanocobalamin (VITAMIN B-12) 100 MCG tablet Take 100 mcg by mouth daily      denosumab (PROLIA) 60 mg/mL Inject 60 mg under the skin once      docusate sodium (COLACE) 100 mg capsule Take 1 capsule (100 mg total) by mouth 2 (two) times a day  Qty: 10 capsule, Refills: 0    Associated Diagnoses: Closed fracture of right femur, unspecified fracture morphology, unspecified portion of femur, initial encounter ([facility])      famotidine (PEPCID) 20 mg tablet Take 20 mg by mouth 2 (two) times a day      fluticasone (FLONASE) 50 mcg/act nasal spray 1 spray into each nostril daily      loperamide (IMODIUM) 2 mg capsule Take 2 mg by mouth 4 (four) times a day as needed for diarrhea      Magnesium Hydroxide (MILK OF MAGNESIA PO) Take by mouth      metoprolol succinate (TOPROL-XL) 25 mg 24 hr tablet Take 12 5 mg by mouth daily      Multiple Vitamins-Minerals (CENTRUM SILVER 50+WOMEN PO) Take 1 tablet by mouth      pantoprazole (PROTONIX) 40 mg tablet Take 40 mg by mouth daily      senna-docusate sodium (SENOKOT S) 8 6-50 mg per tablet Take 1 tablet by mouth daily      bisacodyl (Dulcolax) 10 mg suppository Insert 10 mg into the rectum daily      bisacodyl (DULCOLAX) 5 mg EC tablet Take 5 mg by mouth daily as needed for constipation      budesonide-formoterol (SYMBICORT) 80-4 5 MCG/ACT inhaler Inhale 2 puffs 2 (two) times a day Rinse mouth after use  MAGNESIUM SULFATE PO Take 100 mg by mouth daily      oxyCODONE (OXY-IR) 5 MG capsule Take 5 mg by mouth every 4 (four) hours as needed for moderate pain         STOP taking these medications       Sulfamethoxazole-Trimethoprim (BACTRIM PO) Comments:   Reason for Stopping:             No discharge procedures on file      PDMP Review     None          ED Provider  Electronically Signed by           Bassam Stout DO  07/20/21 2102

## 2021-08-04 NOTE — PROGRESS NOTES
PT Evaluation     Today's date: 2021  Patient name: Brigitte Smith  : 1932  MRN: [id number]  Referring provider: Sara Caban MD  Dx:   Encounter Diagnosis     ICD-10-CM    1  Balance problem  R26 89    2  Gait abnormality  R26 9    3  Stress fracture of neck of femur, initial encounter  M84 353A                   Assessment  Assessment details: PT notes the patient with decrease ROM and strength t/o the right hip and LE with demonstration of impaired gait pattern and balance s/p right femoral fracture surgery leading to increase pain levels and functional limitations with need for course of skilled therapy for 10 weeks with focus on gait/balance, strengthening, manual therapy, analgesic modalities, and HEP  Impairments: abnormal gait, abnormal or restricted ROM, activity intolerance, impaired balance, impaired physical strength, lacks appropriate home exercise program and safety issue  Understanding of Dx/Px/POC: good   Prognosis: fair    Goals  ST  Initiate HEP  2  Decrease pain levels by 25-50%   3  Increase ROM by 25-50%   4  Increase strength by 1-2 mm grades  LT  Improve gait pattern and balance to good with RW  2  Decrease limitations with standing, walking and stairs  3  Decrease limitations with transfers and ADL  4   DC with HEP    Plan  Plan details: PT notes review of POC and findings with patient who is in agreement with PT recommendations of course of skilled therapy     Patient would benefit from: PT eval and skilled physical therapy  Planned therapy interventions: neuromuscular re-education, patient education, self care, strengthening, therapeutic exercise, home exercise program, graded exercise and balance  Frequency: 2x week  Duration in weeks: 10  Treatment plan discussed with: patient        Subjective Evaluation    History of Present Illness  Date of surgery: 2021  Mechanism of injury: surgery  Mechanism of injury: Patient reports a fall at home with impact on the ground on the right hip  Patient was taken to ER and found to have + femoral fracture with need for surgical repair  Patient underwent the procedure on 5/4/21 and was transitioned to acute rehab for several weeks  Patient reports she was released from rehab and then transitioned to home care  Patient reports she was released from [address] with orders for OPPT to address gait and balance deficit  Patient reports she does received care 24/7 from [facility] so she is never alone at home  Patient reports limited mobility at home with sitting the majority of the day with use of RW with ambulation  Patient reports significant PMH of COPD, CAD, bilateral LE neuropathy, bilateral TKA, CKD, spinal stenosis, cardiac stent, and skin cancer      Pain  No pain reported  Relieving factors: rest  Aggravating factors: stair climbing, walking and standing    Treatments  Current treatment: physical therapy  Patient Goals  Patient goals for therapy: improved balance, increased motion, increased strength and independence with ADLs/IADLs          Objective     Neurological Testing     Reflexes   Left   Patellar (L4): normal (2+)  Achilles (S1): normal (2+)    Right   Patellar (L4): normal (2+)  Achilles (S1): normal (2+)    Active Range of Motion   Left Hip   Flexion: 64 degrees   Extension: WFL  Abduction: WFL  External rotation (90/90): 21 degrees   Internal rotation (90/90): 14 degrees     Right Hip   Flexion: 41 degrees with pain  Extension: WFL  Abduction: 18 degrees   External rotation (90/90): 11 degrees   Internal rotation (90/90): 7 degrees   Left Knee   Normal active range of motion    Right Knee   Normal active range of motion  Left Ankle/Foot   Normal active range of motion    Right Ankle/Foot   Normal active range of motion    Additional Active Range of Motion Details  PT notes decrease ROM and strength t/o the bilateral hip and LE     Passive Range of Motion   Left Hip   Flexion: 74 degrees   External rotation (90/90): [facility]  Internal rotation (90/90): 18 degrees     Right Hip   Flexion: 71 degrees   Abduction: WFL  External rotation (90/90): 24 degrees   Internal rotation (90/90): 11 degrees     Strength/Myotome Testing     Left Hip   Planes of Motion   Flexion: 4+  Extension: 4+  Abduction: 4+  Adduction: 5  External rotation: 4  Internal rotation: 4    Right Hip   Planes of Motion   Flexion: 3+  Extension: 4-  Abduction: 3+  Adduction: 4+  External rotation: 3+  Internal rotation: 4-    Left Knee   Flexion: 4+  Extension: 4+  Quadriceps contraction: good    Right Knee   Flexion: 4  Extension: 4-  Quadriceps contraction: fair    Left Ankle/Foot   Dorsiflexion: 4+  Plantar flexion: 4+  Inversion: 4+  Eversion: 4+    Right Ankle/Foot   Dorsiflexion: 4-  Plantar flexion: 4  Inversion: 3+  Eversion: 3+    Ambulation     Observational Gait   Gait: asymmetric   Decreased walking speed, stride length and right stance time       Additional Observational Gait Details  PT notes the patient with CG/S with RW with demonstration of impaired gait pattern with decrease hip and knee flex, decrease stance on the right, decrease step length, decrease alicia with decrease push off on the right with rating of of fair- with static and dynamic activities  Neuro Exam:     Functional outcomes   5x sit to stand: 37 96 (seconds)  TU 26 (seconds)      Balance assessments   MCTSIB   Eyes open level surface: 30  Eyes open foam surface: 7  Eyes closed level surface: 25  Eyes closed foam surface: 0             Precautions:  Fall risk, PMH CKD, COPD, skin cancer, spinal stenosis, CAD, cardiac stent, bilateral LE neuropathy, bilateral TKA      Manuals 8/5        N/A                                     Neuro Re-Ed         Tandem stance         High knee walk          Backwards walk          Side step walk          NBOS stance on foam          Monster walk           cones from stool and place on elevated table-Bilat sides Ther Ex         LAQ          Front and lateral step up          Stair HR/TR          Stand hip march, ABD, ext, and knee flex          Bridge          Supine Tball LTR                   HEP update/review  15 min         Ther Activity                           Gait Training                           Modalities         MHP to the right hip in seated PRN

## 2021-08-05 ENCOUNTER — EVALUATION (OUTPATIENT)
Dept: PHYSICAL THERAPY | Facility: CLINIC | Age: 86
End: 2021-08-05
Payer: MEDICARE

## 2021-08-05 DIAGNOSIS — M84.353A STRESS FRACTURE OF NECK OF FEMUR, INITIAL ENCOUNTER: ICD-10-CM

## 2021-08-05 DIAGNOSIS — R26.9 GAIT ABNORMALITY: ICD-10-CM

## 2021-08-05 DIAGNOSIS — R26.89 BALANCE PROBLEM: Primary | ICD-10-CM

## 2021-08-05 PROCEDURE — 97535 SELF CARE MNGMENT TRAINING: CPT | Performed by: PHYSICAL THERAPIST

## 2021-08-05 PROCEDURE — 97163 PT EVAL HIGH COMPLEX 45 MIN: CPT | Performed by: PHYSICAL THERAPIST

## 2021-08-05 NOTE — LETTER
2021    Galo Gutierrez MD  [address] [address]    Patient: Kishore Smith   YOB: 1932   Date of Visit: 2021     Encounter Diagnosis     ICD-10-CM    1  Balance problem  R26 89    2  Gait abnormality  R26 9    3  Stress fracture of neck of femur, initial encounter  M84 353A        Dear Dr Dia: Thank you for your recent referral of Kishore Smith  Please review the attached evaluation summary from Angela's recent visit  Please verify that you agree with the plan of care by signing the attached order  If you have any questions or concerns, please do not hesitate to call  I sincerely appreciate the opportunity to share in the care of one of your patients and hope to have another opportunity to work with you in the near future  Sincerely,    Smitha Chan, PT      Referring Provider:      I certify that I have read the below Plan of Care and certify the need for these services furnished under this plan of treatment while under my care  Galo Gutierrez MD  [address] [id number] [facility]   Via Fax: [phone]          PT Evaluation     Today's date: 2021  Patient name: Kishore Smith  : 1932  MRN: [id number]  Referring provider: Mare Lopez MD  Dx:   Encounter Diagnosis     ICD-10-CM    1  Balance problem  R26 89    2  Gait abnormality  R26 9    3  Stress fracture of neck of femur, initial encounter  M84 353A                   Assessment  Assessment details: PT notes the patient with decrease ROM and strength t/o the right hip and LE with demonstration of impaired gait pattern and balance s/p right femoral fracture surgery leading to increase pain levels and functional limitations with need for course of skilled therapy for 10 weeks with focus on gait/balance, strengthening, manual therapy, analgesic modalities, and HEP      Impairments: abnormal gait, abnormal or restricted ROM, activity intolerance, impaired balance, impaired physical strength, lacks appropriate home exercise program and safety issue  Understanding of Dx/Px/POC: good   Prognosis: fair    Goals  ST  Initiate HEP  2  Decrease pain levels by 25-50%   3  Increase ROM by 25-50%   4  Increase strength by 1-2 mm grades  LT  Improve gait pattern and balance to good with RW  2  Decrease limitations with standing, walking and stairs  3  Decrease limitations with transfers and ADL  4   DC with HEP    Plan  Plan details: PT notes review of POC and findings with patient who is in agreement with PT recommendations of course of skilled therapy  Patient would benefit from: PT eval and skilled physical therapy  Planned therapy interventions: neuromuscular re-education, patient education, self care, strengthening, therapeutic exercise, home exercise program, graded exercise and balance  Frequency: 2x week  Duration in weeks: 10  Treatment plan discussed with: patient        Subjective Evaluation    History of Present Illness  Date of surgery: 2021  Mechanism of injury: surgery  Mechanism of injury: Patient reports a fall at home with impact on the ground on the right hip  Patient was taken to ER and found to have + femoral fracture with need for surgical repair  Patient underwent the procedure on 21 and was transitioned to acute rehab for several weeks  Patient reports she was released from rehab and then transitioned to home care  Patient reports she was released from [address] with orders for OPPT to address gait and balance deficit  Patient reports she does received care  from [facility] so she is never alone at home  Patient reports limited mobility at home with sitting the majority of the day with use of RW with ambulation  Patient reports significant PMH of COPD, CAD, bilateral LE neuropathy, bilateral TKA, CKD, spinal stenosis, cardiac stent, and skin cancer      Pain  No pain reported  Relieving factors: rest  Aggravating factors: stair climbing, walking and standing    Treatments  Current treatment: physical therapy  Patient Goals  Patient goals for therapy: improved balance, increased motion, increased strength and independence with ADLs/IADLs          Objective     Neurological Testing     Reflexes   Left   Patellar (L4): normal (2+)  Achilles (S1): normal (2+)    Right   Patellar (L4): normal (2+)  Achilles (S1): normal (2+)    Active Range of Motion   Left Hip   Flexion: 64 degrees   Extension: WFL  Abduction: WFL  External rotation (90/90): 21 degrees   Internal rotation (90/90): 14 degrees     Right Hip   Flexion: 41 degrees with pain  Extension: WFL  Abduction: 18 degrees   External rotation (90/90): 11 degrees   Internal rotation (90/90): 7 degrees   Left Knee   Normal active range of motion    Right Knee   Normal active range of motion  Left Ankle/Foot   Normal active range of motion    Right Ankle/Foot   Normal active range of motion    Additional Active Range of Motion Details  PT notes decrease ROM and strength t/o the bilateral hip and LE     Passive Range of Motion   Left Hip   Flexion: 74 degrees   External rotation (90/90):  [facility]  Internal rotation (90/90): 18 degrees     Right Hip   Flexion: 71 degrees   Abduction: WFL  External rotation (90/90): 24 degrees   Internal rotation (90/90): 11 degrees     Strength/Myotome Testing     Left Hip   Planes of Motion   Flexion: 4+  Extension: 4+  Abduction: 4+  Adduction: 5  External rotation: 4  Internal rotation: 4    Right Hip   Planes of Motion   Flexion: 3+  Extension: 4-  Abduction: 3+  Adduction: 4+  External rotation: 3+  Internal rotation: 4-    Left Knee   Flexion: 4+  Extension: 4+  Quadriceps contraction: good    Right Knee   Flexion: 4  Extension: 4-  Quadriceps contraction: fair    Left Ankle/Foot   Dorsiflexion: 4+  Plantar flexion: 4+  Inversion: 4+  Eversion: 4+    Right Ankle/Foot   Dorsiflexion: 4-  Plantar flexion: 4  Inversion: 3+  Eversion: 3+    Ambulation Observational Gait   Gait: asymmetric   Decreased walking speed, stride length and right stance time       Additional Observational Gait Details  PT notes the patient with CG/S with RW with demonstration of impaired gait pattern with decrease hip and knee flex, decrease stance on the right, decrease step length, decrease alicia with decrease push off on the right with rating of of fair- with static and dynamic activities  Neuro Exam:     Functional outcomes   5x sit to stand: 37 96 (seconds)  TU 26 (seconds)      Balance assessments   MCTSIB   Eyes open level surface: 30  Eyes open foam surface: 7  Eyes closed level surface: 25  Eyes closed foam surface: 0             Precautions:  Fall risk, PMH CKD, COPD, skin cancer, spinal stenosis, CAD, cardiac stent, bilateral LE neuropathy, bilateral TKA      Manuals 8/5        N/A                                     Neuro Re-Ed         Tandem stance         High knee walk          Backwards walk          Side step walk          NBOS stance on foam          Monster walk           cones from stool and place on elevated table-Bilat sides                                     Ther Ex         LAQ          Front and lateral step up          Stair HR/TR          Stand hip march, ABD, ext, and knee flex          Bridge          Supine Tball LTR                   HEP update/review  15 min         Ther Activity                           Gait Training                           Modalities         MHP to the right hip in seated PRN

## 2021-08-09 ENCOUNTER — APPOINTMENT (OUTPATIENT)
Dept: PHYSICAL THERAPY | Facility: CLINIC | Age: 86
End: 2021-08-09
Payer: MEDICARE

## 2021-08-11 ENCOUNTER — OFFICE VISIT (OUTPATIENT)
Dept: PHYSICAL THERAPY | Facility: CLINIC | Age: 86
End: 2021-08-11
Payer: MEDICARE

## 2021-08-11 DIAGNOSIS — M84.353A STRESS FRACTURE OF NECK OF FEMUR, INITIAL ENCOUNTER: ICD-10-CM

## 2021-08-11 DIAGNOSIS — R26.9 GAIT ABNORMALITY: ICD-10-CM

## 2021-08-11 DIAGNOSIS — R26.89 BALANCE PROBLEM: Primary | ICD-10-CM

## 2021-08-11 PROCEDURE — 97112 NEUROMUSCULAR REEDUCATION: CPT

## 2021-08-11 PROCEDURE — 97110 THERAPEUTIC EXERCISES: CPT

## 2021-08-11 NOTE — PROGRESS NOTES
Daily Note     Today's date: 2021  Patient name: Celina Smith  : 1932  MRN: [id number]  Referring provider: Noa Quinn MD  Dx:   Encounter Diagnosis     ICD-10-CM    1  Balance problem  R26 89    2  Gait abnormality  R26 9    3  Stress fracture of neck of femur, initial encounter  M84 353A                   Subjective: No new c/o pain today  Objective: See treatment diary below      Assessment: Tolerated treatment well  Patient exhibited good technique with therapeutic exercises and would benefit from continued PT to increase ROM/strength and endurance to improve mobility and gait  Plan: Continue per plan of care  Progress treatment as tolerated         Precautions:  Fall risk, PMH CKD, COPD, skin cancer, spinal stenosis, CAD, cardiac stent, bilateral LE neuropathy, bilateral TKA      Manuals       N/A                                 Neuro Re-Ed        Tandem stance        High knee walk         Backwards walk   4x10'      Side step walk   4x10'      NBOS stance on foam   3x15" bilat      Monster walk   4x10'       cones from stool and place on elevated table-Bilat sides                                 Ther Ex        LAQ   10x bilat      Front and lateral step up   10x ea bilat      Stair HR/TR   10x15" bilat      Stand hip march, ABD, ext, and knee flex   10x bilat      Bridge         Supine Tball LTR   10x5"bilat              HEP update/review  15 min        Ther Activity                        Gait Training                        Modalities        MHP to the right hip in seated PRN

## 2021-08-12 ENCOUNTER — OFFICE VISIT (OUTPATIENT)
Dept: PHYSICAL THERAPY | Facility: CLINIC | Age: 86
End: 2021-08-12
Payer: MEDICARE

## 2021-08-12 DIAGNOSIS — M84.353A STRESS FRACTURE OF NECK OF FEMUR, INITIAL ENCOUNTER: ICD-10-CM

## 2021-08-12 DIAGNOSIS — R26.9 GAIT ABNORMALITY: ICD-10-CM

## 2021-08-12 DIAGNOSIS — R26.89 BALANCE PROBLEM: Primary | ICD-10-CM

## 2021-08-12 PROCEDURE — 97110 THERAPEUTIC EXERCISES: CPT

## 2021-08-12 PROCEDURE — 97116 GAIT TRAINING THERAPY: CPT

## 2021-08-12 PROCEDURE — 97112 NEUROMUSCULAR REEDUCATION: CPT

## 2021-08-12 NOTE — PROGRESS NOTES
Daily Note     Today's date: 2021  Patient name: Lila Smith  : 1932  MRN: [id number]  Referring provider: Judy Sandy MD  Dx:   Encounter Diagnosis     ICD-10-CM    1  Balance problem  R26 89    2  Gait abnormality  R26 9    3  Stress fracture of neck of femur, initial encounter  M84 353A                   Subjective: No new c/o pain today  Objective: See treatment diary below      Assessment: Tolerated treatment well  Patient exhibited good technique with therapeutic exercises and would benefit from continued PT to increase ROM/strength and endurance to improve mobility and gait pattern  Jf Pimentel demonstrated a more erect posture during ambulation today requiring decreased assistance during ambulation  Plan: Continue per plan of care  Progress treatment as tolerated         Precautions:  Fall risk, PMH CKD, COPD, skin cancer, spinal stenosis, CAD, cardiac stent, bilateral LE neuropathy, bilateral TKA      Manuals      N/A                                 Neuro Re-Ed       Tandem stance        High knee walk         Backwards walk   4x10' 4x10'     Side step walk   4x10' 4x10'     NBOS stance on foam   3x15" bilat 3x15"     Monster walk   4x10' 4x10'      cones from stool and place on elevated table-Bilat sides                                 Ther Ex       LAQ   10x bilat 10x3" bilat     Front and lateral step up   10x ea bilat 2x10 ea bilat     Stair HR/TR   10x15" bilat 10x15"     Stand hip march, ABD, ext, and knee flex   10x bilat 10x ea bilat     Bridge         Supine Tball LTR   10x5"bilat 10x5" bilat             HEP update/review  15 min        Ther Activity                       Gait Training                       Modalities       MHP to the right hip in seated PRN

## 2021-08-16 ENCOUNTER — OFFICE VISIT (OUTPATIENT)
Dept: PHYSICAL THERAPY | Facility: CLINIC | Age: 86
End: 2021-08-16
Payer: MEDICARE

## 2021-08-16 DIAGNOSIS — R26.89 BALANCE PROBLEM: Primary | ICD-10-CM

## 2021-08-16 DIAGNOSIS — M84.353A STRESS FRACTURE OF NECK OF FEMUR, INITIAL ENCOUNTER: ICD-10-CM

## 2021-08-16 DIAGNOSIS — R26.9 GAIT ABNORMALITY: ICD-10-CM

## 2021-08-16 PROCEDURE — 97112 NEUROMUSCULAR REEDUCATION: CPT | Performed by: PHYSICAL THERAPIST

## 2021-08-16 PROCEDURE — 97110 THERAPEUTIC EXERCISES: CPT | Performed by: PHYSICAL THERAPIST

## 2021-08-19 ENCOUNTER — OFFICE VISIT (OUTPATIENT)
Dept: PHYSICAL THERAPY | Facility: CLINIC | Age: 86
End: 2021-08-19
Payer: MEDICARE

## 2021-08-19 DIAGNOSIS — R26.89 BALANCE PROBLEM: Primary | ICD-10-CM

## 2021-08-19 DIAGNOSIS — M84.353A STRESS FRACTURE OF NECK OF FEMUR, INITIAL ENCOUNTER: ICD-10-CM

## 2021-08-19 DIAGNOSIS — R26.9 GAIT ABNORMALITY: ICD-10-CM

## 2021-08-19 PROCEDURE — 97112 NEUROMUSCULAR REEDUCATION: CPT | Performed by: PHYSICAL THERAPIST

## 2021-08-19 PROCEDURE — 97110 THERAPEUTIC EXERCISES: CPT | Performed by: PHYSICAL THERAPIST

## 2021-08-19 NOTE — PROGRESS NOTES
Daily Note     Today's date: 2021  Patient name: Enzo Anderson [facility]  : 1932  MRN: [id number]  Referring provider: Guero Mcghee MD  Dx:   Encounter Diagnosis     ICD-10-CM    1  Balance problem  R26 89    2  Gait abnormality  R26 9    3  Stress fracture of neck of femur, initial encounter  M84 353A                   Subjective: The patient states that her R hip is sore today, "Maybe I slept on it wrong last night, I'm not sure why it's sore "        Objective: See treatment diary below      Assessment: The patient needed a few seated rest breaks during treatment today, with complaints of fatigue  She is challenged with balance TE and increased sway noted when on foam   No LOB noted during session  Continued PT would be beneficial to improve function  Plan: Continue per plan of care        Precautions:  Fall risk, PMH CKD, COPD, skin cancer, spinal stenosis, CAD, cardiac stent, bilateral LE neuropathy, bilateral TKA      Manuals    N/A                                 Neuro Re-Ed     Tandem stance        High knee walk         Backwards walk   4x10' 4x10' 4 x 10' 4 x 10'   Side step walk   4x10' 4x10' 4 x 10' 4 x 10'   NBOS stance on foam   3x15" bilat 3x15" 3 x 20"  3 x 20"   Monster walk   4x10' 4x10' 4 x 10' 4 x 10'    cones from stool and place on elevated table-Bilat sides                                 Ther Ex     LAQ   10x bilat 10x3" bilat 15 x 3" Derek 15 x 3" Derek   Front and lateral step up   10x ea bilat 2x10 ea bilat 2 x 10 ea Derek 2 x 10 ea Derek   Stair HR/TR   10x15" bilat 10x15" 10 x 15" Derek 10 x 15" Derek   Stand hip march, ABD, ext, and knee flex   10x bilat 10x ea bilat 10x ea Bilat 10x ea Derek   Bridge         Supine Tball LTR   10x5"bilat 10x5" bilat 10 x 5" Bilat 10 x 5" Derek   NuStep    L1 6 mins L1 7 mins   HEP update/review  15 min        Ther Activity                       Gait Training   Modalities  8/11 8/12     MHP to the right hip in seated PRN

## 2021-08-23 ENCOUNTER — OFFICE VISIT (OUTPATIENT)
Dept: PHYSICAL THERAPY | Facility: CLINIC | Age: 86
End: 2021-08-23
Payer: MEDICARE

## 2021-08-23 DIAGNOSIS — R26.89 BALANCE PROBLEM: Primary | ICD-10-CM

## 2021-08-23 DIAGNOSIS — M84.353A STRESS FRACTURE OF NECK OF FEMUR, INITIAL ENCOUNTER: ICD-10-CM

## 2021-08-23 DIAGNOSIS — R26.9 GAIT ABNORMALITY: ICD-10-CM

## 2021-08-23 PROCEDURE — 97110 THERAPEUTIC EXERCISES: CPT

## 2021-08-23 PROCEDURE — 97112 NEUROMUSCULAR REEDUCATION: CPT

## 2021-08-23 NOTE — PROGRESS NOTES
Daily Note     Today's date: 2021  Patient name: Maurilio Ordonez  : 1932  MRN: [id number]  Referring provider: Josh Payan MD  Dx:   Encounter Diagnosis     ICD-10-CM    1  Balance problem  R26 89    2  Gait abnormality  R26 9    3  Stress fracture of neck of femur, initial encounter  M84 353A                   Subjective: Patient reports she has been very tired the last few days  Objective: See treatment diary below      Assessment: Tolerated treatment fair  Moderate fatigue through today's session and patient required frequent seated breaks for rest  Patient noted R LE weakness when performing step ups and SLR abduction  Patient required moderate verbal cues to perform HR/TR with proper technique  Patient would benefit from continued PT to increase balance for improved gait and function  Plan: Continue per plan of care        Precautions:  Fall risk, PMH CKD, COPD, skin cancer, spinfal stenosis, CAD, cardiac stent, bilateral LE neuropathy, bilateral TKA      Manuals    N/A                                 Neuro Re-Ed     Tandem stance        High knee walk         Backwards walk  4 x 10' 4x10' 4x10' 4 x 10' 4 x 10'   Side step walk  4 x 10' 4x10' 4x10' 4 x 10' 4 x 10'   NBOS stance on foam  3 x 20" 3x15" bilat 3x15" 3 x 20"  3 x 20"   Monster walk  4x10' 4x10' 4x10' 4 x 10' 4 x 10'    cones from stool and place on elevated table-Bilat sides                                 Ther Ex     LAQ  15 x 3" Derek 10x bilat 10x3" bilat 15 x 3" Derek 15 x 3" Derek   Front and lateral step up  2 x 10 ea Derek 10x ea bilat 2x10 ea bilat 2 x 10 ea Derek 2 x 10 ea Derek   Stair HR/TR  10 x 15" Derek 10x15" bilat 10x15" 10 x 15" Derek 10 x 15" Derek   Stand hip march, ABD, ext, and knee flex  10x ea Derek 10x bilat 10x ea bilat 10x ea Bilat 10x ea Derek   Bridge         Supine Tball LTR  10 x 5" Derek 10x5"bilat 10x5" bilat 10 x 5" Bilat 10 x 5" Derek   NuStep L1 7 mins   L1 6 mins L1 7 mins   HEP update/review          Ther Activity  8/11 8/12                     Gait Training  8/11 8/12                     Modalities  8/11 8/12     MHP to the right hip in seated

## 2021-08-26 ENCOUNTER — OFFICE VISIT (OUTPATIENT)
Dept: PHYSICAL THERAPY | Facility: CLINIC | Age: 86
End: 2021-08-26
Payer: MEDICARE

## 2021-08-26 DIAGNOSIS — R26.9 GAIT ABNORMALITY: ICD-10-CM

## 2021-08-26 DIAGNOSIS — R26.89 BALANCE PROBLEM: Primary | ICD-10-CM

## 2021-08-26 PROCEDURE — 97110 THERAPEUTIC EXERCISES: CPT

## 2021-08-26 PROCEDURE — 97112 NEUROMUSCULAR REEDUCATION: CPT

## 2021-08-26 NOTE — PROGRESS NOTES
Daily Note     Today's date: 2021  Patient name: Mckayla Rosa  : 1932  MRN: [id number]  Referring provider: Duarte Petit MD  Dx:   Encounter Diagnosis     ICD-10-CM    1  Balance problem  R26 89    2  Gait abnormality  R26 9                   Subjective: Patient reports she has been feeling stronger lately  Patient presents to clinic using rolling walker instead of wheelchair  Objective: See treatment diary below      Assessment: Tolerated treatment well  Patient demonstrated increased tolerance to TE today and did not require as many seated breaks for rest as last session  Patient demonstrated increased difficulty performing abduction SLR of L LE compared to all other standing SLR series  Patient would benefit from continued PT to increase B/L LE strength and endurance for improved gait and function  Plan: Continue per plan of care        Precautions:  Fall risk, PMH CKD, COPD, skin cancer, spinfal stenosis, CAD, cardiac stent, bilateral LE neuropathy, bilateral TKA      Manuals    N/A                                 Neuro Re-Ed      Tandem stance        High knee walk         Backwards walk  4 x 10' 4 x 10' 4x10' 4 x 10' 4 x 10'   Side step walk  4 x 10' 4 x 10' 4x10' 4 x 10' 4 x 10'   NBOS stance on foam  3 x 20" 5 x 20" 3x15" 3 x 20"  3 x 20"   Monster walk  4x10' 4 x 10' 4x10' 4 x 10' 4 x 10'    cones from stool and place on elevated table-Bilat sides                                 Ther Ex      LAQ  15 x 3" Derek 15x3" derek 10x3" bilat 15 x 3" Derek 15 x 3" Derek   Front and lateral step up  2 x 10 ea Edrek 2 x 10 ea Derek 2x10 ea bilat 2 x 10 ea Derek 2 x 10 ea Derek   Stair HR/TR  10 x 15" Derek 10 x 15" Derek 10x15" 10 x 15" Derek 10 x 15" Derek   Stand hip march, ABD, ext, and knee flex  10x ea Derek 10x ea bilat 10x ea bilat 10x ea Bilat 10x ea Derek   Bridge         Supine Tball LTR  10 x 5" Derek 10 x 5" Derek 10x5" bilat 10 x 5" Bilat 10 x 5" Derek NuStep L1 7 mins L1 9 min  L1 6 mins L1 7 mins   HEP update/review          Ther Activity   8/12                     Gait Training   8/12                     Modalities   8/12     MHP to the right hip in seated

## 2021-08-30 ENCOUNTER — EVALUATION (OUTPATIENT)
Dept: PHYSICAL THERAPY | Facility: CLINIC | Age: 86
End: 2021-08-30
Payer: MEDICARE

## 2021-08-30 DIAGNOSIS — R26.9 GAIT ABNORMALITY: ICD-10-CM

## 2021-08-30 DIAGNOSIS — R26.89 BALANCE PROBLEM: Primary | ICD-10-CM

## 2021-08-30 DIAGNOSIS — M84.353A STRESS FRACTURE OF NECK OF FEMUR, INITIAL ENCOUNTER: ICD-10-CM

## 2021-08-30 PROCEDURE — 97110 THERAPEUTIC EXERCISES: CPT

## 2021-08-30 PROCEDURE — 97112 NEUROMUSCULAR REEDUCATION: CPT | Performed by: PHYSICAL THERAPIST

## 2021-08-30 PROCEDURE — 97112 NEUROMUSCULAR REEDUCATION: CPT

## 2021-08-30 NOTE — PROGRESS NOTES
PT Re-Evaluation     Today's date: 2021  Patient name: Ruddy Nguyen  : 1932  MRN: [id number]  Referring provider: Kisha Childers MD  Dx:   Encounter Diagnosis     ICD-10-CM    1  Balance problem  R26 89    2  Gait abnormality  R26 9    3  Stress fracture of neck of femur, initial encounter  M84 353A                   Assessment  Assessment details: PT notes the patient with continuation of decrease ROM and strength t/o the right hip and LE with demonstration of impaired gait pattern and balance s/p right femoral fracture surgery leading to increase pain levels and functional limitations with need for continuation of skilled therapy for 4 weeks with focus on gait/balance, strengthening, manual therapy, analgesic modalities, and update/review of HEP  Impairments: abnormal gait, abnormal or restricted ROM, activity intolerance, impaired balance, impaired physical strength, lacks appropriate home exercise program and safety issue  Understanding of Dx/Px/POC: good   Prognosis: fair    Goals  ST  Initiate HEP-MET   2  Decrease pain levels by 25-50%-MET   3  Increase ROM by 25-50%-MET   4  Increase strength by 1-2 mm grades-Partial MET  LT  Improve gait pattern and balance to good with RW-Partial MET  2  Decrease limitations with standing, walking and stairs-Partial MET  3  Decrease limitations with transfers and ADL-Partial MET  4   DC with HEP-Progressing     Plan  Plan details: PT notes review of POC and findings with patient who is in agreement with PT recommendations of continuation of skilled therapy     Patient would benefit from: skilled physical therapy  Planned therapy interventions: neuromuscular re-education, patient education, self care, strengthening, therapeutic exercise, home exercise program, graded exercise and balance  Frequency: 2x week  Duration in weeks: 4  Treatment plan discussed with: patient        Subjective Evaluation    History of Present Illness  Date of surgery: 5/4/2021  Mechanism of injury: surgery  Mechanism of injury: Patient reports a fall at home with impact on the ground on the right hip  Patient was taken to ER and found to have + femoral fracture with need for surgical repair  Patient underwent the procedure on 5/4/21 and was transitioned to acute rehab for several weeks  Patient reports she was released from rehab and then transitioned to home care  Patient reports she was released from [address] with orders for OPPT to address gait and balance deficit  Patient reports she does received care 24/7 from [facility] so she is never alone at home  Patient reports limited mobility at home with sitting the majority of the day with use of RW with ambulation  Patient reports significant PMH of COPD, CAD, bilateral LE neuropathy, bilateral TKA, CKD, spinal stenosis, cardiac stent, and skin cancer  Presently the patient has attended 8 sessions of skilled therapy and feels improvement but is unable to quantify improvement  Patient reports her walking is improved with RW but states fatigue in the LE with prolonged standing with occasional feeling of weakness in the LE with buckling in the knees  Patient feels she requires more therapy to continue to get the LE stronger and improve endurance     Pain  No pain reported  Relieving factors: rest  Aggravating factors: stair climbing, walking and standing  Progression: improved    Treatments  Current treatment: physical therapy  Patient Goals  Patient goals for therapy: improved balance, increased motion, increased strength and independence with ADLs/IADLs          Objective     Neurological Testing     Reflexes   Left   Patellar (L4): normal (2+)  Achilles (S1): normal (2+)    Right   Patellar (L4): normal (2+)  Achilles (S1): normal (2+)    Active Range of Motion   Left Hip   Flexion: 65 degrees   Extension: WFL  Abduction: WFL  External rotation (90/90): 21 degrees   Internal rotation (90/90): 14 degrees     Right Hip   Flexion: 52 degrees with pain  Extension: WFL  Abduction: [facility]  External rotation (90/90): 19 degrees   Internal rotation (90/90): 14 degrees   Left Knee   Normal active range of motion    Right Knee   Normal active range of motion  Left Ankle/Foot   Normal active range of motion    Right Ankle/Foot   Normal active range of motion    Additional Active Range of Motion Details  PT notes continuation of  decrease ROM and strength t/o the bilateral hip and LE     Passive Range of Motion   Left Hip   Flexion: 78 degrees   External rotation (90/90): [facility]  Internal rotation (90/90): 18 degrees     Right Hip   Flexion: 75 degrees   Abduction: WFL  External rotation (90/90): 26 degrees   Internal rotation (90/90): 14 degrees     Strength/Myotome Testing     Left Hip   Planes of Motion   Flexion: 4+  Extension: 4+  Abduction: 4+  Adduction: 5  External rotation: 4+  Internal rotation: 4+    Right Hip   Planes of Motion   Flexion: 4-  Extension: 4  Abduction: 4-  Adduction: 4+  External rotation: 4  Internal rotation: 4    Left Knee   Flexion: 4+  Extension: 4+  Quadriceps contraction: good    Right Knee   Flexion: 4+  Extension: 4  Quadriceps contraction: fair    Left Ankle/Foot   Dorsiflexion: 4+  Plantar flexion: 4+  Inversion: 4+  Eversion: 4+    Right Ankle/Foot   Dorsiflexion: 4  Plantar flexion: 4  Inversion: 4  Eversion: 4    Ambulation     Observational Gait   Gait: asymmetric   Decreased walking speed, stride length and right stance time       Additional Observational Gait Details  PT notes the patient with CG/S with RW with demonstration of impaired gait pattern with decrease hip and knee flex, decrease stance on the right, decrease step length, decrease alicia with decrease push off on the right with rating of of fair with static and dynamic activities  Neuro Exam:     Functional outcomes   5x sit to stand: 27 39 (seconds)  TU 74 (seconds)      Balance assessments   MCTSIB   Eyes open level surface: 30  Eyes open foam surface: 11  Eyes closed level surface: 30  Eyes closed foam surface: 0             Precautions:  Fall risk, PMH CKD, COPD, skin cancer, spinal stenosis, CAD, cardiac stent, bilateral LE neuropathy, bilateral TKA

## 2021-08-30 NOTE — LETTER
2021    Minna Cota MD  [address] [address]    Patient: Gianfranco Smith   YOB: 1932   Date of Visit: 2021     Encounter Diagnosis     ICD-10-CM    1  Balance problem  R26 89    2  Gait abnormality  R26 9    3  Stress fracture of neck of femur, initial encounter  M84 353A        Dear Dr Emilio Cruz: Thank you for your recent referral of Gianfranco Smith  Please review the attached re-evaluation summary from Angela's recent visit  Please verify that you agree with the plan of care by signing the attached order  If you have any questions or concerns, please do not hesitate to call  I sincerely appreciate the opportunity to share in the care of one of your patients and hope to have another opportunity to work with you in the near future  Sincerely,    Rollene Bloch, PT      Referring Provider:      I certify that I have read the below Plan of Care and certify the need for these services furnished under this plan of treatment while under my care  Minna Cota MD  [address] [address]  Via Fax: [phone]          PT Re-Evaluation     Today's date: 2021  Patient name: Mckayla Rosa  : 1932  MRN: [id number]  Referring provider: Duarte Petit MD  Dx:   Encounter Diagnosis     ICD-10-CM    1  Balance problem  R26 89    2  Gait abnormality  R26 9    3  Stress fracture of neck of femur, initial encounter  M84 353A                   Assessment  Assessment details: PT notes the patient with continuation of decrease ROM and strength t/o the right hip and LE with demonstration of impaired gait pattern and balance s/p right femoral fracture surgery leading to increase pain levels and functional limitations with need for continuation of skilled therapy for 4 weeks with focus on gait/balance, strengthening, manual therapy, analgesic modalities, and update/review of HEP      Impairments: abnormal gait, abnormal or restricted ROM, activity intolerance, impaired balance, impaired physical strength, lacks appropriate home exercise program and safety issue  Understanding of Dx/Px/POC: good   Prognosis: fair    Goals  ST  Initiate HEP-MET   2  Decrease pain levels by 25-50%-MET   3  Increase ROM by 25-50%-MET   4  Increase strength by 1-2 mm grades-Partial MET  LT  Improve gait pattern and balance to good with RW-Partial MET  2  Decrease limitations with standing, walking and stairs-Partial MET  3  Decrease limitations with transfers and ADL-Partial MET  4   DC with HEP-Progressing     Plan  Plan details: PT notes review of POC and findings with patient who is in agreement with PT recommendations of continuation of skilled therapy  Patient would benefit from: skilled physical therapy  Planned therapy interventions: neuromuscular re-education, patient education, self care, strengthening, therapeutic exercise, home exercise program, graded exercise and balance  Frequency: 2x week  Duration in weeks: 4  Treatment plan discussed with: patient        Subjective Evaluation    History of Present Illness  Date of surgery: 2021  Mechanism of injury: surgery  Mechanism of injury: Patient reports a fall at home with impact on the ground on the right hip  Patient was taken to ER and found to have + femoral fracture with need for surgical repair  Patient underwent the procedure on 21 and was transitioned to acute rehab for several weeks  Patient reports she was released from rehab and then transitioned to home care  Patient reports she was released from [address] with orders for OPPT to address gait and balance deficit  Patient reports she does received care  from [facility] so she is never alone at home  Patient reports limited mobility at home with sitting the majority of the day with use of RW with ambulation    Patient reports significant PMH of COPD, CAD, bilateral LE neuropathy, bilateral TKA, CKD, spinal stenosis, cardiac stent, and skin cancer  Presently the patient has attended 8 sessions of skilled therapy and feels improvement but is unable to quantify improvement  Patient reports her walking is improved with RW but states fatigue in the LE with prolonged standing with occasional feeling of weakness in the LE with buckling in the knees  Patient feels she requires more therapy to continue to get the LE stronger and improve endurance  Pain  No pain reported  Relieving factors: rest  Aggravating factors: stair climbing, walking and standing  Progression: improved    Treatments  Current treatment: physical therapy  Patient Goals  Patient goals for therapy: improved balance, increased motion, increased strength and independence with ADLs/IADLs          Objective     Neurological Testing     Reflexes   Left   Patellar (L4): normal (2+)  Achilles (S1): normal (2+)    Right   Patellar (L4): normal (2+)  Achilles (S1): normal (2+)    Active Range of Motion   Left Hip   Flexion: 65 degrees   Extension: WFL  Abduction: WFL  External rotation (90/90): 21 degrees   Internal rotation (90/90): 14 degrees     Right Hip   Flexion: 52 degrees with pain  Extension: WFL  Abduction: WFL  External rotation (90/90): 19 degrees   Internal rotation (90/90): 14 degrees   Left Knee   Normal active range of motion    Right Knee   Normal active range of motion  Left Ankle/Foot   Normal active range of motion    Right Ankle/Foot   Normal active range of motion    Additional Active Range of Motion Details  PT notes continuation of  decrease ROM and strength t/o the bilateral hip and LE     Passive Range of Motion   Left Hip   Flexion: 78 degrees   External rotation (90/90):  [facility]  Internal rotation (90/90): 18 degrees     Right Hip   Flexion: 75 degrees   Abduction: WFL  External rotation (90/90): 26 degrees   Internal rotation (90/90): 14 degrees     Strength/Myotome Testing     Left Hip   Planes of Motion   Flexion: 4+  Extension: 4+  Abduction: 4+  Adduction: 5  External rotation: 4+  Internal rotation: 4+    Right Hip   Planes of Motion   Flexion: 4-  Extension: 4  Abduction: 4-  Adduction: 4+  External rotation: 4  Internal rotation: 4    Left Knee   Flexion: 4+  Extension: 4+  Quadriceps contraction: good    Right Knee   Flexion: 4+  Extension: 4  Quadriceps contraction: fair    Left Ankle/Foot   Dorsiflexion: 4+  Plantar flexion: 4+  Inversion: 4+  Eversion: 4+    Right Ankle/Foot   Dorsiflexion: 4  Plantar flexion: 4  Inversion: 4  Eversion: 4    Ambulation     Observational Gait   Gait: asymmetric   Decreased walking speed, stride length and right stance time  Additional Observational Gait Details  PT notes the patient with CG/S with RW with demonstration of impaired gait pattern with decrease hip and knee flex, decrease stance on the right, decrease step length, decrease alicia with decrease push off on the right with rating of of fair with static and dynamic activities  Neuro Exam:     Functional outcomes   5x sit to stand: 27 39 (seconds)  TU 74 (seconds)      Balance assessments   MCTSIB   Eyes open level surface: 30  Eyes open foam surface: 11  Eyes closed level surface: 30  Eyes closed foam surface: 0             Precautions:  Fall risk, PMH CKD, COPD, skin cancer, spinal stenosis, CAD, cardiac stent, bilateral LE neuropathy, bilateral TKA          Daily Note     Today's date: 2021  Patient name: Lila Smith  : 1932  MRN: [id number]  Referring provider: Judy Sandy MD  Dx:   Encounter Diagnosis     ICD-10-CM    1  Balance problem  R26 89    2  Gait abnormality  R26 9    3  Stress fracture of neck of femur, initial encounter  M84 353A                   Subjective: Patient reports she has been feeling pretty good recently  She states her COPD has been bothering her lately  Patient states she slept very well last night        Objective: See treatment diary below      Assessment: Tolerated treatment well  Improved endurance noted by increased tolerance to sidesteps, monster walking, and backwards walking  Patient required seated break between reps of lateral step ups due to lightheadedness- this was relieved with rest and water  Patient would benefit from continued PT to increase balance and endurance for improved function and gait  Plan: Continue per plan of care        Precautions:  Fall risk, PMH CKD, COPD, skin cancer, spinfal stenosis, CAD, cardiac stent, bilateral LE neuropathy, bilateral TKA      Manuals 8/23 8/26 8/30 8/16 8/19   N/A                                 Neuro Re-Ed    8/16 8/19   Tandem stance        High knee walk         Backwards walk  4 x 10' 4 x 10' 6 x 10' 4 x 10' 4 x 10'   Side step walk  4 x 10' 4 x 10' 6 x 10' 4 x 10' 4 x 10'   NBOS stance on foam  3 x 20" 5 x 20" 5 x 20" 3 x 20"  3 x 20"   Monster walk  4x10' 4 x 10' 6 x 10' 4 x 10' 4 x 10'    cones from stool and place on elevated table-Bilat sides                                 Ther Ex    8/16 8/19   LAQ  15 x 3" Derek 15x3" derek 15x3" derek 15 x 3" Derek 15 x 3" Derek   Front and lateral step up  2 x 10 ea Derek 2 x 10 ea Derek 2 x 10 ea Derek 2 x 10 ea Derek 2 x 10 ea Derek   Stair HR/TR  10 x 15" Derek 10 x 15" Derek 10 x 15" Derek 10 x 15" Derek 10 x 15" Derek   Stand hip march, ABD, ext, and knee flex  10x ea Derek 10x ea bilat 10x ea bilat 10x ea Bilat 10x ea Derek   Bridge         Supine Tball LTR  10 x 5" Derek 10 x 5" Derek NP 10 x 5" Bilat 10 x 5" Derek   NuStep L1 7 mins L1 9 min L1 10 min L1 6 mins L1 7 mins   HEP update/review          Ther Activity                        Gait Training                        Modalities        MHP to the right hip in seated

## 2021-08-30 NOTE — PROGRESS NOTES
Daily Note     Today's date: 2021  Patient name: Hemal Fox  : 1932  MRN: [id number]  Referring provider: Sara Caban MD  Dx:   Encounter Diagnosis     ICD-10-CM    1  Balance problem  R26 89    2  Gait abnormality  R26 9    3  Stress fracture of neck of femur, initial encounter  M84 353A                   Subjective: Patient reports she has been feeling pretty good recently  She states her COPD has been bothering her lately  Patient states she slept very well last night  Objective: See treatment diary below      Assessment: Tolerated treatment well  Improved endurance noted by increased tolerance to sidesteps, monster walking, and backwards walking  Patient required seated break between reps of lateral step ups due to lightheadedness- this was relieved with rest and water  Patient would benefit from continued PT to increase balance and endurance for improved function and gait  Plan: Continue per plan of care        Precautions:  Fall risk, PMH CKD, COPD, skin cancer, spinfal stenosis, CAD, cardiac stent, bilateral LE neuropathy, bilateral TKA      Manuals    N/A                                 Neuro Re-Ed       Tandem stance        High knee walk         Backwards walk  4 x 10' 4 x 10' 6 x 10' 4 x 10' 4 x 10'   Side step walk  4 x 10' 4 x 10' 6 x 10' 4 x 10' 4 x 10'   NBOS stance on foam  3 x 20" 5 x 20" 5 x 20" 3 x 20"  3 x 20"   Monster walk  4x10' 4 x 10' 6 x 10' 4 x 10' 4 x 10'    cones from stool and place on elevated table-Bilat sides                                 Ther Ex       LAQ  15 x 3" Derek 15x3" derek 15x3" derek 15 x 3" Derek 15 x 3" Derek   Front and lateral step up  2 x 10 ea Derek 2 x 10 ea Derek 2 x 10 ea Derek 2 x 10 ea Derek 2 x 10 ea Derek   Stair HR/TR  10 x 15" Derek 10 x 15" Derek 10 x 15" Derek 10 x 15" Derek 10 x 15" Derek   Stand hip march, ABD, ext, and knee flex  10x ea Derek 10x ea bilat 10x ea bilat 10x ea Bilat 10x ea Derek   Bridge Supine Tball LTR  10 x 5" Derek 10 x 5" Derek NP 10 x 5" Bilat 10 x 5" Derek   NuStep L1 7 mins L1 9 min L1 10 min L1 6 mins L1 7 mins   HEP update/review          Ther Activity                        Gait Training                        Modalities        MHP to the right hip in seated

## 2021-09-02 ENCOUNTER — OFFICE VISIT (OUTPATIENT)
Dept: OBGYN CLINIC | Facility: CLINIC | Age: 86
End: 2021-09-02
Payer: MEDICARE

## 2021-09-02 ENCOUNTER — HOSPITAL ENCOUNTER (OUTPATIENT)
Dept: RADIOLOGY | Facility: CLINIC | Age: 86
Discharge: HOME | End: 2021-09-02
Payer: MEDICARE

## 2021-09-02 ENCOUNTER — APPOINTMENT (OUTPATIENT)
Dept: PHYSICAL THERAPY | Facility: CLINIC | Age: 86
End: 2021-09-02
Payer: MEDICARE

## 2021-09-02 VITALS
WEIGHT: 138 LBS | TEMPERATURE: 96.6 F | HEART RATE: 79 BPM | BODY MASS INDEX: 22.99 KG/M2 | SYSTOLIC BLOOD PRESSURE: 110 MMHG | HEIGHT: 65 IN | DIASTOLIC BLOOD PRESSURE: 64 MMHG

## 2021-09-02 DIAGNOSIS — S72.21XD CLOSED DISPLACED SUBTROCHANTERIC FRACTURE OF RIGHT FEMUR WITH ROUTINE HEALING, SUBSEQUENT ENCOUNTER: Primary | ICD-10-CM

## 2021-09-02 DIAGNOSIS — S72.21XD CLOSED DISPLACED SUBTROCHANTERIC FRACTURE OF RIGHT FEMUR WITH ROUTINE HEALING, SUBSEQUENT ENCOUNTER: ICD-10-CM

## 2021-09-02 PROCEDURE — 99213 OFFICE O/P EST LOW 20 MIN: CPT | Performed by: ORTHOPAEDIC SURGERY

## 2021-09-02 PROCEDURE — 73552 X-RAY EXAM OF FEMUR 2/>: CPT

## 2021-09-02 RX ORDER — FLUTICASONE FUROATE AND VILANTEROL TRIFENATATE 100; 25 UG/1; UG/1
POWDER RESPIRATORY (INHALATION)
COMMUNITY

## 2021-09-02 NOTE — PROGRESS NOTES
ASSESSMENT/PLAN:    Diagnoses and all orders for this visit:    Closed displaced subtrochanteric fracture of right femur with routine healing, subsequent encounter  -     XR femur 2 vw right; Future    Other orders  -     fluticasone-vilanterol (Breo Ellipta) 100-25 mcg/inh inhaler      Nonda Andrade will continue in PT but may discharge to HEP when she and her therapist feel she is ready  We will see her back in 2 months for recheck  AP and lateral of the right femur will be obtained upon arrival  She was encouraged to contact the office should questions or concerns arise  Return in about 2 months (around 11/2/2021)  _____________________________________________________  CHIEF COMPLAINT:  Chief Complaint   Patient presents with    Right Thigh - Follow-up, Post-op         SUBJECTIVE:  Toya Smith is a 80 y o  female who presents for follow up of right femur  She is 4 months s/p long TFN insertion for subtrochanteric femur fracture  Reports she is doing well overall  She has no pain in the hip at this time  She continues in physical therapy to work on ambulation  She denies new injuries or trauma  PAST MEDICAL HISTORY:  Past Medical History:   Diagnosis Date    Cancer [facility])     skin cancer    Cardiac disease     Multiple falls     Neuropathy     Skin cancer     forehead    Spinal stenosis     UTI (urinary tract infection)        PAST SURGICAL HISTORY:  Past Surgical History:   Procedure Laterality Date    APPENDECTOMY      BACK SURGERY      x2    CATARACT EXTRACTION W/  INTRAOCULAR LENS IMPLANT Bilateral     CORONARY ANGIOPLASTY WITH STENT PLACEMENT  09/23/2011    NV OPEN RX FEMUR FX+INTRAMED RASHIDA Right 5/4/2021    Procedure: INSERTION NAIL IM FEMUR ANTEGRADE (TROCHANTERIC); Surgeon: Natalia Graham; Location:  MAIN OR;  Service: Orthopedics    REPLACEMENT TOTAL KNEE BILATERAL         FAMILY HISTORY:  History reviewed  No pertinent family history      SOCIAL HISTORY:  Social History Tobacco Use    Smoking status: Never Smoker    Smokeless tobacco: Never Used   Vaping Use    Vaping Use: Never used   Substance Use Topics    Alcohol use: Yes     Comment: Occasionally    Drug use: Never       MEDICATIONS:    Current Outpatient Medications:     acetaminophen (TYLENOL) 325 mg tablet, Take 2 tablets (650 mg total) by mouth every 6 (six) hours as needed for mild pain, headaches or fever, Disp:  , Rfl: 0    albuterol (PROVENTIL HFA,VENTOLIN HFA) 90 mcg/act inhaler, Inhale 2 puffs every 6 (six) hours as needed for wheezing, Disp: , Rfl:     aspirin (ECOTRIN LOW STRENGTH) 81 mg EC tablet, Take 1 tablet (81 mg total) by mouth daily, Disp:  , Rfl: 0    cyanocobalamin (VITAMIN B-12) 100 MCG tablet, Take 100 mcg by mouth daily, Disp: , Rfl:     denosumab (PROLIA) 60 mg/mL, Inject 60 mg under the skin once, Disp: , Rfl:     docusate sodium (COLACE) 100 mg capsule, Take 1 capsule (100 mg total) by mouth 2 (two) times a day, Disp: 10 capsule, Rfl: 0    famotidine (PEPCID) 20 mg tablet, Take 20 mg by mouth 2 (two) times a day, Disp: , Rfl:     fluticasone (FLONASE) 50 mcg/act nasal spray, 1 spray into each nostril daily, Disp: , Rfl:     loperamide (IMODIUM) 2 mg capsule, Take 2 mg by mouth 4 (four) times a day as needed for diarrhea, Disp: , Rfl:     Magnesium Hydroxide (MILK OF MAGNESIA PO), Take by mouth, Disp: , Rfl:     metoprolol succinate (TOPROL-XL) 25 mg 24 hr tablet, Take 12 5 mg by mouth daily, Disp: , Rfl:     Multiple Vitamins-Minerals (CENTRUM SILVER 50+WOMEN PO), Take 1 tablet by mouth, Disp: , Rfl:     pantoprazole (PROTONIX) 40 mg tablet, Take 40 mg by mouth daily, Disp: , Rfl:     senna-docusate sodium (SENOKOT S) 8 6-50 mg per tablet, Take 1 tablet by mouth daily, Disp: , Rfl:     bisacodyl (Dulcolax) 10 mg suppository, Insert 10 mg into the rectum daily (Patient not taking: Reported on 7/9/2021), Disp: , Rfl:     bisacodyl (DULCOLAX) 5 mg EC tablet, Take 5 mg by mouth daily as needed for constipation (Patient not taking: Reported on 7/9/2021), Disp: , Rfl:     budesonide-formoterol (SYMBICORT) 80-4 5 MCG/ACT inhaler, Inhale 2 puffs 2 (two) times a day Rinse mouth after use  (Patient not taking: Reported on 7/9/2021), Disp: , Rfl:     fluticasone-vilanterol (Breo Ellipta) 100-25 mcg/inh inhaler, , Disp: , Rfl:     MAGNESIUM SULFATE PO, Take 100 mg by mouth daily (Patient not taking: Reported on 7/9/2021), Disp: , Rfl:     oxyCODONE (OXY-IR) 5 MG capsule, Take 5 mg by mouth every 4 (four) hours as needed for moderate pain (Patient not taking: Reported on 7/9/2021), Disp: , Rfl:     ALLERGIES:  Allergies   Allergen Reactions    Ace Inhibitors Cough    Angiotensin Receptor Blockers      Other reaction(s): WEAK AND SOB    Atorvastatin      Other reaction(s): NOT SURE    Ezetimibe      Other reaction(s): N&V    Formoterol      Other reaction(s): NOT SURE, SHAKINESS    Gabapentin      Other reaction(s): SWELLING    Hydrocodone-Acetaminophen Vomiting     Other reaction(s): Nausea and Vomiting    Lovastatin      Other reaction(s): MUSCLE PAIN    Methylprednisolone      Other reaction(s): FLUSHING    Mometasone Furoate      Other reaction(s): HOARSENESS    Niacin      Other reaction(s): NOT SURE    Nsaids     Other      abx - pt cant remember name - got body aches and headache    Oxycodone GI Intolerance    Oxytetracycline      Other reaction(s): NOT SURE    Pravastatin     Rosuvastatin      Other reaction(s): MUSCLE PAIN    Statins      Other reaction(s): Other (See Comments)  "bone pain"    Tramadol      Other reaction(s): N&V    Latex Other (See Comments) and Rash     "skin peeling"    Penicillins Rash and Hives     Other reaction(s): RASH       REVIEW OF SYSTEMS:  Pertinent items are noted in HPI    A comprehensive review of systems was negative       _____________________________________________________  PHYSICAL EXAMINATION:  General: well developed and well nourished, alert, oriented times 3 and appears comfortable  Psychiatric: Normal  HEENT:  Normocephalic, atraumatic  Cardiovascular:  Regular  Pulmonary: No wheezing or stridor  Skin: No masses, erthema, lacerations, fluctation, ulcerations  Neurovascular: Motor and sensory exams are grossly intact  Distal pulses are palpable  Limb is warm and well perfused with good color and capillary refill  MUSCULOSKELETAL EXAMINATION:      The right thigh exam demonstrates skin intact, no erythema, no ecchymosis  Incisions are well healed and benign  She is able to actively range the hip without pain  Passive hip knee injured motion also elicits no complaints  Thigh and calf compartments are soft and compressible without pain  The remainder of the right lower extremity exam is benign  _____________________________________________________  STUDIES REVIEWED:  I have personally reviewed pertinent films and reports in PACS and my interpretation is as follows:     XRs of the right femur performed today in clinic demonstrates   Routine, but slow, healing without interval changes in fracture or hardware alignment  PROCEDURES PERFORMED:   Procedures  None performed today            Ioana Boudreaux PA-C

## 2021-09-07 ENCOUNTER — APPOINTMENT (OUTPATIENT)
Dept: PHYSICAL THERAPY | Facility: CLINIC | Age: 86
End: 2021-09-07
Payer: MEDICARE

## 2021-09-09 ENCOUNTER — OFFICE VISIT (OUTPATIENT)
Dept: PHYSICAL THERAPY | Facility: CLINIC | Age: 86
End: 2021-09-09
Payer: MEDICARE

## 2021-09-09 DIAGNOSIS — R26.9 GAIT ABNORMALITY: ICD-10-CM

## 2021-09-09 DIAGNOSIS — R26.89 BALANCE PROBLEM: Primary | ICD-10-CM

## 2021-09-09 DIAGNOSIS — M84.353A STRESS FRACTURE OF NECK OF FEMUR, INITIAL ENCOUNTER: ICD-10-CM

## 2021-09-09 PROCEDURE — 97112 NEUROMUSCULAR REEDUCATION: CPT

## 2021-09-09 PROCEDURE — 97110 THERAPEUTIC EXERCISES: CPT

## 2021-09-09 NOTE — PROGRESS NOTES
Daily Note     Today's date: 2021  Patient name: Mecca Nolasco [facility]  : 1932  MRN: [id number]  Referring provider: Bruce Lopez MD  Dx:   Encounter Diagnosis     ICD-10-CM    1  Balance problem  R26 89    2  Gait abnormality  R26 9    3  Stress fracture of neck of femur, initial encounter  M84 353A                   Subjective: Patient reports she is very tired today  She states she was out all morning for another appointment that took a while so she is a bit worn out  Objective: See treatment diary below    Assessment: Tolerated treatment fair  Patient required frequent seated breaks for rest throughout session due to difficulty breathing secondary to COPD as well as muscle fatigue  Patient demonstrated increased difficulty with L leading tandem stance versus R leading tandem stance  Patient reported B/L hip discomfort when performing monster walking due to muscle weakness  Moderate fatigue and FIERRO noted when performing step ups- patient declined performance of second set of these today  Patient would benefit from continued PT to increase B/L LE strength and overall endurance for improved function and gait  Plan: Continue per plan of care        Precautions:  Fall risk, PMH CKD, COPD, skin cancer, spinfal stenosis, CAD, cardiac stent, bilateral LE neuropathy, bilateral TKA      Manuals    N/A                                 Neuro Re-Ed        Tandem stance    30"x2 ea    High knee walk         Backwards walk  4 x 10' 4 x 10' 6 x 10' 6 x 10' 4 x 10'   Side step walk  4 x 10' 4 x 10' 6 x 10' 6 x 10' 4 x 10'   NBOS stance on foam  3 x 20" 5 x 20" 5 x 20" 5 x 20" 3 x 20"   Monster walk  4x10' 4 x 10' 6 x 10' 6 x 10' 4 x 10'    cones from stool and place on elevated table-Bilat sides     12 cones to and from elevated table                            Ther Ex        LAQ  15 x 3" Derek 15x3" derek 15x3" derek 15x3" derek 15 x 3" Derek   Front and lateral step up  2 x 10 ea Derek 2 x 10 ea Derek 2 x 10 ea Derek 10x ea Derek 2 x 10 ea Derek   Stair HR/TR  10 x 15" Derek 10 x 15" Derek 10 x 15" Derek 10 x 15" Derek 10 x 15" Derek   Stand hip march, ABD, ext, and knee flex  10x ea Derek 10x ea bilat 10x ea bilat 10x ea bilat 10x ea Derek   Bridge         Supine Tball LTR  10 x 5" Derek 10 x 5" Derek NP  10 x 5" Derek   NuStep L1 7 mins L1 9 min L1 10 min L1 10 min L1 7 mins   HEP update/review          Ther Activity                        Gait Training                        Modalities        MHP to the right hip in seated

## 2021-09-13 ENCOUNTER — OFFICE VISIT (OUTPATIENT)
Dept: PHYSICAL THERAPY | Facility: CLINIC | Age: 86
End: 2021-09-13
Payer: MEDICARE

## 2021-09-13 DIAGNOSIS — R26.89 BALANCE PROBLEM: Primary | ICD-10-CM

## 2021-09-13 DIAGNOSIS — M84.353A STRESS FRACTURE OF NECK OF FEMUR, INITIAL ENCOUNTER: ICD-10-CM

## 2021-09-13 DIAGNOSIS — R26.9 GAIT ABNORMALITY: ICD-10-CM

## 2021-09-13 PROCEDURE — 97110 THERAPEUTIC EXERCISES: CPT

## 2021-09-13 PROCEDURE — 97112 NEUROMUSCULAR REEDUCATION: CPT

## 2021-09-13 NOTE — PROGRESS NOTES
Daily Note     Today's date: 2021  Patient name: Catherine Smith  : 1932  MRN: [id number]  Referring provider: Dalphine Peabody, MD  Dx:   Encounter Diagnosis     ICD-10-CM    1  Balance problem  R26 89    2  Gait abnormality  R26 9    3  Stress fracture of neck of femur, initial encounter  M84 353A                   Subjective: Patient voiced that she is pleased with her progress so far  Objective: See treatment diary below      Assessment: Tolerated treatment well  Patient exhibited good technique with therapeutic exercises and would benefit from continued PT to increase ROM/strength and endurance to improve mobility and gait  Plan: Continue per plan of care  Progress treatment as tolerated         Precautions:  Fall risk, PMH CKD, COPD, skin cancer, spinfal stenosis, CAD, cardiac stent, bilateral LE neuropathy, bilateral TKA      Manuals    N/A                                 Neuro Re-Ed        Tandem stance    30"x2 ea    High knee walk         Backwards walk  4 x 10' 4 x 10' 6 x 10' 6 x 10' 6x10'   Side step walk  4 x 10' 4 x 10' 6 x 10' 6 x 10' 6x10'   NBOS stance on foam  3 x 20" 5 x 20" 5 x 20" 5 x 20" 5x15" bilat   Monster walk  4x10' 4 x 10' 6 x 10' 6 x 10'     cones from stool and place on elevated table-Bilat sides     12 cones to and from elevated table 12x bilat Move cones from chair to 1st&2nd  Shelf and then back to chair                           Ther Ex        LAQ  15 x 3" Derek 15x3" derek 15x3" derek 15x3" derek    Front and lateral step up  2 x 10 ea Derek 2 x 10 ea Derek 2 x 10 ea Derek 10x ea Derek 4" step 2x10 ea Bilat   Stair HR/TR  10 x 15" Derek 10 x 15" Derek 10 x 15" Derek 10 x 15" Derek 4" step 2x10 15"hold Bilat   Forward & Retro Step Over     4" step 2x10   Stand hip march, ABD, ext, and knee flex  10x ea Derek 10x ea bilat 10x ea bilat 10x ea bilat    Bridge      10x5"   Supine Tball LTR  10 x 5" Derek 10 x 5" Derek NP  20x5" Derek   NuStep L1 7 mins L1 9 min L1 10 min L1 10 min L2 10'   HEP update/review          Ther Activity     9/13                   Gait Training     9/13                   Modalities     9/13   MHP to the right hip in seated

## 2021-09-15 ENCOUNTER — APPOINTMENT (OUTPATIENT)
Dept: PHYSICAL THERAPY | Facility: CLINIC | Age: 86
End: 2021-09-15
Payer: MEDICARE

## 2021-09-16 ENCOUNTER — OFFICE VISIT (OUTPATIENT)
Dept: PHYSICAL THERAPY | Facility: CLINIC | Age: 86
End: 2021-09-16
Payer: MEDICARE

## 2021-09-16 DIAGNOSIS — M84.353A STRESS FRACTURE OF NECK OF FEMUR, INITIAL ENCOUNTER: ICD-10-CM

## 2021-09-16 DIAGNOSIS — R26.89 BALANCE PROBLEM: Primary | ICD-10-CM

## 2021-09-16 DIAGNOSIS — R26.9 GAIT ABNORMALITY: ICD-10-CM

## 2021-09-16 PROCEDURE — 97110 THERAPEUTIC EXERCISES: CPT

## 2021-09-16 NOTE — PROGRESS NOTES
Daily Note     Today's date: 2021  Patient name: Parish Smith  : 1932  MRN: [id number]  Referring provider: Gary Sexton MD  Dx:   Encounter Diagnosis     ICD-10-CM    1  Balance problem  R26 89    2  Gait abnormality  R26 9    3  Stress fracture of neck of femur, initial encounter  M84 353A                   Subjective: "I'm just worn out from all of these tests and appointments I've been having "      Objective: See treatment diary below      Assessment: Tolerated treatment well  Patient exhibited good technique with therapeutic exercises and would benefit from continued PT to increase ROM/strength and endurance to improve mobility and gait  Plan: Continue per plan of care        Precautions:  Fall risk, PMH CKD, COPD, skin cancer, spinfal stenosis, CAD, cardiac stent, bilateral LE neuropathy, bilateral TKA      Manuals    N/A                                 Neuro Re-Ed       Tandem stance   30"x2 ea     High knee walk         Backwards walk  4 x 10' 6 x 10' 6 x 10' 6x10'    Side step walk  4 x 10' 6 x 10' 6 x 10' 6x10'    NBOS stance on foam  5 x 20" 5 x 20" 5 x 20" 5x15" bilat    Monster walk  4 x 10' 6 x 10' 6 x 10'      cones from stool and place on elevated table-Bilat sides    12 cones to and from elevated table 12x bilat Move cones from chair to 1st&2nd  Shelf and then back to chair                            Ther Ex       LAQ  15x3" derek 15x3" derek 15x3" derek     Front and lateral step up  2 x 10 ea Derek 2 x 10 ea Derek 10x ea Derek 4" step 2x10 ea Bilat 4"step 2x10 bilat   Stair HR/TR  10 x 15" Derek 10 x 15" Derek 10 x 15" Derek 4" step 2x10 15"hold Bilat 4"step 2x10 15"hold   Forward & Retro Step Over    4" step 2x10 4" step 2x10   Stand hip march, ABD, ext, and knee flex  10x ea bilat 10x ea bilat 10x ea bilat     Bridge     10x5"    Supine Tball LTR  10 x 5" Derek NP  20x5" Derek 2x10 5" bilat   NuStep L1 9 min L1 10 min L1 10 min L2 10' 10' L2   HEP update/review         Ther Activity    9/13 9/16                   Gait Training    9/13 9/16                   Modalities    9/13 9/16   MHP to the right hip in seated     15'

## 2021-09-20 ENCOUNTER — OFFICE VISIT (OUTPATIENT)
Dept: PHYSICAL THERAPY | Facility: CLINIC | Age: 86
End: 2021-09-20
Payer: MEDICARE

## 2021-09-20 DIAGNOSIS — M84.353A STRESS FRACTURE OF NECK OF FEMUR, INITIAL ENCOUNTER: ICD-10-CM

## 2021-09-20 DIAGNOSIS — R26.9 GAIT ABNORMALITY: ICD-10-CM

## 2021-09-20 DIAGNOSIS — R26.89 BALANCE PROBLEM: Primary | ICD-10-CM

## 2021-09-20 PROCEDURE — 97112 NEUROMUSCULAR REEDUCATION: CPT

## 2021-09-20 PROCEDURE — 97110 THERAPEUTIC EXERCISES: CPT

## 2021-09-20 NOTE — PROGRESS NOTES
Daily Note     Today's date: 2021  Patient name: Lluvia Tinoco  : 1932  MRN: [id number]  Referring provider: Gary Sexton MD  Dx:   Encounter Diagnosis     ICD-10-CM    1  Balance problem  R26 89    2  Gait abnormality  R26 9    3  Stress fracture of neck of femur, initial encounter  M84 353A        Start Time: 1100  Stop Time: 1145  Total time in clinic (min): 45 minutes    Subjective: Pt reports she was not sure if she was going to come today due to feeling weak  Pt arrived in wheelchair  Objective: See treatment diary below      Assessment: Pt tolerated treatment session fairly well today  With seated rest breaks, patient able to complete exercises with fairly good form  Pt challenged with lateral movements due to weak hip abd strength  Trialed EC balance activity this visit, pt demonstrated appropriate postural sway after v/c given to perform glute set  Pt would benefit from continued OP PT services addressing LE strength and balance  Plan: Continue per plan of care        Precautions:  Fall risk, PMH CKD, COPD, skin cancer, spinfal stenosis, CAD, cardiac stent, bilateral LE neuropathy, bilateral TKA      Manuals    N/A                                 Neuro Re-Ed       Tandem stance   30"x2 ea     High knee walk         Backwards walk  6x10' 6 x 10' 6 x 10' 6x10'    Side step walk  6x10' 6 x 10' 6 x 10' 6x10'    NBOS stance on foam  2x15" EO  2x15" EC 5 x 20" 5 x 20" 5x15" bilat    Monster walk   6 x 10' 6 x 10'      cones from stool and place on elevated table-Bilat sides    12 cones to and from elevated table 12x bilat Move cones from chair to 1st&2nd  Shelf and then back to chair                            Ther Ex       LAQ   15x3" derek 15x3" derek     Front and lateral step up  2 x 10 ea Derek 2 x 10 ea Derek 10x ea Derek 4" step 2x10 ea Bilat 4"step 2x10 bilat   Stair HR/TR  4"step 2x10 15"hold 10 x 15" Derek 10 x 15" Derek 4" step 2x10 15"hold Bilat 4"step 2x10 15"hold   Forward & Retro Step Over 4" step 2x10   4" step 2x10 4" step 2x10   Stand hip march, ABD, ext, and knee flex  2x10 ea abd 10x ea bilat 10x ea bilat     Bridge     10x5"    Supine Tball LTR   NP  20x5" Derek 2x10 5" bilat   NuStep 10' L2 L1 10 min L1 10 min L2 10' 10' L2   HEP update/review         Ther Activity    9/13 9/16                   Gait Training    9/13 9/16                   Modalities    9/13 9/16   MHP to the right hip in seated     15'

## 2021-09-23 ENCOUNTER — APPOINTMENT (OUTPATIENT)
Dept: PHYSICAL THERAPY | Facility: CLINIC | Age: 86
End: 2021-09-23
Payer: MEDICARE

## 2021-09-27 ENCOUNTER — APPOINTMENT (OUTPATIENT)
Dept: PHYSICAL THERAPY | Facility: CLINIC | Age: 86
End: 2021-09-27
Payer: MEDICARE

## 2021-09-30 ENCOUNTER — APPOINTMENT (OUTPATIENT)
Dept: PHYSICAL THERAPY | Facility: CLINIC | Age: 86
End: 2021-09-30
Payer: MEDICARE

## 2021-09-30 NOTE — PROGRESS NOTES
Patient contacted PT clinic and informed PT that she is stopping OPPT secondary to transportation difficulty and is transitioning to Home Care PT  PT will comply with patient request and DC with HEP

## 2021-11-03 ENCOUNTER — HOSPITAL ENCOUNTER (OUTPATIENT)
Dept: RADIOLOGY | Facility: CLINIC | Age: 86
Discharge: HOME | End: 2021-11-03
Payer: MEDICARE

## 2021-11-03 ENCOUNTER — OFFICE VISIT (OUTPATIENT)
Dept: OBGYN CLINIC | Facility: CLINIC | Age: 86
End: 2021-11-03
Payer: MEDICARE

## 2021-11-03 VITALS
BODY MASS INDEX: 22.99 KG/M2 | TEMPERATURE: 97 F | HEART RATE: 82 BPM | WEIGHT: 138 LBS | SYSTOLIC BLOOD PRESSURE: 112 MMHG | HEIGHT: 65 IN | DIASTOLIC BLOOD PRESSURE: 62 MMHG

## 2021-11-03 DIAGNOSIS — S72.21XD CLOSED DISPLACED SUBTROCHANTERIC FRACTURE OF RIGHT FEMUR WITH ROUTINE HEALING, SUBSEQUENT ENCOUNTER: ICD-10-CM

## 2021-11-03 DIAGNOSIS — S72.21XG CLOSED DISPLACED SUBTROCHANTERIC FRACTURE OF RIGHT FEMUR WITH DELAYED HEALING, SUBSEQUENT ENCOUNTER: Primary | ICD-10-CM

## 2021-11-03 PROCEDURE — 73552 X-RAY EXAM OF FEMUR 2/>: CPT

## 2021-11-03 PROCEDURE — 99213 OFFICE O/P EST LOW 20 MIN: CPT | Performed by: ORTHOPAEDIC SURGERY

## 2021-11-03 RX ORDER — CYANOCOBALAMIN/FOLIC AC/VIT B6 0.5-2.2-25
TABLET ORAL
COMMUNITY
Start: 2021-10-11 | End: 2022-07-31

## 2022-01-03 ENCOUNTER — OFFICE VISIT (OUTPATIENT)
Dept: OBGYN CLINIC | Facility: CLINIC | Age: 87
End: 2022-01-03
Payer: MEDICARE

## 2022-01-03 ENCOUNTER — HOSPITAL ENCOUNTER (OUTPATIENT)
Dept: RADIOLOGY | Facility: CLINIC | Age: 87
Discharge: HOME | End: 2022-01-03

## 2022-01-03 ENCOUNTER — HOSPITAL ENCOUNTER (OUTPATIENT)
Dept: RADIOLOGY | Facility: CLINIC | Age: 87
Discharge: HOME | End: 2022-01-03
Payer: MEDICARE

## 2022-01-03 VITALS
TEMPERATURE: 97.2 F | SYSTOLIC BLOOD PRESSURE: 130 MMHG | DIASTOLIC BLOOD PRESSURE: 80 MMHG | WEIGHT: 133 LBS | HEIGHT: 65 IN | BODY MASS INDEX: 22.16 KG/M2 | HEART RATE: 64 BPM

## 2022-01-03 DIAGNOSIS — S72.21XG CLOSED DISPLACED SUBTROCHANTERIC FRACTURE OF RIGHT FEMUR WITH DELAYED HEALING, SUBSEQUENT ENCOUNTER: ICD-10-CM

## 2022-01-03 DIAGNOSIS — S72.21XG CLOSED DISPLACED SUBTROCHANTERIC FRACTURE OF RIGHT FEMUR WITH DELAYED HEALING, SUBSEQUENT ENCOUNTER: Primary | ICD-10-CM

## 2022-01-03 DIAGNOSIS — S72.21XK CLOSED DISPLACED SUBTROCHANTERIC FRACTURE OF RIGHT FEMUR WITH NONUNION, SUBSEQUENT ENCOUNTER: ICD-10-CM

## 2022-01-03 PROCEDURE — 99213 OFFICE O/P EST LOW 20 MIN: CPT | Performed by: ORTHOPAEDIC SURGERY

## 2022-01-03 PROCEDURE — 73502 X-RAY EXAM HIP UNI 2-3 VIEWS: CPT

## 2022-01-03 PROCEDURE — 73552 X-RAY EXAM OF FEMUR 2/>: CPT

## 2022-01-03 NOTE — PROGRESS NOTES
Patient Name:  Franklin Cuadra  MRN:  [id number]    Assessment     1  Closed displaced subtrochanteric fracture of right femur with delayed healing, subsequent encounter  XR hip/pelv 2-3 vws right if performed       Plan     1  ***    No follow-ups on file  Subjective   Manju Smith returns for follow-up of right hip subtrochanteric fracture  The patient is 8 months status post long TFN insertion  and returns for routine follow-up  DOS 5/4/2021  The patient was last seen in office on 11/3/2021, where the patient was provided with a script for continued home physical therapy  Objective     /80   Pulse 64   Temp (!) 97 2 °F (36 2 °C) (Temporal)   Ht 5' 5" (1 651 m)   Wt 60 3 kg (133 lb)   LMP  (LMP Unknown)   BMI 22 13 kg/m²     Right Lower Extremity:        Data Review     I have personally reviewed pertinent films in PACS  X-rays of the right femur taken today in office demonstrate intact TFN hardware without signs of loosening or failure  There is good healing noted of the fracture       Nohelia Nowak PA-C

## 2022-01-03 NOTE — PROGRESS NOTES
ASSESSMENT/PLAN:    Diagnoses and all orders for this visit:      Closed displaced subtrochanteric fracture of right femur with nonunion, subsequent encounter  -     XR hip/pelv 2-3 vws right if performed; Future  -     XR femur 2 vw right; Future  -     Ambulatory referral to Physical Therapy; Future          Plan:  I would recommend follow-up in about 3 months  I have recommended use of a bone stim device and will contact the appropriate representative to obtain approval and have this delivered to the patient  I instructed the patient and her daughter to contact the office if they have not heard from anyone within 2 weeks  X-rays of her right femur will be obtained at follow-up to assess for continued healing  I have encouraged her to contact me if questions or concerns arise  I have also recommended that she return to physical therapy to see if this would help improve her mobility and strength  Return in about 3 months (around 4/3/2022)  _____________________________________________________  CHIEF COMPLAINT:  Chief Complaint   Patient presents with    Right Hip - Post-op         SUBJECTIVE:  Trip Smith is a 80y o  year old female who presents for follow up of her right subtrochanteric femoral fracture  She is doing pretty well  She does note some mild pain, states she uses a walker because of pain and concerned about falling and does note some decreased strength  She has not been attending physical therapy as her home therapy was discontinued        PAST MEDICAL HISTORY:  Past Medical History:   Diagnosis Date    Cancer [facility])     skin cancer    Cardiac disease     Multiple falls     Neuropathy     Skin cancer     forehead    Spinal stenosis     UTI (urinary tract infection)        PAST SURGICAL HISTORY:  Past Surgical History:   Procedure Laterality Date    APPENDECTOMY      BACK SURGERY      x2    CATARACT EXTRACTION W/  INTRAOCULAR LENS IMPLANT Bilateral     CORONARY ANGIOPLASTY WITH STENT PLACEMENT  09/23/2011    AR OPEN RX FEMUR FX+INTRAMED RASHIDA Right 5/4/2021    Procedure: INSERTION NAIL IM FEMUR ANTEGRADE (TROCHANTERIC); Surgeon: Fabrizio Noble; Location: [facility];  Service: Orthopedics    REPLACEMENT TOTAL KNEE BILATERAL         FAMILY HISTORY:  History reviewed  No pertinent family history      SOCIAL HISTORY:  Social History     Tobacco Use    Smoking status: Never Smoker    Smokeless tobacco: Never Used   Vaping Use    Vaping Use: Never used   Substance Use Topics    Alcohol use: Yes     Comment: Occasionally    Drug use: Never       MEDICATIONS:    Current Outpatient Medications:     acetaminophen (TYLENOL) 325 mg tablet, Take 2 tablets (650 mg total) by mouth every 6 (six) hours as needed for mild pain, headaches or fever, Disp:  , Rfl: 0    albuterol (PROVENTIL HFA,VENTOLIN HFA) 90 mcg/act inhaler, Inhale 2 puffs every 6 (six) hours as needed for wheezing, Disp: , Rfl:     aspirin (ECOTRIN LOW STRENGTH) 81 mg EC tablet, Take 1 tablet (81 mg total) by mouth daily, Disp:  , Rfl: 0    cyanocobalamin (VITAMIN B-12) 100 MCG tablet, Take 100 mcg by mouth daily, Disp: , Rfl:     denosumab (PROLIA) 60 mg/mL, Inject 60 mg under the skin once, Disp: , Rfl:     docusate sodium (COLACE) 100 mg capsule, Take 1 capsule (100 mg total) by mouth 2 (two) times a day, Disp: 10 capsule, Rfl: 0    famotidine (PEPCID) 20 mg tablet, Take 20 mg by mouth 2 (two) times a day, Disp: , Rfl:     fluticasone (FLONASE) 50 mcg/act nasal spray, 1 spray into each nostril daily, Disp: , Rfl:     fluticasone-vilanterol (Breo Ellipta) 100-25 mcg/inh inhaler, , Disp: , Rfl:     Folplex 2 2 2 2-25-0 5 MG TABS, , Disp: , Rfl:     loperamide (IMODIUM) 2 mg capsule, Take 2 mg by mouth 4 (four) times a day as needed for diarrhea, Disp: , Rfl:     Magnesium Hydroxide (MILK OF MAGNESIA PO), Take by mouth, Disp: , Rfl:     metoprolol succinate (TOPROL-XL) 25 mg 24 hr tablet, Take 12 5 mg by mouth daily, Disp: , Rfl:     Multiple Vitamins-Minerals (CENTRUM SILVER 50+WOMEN PO), Take 1 tablet by mouth, Disp: , Rfl:     pantoprazole (PROTONIX) 40 mg tablet, Take 40 mg by mouth daily, Disp: , Rfl:     senna-docusate sodium (SENOKOT S) 8 6-50 mg per tablet, Take 1 tablet by mouth daily, Disp: , Rfl:     bisacodyl (Dulcolax) 10 mg suppository, Insert 10 mg into the rectum daily (Patient not taking: Reported on 7/9/2021), Disp: , Rfl:     bisacodyl (DULCOLAX) 5 mg EC tablet, Take 5 mg by mouth daily as needed for constipation (Patient not taking: Reported on 7/9/2021), Disp: , Rfl:     budesonide-formoterol (SYMBICORT) 80-4 5 MCG/ACT inhaler, Inhale 2 puffs 2 (two) times a day Rinse mouth after use  (Patient not taking: Reported on 7/9/2021), Disp: , Rfl:     MAGNESIUM SULFATE PO, Take 100 mg by mouth daily (Patient not taking: Reported on 7/9/2021), Disp: , Rfl:     oxyCODONE (OXY-IR) 5 MG capsule, Take 5 mg by mouth every 4 (four) hours as needed for moderate pain (Patient not taking: Reported on 7/9/2021), Disp: , Rfl:     ALLERGIES:  Allergies   Allergen Reactions    Ace Inhibitors Cough    Angiotensin Receptor Blockers      Other reaction(s): WEAK AND SOB    Atorvastatin      Other reaction(s): NOT SURE    Ezetimibe      Other reaction(s): N&V    Formoterol      Other reaction(s): NOT SURE, SHAKINESS    Gabapentin      Other reaction(s): SWELLING    Hydrocodone-Acetaminophen Vomiting     Other reaction(s): Nausea and Vomiting    Lovastatin      Other reaction(s): MUSCLE PAIN    Methylprednisolone      Other reaction(s): FLUSHING    Mometasone Furoate      Other reaction(s): HOARSENESS    Niacin      Other reaction(s): NOT SURE    Nsaids     Other      abx - pt cant remember name - got body aches and headache    Oxycodone GI Intolerance    Oxytetracycline      Other reaction(s): NOT SURE    Pravastatin     Rosuvastatin      Other reaction(s):  MUSCLE PAIN    Statins      Other reaction(s): Other (See Comments)  "bone pain"    Tramadol      Other reaction(s): N&V    Latex Other (See Comments) and Rash     "skin peeling"    Penicillins Rash and Hives     Other reaction(s): RASH       REVIEW OF SYSTEMS:  Pertinent items are noted in HPI  A comprehensive review of systems was negative       _____________________________________________________  PHYSICAL EXAMINATION:  General: alert, oriented times 3 and appears comfortable  Psychiatric: Normal  HEENT:  Normocephalic, atraumatic  Cardiovascular:  Regular  Pulmonary: No wheezing or stridor  Skin: No masses, erthema, lacerations, fluctation, ulcerations  Neurovascular: Motor and sensory exams are grossly intact although she does demonstrate some age-appropriate weakness  Pulses are palpable  MUSCULOSKELETAL EXAMINATION:    The right lower extremity exam demonstrates no complaints with range of motion of the hip, knee or ankle and foot  She has no tenderness throughout the thigh  Incision is well healed  Calf compartments are soft and nontender  She immobilized in a wheelchair     _____________________________________________________  STUDIES REVIEWED:  X-rays demonstrate stable fixation of the fracture  However, there does not appear to be any progression of healing in comparing the images of today and those from 2 months ago              Nesha Hansen

## 2022-01-25 ENCOUNTER — EVALUATION (OUTPATIENT)
Dept: PHYSICAL THERAPY | Facility: CLINIC | Age: 87
End: 2022-01-25
Payer: MEDICARE

## 2022-01-25 DIAGNOSIS — S72.21XG CLOSED DISPLACED SUBTROCHANTERIC FRACTURE OF RIGHT FEMUR WITH DELAYED HEALING, SUBSEQUENT ENCOUNTER: ICD-10-CM

## 2022-01-25 PROCEDURE — 97110 THERAPEUTIC EXERCISES: CPT | Performed by: PHYSICAL THERAPIST

## 2022-01-25 PROCEDURE — 97161 PT EVAL LOW COMPLEX 20 MIN: CPT | Performed by: PHYSICAL THERAPIST

## 2022-01-25 NOTE — PROGRESS NOTES
PT Evaluation     Today's date: 2022  Patient name: Elena Smith  : 1932  MRN: [id number]  Referring provider: Paul Sheffield DO  Dx:   Encounter Diagnosis     ICD-10-CM    1  Closed displaced subtrochanteric fracture of right femur with delayed healing, subsequent encounter  S72  21XG Ambulatory referral to Physical Therapy                  Assessment  Assessment details: Elena Smith is a 80year old female presenting to PT with chief complaint of weakness in R LE from a fall over a year ago  Pt did have a closed displaced subtrochanteric fracture of her R femur which she had operated on the day after her fall  Pt reports that she did go to rehab for a few weeks following her hospital stay and did complete outpatient PT last year  She notes that she did not continue exercising at home and recent x-rays showed minimal healing in her femur since last check up  Upon examination, patient does have strength deficits in R LE compared to contralateral side and balance deficits  Pt does have a fear of falling and COPD which complicates her current situation  Functionally, pt has difficulty ambulating for periods of time, stair climbing, and performing ADL's  Patient would benefit from skilled PT services to address these impairments and to maximize function in order to improve quality of life  Thank you for the referral   Impairments: abnormal gait, abnormal muscle tone, activity intolerance, impaired physical strength and lacks appropriate home exercise program    Symptom irritability: moderateUnderstanding of Dx/Px/POC: excellent  Goals  ST  Pt will improve hip and knee strength in R LE by 1/2 grade in 3 weeks  2  Pt will be able to ambulate 100 feet without having to rest throughout in 3 weeks  3  Pt will report no falls in the next three weeks  LT  Pt will ambulate >150 feet without having to rest throughout in 6 weeks    2  Pt will decrease TUG score >15 second to reduce the risk of falls in 6 weeks  3  Pt will be I with comprehensive HEP in 6 weeks  Plan  Patient would benefit from: skilled physical therapy  Planned therapy interventions: manual therapy, massage, abdominal trunk stabilization, neuromuscular re-education, patient education, balance, balance/weight bearing training, strengthening, stretching, therapeutic activities, therapeutic exercise, flexibility, gait training and home exercise program  Frequency: 2x week  Duration in weeks: 4  Treatment plan discussed with: patient        Subjective Evaluation    History of Present Illness  Mechanism of injury: Pt notes that last May she was trying to light her fireplace and she got up too fast and fell  She said the ambulance came and took her to the ER right away  They did surgery on her the next day, she broke her hip and her femur  She went to rehab after that for a few weeks  She lives alone, but has helpers that come 8a-2pm everyday  Pt does not drive  Pt has neuropathy in her feet; she notes that she cannot feel light touch but does feel her feet on the ground  She notes that her COPD does bother her the most with daily activity  She also has stomach problems and issues with her esophagus that complicates her breathing  Pt does feel off balance a lot, uses a walker in the community and at her home  She notes that sometimes she doesn't use the walker, but holds onto things while walking for balance  She feels that her R leg is very weak and that is why she is coming back to therapy  She just saw her doctor and the xrays showed poor healing in her femur  They gave her a bone simulator and she is suppose to use that 1 hour a day  Pt reports that she cannot walk long periods or stand for periods of time due to her breathing and the weakness she feels in her leg  She has no pain in her leg     Quality of life: excellent    Pain  Current pain ratin  At best pain ratin  At worst pain ratin  Aggravating factors: standing, walking and stair climbing    Social Support  Steps to enter house: yes (1 step)  Stairs in house: no   Lives in: [place]  Lives with: alone    Employment status: not working    Diagnostic Tests  X-ray: abnormal (not healing well)  Treatments  Previous treatment: physical therapy  Patient Goals  Patient goals for therapy: increased strength and independence with ADLs/IADLs  Patient goal: "I want to be able to walk better and get stronger "        Objective     Tenderness     Additional Tenderness Details  No tenderness noted in thigh or R hip    Neurological Testing     Sensation     Hip   Left Hip   Intact: light touch    Right Hip   Intact: light touch    Knee   Left Knee   Intact: light touch    Right Knee   Intact: light touch     Ankle/Foot   Left Ankle/Foot   Diminished: light touch    Right Ankle/Foot   Diminished: light touch    Active Range of Motion   Left Hip   Normal active range of motion    Right Hip   Normal active range of motion  Left Knee   Normal active range of motion    Right Knee   Normal active range of motion    Strength/Myotome Testing     Left Hip   Planes of Motion   Flexion: 4  Abduction: 4-  Adduction: 4-  External rotation: 4-  Internal rotation: 4-    Right Hip   Planes of Motion   Flexion: 4-  Abduction: 4-  Adduction: 4-  External rotation: 4-  Internal rotation: 4-    Left Knee   Flexion: 4  Extension: 4  Quadriceps contraction: fair    Right Knee   Flexion: 4-  Extension: 4-  Quadriceps contraction: fair    Left Ankle/Foot   Dorsiflexion: 4  Plantar flexion: 4  Inversion: 4  Eversion: 4    Right Ankle/Foot   Dorsiflexion: 4  Plantar flexion: 4  Inversion: 4  Eversion: 4    Ambulation     Ambulation: Level Surfaces   Ambulation with assistive device: independent (uses a rolling walker to ambulate)    Observational Gait   Gait: antalgic   Decreased walking speed and stride length       Functional Assessment        Comments  5x STS: 35 seconds    TU seconds using a rolling walker  Pt felt out of breath following test     SPO2 prior to testin%  SPO2 following testin%, back up to 98% within a minute of rest      Unable to perform SL balance on either leg without loss of balance  Unable to perform tandem balance without loss of balance  Pt could perform FT on firm ground for >10 seconds without loss of balance         Precautions: COPD, stage 3 kidney disease, FALL RISK, LATEX allergy, slow progressions- pt gets SOB quickly     Daily Treatment Diary:      Initial Evaluation Date: 22  Compliance                      Visit Number 1                    Re-Manju  IE                 [facility]   Foto Captured Y                                                Manual                                                                                        Ther-Ex                      New step-warm up                      Seated marches HEP                     Seated hip add   HEP                     LAQ HEP            Seated hip abduction HEP                     Seated HS stretch                      Standing HR                      Mini squats                      SLR- flex                                                                                                                                   Neuro Re-Ed                      Step and reach                      Balance- FT on foam             Balance- tandem                                       Ther-Act              step ups                                                 Modalities

## 2022-01-27 ENCOUNTER — OFFICE VISIT (OUTPATIENT)
Dept: PHYSICAL THERAPY | Facility: CLINIC | Age: 87
End: 2022-01-27
Payer: MEDICARE

## 2022-01-27 DIAGNOSIS — S72.21XG CLOSED DISPLACED SUBTROCHANTERIC FRACTURE OF RIGHT FEMUR WITH DELAYED HEALING, SUBSEQUENT ENCOUNTER: Primary | ICD-10-CM

## 2022-01-27 PROCEDURE — 97110 THERAPEUTIC EXERCISES: CPT | Performed by: PHYSICAL THERAPIST

## 2022-01-27 PROCEDURE — 97530 THERAPEUTIC ACTIVITIES: CPT | Performed by: PHYSICAL THERAPIST

## 2022-01-27 NOTE — PROGRESS NOTES
Daily Note     Today's date: 2022  Patient name: Dena Smtih  : 1932  MRN: [id number]  Referring provider: Robin Tiwari DO  Dx:   Encounter Diagnosis     ICD-10-CM    1  Closed displaced subtrochanteric fracture of right femur with delayed healing, subsequent encounter  S72  21XG                   Subjective: Pt notes that her stomach has been bothering her all morning  She reports that the exercises have been going okay at home  Objective: See treatment diary below      Assessment: Tolerated treatment well  Pt needed frequent rest breaks throughout session due to feeling out of breath  She had difficulty with toe tap exercise due to loss of balance, needed moderate A during from PT  PT educated pt on safety in her home and ways to decrease risk of falls, she verbalized understanding  Patient demonstrated fatigue post treatment and would benefit from continued PT to address current limitations  Plan: Continue per plan of care  Precautions: COPD, stage 3 kidney disease, FALL RISK, LATEX allergy, slow progressions- pt gets SOB quickly     Daily Treatment Diary:      Initial Evaluation Date: 22  Compliance                    Visit Number 1 2                   Re-Eval  JOSÉ MIGUEL -                [facility]   Foto Captured Y -                                             Manual                                                                                        Ther-Ex                      New step-warm up   nv                   Seated marches HEP  #1 15x ea                   Seated hip add   HEP  -                   LAQ HEP #1 15x            Seated hip abduction HEP  standing at bar 10x ea                   Seated HS stretch   3x30"                   Standing HR   2x10                   Mini squats   10x                    SLR- flex  -                   HS stretch  4x20"           Standing HS curl  - Neuro Re-Ed                      Step and reach                      Balance- FT on foam             Balance- tandem                                       Ther-Act              step ups   toe tap 6" 10x ea mod A                   Sit to stand  10x                         Modalities

## 2022-02-01 ENCOUNTER — OFFICE VISIT (OUTPATIENT)
Dept: PHYSICAL THERAPY | Facility: CLINIC | Age: 87
End: 2022-02-01
Payer: MEDICARE

## 2022-02-01 DIAGNOSIS — S72.21XG CLOSED DISPLACED SUBTROCHANTERIC FRACTURE OF RIGHT FEMUR WITH DELAYED HEALING, SUBSEQUENT ENCOUNTER: Primary | ICD-10-CM

## 2022-02-01 PROCEDURE — 97530 THERAPEUTIC ACTIVITIES: CPT | Performed by: PHYSICAL THERAPIST

## 2022-02-01 PROCEDURE — 97110 THERAPEUTIC EXERCISES: CPT | Performed by: PHYSICAL THERAPIST

## 2022-02-01 NOTE — PROGRESS NOTES
Daily Note     Today's date: 2022  Patient name: Elena Smith  : 1932  MRN: [id number]  Referring provider: Paul Sheffield DO  Dx:   Encounter Diagnosis     ICD-10-CM    1  Closed displaced subtrochanteric fracture of right femur with delayed healing, subsequent encounter  S72  21XG                   Subjective: Pt notes that her leg has been feeling good, denies any pain  She used the wheelchair to come to therapy today because she was so short of breath  Objective: See treatment diary below      Assessment: Tolerated treatment well  Pt needed min  A with step up exercise and hip abduction  She was able to perform step up exercise on 4" block easier than the 6" toe taps, will continue in future sessions  Will introduce balance exercises nv  She needed frequent rest breaks throughout session due to feeling out of breath  Patient demonstrated fatigue post treatment and would benefit from continued PT to address current limitations  Plan: Continue per plan of care  Precautions: COPD, stage 3 kidney disease, FALL RISK, LATEX allergy, slow progressions- pt gets SOB quickly     Daily Treatment Diary:      Initial Evaluation Date: 22  Compliance                  Visit Number 1 2  3                 Re-Eval  IE -  -              MC   Foto Captured Y -  -                                         Manual                                                                                        Ther-Ex                      New step-warm up   nv  held                 Seated marches HEP  #1 15x ea  #1 20x                 Seated hip add   HEP  -                   LAQ HEP #1 15x  #1 2x10          Seated hip abduction HEP  standing at bar 10x ea  standing 10x ea                 Seated HS stretch   3x30"  3x30"                 Standing HR   2x10  2x10                 Mini squats   10x   15x                  SLR- flex  -  -                 Standing HS curl  - 10x ea Neuro Re-Ed                      Step and reach                      Balance- FT on foam             Balance- tandem                                       Ther-Act              step ups   toe tap 6" 10x ea mod A  4" 15x                  Sit to stand  10x 10x                         Modalities

## 2022-02-03 ENCOUNTER — OFFICE VISIT (OUTPATIENT)
Dept: PHYSICAL THERAPY | Facility: CLINIC | Age: 87
End: 2022-02-03
Payer: MEDICARE

## 2022-02-03 DIAGNOSIS — S72.21XG CLOSED DISPLACED SUBTROCHANTERIC FRACTURE OF RIGHT FEMUR WITH DELAYED HEALING, SUBSEQUENT ENCOUNTER: Primary | ICD-10-CM

## 2022-02-03 PROCEDURE — 97530 THERAPEUTIC ACTIVITIES: CPT | Performed by: PHYSICAL THERAPIST

## 2022-02-03 PROCEDURE — 97110 THERAPEUTIC EXERCISES: CPT | Performed by: PHYSICAL THERAPIST

## 2022-02-03 NOTE — PROGRESS NOTES
Daily Note     Today's date: 2/3/2022  Patient name: Julio C Smith  : 1932  MRN: [id number]  Referring provider: Cece Jade DO  Dx:   Encounter Diagnosis     ICD-10-CM    1  Closed displaced subtrochanteric fracture of right femur with delayed healing, subsequent encounter  S72  21XG                   Subjective: Pt notes that her toes were sore after last session  Denies any pain in her leg  Objective: See treatment diary below      Assessment: Tolerated treatment well  Pt needed frequent rest breaks due to feeling out of breath  She continues to be nervous to put full body weight through her R LE  PT educated pt on ways to work on confidence and safety techniques to help her ambulate in her home  Added #1 weight for seated exercises, she did with minimal difficulty  Patient exhibited good technique with therapeutic exercises and would benefit from continued PT to address current limitations  Plan: Continue per plan of care  Precautions: COPD, stage 3 kidney disease, FALL RISK, LATEX allergy, slow progressions- pt gets SOB quickly     Daily Treatment Diary:      Initial Evaluation Date: 22  Compliance 1/25  1/27  2/1  2/3               Visit Number 1 2  3  4               Re-Eval  IE -  -  -            MC   Foto Captured Y -  -  -                      1/25  1/27  2/1  2/3               Manual                                                                                        Ther-Ex                      New step-warm up   nv  held  -               Seated marches HEP  #1 15x ea  #1 20x  #1 20x                Seated hip add   HEP  -                   LAQ HEP #1 15x  #1 2x10 #1 20x          Seated hip abduction HEP  standing at bar 10x ea  standing 10x ea  standing 10x ea               Seated HS stretch   3x30"  3x30"  3x30"               Standing HR   2x10  2x10  2x10               Mini squats   10x   15x   15x               SLR- flex  -  -                 Standing HS curl  - 10x ea 10x ea                                                                                            Neuro Re-Ed                      Step and reach                      Balance- FT on foam             Balance- tandem                                       Ther-Act              step ups   toe tap 6" 10x ea mod A  4" 15x   4" 15x                Sit to stand  10x 10x  10x                       Modalities

## 2022-02-08 ENCOUNTER — OFFICE VISIT (OUTPATIENT)
Dept: PHYSICAL THERAPY | Facility: CLINIC | Age: 87
End: 2022-02-08
Payer: MEDICARE

## 2022-02-08 DIAGNOSIS — S72.21XG CLOSED DISPLACED SUBTROCHANTERIC FRACTURE OF RIGHT FEMUR WITH DELAYED HEALING, SUBSEQUENT ENCOUNTER: Primary | ICD-10-CM

## 2022-02-08 PROCEDURE — 97110 THERAPEUTIC EXERCISES: CPT | Performed by: PHYSICAL THERAPIST

## 2022-02-08 PROCEDURE — 97112 NEUROMUSCULAR REEDUCATION: CPT | Performed by: PHYSICAL THERAPIST

## 2022-02-08 NOTE — PROGRESS NOTES
Daily Note     Today's date: 2022  Patient name: Aleena Smith  : 1932  MRN: [id number]  Referring provider: Ada Vasquez DO  Dx:   Encounter Diagnosis     ICD-10-CM    1  Closed displaced subtrochanteric fracture of right femur with delayed healing, subsequent encounter  S72  21XG                   Subjective: Pt notes that her L leg was a little sore after last session  Objective: See treatment diary below      Assessment: Tolerated treatment well  Pt continues to fatigue quickly, but was able to perform all exercises without difficulty  She continues to have some fear with putting full weight through R LE, but is improving  Her breathing continues to be her biggest limitation  Pt needed min  A during balance exercises; will continue to progress exercises as see fit  Patient would benefit from continued PT to address current limitations  Plan: Continue per plan of care  Precautions: COPD, stage 3 kidney disease, FALL RISK, LATEX allergy, slow progressions- pt gets SOB quickly     Daily Treatment Diary:      Initial Evaluation Date: 22  Compliance 1/25  1/27  2/1  2/3  2/8             Visit Number 1 2  3  4  5             Re-Eval  IE -  -  -  -          MC   Foto Captured Y -  -  - -                    1/25  1/27  2/1  2/3  2/8             Manual                                                                                        Ther-Ex                      Seated marches HEP  #1 15x ea  #1 20x  #1 20x   standing march 15x ea             Seated hip add   HEP  -      2x10              LAQ HEP #1 15x  #1 2x10 #1 20x  #1 5 20x        Seated hip abduction HEP  standing at bar 10x ea  standing 10x ea  standing 10x ea standing 10x ea             Seated HS stretch   3x30"  3x30"  3x30"  3x30"             Standing HR   2x10  2x10  2x10  2x10             Mini squats   10x   15x   15x  -             SLR- flex  -  -    -             Standing HS curl  - 10x ea 10x ea 20x on R Neuro Re-Ed                      Step and reach                      Balance- FT on foam     2x30" min  A        Balance- tandem     2x30" ea min   A                                  Ther-Act              step ups   toe tap 6" 10x ea mod A  4" 15x   4" 15x   4" 20x             Sit to stand  10x 10x  10x 10x                      Modalities

## 2022-02-10 ENCOUNTER — OFFICE VISIT (OUTPATIENT)
Dept: PHYSICAL THERAPY | Facility: CLINIC | Age: 87
End: 2022-02-10
Payer: MEDICARE

## 2022-02-10 DIAGNOSIS — S72.21XG CLOSED DISPLACED SUBTROCHANTERIC FRACTURE OF RIGHT FEMUR WITH DELAYED HEALING, SUBSEQUENT ENCOUNTER: Primary | ICD-10-CM

## 2022-02-10 PROCEDURE — 97110 THERAPEUTIC EXERCISES: CPT | Performed by: PHYSICAL THERAPIST

## 2022-02-10 PROCEDURE — 97530 THERAPEUTIC ACTIVITIES: CPT | Performed by: PHYSICAL THERAPIST

## 2022-02-10 NOTE — PROGRESS NOTES
Daily Note     Today's date: 2/10/2022  Patient name: Trip Smith  : 1932  MRN: [id number]  Referring provider: Tarik Edward DO  Dx:   Encounter Diagnosis     ICD-10-CM    1  Closed displaced subtrochanteric fracture of right femur with delayed healing, subsequent encounter  S72  21XG                   Subjective: Pt states that she did not feel well this morning, but once she took her meds she felt better  States that her L leg was a little sore after last session  Objective: See treatment diary below      Assessment: Tolerated treatment well  Pt needed frequent rest breaks due to feeling out of breath, but was able to complete increased reps  Today  Pt continues to have some concern with putting full weight through her R LE, but improves with constant cueing from PT  She was able to perform toe taps with less difficulty or loss of balance today  Continue to progress as see fit  Patient demonstrated fatigue post treatment and would benefit from continued PT to address current limitations  Plan: Continue per plan of care  Precautions: COPD, stage 3 kidney disease, FALL RISK, LATEX allergy, slow progressions- pt gets SOB quickly     Daily Treatment Diary:      Initial Evaluation Date: 22  Compliance 1/25  1/27  2/1  2/3  2/8  2/10           Visit Number 1 2  3  4  5  6           Re-Eval  IE -  -  -  -  -        MC   Foto Captured Y -  -  - -  -                  1/25  1/27  2/1  2/3  2/8  2/10           Manual                                                                                        Ther-Ex                      Seated march HEP  #1 15x ea  #1 20x  #1 20x   standing march 15x ea  standing  15x ea           Seated hip add   HEP  -      2x10   2x10           LAQ HEP #1 15x  #1 2x10 #1 20x  #1 5 20x #1 5 20x        Seated hip abduction HEP  standing at bar 10x ea  standing 10x ea  standing 10x ea standing 10x ea  standing 15x ea           Seated HS stretch 3x30"  3x30"  3x30"  3x30" 3x30"           Standing HR   2x10  2x10  2x10  2x10  2x10           Mini squats   10x   15x   15x  -  15x           SLR- flex  -  -    -  -           Standing HS curl  - 10x ea 10x ea 20x on R 20x BL                                                                                           Neuro Re-Ed                      Step and reach                      Balance- FT on foam     2x30" min  A -       Balance- tandem     2x30" ea min   A -                                 Ther-Act              step ups   toe tap 6" 10x ea mod A  4" 15x   4" 15x   4" 20x  toe taps 6" 15x ea           Sit to stand  10x 10x  10x 10x 10x                      Modalities

## 2022-02-15 ENCOUNTER — OFFICE VISIT (OUTPATIENT)
Dept: PHYSICAL THERAPY | Facility: CLINIC | Age: 87
End: 2022-02-15
Payer: MEDICARE

## 2022-02-15 DIAGNOSIS — S72.21XG CLOSED DISPLACED SUBTROCHANTERIC FRACTURE OF RIGHT FEMUR WITH DELAYED HEALING, SUBSEQUENT ENCOUNTER: Primary | ICD-10-CM

## 2022-02-15 PROCEDURE — 97110 THERAPEUTIC EXERCISES: CPT | Performed by: PHYSICAL THERAPIST

## 2022-02-15 PROCEDURE — 97530 THERAPEUTIC ACTIVITIES: CPT | Performed by: PHYSICAL THERAPIST

## 2022-02-15 NOTE — PROGRESS NOTES
Daily Note     Today's date: 2/15/2022  Patient name: Elvira Smith  : 1932  MRN: [id number]  Referring provider: Ronn Jones DO  Dx:   Encounter Diagnosis     ICD-10-CM    1  Closed displaced subtrochanteric fracture of right femur with delayed healing, subsequent encounter  S72  21XG                   Subjective: Pt notes that her R calf has been tighter than normal, but otherwise feeling about the same  Objective: See treatment diary below      Assessment: Tolerated treatment fair  Pt needed more rest breaks due to fatigue and SOB today  Held some standing exercises, will resume nv  Pt had more difficulty performing sit to stand exercise today, needed moderate cueing for proper form  Will continue to address in future sessions  Patient demonstrated fatigue post treatment and would benefit from continued PT to address deficits  Plan: Continue per plan of care  Precautions: COPD, stage 3 kidney disease, FALL RISK, LATEX allergy, slow progressions- pt gets SOB quickly     Daily Treatment Diary:      Initial Evaluation Date: 22  Compliance 1/25  1/27  2/1  2/3  2/8  2/10  2/15         Visit Number 1 2  3  4  5  6  7         Re-Eval  IE -  -  -  -  -  -      MC   Foto Captured Y -  -  - -  -  -                1/25  1/27  2/1  2/3  2/8  2/10  2/15         Manual                                                                                        Ther-Ex                      Seated marches HEP  #1 15x ea  #1 20x  #1 20x   standing march 15x ea  standing  15x ea  standing  15x ea         Seated hip add   HEP  -      2x10   2x10  2x10         LAQ HEP #1 15x  #1 2x10 #1 20x  #1 5 20x #1 5 20x  #2 20x       Seated hip abduction HEP  standing at bar 10x ea  standing 10x ea  standing 10x ea standing 10x ea  standing 15x ea  standing 15x ea         Seated HS stretch   3x30"  3x30"  3x30"  3x30" 3x30"  3x30"         Standing HR   2x10  2x10  2x10  2x10  2x10  2x10         Mini squats   10x   15x   15x  -  15x  -         SLR- flex  -  -    -  -  -         Standing HS curl  - 10x ea 10x ea 20x on R 20x BL  -                                                                                         Neuro Re-Ed                      Step and reach                      Balance- FT on foam     2x30" min  A - -      Balance- tandem     2x30" ea min   A - -                                Ther-Act              step ups   toe tap 6" 10x ea mod A  4" 15x   4" 15x   4" 20x  toe taps 6" 15x ea  held         Sit to stand  10x 10x  10x 10x 10x  12x      Lateral stepping        At bar 20' x2       Modalities

## 2022-02-17 ENCOUNTER — APPOINTMENT (OUTPATIENT)
Dept: PHYSICAL THERAPY | Facility: CLINIC | Age: 87
End: 2022-02-17
Payer: MEDICARE

## 2022-02-22 ENCOUNTER — OFFICE VISIT (OUTPATIENT)
Dept: PHYSICAL THERAPY | Facility: CLINIC | Age: 87
End: 2022-02-22
Payer: MEDICARE

## 2022-02-22 DIAGNOSIS — S72.21XG CLOSED DISPLACED SUBTROCHANTERIC FRACTURE OF RIGHT FEMUR WITH DELAYED HEALING, SUBSEQUENT ENCOUNTER: Primary | ICD-10-CM

## 2022-02-22 PROCEDURE — 97110 THERAPEUTIC EXERCISES: CPT | Performed by: PHYSICAL THERAPIST

## 2022-02-22 PROCEDURE — 97112 NEUROMUSCULAR REEDUCATION: CPT | Performed by: PHYSICAL THERAPIST

## 2022-02-22 NOTE — PROGRESS NOTES
Daily Note     Today's date: 2022  Patient name: Iris Smith  : 1932  MRN: [id number]  Referring provider: Rama Hendricks DO  Dx:   Encounter Diagnosis     ICD-10-CM    1  Closed displaced subtrochanteric fracture of right femur with delayed healing, subsequent encounter  S72  21XG                   Subjective: "I was not feeling well this morning, but after taking my meds  I am feeling a little better "      Objective: See treatment diary below      Assessment: Tolerated treatment well  Pt was hailey to perform more exercise today with less rest breaks between  She continues to have some fear with putting full body weight through her R LE, but is improving  Held sit to stands and balance exercise today due to patient feeling tired, will resume nv  Patient would benefit from continued PT to address current limitations  Plan: Continue per plan of care  Precautions: COPD, stage 3 kidney disease, FALL RISK, LATEX allergy, slow progressions- pt gets SOB quickly     Daily Treatment Diary:      Initial Evaluation Date: 22  Compliance 1/25  1/27  2/1  2/3  2/8  2/10  2/15  2/22       Visit Number 1 2  3  4  5  6  7  8       Re-Eval  IE -  -  -  -  -  -  -    MC   Foto Captured Y -  -  - -  -  -  -              1/25  1/27  2/1  2/3  2/8  2/10  2/15  2/22       Manual                                                                                        Ther-Ex                      Seated marches HEP  #1 15x ea  #1 20x  #1 20x   standing march 15x ea  standing march 15x ea  standing  15x ea  standing  15x ea       Seated hip add   HEP  -      2x10   2x10  2x10  2x10       LAQ HEP #1 15x  #1 2x10 #1 20x  #1 5 20x #1 5 20x  #2 20x  #2 20x      Seated hip abduction HEP  standing at bar 10x ea  standing 10x ea  standing 10x ea standing 10x ea  standing 15x ea  standing 15x ea standing 15x ea       Seated HS stretch   3x30"  3x30"  3x30"  3x30" 3x30"  3x30"  3x30"        Standing HR 2x10  2x10  2x10  2x10  2x10  2x10  2x10       Mini squats   10x   15x   15x  -  15x  -  15x       SLR- flex  -  -    -  -  -  -       Standing HS curl  - 10x ea 10x ea 20x on R 20x BL  - -                                                                                        Neuro Re-Ed                      Step and reach                      Balance- FT on foam     2x30" min  A - - -     Balance- tandem     2x30" ea min   A - - -                               Ther-Act              step ups   toe tap 6" 10x ea mod A  4" 15x   4" 15x   4" 20x  toe taps 6" 15x ea  held  lateral step ups 15x 4"       Sit to stand  10x 10x  10x 10x 10x  12x np     Lateral stepping        At bar 20' x2 At bar 20" x 4      Modalities

## 2022-02-24 ENCOUNTER — EVALUATION (OUTPATIENT)
Dept: PHYSICAL THERAPY | Facility: CLINIC | Age: 87
End: 2022-02-24
Payer: MEDICARE

## 2022-02-24 DIAGNOSIS — S72.21XG CLOSED DISPLACED SUBTROCHANTERIC FRACTURE OF RIGHT FEMUR WITH DELAYED HEALING, SUBSEQUENT ENCOUNTER: Primary | ICD-10-CM

## 2022-02-24 PROCEDURE — 97530 THERAPEUTIC ACTIVITIES: CPT | Performed by: PHYSICAL THERAPIST

## 2022-02-24 PROCEDURE — 97110 THERAPEUTIC EXERCISES: CPT | Performed by: PHYSICAL THERAPIST

## 2022-02-24 NOTE — PROGRESS NOTES
Re-Evaluation     Today's date: 2022  Patient name: Fransisco Smith  : 1932  MRN: [id number]  Referring provider: Manuel Alcocer DO  Dx:   Encounter Diagnosis     ICD-10-CM    1  Closed displaced subtrochanteric fracture of right femur with delayed healing, subsequent encounter  S72  21XG                      Assessment  Assessment details: Fransisco Smith has been compliant with attending PT and HEP since initial eval  Fransisco Smith has made improvements in objective data since IE but is still limited compared to normal  TUG and 5x STS testing was improved from IE which decreases her risk for falls  She continues to have strength deficits in R LE compared to contralateral side, balance deficits, and poor endurance which limits her ability to perform ADL's  [facility] would benefit from additional skilled PT to address these deficits to return to PLOF  Impairments: abnormal gait, abnormal muscle tone, activity intolerance, impaired physical strength and lacks appropriate home exercise program    Symptom irritability: moderateUnderstanding of Dx/Px/POC: excellent  Goals  ST  Pt will improve hip and knee strength in R LE by 1/2 grade in 3 weeks  MET  2  Pt will be able to ambulate 100 feet without having to rest throughout in 3 weeks  IN PROGRESS (continues to get very SOB with walking)  3  Pt will report no falls in the next three weeks  MET    LT  Pt will ambulate >150 feet without having to rest throughout in 6 weeks  -NOT MET  2  Pt will decrease TUG score >15 second to reduce the risk of falls in 6 weeks  IN PROGRESS   3   Pt will be I with comprehensive HEP in 6 weeks  - NOT MET      Plan  Patient would benefit from: skilled physical therapy  Planned therapy interventions: manual therapy, massage, abdominal trunk stabilization, neuromuscular re-education, patient education, balance, balance/weight bearing training, strengthening, stretching, therapeutic activities, therapeutic exercise, flexibility, gait training and home exercise program  Frequency: 2x week  Duration in weeks: 4  Treatment plan discussed with: patient        Subjective Evaluation    History of Present Illness  Mechanism of injury: Pt reports improvement in her strength of her R leg since starting PT  She is able to get around her home a little easier since starting PT  She feels that her R leg still fatigues quicker than her L leg, but is improving each week  She continues to be fearful to fall, but has not fallen the last few weeks  Her breathing is about the same, states that it is more difficult when she is moving around or doing activity  She continues to lose weight which is a concern, but is going to talk to her doctor about it         Quality of life: excellent    Pain  Current pain ratin  At best pain ratin  At worst pain ratin  Aggravating factors: standing, walking and stair climbing    Social Support  Steps to enter house: yes (1 step)  Stairs in house: no   Lives in: [facility]  Lives with: alone    Employment status: not working    Diagnostic Tests  X-ray: abnormal (not healing well)  Treatments  Previous treatment: physical therapy  Patient Goals  Patient goals for therapy: increased strength and independence with ADLs/IADLs  Patient goal: "I want to be able to walk better and get stronger "        Objective     Tenderness     Additional Tenderness Details  No tenderness noted in thigh or R hip    Neurological Testing     Sensation     Hip   Left Hip   Intact: light touch    Right Hip   Intact: light touch    Knee   Left Knee   Intact: light touch    Right Knee   Intact: light touch     Ankle/Foot   Left Ankle/Foot   Diminished: light touch    Right Ankle/Foot   Diminished: light touch    Active Range of Motion   Left Hip   Normal active range of motion    Right Hip   Normal active range of motion  Left Knee   Normal active range of motion    Right Knee   Normal active range of motion    Strength/Myotome Testing     Left Hip   Planes of Motion   Flexion: 4  Abduction: 4  Adduction: 4  External rotation: 4  Internal rotation: 4    Right Hip   Planes of Motion   Flexion: 4-  Abduction: 4  Adduction: 4  External rotation: 4  Internal rotation: 4    Left Knee   Flexion: 4  Extension: 4  Quadriceps contraction: fair    Right Knee   Flexion: 4  Extension: 4  Quadriceps contraction: fair    Left Ankle/Foot   Dorsiflexion: 4  Plantar flexion: 4  Inversion: 4  Eversion: 4    Right Ankle/Foot   Dorsiflexion: 4  Plantar flexion: 4  Inversion: 4  Eversion: 4    Ambulation     Ambulation: Level Surfaces   Ambulation with assistive device: independent (uses a rolling walker to ambulate, will walk without assistive device at home)    Observational Gait   Gait: antalgic   Decreased walking speed and stride length  Functional Assessment        Comments  5x STS: 24 seconds from 35 seconds at IE      TU seconds without use of walker   Needed one hand for assistance from PT      SPO2 prior to testin%  SPO2 following testin%, back up to 98% within a minute of rest      Unable to perform SL balance on either leg without loss of balance- needed one hand for assistance   Unable to perform tandem balance without holding on with one hand  Pt could perform FT on firm ground for >10 seconds without loss of balance            Precautions: COPD, stage 3 kidney disease, FALL RISK, LATEX allergy, slow progressions- pt gets SOB quickly     Daily Treatment Diary:      Initial Evaluation Date: 22  Compliance 1/25  1/27  2/1  2/3  2/8  2/10  2/15  2/22  2/24     Visit Number 1 2  3  4  5  6  7  8  9     Re-Eval  IE -  -  -  -  -  -  -  Y  MC   Foto Captured Y -  -  - -  -  -  -  Y              2/3  2/8  2/10  2/15  2/22  2/24     Manual                                                                                        Ther-Ex                      Seated izzy HEP  #1 15x ea  #1 20x  #1 20x   standing march 15x ea  standing marches 15x ea  standing marches 15x ea  standing marches 15x ea  standing #2 5      Seated hip add  HEP  -      2x10   2x10  2x10  2x10  25x     LAQ HEP #1 15x  #1 2x10 #1 20x  #1 5 20x #1 5 20x  #2 20x  #2 20x  #2 5 20x     Seated hip abduction HEP  standing at bar 10x ea  standing 10x ea  standing 10x ea standing 10x ea  standing 15x ea  standing 15x ea standing 15x ea  standing 10x #2 5      Seated HS stretch   3x30"  3x30"  3x30"  3x30" 3x30"  3x30"  3x30"   3x30"     Standing HR   2x10  2x10  2x10  2x10  2x10  2x10  2x10  2x10     Mini squats   10x   15x   15x  -  15x  -  15x  15x     SLR- flex  -  -    -  -  -  -  -     Standing HS curl  - 10x ea 10x ea 20x on R 20x BL  - - 10x BL                                                                                       Neuro Re-Ed                      Step and reach                      Balance- FT on foam     2x30" min  A - - - 2x30" 1 HHA    Balance- tandem     2x30" ea min   A - - - 2x30" 1 HHA                              Ther-Act              step ups   toe tap 6" 10x ea mod A  4" 15x   4" 15x   4" 20x  toe taps 6" 15x ea  held  lateral step ups 15x 4"  lateral step ups 15x 4"     Sit to stand  10x 10x  10x 10x 10x  12x np 5x    Lateral stepping        At bar 20' x2 At bar 20" x 4 np     Modalities

## 2022-02-28 NOTE — ED NOTES
Chart reviewed. Pt visited. Reviewed rationale and guidelines for low sodium diet. Discussed ways to reduce sodium, possible need for fluid restriction, and daily weight monitoring. Answered all nutrition related questions and concerns. Provided heart failure nutrition therapy handout from [facility] with RD contact information. Pt verbalized good understanding of information discussed. Education complete at this time. Will follow per policy. Of note, pt has been seen by RD in the past and has been referred to outpatient nutrition counseling and R weight loss program. Pt and pt's wife expressed interest in following up with these services, contact information provided.    Pt called caregiver to pick her up         Sury Acosta RN  02/14/20 [phone]

## 2022-03-01 ENCOUNTER — OFFICE VISIT (OUTPATIENT)
Dept: PHYSICAL THERAPY | Facility: CLINIC | Age: 87
End: 2022-03-01
Payer: MEDICARE

## 2022-03-01 DIAGNOSIS — S72.21XG CLOSED DISPLACED SUBTROCHANTERIC FRACTURE OF RIGHT FEMUR WITH DELAYED HEALING, SUBSEQUENT ENCOUNTER: Primary | ICD-10-CM

## 2022-03-01 PROCEDURE — 97530 THERAPEUTIC ACTIVITIES: CPT | Performed by: PHYSICAL THERAPIST

## 2022-03-01 PROCEDURE — 97110 THERAPEUTIC EXERCISES: CPT | Performed by: PHYSICAL THERAPIST

## 2022-03-01 NOTE — PROGRESS NOTES
Daily Note     Today's date: 3/1/2022  Patient name: Lola Smith  : 1932  MRN: [id number]  Referring provider: Terra Hernandez DO  Dx:   Encounter Diagnosis     ICD-10-CM    1  Closed displaced subtrochanteric fracture of right femur with delayed healing, subsequent encounter  S72  21XG                   Subjective: Pt notes that she was reaching up in her shower this morning and thinks she pulled a muscle in her back  Objective: See treatment diary below      Assessment: Tolerated treatment well  Pt did well with standing exercises today, needed less breaks due to fatigue  She continues to be fearful of falling, needed 1 HHA during balance exercises  Applied MH to low back during session, she felt relief following  Patient demonstrated fatigue post treatment and would benefit from continued PT to address current limitations  Plan: Continue per plan of care  Precautions: COPD, stage 3 kidney disease, FALL RISK, LATEX allergy, slow progressions- pt gets SOB quickly     Daily Treatment Diary:      Initial Evaluation Date: 22  Compliance   2/3  2/8  2/10  2/15  2/22  2/24  3/1   Visit Number 1 2  3  4  5  6  7  8  9  10   Re-Eval  IE -  -  -  -  -  -  -  Y -   Foto Captured Y -  -  - -  -  -  -  Y  -            2/3  2/8  2/10  2/15  2/22  2/24  3/1   Manual                                                                                        Ther-Ex                      Seated marches HEP  #1 15x ea  #1 20x  #1 20x   standing march 15x ea  standing marches 15x ea  standing marches 15x ea  standing march 15x ea  standing #2 5   standing #2 5   Seated hip add   HEP  -      2x10   2x10  2x10  2x10  25x 30x   LAQ HEP #1 15x  #1 2x10 #1 20x  #1 5 20x #1 5 20x  #2 20x  #2 20x  #2 5 20x  #2 5 20x   Seated hip abduction HEP  standing at bar 10x ea  standing 10x ea  standing 10x ea standing 10x ea  standing 15x ea  standing 15x ea standing 15x ea  standing 10x #2 5   standing 10x #2 5   Seated HS stretch   3x30"  3x30"  3x30"  3x30" 3x30"  3x30"  3x30"   3x30"  3x30"    Standing HR   2x10  2x10  2x10  2x10  2x10  2x10  2x10  2x10  2x10   Mini squats   10x   15x   15x  -  15x  -  15x  15x  15x   SLR- flex  -  -    -  -  -  -  -     Standing HS curl  - 10x ea 10x ea 20x on R 20x BL  - - 10x BL -                                                                                      Neuro Re-Ed                      Step and reach                      Balance- FT on foam     2x30" min  A - - - 2x30" 1 HHA -   Balance- tandem     2x30" ea min   A - - - 2x30" 1 HHA 2x30" 1 HHA                             Ther-Act              step ups   toe tap 6" 10x ea mod A  4" 15x   4" 15x   4" 20x  toe taps 6" 15x ea  held  lateral step ups 15x 4"  lateral step ups 15x 4"  lateral step ups 15x 4"   Sit to stand  10x 10x  10x 10x 10x  12x np 5x 10x   Lateral stepping        At bar 20' x2 At bar 20" x 4 np -    Modalities

## 2022-03-03 ENCOUNTER — OFFICE VISIT (OUTPATIENT)
Dept: PHYSICAL THERAPY | Facility: CLINIC | Age: 87
End: 2022-03-03
Payer: MEDICARE

## 2022-03-03 DIAGNOSIS — S72.21XG CLOSED DISPLACED SUBTROCHANTERIC FRACTURE OF RIGHT FEMUR WITH DELAYED HEALING, SUBSEQUENT ENCOUNTER: Primary | ICD-10-CM

## 2022-03-03 PROCEDURE — 97110 THERAPEUTIC EXERCISES: CPT | Performed by: PHYSICAL THERAPIST

## 2022-03-03 PROCEDURE — 97112 NEUROMUSCULAR REEDUCATION: CPT | Performed by: PHYSICAL THERAPIST

## 2022-03-03 NOTE — PROGRESS NOTES
Daily Note     Today's date: 3/3/2022  Patient name: Roger Smith  : 1932  MRN: [id number]  Referring provider: Genia Edmondson DO  Dx:   Encounter Diagnosis     ICD-10-CM    1  Closed displaced subtrochanteric fracture of right femur with delayed healing, subsequent encounter  S72  21XG                   Subjective: Pt notes feeling a little sore yesterday, but today she feels good  Objective: See treatment diary below      Assessment: Tolerated treatment well  Pt is progressing well towards her goals  She was able to perform step and reach balance exercise with only Poornima  She performed 10x sit to stands without assistance or pushing off from the chair  Patient demonstrated fatigue post treatment and would benefit from continued PT to address current limitations  Plan: Continue per plan of care  Precautions: COPD, stage 3 kidney disease, FALL RISK, LATEX allergy, slow progressions- pt gets SOB quickly     Daily Treatment Diary:      Initial Evaluation Date: 22  Compliance 3/3          3/1   Visit Number 11          10   Re-Eval  -         -   Foto Captured -          -          3/3          3/1   Manual                                                        Ther-Ex              Seated marches standing #2 5 15x          standing #2 5   Seated hip add   30x         30x   LAQ #2 5 25x         #2 5 20x   Seated hip abduction standing 15x ea          standing 10x #2 5   Seated HS stretch 3x30"          3x30"    Standing HR 2x10          2x10   Mini squats 15x           15x   Standing HS curl 15x          -                                                                      Neuro Re-Ed              Step and reach 10x ea Poornima             Balance- FT on foam 2x30" CG         -   Balance- tandem -         2x30" 1 HHA                             Ther-Act              step ups np          lateral step ups 15x 4"   Sit to stand 10x         10x   Lateral stepping  -         -    Modalities

## 2022-03-08 ENCOUNTER — OFFICE VISIT (OUTPATIENT)
Dept: PHYSICAL THERAPY | Facility: CLINIC | Age: 87
End: 2022-03-08
Payer: MEDICARE

## 2022-03-08 DIAGNOSIS — S72.21XG CLOSED DISPLACED SUBTROCHANTERIC FRACTURE OF RIGHT FEMUR WITH DELAYED HEALING, SUBSEQUENT ENCOUNTER: Primary | ICD-10-CM

## 2022-03-08 PROCEDURE — 97110 THERAPEUTIC EXERCISES: CPT | Performed by: PHYSICAL THERAPIST

## 2022-03-08 NOTE — PROGRESS NOTES
Daily Note     Today's date: 3/8/2022  Patient name: Jodie Smith  : 1932  MRN: [id number]  Referring provider: Anil Ledesma DO  Dx:   Encounter Diagnosis     ICD-10-CM    1  Closed displaced subtrochanteric fracture of right femur with delayed healing, subsequent encounter  S72  21XG                   Subjective: Pt notes that she has been able to walk in her home without the use of the walker  She still has to grab onto things at times, but it is improving  Objective: See treatment diary below      Assessment: Tolerated treatment well  Pt needed rest breaks throughout session today due to fatigue  She was able to do 8 sit to stands without assistance or holding on  Pt's strength is progressing well, her balance was improved today, not needing HHA to perform  Patient would benefit from continued PT to address current limitations  Plan: Continue per plan of care  Precautions: COPD, stage 3 kidney disease, FALL RISK, LATEX allergy, slow progressions- pt gets SOB quickly     Daily Treatment Diary:      Initial Evaluation Date: 22  Compliance 3/3 3/8         3/1   Visit Number 11 12         10   Re-Eval  -         -   Foto Captured -          -          3/3 3/8         3/1   Manual                                                        Ther-Ex              Seated marches standing #2 5 15x standing no weight 2x10         standing #2 5   Seated hip add   30x 30x        30x   LAQ #2 5 25x #2 5 25x        #2 5 20x   Seated hip abduction standing 15x ea standing 15x ea         standing 10x #2 5   Seated HS stretch 3x30" 3x30"          3x30"    Standing HR 2x10 2x10         2x10   Mini squats 15x  15x          15x   Standing HS curl 15x  -        -                                                                      Neuro Re-Ed              Step and reach 10x ea Poornima -            Balance- FT on foam 2x30" CG -        -   Balance- tandem - 2x30" ea no assistance        2x30" 1 HHA Ther-Act              step ups np -         lateral step ups 15x 4"   Sit to stand 10x 10x        10x   Lateral stepping  -         -    Modalities

## 2022-03-10 ENCOUNTER — APPOINTMENT (OUTPATIENT)
Dept: PHYSICAL THERAPY | Facility: CLINIC | Age: 87
End: 2022-03-10
Payer: MEDICARE

## 2022-03-15 ENCOUNTER — OFFICE VISIT (OUTPATIENT)
Dept: PHYSICAL THERAPY | Facility: CLINIC | Age: 87
End: 2022-03-15
Payer: MEDICARE

## 2022-03-15 DIAGNOSIS — S72.21XG CLOSED DISPLACED SUBTROCHANTERIC FRACTURE OF RIGHT FEMUR WITH DELAYED HEALING, SUBSEQUENT ENCOUNTER: Primary | ICD-10-CM

## 2022-03-15 PROCEDURE — 97110 THERAPEUTIC EXERCISES: CPT | Performed by: PHYSICAL THERAPIST

## 2022-03-15 NOTE — PROGRESS NOTES
Daily Note     Today's date: 3/15/2022  Patient name: Erna Smith  : 1932  MRN: [id number]  Referring provider: Vibha Sterling DO  Dx:   Encounter Diagnosis     ICD-10-CM    1  Closed displaced subtrochanteric fracture of right femur with delayed healing, subsequent encounter  S72  21XG                   Subjective: Pt notes that she is feeling pretty good this afternoon  Denies any pain in legs  Objective: See treatment diary below      Assessment: Tolerated treatment well  Tried step and reach today, she has less loss of balance, but still needed Poornima  Pt continues to fatigue quickly, but her strength is improving  She was able to do 10x sit to stands without assistance or a rest in between  Patient exhibited good technique with therapeutic exercises and would benefit from continued PT to address current limitations  Plan: Continue per plan of care  Precautions: COPD, stage 3 kidney disease, FALL RISK, LATEX allergy, slow progressions- pt gets SOB quickly     Daily Treatment Diary:      Initial Evaluation Date: 22  Compliance 3/3 3/8 3/15        3/1   Visit Number 11 12 13        10   Re-Eval  -         -   Foto Captured -          -          3/3 3/8 3/15        3/1   Manual                                                        Ther-Ex              Seated marches standing #2 5 15x standing no weight 2x10 standing #2 5 15x ea        standing #2 5   Seated hip add  30x 30x 30x       30x   LAQ #2 5 25x #2 5 25x #2 5 25x       #2 5 20x   Seated hip abduction standing 15x ea standing 15x ea standing 15x #2 5 ea         standing 10x #2 5   Seated HS stretch 3x30" 3x30"  3x30"         3x30"    Standing HR 2x10 2x10 2x10        2x10   Mini squats 15x  15x  2x10        15x   Standing HS curl 15x  - 15x #2 5 on R       -                                                                      Neuro Re-Ed              Step and reach 10x ea Poornima - 10x ea Poornima             Balance- FT on foam 2x30" CG - -       -   Balance- tandem - 2x30" ea no assistance -       2x30" 1 HHA                             Ther-Act              step ups np - -        lateral step ups 15x 4"   Sit to stand 10x 10x 10x       10x   Lateral stepping  -         -    Modalities

## 2022-03-17 ENCOUNTER — APPOINTMENT (OUTPATIENT)
Dept: PHYSICAL THERAPY | Facility: CLINIC | Age: 87
End: 2022-03-17
Payer: MEDICARE

## 2022-03-22 ENCOUNTER — OFFICE VISIT (OUTPATIENT)
Dept: PHYSICAL THERAPY | Facility: CLINIC | Age: 87
End: 2022-03-22
Payer: MEDICARE

## 2022-03-22 DIAGNOSIS — S72.21XG CLOSED DISPLACED SUBTROCHANTERIC FRACTURE OF RIGHT FEMUR WITH DELAYED HEALING, SUBSEQUENT ENCOUNTER: Primary | ICD-10-CM

## 2022-03-22 PROCEDURE — 97110 THERAPEUTIC EXERCISES: CPT | Performed by: PHYSICAL THERAPIST

## 2022-03-22 NOTE — PROGRESS NOTES
Daily Note     Today's date: 3/22/2022  Patient name: Dawna Smith  : 1932  MRN: [id number]  Referring provider: Corrie Vargas DO  Dx:   Encounter Diagnosis     ICD-10-CM    1  Closed displaced subtrochanteric fracture of right femur with delayed healing, subsequent encounter  S72  21XG                   Subjective: Pt notes that she has been getting some shooting pains the last week in her R leg, not sure why  Objective: See treatment diary below      Assessment: Tolerated treatment well  Pt needed more frequent rest breaks today due to fatigue  Held some exercises due to patient not feeling well today  Will resume full exercise diary next visit  Pt did well with #2 5lbs  on R leg, continue to progress as see fit  Patient demonstrated fatigue post treatment and would benefit from continued PT to address weakness in R LE  Plan: Continue per plan of care  Precautions: COPD, stage 3 kidney disease, FALL RISK, LATEX allergy, slow progressions- pt gets SOB quickly     Daily Treatment Diary:      Initial Evaluation Date: 22  Compliance 3/3 3/8 3/15 3/22       3/1   Visit Number 11 12 13 14       10   Re-Eval  -   -      -   Foto Captured -   -       -          3/3 3/8 3/15 3/22       3/1   Manual                                                        Ther-Ex              Seated marches standing #2 5 15x standing no weight 2x10 standing #2 5 15x ea Seated #2 5 20x ea       standing #2 5   Seated hip add   30x 30x 30x -      30x   LAQ #2 5 25x #2 5 25x #2 5 25x #2 5 25x      #2 5 20x   Seated hip abduction standing 15x ea standing 15x ea standing 15x #2 5 ea  standing 2x10 #15       standing 10x #2 5   Seated HS stretch 3x30" 3x30"  3x30"  3x30"       3x30"    Standing HR 2x10 2x10 2x10 2x10       2x10   Mini squats 15x  15x  2x10 2x10       15x   Standing HS curl 15x  - 15x #2 5 on R 2x10 #2 5 on R      -                                                                      Neuro Re-Ed Step and reach 10x ea Poornima - 10x ea Poornima  held          Balance- FT on foam 2x30" CG - - -      -   Balance- tandem - 2x30" ea no assistance - -      2x30" 1 HHA                             Ther-Act              step ups np - - -       lateral step ups 15x 4"   Sit to stand 10x 10x 10x 10x      10x   Lateral stepping  -         -    Modalities

## 2022-03-24 ENCOUNTER — EVALUATION (OUTPATIENT)
Dept: PHYSICAL THERAPY | Facility: CLINIC | Age: 87
End: 2022-03-24
Payer: MEDICARE

## 2022-03-24 DIAGNOSIS — S72.21XG CLOSED DISPLACED SUBTROCHANTERIC FRACTURE OF RIGHT FEMUR WITH DELAYED HEALING, SUBSEQUENT ENCOUNTER: Primary | ICD-10-CM

## 2022-03-24 PROCEDURE — 97110 THERAPEUTIC EXERCISES: CPT | Performed by: PHYSICAL THERAPIST

## 2022-03-24 PROCEDURE — 97112 NEUROMUSCULAR REEDUCATION: CPT | Performed by: PHYSICAL THERAPIST

## 2022-03-24 NOTE — PROGRESS NOTES
Daily Note/ DC Summary     Today's date: 3/24/2022  Patient name: Estefany Smith  : 1932  MRN: [id number]  Referring provider: Ca Corey DO  Dx:   Encounter Diagnosis     ICD-10-CM    1  Closed displaced subtrochanteric fracture of right femur with delayed healing, subsequent encounter  S72  21XG                         Assessment  Assessment details: Estefany Smith has been compliant with attending PT and HEP since initial eval  Estefany Smith has made improvements in objective data since IE and has maximized function  Patient is agreeable to d/c to HEP at this time, and will contact clinic with any exercise related questions or concerns as needed  Goals  ST  Pt will improve hip and knee strength in R LE by 1/2 grade in 3 weeks  MET  2  Pt will be able to ambulate 100 feet without having to rest throughout in 3 weeks  MET  3  Pt will report no falls in the next three weeks  MET    LT  Pt will ambulate >150 feet without having to rest throughout in 6 weeks  -IN PROGRESS (pt still gets SOB, needs to rest in between)  2  Pt will decrease TUG score >15 second to reduce the risk of falls in 6 weeks  IN PROGRESS   3  Pt will be I with comprehensive HEP in 6 weeks  -MET      Plan  Patient to be DC form formal PT today  Reviewed HEP with her today, no questions at this time  Subjective Evaluation    History of Present Illness  Mechanism of injury: Pt is happy with her progress thus far  She states that she feels stronger and more confident with her R leg  She has been walking around without a walker at home, using her furniture to hold onto  Pt states that she is still using her bone stimulator daily and thinks it is helping  Pt states that her breathing is still labored when she does activity and her stomach bothers her more so in the morning  Pt is comfortable transitioning into an HEP moving forward because it is a lot to get to therapy with people driving her         Quality of life: excellent    Pain  Current pain ratin  At best pain ratin  At worst pain ratin  Aggravating factors: standing, walking and stair climbing    Social Support  Steps to enter house: yes (1 step)  Stairs in house: no   Lives in: [facility]  Lives with: alone    Employment status: not working    Diagnostic Tests  X-ray: abnormal (not healing well)  Treatments  Previous treatment: physical therapy  Patient Goals  Patient goals for therapy: increased strength and independence with ADLs/IADLs  Patient goal: "I want to be able to walk better and get stronger "        Objective     Tenderness     Additional Tenderness Details  No tenderness noted in thigh or R hip    Neurological Testing     Sensation     Hip   Left Hip   Intact: light touch    Right Hip   Intact: light touch    Knee   Left Knee   Intact: light touch    Right Knee   Intact: light touch     Ankle/Foot   Left Ankle/Foot   Diminished: light touch    Right Ankle/Foot   Diminished: light touch    Active Range of Motion   Left Hip   Normal active range of motion    Right Hip   Normal active range of motion  Left Knee   Normal active range of motion    Right Knee   Normal active range of motion    Strength/Myotome Testing     Left Hip   Planes of Motion   Flexion: 4+  Abduction: 4  Adduction: 4+  External rotation: 4  Internal rotation: 4    Right Hip   Planes of Motion   Flexion: 4  Abduction: 4  Adduction: 4+  External rotation: 4  Internal rotation: 4    Left Knee   Flexion: 4  Extension: 4+  Quadriceps contraction: good    Right Knee   Flexion: 4  Extension: 4+  Quadriceps contraction: good    Left Ankle/Foot   Dorsiflexion: 4  Plantar flexion: 4  Inversion: 4  Eversion: 4    Right Ankle/Foot   Dorsiflexion: 4  Plantar flexion: 4  Inversion: 4  Eversion: 4    Ambulation     Ambulation: Level Surfaces   Ambulation with assistive device: independent (uses a rolling walker to ambulate, will walk without assistive device at home)    Observational Gait   Gait: antalgic   Decreased walking speed and stride length  Functional Assessment        Comments  5x STS: 22 seconds from 24 seconds from last re-eval     TU seconds without use of walker  Needed one hand for assistance from PT      SPO2 prior to testin%  SPO2 following testin%, back up to 98% within a minute of rest      Unable to perform SL balance on either leg without loss of balance- needed one hand for assistance   Able to perform tandem balance without assistance for >15 seconds  Pt could perform FT on firm ground for 30 seconds without loss of balance             Precautions: COPD, stage 3 kidney disease, FALL RISK, LATEX allergy, slow progressions- pt gets SOB quickly     Daily Treatment Diary:      Initial Evaluation Date: 22  Compliance 3/3 3/8 3/15 3/22 3/24      3/1   Visit Number 11 12 15 14 15      10   Re-Eval  -   - Y     -   Foto Captured -   - Y      -          3/3 3/8 3/15 3/22 3/24      3/1   Manual                                                        Ther-Ex              Seated marches standing #2 5 15x standing no weight 2x10 standing #2 5 15x ea Seated #2 5 20x ea Seated #2 5 20x ea      standing #2 5   Seated hip add   30x 30x 30x - 30x     30x   LAQ #2 5 25x #2 5 25x #2 5 25x #2 5 25x #2 5 25x      #2 5 20x   Seated hip abduction standing 15x ea standing 15x ea standing 15x #2 5 ea  standing 2x10 #2 5 standing 2x10 #2 5      standing 10x #2 5   Seated HS stretch 3x30" 3x30"  3x30"  3x30" 3x30"      3x30"    Standing HR 2x10 2x10 2x10 2x10 25x      2x10   Mini squats 15x  15x  2x10 2x10 2x10      15x   Standing HS curl 15x  - 15x #2 5 on R 2x10 #2 5 on R 2x10 #2 5 on R     -                                                                      Neuro Re-Ed              Step and reach 10x ea Poornima - 10x ea Poornima  held 10x ea Poornima         Balance- FT on foam 2x30" CG - - - -     -   Balance- tandem - 2x30" ea no assistance - - No assistance 2x 30"     2x30" 1 [facility] Ther-Act              step ups np - - -       lateral step ups 15x 4"   Sit to stand 10x 10x 10x 10x 10x     10x   Lateral stepping  -         -    Modalities

## 2022-03-29 ENCOUNTER — APPOINTMENT (OUTPATIENT)
Dept: PHYSICAL THERAPY | Facility: CLINIC | Age: 87
End: 2022-03-29
Payer: MEDICARE

## 2022-03-31 ENCOUNTER — APPOINTMENT (OUTPATIENT)
Dept: PHYSICAL THERAPY | Facility: CLINIC | Age: 87
End: 2022-03-31
Payer: MEDICARE

## 2022-04-04 ENCOUNTER — OFFICE VISIT (OUTPATIENT)
Dept: OBGYN CLINIC | Facility: CLINIC | Age: 87
End: 2022-04-04
Payer: MEDICARE

## 2022-04-04 ENCOUNTER — HOSPITAL ENCOUNTER (OUTPATIENT)
Dept: RADIOLOGY | Facility: CLINIC | Age: 87
Discharge: HOME | End: 2022-04-04
Payer: MEDICARE

## 2022-04-04 VITALS
HEIGHT: 65 IN | DIASTOLIC BLOOD PRESSURE: 72 MMHG | TEMPERATURE: 96.9 F | BODY MASS INDEX: 20.83 KG/M2 | WEIGHT: 125 LBS | SYSTOLIC BLOOD PRESSURE: 124 MMHG | HEART RATE: 50 BPM

## 2022-04-04 DIAGNOSIS — S72.21XG CLOSED DISPLACED SUBTROCHANTERIC FRACTURE OF RIGHT FEMUR WITH DELAYED HEALING, SUBSEQUENT ENCOUNTER: ICD-10-CM

## 2022-04-04 DIAGNOSIS — S72.21XG CLOSED DISPLACED SUBTROCHANTERIC FRACTURE OF RIGHT FEMUR WITH DELAYED HEALING, SUBSEQUENT ENCOUNTER: Primary | ICD-10-CM

## 2022-04-04 PROCEDURE — 99213 OFFICE O/P EST LOW 20 MIN: CPT | Performed by: ORTHOPAEDIC SURGERY

## 2022-04-04 PROCEDURE — 73552 X-RAY EXAM OF FEMUR 2/>: CPT

## 2022-04-04 NOTE — PROGRESS NOTES
ASSESSMENT/PLAN:    Diagnoses and all orders for this visit:    Closed displaced subtrochanteric fracture of right femur with delayed healing, subsequent encounter  -     XR femur 2 vw right; Future        Plan:  I would recommend follow-up as needed  X-rays demonstrate excellent progression of healing  I have encouraged her to contact me if any questions or concerns arise  She no longer needs to use the bone stim device  No follow-ups on file       _____________________________________________________  CHIEF COMPLAINT:  Chief Complaint   Patient presents with    Follow-up         SUBJECTIVE:  Umm Patel is a 80y o  year old female who presents for follow up of her right femoral fracture  She is doing well  She states she is now walking around the house without difficulty  If the weather is warm enough, she will go out on her deck or sylvie a without difficulty  She does get out of the house a little bit but tends to limit this in the colder weather  She denies any pain in her right thigh  PAST MEDICAL HISTORY:  Past Medical History:   Diagnosis Date    Cancer [facility])     skin cancer    Cardiac disease     Multiple falls     Neuropathy     Skin cancer     forehead    Spinal stenosis     UTI (urinary tract infection)        PAST SURGICAL HISTORY:  Past Surgical History:   Procedure Laterality Date    APPENDECTOMY      BACK SURGERY      x2    CATARACT EXTRACTION W/  INTRAOCULAR LENS IMPLANT Bilateral     CORONARY ANGIOPLASTY WITH STENT PLACEMENT  09/23/2011    [place] OPEN RX FEMUR FX+INTRAMED RASHIDA Right 5/4/2021    Procedure: INSERTION NAIL IM FEMUR ANTEGRADE (TROCHANTERIC); Surgeon: Candido Brown; Location: [facility];  Service: Orthopedics    REPLACEMENT TOTAL KNEE BILATERAL         FAMILY HISTORY:  History reviewed  No pertinent family history      SOCIAL HISTORY:  Social History     Tobacco Use    Smoking status: Never Smoker    Smokeless tobacco: Never Used   Vaping Use    Vaping Use: Never used   Substance Use Topics    Alcohol use: Yes     Comment: Occasionally    Drug use: Never       MEDICATIONS:    Current Outpatient Medications:     acetaminophen (TYLENOL) 325 mg tablet, Take 2 tablets (650 mg total) by mouth every 6 (six) hours as needed for mild pain, headaches or fever, Disp:  , Rfl: 0    albuterol (PROVENTIL HFA,VENTOLIN HFA) 90 mcg/act inhaler, Inhale 2 puffs every 6 (six) hours as needed for wheezing, Disp: , Rfl:     aspirin (ECOTRIN LOW STRENGTH) 81 mg EC tablet, Take 1 tablet (81 mg total) by mouth daily, Disp:  , Rfl: 0    cyanocobalamin (VITAMIN B-12) 100 MCG tablet, Take 100 mcg by mouth daily, Disp: , Rfl:     denosumab (PROLIA) 60 mg/mL, Inject 60 mg under the skin once, Disp: , Rfl:     docusate sodium (COLACE) 100 mg capsule, Take 1 capsule (100 mg total) by mouth 2 (two) times a day, Disp: 10 capsule, Rfl: 0    famotidine (PEPCID) 20 mg tablet, Take 20 mg by mouth 2 (two) times a day, Disp: , Rfl:     fluticasone (FLONASE) 50 mcg/act nasal spray, 1 spray into each nostril daily, Disp: , Rfl:     fluticasone-vilanterol (Breo Ellipta) 100-25 mcg/inh inhaler, , Disp: , Rfl:     Folplex 2 2 2 2-25-0 5 MG TABS, , Disp: , Rfl:     loperamide (IMODIUM) 2 mg capsule, Take 2 mg by mouth 4 (four) times a day as needed for diarrhea, Disp: , Rfl:     Magnesium Hydroxide (MILK OF MAGNESIA PO), Take by mouth, Disp: , Rfl:     metoprolol succinate (TOPROL-XL) 25 mg 24 hr tablet, Take 12 5 mg by mouth daily, Disp: , Rfl:     Multiple Vitamins-Minerals (CENTRUM SILVER 50+WOMEN PO), Take 1 tablet by mouth, Disp: , Rfl:     pantoprazole (PROTONIX) 40 mg tablet, Take 40 mg by mouth daily, Disp: , Rfl:     senna-docusate sodium (SENOKOT S) 8 6-50 mg per tablet, Take 1 tablet by mouth daily, Disp: , Rfl:     bisacodyl (Dulcolax) 10 mg suppository, Insert 10 mg into the rectum daily (Patient not taking: Reported on 7/9/2021), Disp: , Rfl:     bisacodyl (DULCOLAX) 5 mg EC tablet, Take 5 mg by mouth daily as needed for constipation (Patient not taking: Reported on 7/9/2021), Disp: , Rfl:     budesonide-formoterol (SYMBICORT) 80-4 5 MCG/ACT inhaler, Inhale 2 puffs 2 (two) times a day Rinse mouth after use  (Patient not taking: Reported on 7/9/2021), Disp: , Rfl:     MAGNESIUM SULFATE PO, Take 100 mg by mouth daily (Patient not taking: Reported on 7/9/2021), Disp: , Rfl:     oxyCODONE (OXY-IR) 5 MG capsule, Take 5 mg by mouth every 4 (four) hours as needed for moderate pain (Patient not taking: Reported on 7/9/2021), Disp: , Rfl:     ALLERGIES:  Allergies   Allergen Reactions    Ace Inhibitors Cough    Acetaminophen Vomiting    Angiotensin Receptor Blockers      Other reaction(s): WEAK AND SOB    Atorvastatin      Other reaction(s): NOT SURE    Ezetimibe      Other reaction(s): N&V    Formoterol      Other reaction(s): NOT SURE, SHAKINESS    Gabapentin      Other reaction(s): SWELLING    Hydrocodone-Acetaminophen Vomiting     Other reaction(s): Nausea and Vomiting    Lovastatin      Other reaction(s): MUSCLE PAIN    Methylprednisolone      Other reaction(s): FLUSHING    Mometasone Furoate      Other reaction(s): HOARSENESS    Niacin      Other reaction(s): NOT SURE    Nsaids     Other      abx - pt cant remember name - got body aches and headache    Oxycodone GI Intolerance    Oxytetracycline      Other reaction(s): NOT SURE    Penicillin G Hives    Pravastatin     Rosuvastatin      Other reaction(s): MUSCLE PAIN    Statins      Other reaction(s): Other (See Comments)  "bone pain"    Tramadol      Other reaction(s): N&V    Latex Other (See Comments) and Rash     "skin peeling"    Penicillins Rash and Hives     Other reaction(s): RASH       REVIEW OF SYSTEMS:  Pertinent items are noted in HPI    A comprehensive review of systems was negative       _____________________________________________________  PHYSICAL EXAMINATION:  General: alert, oriented times 3 and appears comfortable  Psychiatric: Normal  HEENT:  Normocephalic, atraumatic  Cardiovascular:  Regular  Pulmonary: No wheezing or stridor  Skin: No masses, erthema, lacerations, fluctation, ulcerations  Neurovascular: Motor and sensory exams are grossly intact although some generalized weakness is noted consistent with her age  Pulses are palpable  MUSCULOSKELETAL EXAMINATION:    The right lower extremity exam demonstrates good range of motion of the knee without complaint  She has internal rotation of the hip to about 10°, external rotation of about 30°, flexion to about 120° and full extension  She has abduction to about 30°  All motion triggers no complaints  She has no tenderness to palpation of the right thigh  The remainder of the lower extremity examination bilaterally is benign  _____________________________________________________  STUDIES REVIEWED:  X-rays obtained today demonstrate excellent progression of healing with bridging callus consistent with healing of the fracture              Yvonne Amador

## 2022-05-17 ENCOUNTER — HOSPITAL ENCOUNTER (OUTPATIENT)
Dept: ULTRASOUND IMAGING | Facility: HOSPITAL | Age: 87
Discharge: HOME | End: 2022-05-17
Payer: MEDICARE

## 2022-05-17 DIAGNOSIS — R22.1 LUMP IN NECK: ICD-10-CM

## 2022-05-17 PROCEDURE — 76536 US EXAM OF HEAD AND NECK: CPT

## 2022-07-11 ENCOUNTER — EVALUATION (OUTPATIENT)
Dept: PHYSICAL THERAPY | Facility: CLINIC | Age: 87
End: 2022-07-11
Payer: MEDICARE

## 2022-07-11 DIAGNOSIS — R26.81 UNSTEADINESS ON FEET: ICD-10-CM

## 2022-07-11 DIAGNOSIS — R42 DIZZINESS AND GIDDINESS: Primary | ICD-10-CM

## 2022-07-11 DIAGNOSIS — M54.2 NECK PAIN: ICD-10-CM

## 2022-07-11 PROCEDURE — 97110 THERAPEUTIC EXERCISES: CPT | Performed by: PHYSICAL THERAPIST

## 2022-07-11 PROCEDURE — 97161 PT EVAL LOW COMPLEX 20 MIN: CPT | Performed by: PHYSICAL THERAPIST

## 2022-07-11 NOTE — PROGRESS NOTES
PT Evaluation     Today's date: 2022  Patient name: Elvira Smith  : 1932  MRN: [id number]  Referring provider: Joseph Blake MD  Dx:   Encounter Diagnosis     ICD-10-CM    1  Dizziness and giddiness  R42    2  Unsteadiness on feet  R26 81    3  Neck pain  M54 2        Start Time: 1055  Stop Time: 1145  Total time in clinic (min): 50 minutes    Assessment  Assessment details: Pt presents to PT with chronic Hx of neck pain and dizziness  Significant findings from examination include: decreased/painful Cx ROM, decreased LE strength, decreased functional mobility and transfer ability, (-) [place]-Hallpike, and increased fall risk based on TUG and Chair raise test  These findings are consistent with chronic non-specific LBP, limiting their ADL performance and (I) functional mobility  Pt would benefit from course of PT to resolve the above mentioned impairments and facilitate return to PLOF       Impairments: abnormal gait, abnormal or restricted ROM, activity intolerance, impaired balance, lacks appropriate home exercise program, pain with function and safety issue    Symptom irritability: lowUnderstanding of Dx/Px/POC: good   Prognosis: good    Goals  Short Term Functional Goals:   In 4 weeks patient will:   Patient will demonstrate independence and compliance with HEP to maximize improvements in functional mobility    pt will demonstrate symmetrical Cx AROM to improve ADL performance      Long Term Functional Goals (Goals for patient at discharge):    In 8 weeks patient will:   Decrease cervical pain to 2 on a scale of 0-10 to allow ADL performance    pt will be able to carry and put groceries away in cabinets with <3/10 pain    Improve functional mobility: Patient will improve 30 sec squat test to 5 repetitions     pt will score at or above predicted discharge FOTO score of 66 to reflect improvement in subjective functional ability         Plan  Patient would benefit from: PT eval and skilled physical therapy  Referral necessary: No  Planned therapy interventions: manual therapy, patient education, therapeutic activities, therapeutic exercise, neuromuscular re-education and home exercise program  Frequency: 2x week  Duration in weeks: 6  Plan of Care beginning date: 2022  Plan of Care expiration date: 2022  Treatment plan discussed with: patient        Subjective Evaluation    History of Present Illness  Mechanism of injury: Pt reports having 3-4 mth Hx of L neck pain and dizziness  Gets dizziness when standing too quickly  Described as lightheadedness and room "sways " Laying and turning also reproduces symp, but it only lasts a few sec and it doesn't limit her function  Expressed activity avoidance due to dizziness when aides aren't with her  Neck pain limits sleeping and turning head, talita to L  Hasn't been wearing hearing aids due to ear pain with wearing  Recurrent probem    Quality of life: fair    Pain  Current pain rating: 3  At best pain ratin  At worst pain ratin  Quality: tight and dull ache  Relieving factors: change in position and rest  Progression: no change    Social Support  Steps to enter house: yes  1  Stairs in house: no   Lives in: [facility]    Employment status: not working  Treatments  Previous treatment: medication  Patient Goals  Patient goal: Decrease pain and turn head more  Objective     Concurrent Complaints  Positive for disturbed sleep and dizziness  Negative for faints, headaches and visual change    Palpation   Left   Hypertonic in the scalenes and sternocleidomastoid  Tenderness of the scalenes and sternocleidomastoid       Active Range of Motion   Cervical/Thoracic Spine       Cervical    Flexion: 40 degrees   Extension: 35 degrees      Left lateral flexion: 5 degrees     with pain  Right lateral flexion: 5 degrees     with pain  Left rotation: 40 degrees with pain  Right rotation: 30 degrees             General Comments:    Upper quarter screen   Shoulder: unremarkable  Elbow: unremarkable  Hand/wrist: unremarkable    Cervical/Thoracic Comments  Rhomberg balance:  EO 20"  EC 22"  EO foam 6"  EC foam    30" Sq Test - 2 reps   Neuro Exam:     Headaches   Patient reports headaches: No               Precautions: Latex     Daily Treatment Diary:      Initial Evaluation Date: 07/11/22  Compliance 7/11                     Visit Number 1                    Re-Eval  IE                 MC   Foto Captured Y                           7/11                     Manual                                                                                        Ther-Ex                      Cx AROM 5min                     Post Scalene stretch 4x30"                                                                                                                                                                               Edu 5'                     Neuro Re-Ed                                                                                                Ther-Act                                                               Modalities

## 2022-07-14 ENCOUNTER — OFFICE VISIT (OUTPATIENT)
Dept: PHYSICAL THERAPY | Facility: CLINIC | Age: 87
End: 2022-07-14
Payer: MEDICARE

## 2022-07-14 ENCOUNTER — APPOINTMENT (OUTPATIENT)
Dept: LAB | Facility: CLINIC | Age: 87
End: 2022-07-14
Payer: MEDICARE

## 2022-07-14 DIAGNOSIS — R42 DIZZINESS AND GIDDINESS: Primary | ICD-10-CM

## 2022-07-14 DIAGNOSIS — M54.2 NECK PAIN: ICD-10-CM

## 2022-07-14 DIAGNOSIS — R26.81 UNSTEADINESS ON FEET: ICD-10-CM

## 2022-07-14 DIAGNOSIS — Z51.81 MEDICATION MONITORING ENCOUNTER: ICD-10-CM

## 2022-07-14 LAB — CALCIUM SERPL-MCNC: 8.9 MG/DL (ref 8.3–10.1)

## 2022-07-14 PROCEDURE — 36415 COLL VENOUS BLD VENIPUNCTURE: CPT

## 2022-07-14 PROCEDURE — 97110 THERAPEUTIC EXERCISES: CPT | Performed by: PHYSICAL THERAPIST

## 2022-07-14 PROCEDURE — 82310 ASSAY OF CALCIUM: CPT

## 2022-07-14 PROCEDURE — 97140 MANUAL THERAPY 1/> REGIONS: CPT | Performed by: PHYSICAL THERAPIST

## 2022-07-14 NOTE — PROGRESS NOTES
Daily Note     Today's date: 2022  Patient name: Aminata Smith  : 1932  MRN: [id number]  Referring provider: Davian Guardado MD  Dx:   Encounter Diagnosis     ICD-10-CM    1  Dizziness and giddiness  R42    2  Unsteadiness on feet  R26 81    3  Neck pain  M54 2        Start Time: 1100  Stop Time: 1145  Total time in clinic (min): 45 minutes    Subjective: pt states still has general Cx soreness and today is worse R>L      Objective: See treatment diary below      Assessment: Tolerated treatment fair  Patient demonstrated fatigue post treatment, would benefit from continued PT and poor balance that req consistent guarding to prevent falls  pt fatigued quickly and req freq rest breaks  Plan: Continue per plan of care  Progress treatment as tolerated         Precautions: Latex     Daily Treatment Diary:      Initial Evaluation Date: 22  Compliance                      Visit Number 1                    Re-Eval  IE                 MC   Foto Captured Y                                              Manual                      Cx STM   ss 10'                   Lower Cx Side glide Mob   Gr2-3   6'                                         Ther-Ex                      Cx AROM 5min  8'                   Post Scalene stretch 4x30"  4x30"                   Chin Tuck   10x10"                   (B) Banded ER  Red   2x10 5" ea                   NBOS EC  10x10"                   Alt Step tap  4in  2x20                                                                                     Edu 5'                     Neuro Re-Ed                                                                                                Ther-Act                                                               Modalities

## 2022-07-19 ENCOUNTER — OFFICE VISIT (OUTPATIENT)
Dept: PHYSICAL THERAPY | Facility: CLINIC | Age: 87
End: 2022-07-19
Payer: MEDICARE

## 2022-07-19 ENCOUNTER — HOSPITAL ENCOUNTER (EMERGENCY)
Facility: HOSPITAL | Age: 87
Discharge: HOME | End: 2022-07-19
Attending: EMERGENCY MEDICINE | Admitting: EMERGENCY MEDICINE
Payer: MEDICARE

## 2022-07-19 VITALS
RESPIRATION RATE: 16 BRPM | HEIGHT: 65 IN | DIASTOLIC BLOOD PRESSURE: 106 MMHG | TEMPERATURE: 96.9 F | HEART RATE: 46 BPM | WEIGHT: 120 LBS | SYSTOLIC BLOOD PRESSURE: 121 MMHG | BODY MASS INDEX: 19.99 KG/M2 | OXYGEN SATURATION: 99 %

## 2022-07-19 DIAGNOSIS — R42 DIZZINESS AND GIDDINESS: Primary | ICD-10-CM

## 2022-07-19 DIAGNOSIS — R26.81 UNSTEADINESS ON FEET: ICD-10-CM

## 2022-07-19 DIAGNOSIS — M54.2 NECK PAIN: ICD-10-CM

## 2022-07-19 DIAGNOSIS — L03.113 CELLULITIS OF RIGHT UPPER EXTREMITY: Primary | ICD-10-CM

## 2022-07-19 PROCEDURE — 97112 NEUROMUSCULAR REEDUCATION: CPT | Performed by: PHYSICAL THERAPIST

## 2022-07-19 PROCEDURE — 99284 EMERGENCY DEPT VISIT MOD MDM: CPT | Performed by: EMERGENCY MEDICINE

## 2022-07-19 PROCEDURE — 97140 MANUAL THERAPY 1/> REGIONS: CPT | Performed by: PHYSICAL THERAPIST

## 2022-07-19 PROCEDURE — 97110 THERAPEUTIC EXERCISES: CPT | Performed by: PHYSICAL THERAPIST

## 2022-07-19 PROCEDURE — 99283 EMERGENCY DEPT VISIT LOW MDM: CPT

## 2022-07-19 RX ORDER — CEFADROXIL 500 MG/1
500 CAPSULE ORAL EVERY 12 HOURS SCHEDULED
Qty: 14 CAPSULE | Refills: 0 | Status: SHIPPED | OUTPATIENT
Start: 2022-07-19 | End: 2022-07-26

## 2022-07-19 RX ORDER — SULFAMETHOXAZOLE AND TRIMETHOPRIM 800; 160 MG/1; MG/1
1 TABLET ORAL 2 TIMES DAILY
Qty: 14 TABLET | Refills: 0 | Status: SHIPPED | OUTPATIENT
Start: 2022-07-19 | End: 2022-07-26

## 2022-07-19 NOTE — DISCHARGE INSTRUCTIONS
You been placed on antibiotics for an infection in your right upper arm  However, the lesion on right arm may need to be biopsy    Please follow-up with your dermatologist

## 2022-07-19 NOTE — ED PROVIDER NOTES
History  Chief Complaint   Patient presents with    Arm Pain     Pt arrives from home with c/o "red pimple" to right upper arm x1 month  Pt states its progressively getting worse with drainage  Pt reports itching and mild pain to area  Dried drainage with surrounding redness noted to arm during triage  Patient with a past history of skin cancer  Has a sore on her right upper arm for some time  Now draining pus  Called her dermatologist but cannot see her until October  No fevers or chills  Not diabetic  No nausea vomiting or diarrhea  History provided by:  Patient   used: No    Medical Problem  Location:  Right arm  Quality:  Skin lesion on right arm  Severity:  Mild  Onset quality:  Gradual  Timing:  Constant  Progression:  Unchanged  Chronicity:  New  Context:  Skin lesion on right arm now with pus drainage  Relieved by:  Nothing  Worsened by:  Nothing  Ineffective treatments:  None tried  Associated symptoms: no abdominal pain, no chest pain, no cough, no diarrhea, no ear pain, no fever, no headaches, no nausea, no rash, no rhinorrhea, no shortness of breath, no sore throat, no vomiting and no wheezing        Prior to Admission Medications   Prescriptions Last Dose Informant Patient Reported? Taking?    Folplex 2 2 2 2-25-0 5 MG TABS   Yes No   MAGNESIUM SULFATE PO  Self Yes No   Sig: Take 100 mg by mouth daily   Patient not taking: Reported on 7/9/2021   Magnesium Hydroxide (MILK OF MAGNESIA PO)   Yes No   Sig: Take by mouth   Multiple Vitamins-Minerals (CENTRUM SILVER 50+WOMEN PO)  Self Yes No   Sig: Take 1 tablet by mouth   acetaminophen (TYLENOL) 325 mg tablet   No No   Sig: Take 2 tablets (650 mg total) by mouth every 6 (six) hours as needed for mild pain, headaches or fever   albuterol (PROVENTIL HFA,VENTOLIN HFA) 90 mcg/act inhaler  Self Yes No   Sig: Inhale 2 puffs every 6 (six) hours as needed for wheezing   aspirin (ECOTRIN LOW STRENGTH) 81 mg EC tablet   No No   Sig: Take 1 tablet (81 mg total) by mouth daily   bisacodyl (DULCOLAX) 5 mg EC tablet   Yes No   Sig: Take 5 mg by mouth daily as needed for constipation   Patient not taking: Reported on 2021   bisacodyl (Dulcolax) 10 mg suppository   Yes No   Sig: Insert 10 mg into the rectum daily   Patient not taking: Reported on 2021   budesonide-formoterol (SYMBICORT) 80-4 5 MCG/ACT inhaler  Self Yes No   Sig: Inhale 2 puffs 2 (two) times a day Rinse mouth after use     Patient not taking: Reported on 2021   cyanocobalamin (VITAMIN B-12) 100 MCG tablet  Self Yes No   Sig: Take 100 mcg by mouth daily   denosumab (PROLIA) 60 mg/mL  Self Yes No   Sig: Inject 60 mg under the skin once   docusate sodium (COLACE) 100 mg capsule   No No   Sig: Take 1 capsule (100 mg total) by mouth 2 (two) times a day   famotidine (PEPCID) 20 mg tablet  Self Yes No   Sig: Take 20 mg by mouth 2 (two) times a day   fluticasone (FLONASE) 50 mcg/act nasal spray  Self Yes No   Si spray into each nostril daily   fluticasone-vilanterol (Breo Ellipta) 100-25 mcg/inh inhaler   Yes No   loperamide (IMODIUM) 2 mg capsule   Yes No   Sig: Take 2 mg by mouth 4 (four) times a day as needed for diarrhea   metoprolol succinate (TOPROL-XL) 25 mg 24 hr tablet  Self Yes No   Sig: Take 12 5 mg by mouth daily   oxyCODONE (OXY-IR) 5 MG capsule   Yes No   Sig: Take 5 mg by mouth every 4 (four) hours as needed for moderate pain   Patient not taking: Reported on 2021   pantoprazole (PROTONIX) 40 mg tablet  Self Yes No   Sig: Take 40 mg by mouth daily   senna-docusate sodium (SENOKOT S) 8 6-50 mg per tablet  Self Yes No   Sig: Take 1 tablet by mouth daily      Facility-Administered Medications: None       Past Medical History:   Diagnosis Date    Cancer ([facility] [address] )     skin cancer    Cardiac disease     Multiple falls     Neuropathy     Skin cancer     forehead    Spinal stenosis     UTI (urinary tract infection)        Past Surgical History:   Procedure Laterality Date    APPENDECTOMY      BACK SURGERY      x2    CATARACT EXTRACTION W/  INTRAOCULAR LENS IMPLANT Bilateral     CORONARY ANGIOPLASTY WITH STENT PLACEMENT  09/23/2011    [place] OPEN RX FEMUR FX+INTRAMED RASHIDA Right 5/4/2021    Procedure: INSERTION NAIL IM FEMUR ANTEGRADE (TROCHANTERIC); Surgeon: Andrea Hoff; [facility];  Service: Orthopedics    REPLACEMENT TOTAL KNEE BILATERAL         History reviewed  No pertinent family history  I have reviewed and agree with the history as documented  E-Cigarette/Vaping    E-Cigarette Use Never User      E-Cigarette/Vaping Substances     Social History     Tobacco Use    Smoking status: Never Smoker    Smokeless tobacco: Never Used   Vaping Use    Vaping Use: Never used   Substance Use Topics    Alcohol use: Yes     Comment: Occasionally    Drug use: Never       Review of Systems   Constitutional: Negative for chills and fever  HENT: Negative for ear pain, hearing loss, rhinorrhea, sore throat, trouble swallowing and voice change  Eyes: Negative for pain and discharge  Respiratory: Negative for cough, shortness of breath and wheezing  Cardiovascular: Negative for chest pain and palpitations  Gastrointestinal: Negative for abdominal pain, blood in stool, constipation, diarrhea, nausea and vomiting  Genitourinary: Negative for dysuria, flank pain, frequency and hematuria  Musculoskeletal: Negative for joint swelling, neck pain and neck stiffness  Skin: Negative for rash and wound  Neurological: Negative for dizziness, seizures, syncope, facial asymmetry and headaches  Psychiatric/Behavioral: Negative for hallucinations, self-injury and suicidal ideas  All other systems reviewed and are negative  Physical Exam  Physical Exam  Constitutional:       General: She is not in acute distress  Appearance: Normal appearance  She is not ill-appearing  HENT:      Head: Normocephalic and atraumatic        Right Ear: External ear normal       Left Ear: External ear normal       Nose: Nose normal       Mouth/Throat:      Mouth: Mucous membranes are moist    Eyes:      Extraocular Movements: Extraocular movements intact  Pupils: Pupils are equal, round, and reactive to light  Cardiovascular:      Rate and Rhythm: Normal rate and regular rhythm  Pulmonary:      Effort: Pulmonary effort is normal  No respiratory distress  Breath sounds: Normal breath sounds  Abdominal:      General: Abdomen is flat  Bowel sounds are normal  There is no distension  Palpations: Abdomen is soft  Tenderness: There is no abdominal tenderness  Musculoskeletal:         General: No swelling or tenderness  Cervical back: Normal range of motion and neck supple  Comments: Reddish area at the mid upper arm  With pressure there is a small amount of pus  There is small amount of surrounding erythema  No warmth  No streaking  Radial pulses 2+  Neurovascular intact distally  Skin:     General: Skin is warm and dry  Capillary Refill: Capillary refill takes less than 2 seconds  Neurological:      General: No focal deficit present  Mental Status: She is alert and oriented to person, place, and time     Psychiatric:         Mood and Affect: Mood normal          Behavior: Behavior normal          Vital Signs  ED Triage Vitals   Temperature Pulse Respirations Blood Pressure SpO2   07/19/22 1223 07/19/22 1223 07/19/22 1223 07/19/22 1224 07/19/22 1223   (!) 96 9 °F (36 1 °C) (!) 46 16 (!) 121/106 99 %      Temp Source Heart Rate Source Patient Position - Orthostatic VS BP Location FiO2 (%)   07/19/22 1223 07/19/22 1223 07/19/22 1223 07/19/22 1223 --   Temporal Monitor Sitting Left arm       Pain Score       07/19/22 1223       6           Vitals:    07/19/22 1223 07/19/22 1224   BP:  (!) 121/106   Pulse: (!) 46    Patient Position - Orthostatic VS: Sitting          Visual Acuity      ED Medications  Medications - No data to display    Diagnostic Studies  Results Reviewed     None                 No orders to display              Procedures  Procedures         ED Course                                             MDM    Disposition  Final diagnoses:   Cellulitis of right upper extremity     Time reflects when diagnosis was documented in both MDM as applicable and the Disposition within this note     Time User Action Codes Description Comment    7/19/2022 12:43 PM Oval Wanda Add [[id number]] Cellulitis of right upper extremity       ED Disposition     ED Disposition   Discharge    Condition   Stable    Date/Time   Tue Jul 19, 2022 12:43 PM    Comment   Kemi CACERES [facility] discharge to home/self care  Follow-up Information     Follow up With Specialties Details Why Contact Info    Severiano Elizabeth, MD Family Medicine Call in 2 days  [address]  [phone]            Patient's Medications   Discharge Prescriptions    CEFADROXIL (DURICEF) 500 MG CAPSULE    Take 1 capsule (500 mg total) by mouth every 12 (twelve) hours for 7 days       Start Date: 7/19/2022 End Date: 7/26/2022       Order Dose: 500 mg       Quantity: 14 capsule    Refills: 0    SULFAMETHOXAZOLE-TRIMETHOPRIM (BACTRIM DS) 800-160 MG PER TABLET    Take 1 tablet by mouth 2 (two) times a day for 7 days smx-tmp DS (BACTRIM) 800-160 mg tabs (1tab q12 D10)       Start Date: 7/19/2022 End Date: 7/26/2022       Order Dose: 1 tablet       Quantity: 14 tablet    Refills: 0       No discharge procedures on file      PDMP Review     None          ED Provider  Electronically Signed by           Raquel Forrest MD  07/19/22 5491

## 2022-07-19 NOTE — PROGRESS NOTES
Daily Note     Today's date: 2022  Patient name: Aleena Smith  : 1932  MRN: [id number]  Referring provider: Rony Mariscal MD  Dx:   Encounter Diagnosis     ICD-10-CM    1  Dizziness and giddiness  R42    2  Unsteadiness on feet  R26 81    3  Neck pain  M54 2        Start Time: 1100  Stop Time: 1145  Total time in clinic (min): 45 minutes    Subjective: pt states still has general Cx soreness and today is worse R>L      Objective: See treatment diary below      Assessment: Tolerated treatment fair  Patient demonstrated fatigue post treatment, would benefit from continued PT and poor balance that req consistent guarding to prevent falls  pt fatigued quickly and req freq rest breaks  Plan: Continue per plan of care  Progress treatment as tolerated         Precautions: Latex     Daily Treatment Diary:      Initial Evaluation Date: 22  Compliance                  Visit Number 1 2  3                 Re-Eval  IE                 MC   Foto Captured Y                                            Manual                      Cx STM   ss 10'  ss 10'                 Lower Cx Side glide Mob   Gr2-3   6'  Gr2-3   6'                                       Ther-Ex                      Cx AROM 5min  8'  5'                 Post Scalene stretch 4x30"  4x30"  4x30"                 Chin Tuck   10x10"  10x10"                 (B) Banded ER  Red   2x10 5" ea  Red   2x10 5" ea                 NBOS EC  10x10"  NR                 Alt Step tap  4in  2x20  NR                                                                                   Edu 5'                     Neuro Re-Ed                       Alt Step tap     4in  2x20                 NBOS EC   4in  2x20          Standing Hip ABD/Ext/march   2x10 ea (B)                                    Ther-Act                                                              Modalities

## 2022-07-21 ENCOUNTER — APPOINTMENT (OUTPATIENT)
Dept: PHYSICAL THERAPY | Facility: CLINIC | Age: 87
End: 2022-07-21
Payer: MEDICARE

## 2022-07-26 ENCOUNTER — APPOINTMENT (OUTPATIENT)
Dept: PHYSICAL THERAPY | Facility: CLINIC | Age: 87
End: 2022-07-26
Payer: MEDICARE

## 2022-07-28 ENCOUNTER — HOSPITAL ENCOUNTER (INPATIENT)
Facility: HOSPITAL | Age: 87
LOS: 3 days | Discharge: HOME | DRG: 375 | End: 2022-07-31
Attending: EMERGENCY MEDICINE | Admitting: FAMILY MEDICINE
Payer: MEDICARE

## 2022-07-28 ENCOUNTER — APPOINTMENT (EMERGENCY)
Dept: CT IMAGING | Facility: HOSPITAL | Age: 87
DRG: 375 | End: 2022-07-28
Payer: MEDICARE

## 2022-07-28 ENCOUNTER — APPOINTMENT (OUTPATIENT)
Dept: PHYSICAL THERAPY | Facility: CLINIC | Age: 87
End: 2022-07-28
Payer: MEDICARE

## 2022-07-28 DIAGNOSIS — D64.9 SYMPTOMATIC ANEMIA: Primary | ICD-10-CM

## 2022-07-28 DIAGNOSIS — D64.9 ACUTE ON CHRONIC ANEMIA: ICD-10-CM

## 2022-07-28 DIAGNOSIS — C18.9 MALIGNANT NEOPLASM OF COLON, UNSPECIFIED PART OF COLON (HCC): ICD-10-CM

## 2022-07-28 DIAGNOSIS — D62 ACUTE BLOOD LOSS ANEMIA: ICD-10-CM

## 2022-07-28 DIAGNOSIS — R19.5 HEME POSITIVE STOOL: ICD-10-CM

## 2022-07-28 LAB
2HR DELTA HS TROPONIN: 1 NG/L
4HR DELTA HS TROPONIN: 1 NG/L
ABO GROUP BLD: NORMAL
ALBUMIN SERPL BCP-MCNC: 3.4 G/DL (ref 3.5–5)
ALP SERPL-CCNC: 52 U/L (ref 46–116)
ALT SERPL W P-5'-P-CCNC: 13 U/L (ref 12–78)
ANION GAP SERPL CALCULATED.3IONS-SCNC: 9 MMOL/L (ref 4–13)
APTT PPP: 27 SECONDS (ref 23–37)
AST SERPL W P-5'-P-CCNC: 16 U/L (ref 5–45)
BASOPHILS # BLD AUTO: 0.03 THOUSANDS/ΜL (ref 0–0.1)
BASOPHILS NFR BLD AUTO: 0 % (ref 0–1)
BILIRUB SERPL-MCNC: 0.2 MG/DL (ref 0.2–1)
BLD GP AB SCN SERPL QL: NEGATIVE
BUN SERPL-MCNC: 17 MG/DL (ref 5–25)
CALCIUM ALBUM COR SERPL-MCNC: 9.2 MG/DL (ref 8.3–10.1)
CALCIUM SERPL-MCNC: 8.7 MG/DL (ref 8.3–10.1)
CARDIAC TROPONIN I PNL SERPL HS: 7 NG/L
CARDIAC TROPONIN I PNL SERPL HS: 8 NG/L
CARDIAC TROPONIN I PNL SERPL HS: 8 NG/L
CHLORIDE SERPL-SCNC: 97 MMOL/L (ref 96–108)
CO2 SERPL-SCNC: 24 MMOL/L (ref 21–32)
CREAT SERPL-MCNC: 1.24 MG/DL (ref 0.6–1.3)
EOSINOPHIL # BLD AUTO: 0.18 THOUSAND/ΜL (ref 0–0.61)
EOSINOPHIL NFR BLD AUTO: 2 % (ref 0–6)
ERYTHROCYTE [DISTWIDTH] IN BLOOD BY AUTOMATED COUNT: 17.5 % (ref 11.6–15.1)
FLUAV RNA RESP QL NAA+PROBE: NEGATIVE
FLUBV RNA RESP QL NAA+PROBE: NEGATIVE
GFR SERPL CREATININE-BSD FRML MDRD: 38 ML/MIN/1.73SQ M
GLUCOSE SERPL-MCNC: 126 MG/DL (ref 65–140)
HCT VFR BLD AUTO: 19 % (ref 34.8–46.1)
HGB BLD-MCNC: 5.4 G/DL (ref 11.5–15.4)
IMM GRANULOCYTES # BLD AUTO: 0.02 THOUSAND/UL (ref 0–0.2)
IMM GRANULOCYTES NFR BLD AUTO: 0 % (ref 0–2)
INR PPP: 0.96 (ref 0.84–1.19)
LACTATE SERPL-SCNC: 0.8 MMOL/L (ref 0.5–2)
LACTATE SERPL-SCNC: 3.4 MMOL/L (ref 0.5–2)
LIPASE SERPL-CCNC: 143 U/L (ref 73–393)
LYMPHOCYTES # BLD AUTO: 3.92 THOUSANDS/ΜL (ref 0.6–4.47)
LYMPHOCYTES NFR BLD AUTO: 44 % (ref 14–44)
MCH RBC QN AUTO: 20.2 PG (ref 26.8–34.3)
MCHC RBC AUTO-ENTMCNC: 28.4 G/DL (ref 31.4–37.4)
MCV RBC AUTO: 71 FL (ref 82–98)
MONOCYTES # BLD AUTO: 0.66 THOUSAND/ΜL (ref 0.17–1.22)
MONOCYTES NFR BLD AUTO: 7 % (ref 4–12)
NEUTROPHILS # BLD AUTO: 4.07 THOUSANDS/ΜL (ref 1.85–7.62)
NEUTS SEG NFR BLD AUTO: 47 % (ref 43–75)
NRBC BLD AUTO-RTO: 0 /100 WBCS
NT-PROBNP SERPL-MCNC: 625 PG/ML
PLATELET # BLD AUTO: 266 THOUSANDS/UL (ref 149–390)
PMV BLD AUTO: 10 FL (ref 8.9–12.7)
POTASSIUM SERPL-SCNC: 4.5 MMOL/L (ref 3.5–5.3)
PROT SERPL-MCNC: 6.9 G/DL (ref 6.4–8.4)
PROTHROMBIN TIME: 12.8 SECONDS (ref 11.6–14.5)
RBC # BLD AUTO: 2.67 MILLION/UL (ref 3.81–5.12)
RH BLD: POSITIVE
RSV RNA RESP QL NAA+PROBE: NEGATIVE
SARS-COV-2 RNA RESP QL NAA+PROBE: NEGATIVE
SODIUM SERPL-SCNC: 130 MMOL/L (ref 135–147)
SPECIMEN EXPIRATION DATE: NORMAL
WBC # BLD AUTO: 8.88 THOUSAND/UL (ref 4.31–10.16)

## 2022-07-28 PROCEDURE — 85025 COMPLETE CBC W/AUTO DIFF WBC: CPT | Performed by: EMERGENCY MEDICINE

## 2022-07-28 PROCEDURE — 85610 PROTHROMBIN TIME: CPT | Performed by: EMERGENCY MEDICINE

## 2022-07-28 PROCEDURE — 86900 BLOOD TYPING SEROLOGIC ABO: CPT | Performed by: EMERGENCY MEDICINE

## 2022-07-28 PROCEDURE — 30233N1 TRANSFUSION OF NONAUTOLOGOUS RED BLOOD CELLS INTO PERIPHERAL VEIN, PERCUTANEOUS APPROACH: ICD-10-PCS | Performed by: FAMILY MEDICINE

## 2022-07-28 PROCEDURE — 80053 COMPREHEN METABOLIC PANEL: CPT | Performed by: EMERGENCY MEDICINE

## 2022-07-28 PROCEDURE — 93005 ELECTROCARDIOGRAM TRACING: CPT

## 2022-07-28 PROCEDURE — 74176 CT ABD & PELVIS W/O CONTRAST: CPT

## 2022-07-28 PROCEDURE — C9113 INJ PANTOPRAZOLE SODIUM, VIA: HCPCS | Performed by: EMERGENCY MEDICINE

## 2022-07-28 PROCEDURE — 36415 COLL VENOUS BLD VENIPUNCTURE: CPT | Performed by: EMERGENCY MEDICINE

## 2022-07-28 PROCEDURE — 99285 EMERGENCY DEPT VISIT HI MDM: CPT

## 2022-07-28 PROCEDURE — 86923 COMPATIBILITY TEST ELECTRIC: CPT

## 2022-07-28 PROCEDURE — 83550 IRON BINDING TEST: CPT | Performed by: NURSE PRACTITIONER

## 2022-07-28 PROCEDURE — 96374 THER/PROPH/DIAG INJ IV PUSH: CPT

## 2022-07-28 PROCEDURE — 99222 1ST HOSP IP/OBS MODERATE 55: CPT | Performed by: NURSE PRACTITIONER

## 2022-07-28 PROCEDURE — 82728 ASSAY OF FERRITIN: CPT | Performed by: NURSE PRACTITIONER

## 2022-07-28 PROCEDURE — P9016 RBC LEUKOCYTES REDUCED: HCPCS

## 2022-07-28 PROCEDURE — 85730 THROMBOPLASTIN TIME PARTIAL: CPT | Performed by: EMERGENCY MEDICINE

## 2022-07-28 PROCEDURE — 72125 CT NECK SPINE W/O DYE: CPT

## 2022-07-28 PROCEDURE — 0241U HB NFCT DS VIR RESP RNA 4 TRGT: CPT | Performed by: EMERGENCY MEDICINE

## 2022-07-28 PROCEDURE — G1004 CDSM NDSC: HCPCS

## 2022-07-28 PROCEDURE — 83605 ASSAY OF LACTIC ACID: CPT | Performed by: EMERGENCY MEDICINE

## 2022-07-28 PROCEDURE — 1124F ACP DISCUSS-NO DSCNMKR DOCD: CPT | Performed by: EMERGENCY MEDICINE

## 2022-07-28 PROCEDURE — 83690 ASSAY OF LIPASE: CPT | Performed by: EMERGENCY MEDICINE

## 2022-07-28 PROCEDURE — 86901 BLOOD TYPING SEROLOGIC RH(D): CPT | Performed by: EMERGENCY MEDICINE

## 2022-07-28 PROCEDURE — 99285 EMERGENCY DEPT VISIT HI MDM: CPT | Performed by: EMERGENCY MEDICINE

## 2022-07-28 PROCEDURE — 83880 ASSAY OF NATRIURETIC PEPTIDE: CPT | Performed by: EMERGENCY MEDICINE

## 2022-07-28 PROCEDURE — 83540 ASSAY OF IRON: CPT | Performed by: NURSE PRACTITIONER

## 2022-07-28 PROCEDURE — 84484 ASSAY OF TROPONIN QUANT: CPT | Performed by: EMERGENCY MEDICINE

## 2022-07-28 PROCEDURE — 71250 CT THORAX DX C-: CPT

## 2022-07-28 PROCEDURE — 86850 RBC ANTIBODY SCREEN: CPT | Performed by: EMERGENCY MEDICINE

## 2022-07-28 RX ORDER — PANTOPRAZOLE SODIUM 40 MG/10ML
40 INJECTION, POWDER, LYOPHILIZED, FOR SOLUTION INTRAVENOUS EVERY 12 HOURS
Status: DISCONTINUED | OUTPATIENT
Start: 2022-07-29 | End: 2022-07-31 | Stop reason: HOSPADM

## 2022-07-28 RX ORDER — PANTOPRAZOLE SODIUM 40 MG/10ML
40 INJECTION, POWDER, LYOPHILIZED, FOR SOLUTION INTRAVENOUS ONCE
Status: COMPLETED | OUTPATIENT
Start: 2022-07-28 | End: 2022-07-28

## 2022-07-28 RX ORDER — METOPROLOL SUCCINATE 25 MG/1
12.5 TABLET, EXTENDED RELEASE ORAL DAILY
Status: DISCONTINUED | OUTPATIENT
Start: 2022-07-29 | End: 2022-07-31 | Stop reason: HOSPADM

## 2022-07-28 RX ORDER — ONDANSETRON 2 MG/ML
4 INJECTION INTRAMUSCULAR; INTRAVENOUS EVERY 6 HOURS PRN
Status: DISCONTINUED | OUTPATIENT
Start: 2022-07-28 | End: 2022-07-31 | Stop reason: HOSPADM

## 2022-07-28 RX ORDER — POLYETHYLENE GLYCOL 3350 17 G/17G
238 POWDER, FOR SOLUTION ORAL ONCE
Status: COMPLETED | OUTPATIENT
Start: 2022-07-28 | End: 2022-07-28

## 2022-07-28 RX ORDER — ONDANSETRON 2 MG/ML
4 INJECTION INTRAMUSCULAR; INTRAVENOUS ONCE
Status: DISCONTINUED | OUTPATIENT
Start: 2022-07-28 | End: 2022-07-31 | Stop reason: HOSPADM

## 2022-07-28 RX ADMIN — PANTOPRAZOLE SODIUM 40 MG: 40 INJECTION, POWDER, FOR SOLUTION INTRAVENOUS at 19:35

## 2022-07-28 RX ADMIN — POLYETHYLENE GLYCOL 3350 238 G: 17 POWDER, FOR SOLUTION ORAL at 23:52

## 2022-07-28 NOTE — ED PROVIDER NOTES
History  Chief Complaint   Patient presents with    Neck Pain     Left sided neck pain, was told she has a " swollen muscle" Also complaining of shortness of breath with exertion      Patient complains of left neck pain for the past several weeks  Pain is intermittent  Has gotten worse recently  Does have some increasing shortness of breath  Slight congestion  No fevers or chills  Is nauseated  No vomiting or diarrhea  Feels weak and lightheaded  No change in speech or vision  States she cannot walk because she feels lightheaded  States she feels weak  Lives alone  History provided by:  Patient   used: No    Neck Pain  Pain location:  L side  Quality:  Aching  Pain severity:  Mild  Pain is:  Same all the time  Timing:  Constant  Progression:  Waxing and waning  Chronicity:  Recurrent  Context: not fall and not jumping from heights    Relieved by:  Nothing  Worsened by:  Position and twisting  Ineffective treatments:  None tried  Associated symptoms: weakness    Associated symptoms: no bladder incontinence, no bowel incontinence, no chest pain, no fever, no headaches, no numbness and no visual change        Prior to Admission Medications   Prescriptions Last Dose Informant Patient Reported? Taking?    Folplex 2 2 2 2-25-0 5 MG TABS   Yes No   MAGNESIUM SULFATE PO  Self Yes No   Sig: Take 100 mg by mouth daily   Patient not taking: Reported on 7/9/2021   Magnesium Hydroxide (MILK OF MAGNESIA PO)   Yes No   Sig: Take by mouth   Multiple Vitamins-Minerals (CENTRUM SILVER 50+WOMEN PO)  Self Yes No   Sig: Take 1 tablet by mouth   acetaminophen (TYLENOL) 325 mg tablet   No No   Sig: Take 2 tablets (650 mg total) by mouth every 6 (six) hours as needed for mild pain, headaches or fever   albuterol (PROVENTIL HFA,VENTOLIN HFA) 90 mcg/act inhaler  Self Yes No   Sig: Inhale 2 puffs every 6 (six) hours as needed for wheezing   aspirin (ECOTRIN LOW STRENGTH) 81 mg EC tablet   No No   Sig: Take 1 tablet (81 mg total) by mouth daily   bisacodyl (DULCOLAX) 5 mg EC tablet   Yes No   Sig: Take 5 mg by mouth daily as needed for constipation   Patient not taking: Reported on 2021   bisacodyl (Dulcolax) 10 mg suppository   Yes No   Sig: Insert 10 mg into the rectum daily   Patient not taking: Reported on 2021   budesonide-formoterol (SYMBICORT) 80-4 5 MCG/ACT inhaler  Self Yes No   Sig: Inhale 2 puffs 2 (two) times a day Rinse mouth after use     Patient not taking: Reported on 2021   cyanocobalamin (VITAMIN B-12) 100 MCG tablet  Self Yes No   Sig: Take 100 mcg by mouth daily   denosumab (PROLIA) 60 mg/mL  Self Yes No   Sig: Inject 60 mg under the skin once   docusate sodium (COLACE) 100 mg capsule   No No   Sig: Take 1 capsule (100 mg total) by mouth 2 (two) times a day   famotidine (PEPCID) 20 mg tablet  Self Yes No   Sig: Take 20 mg by mouth 2 (two) times a day   fluticasone (FLONASE) 50 mcg/act nasal spray  Self Yes No   Si spray into each nostril daily   fluticasone-vilanterol (Breo Ellipta) 100-25 mcg/inh inhaler   Yes No   loperamide (IMODIUM) 2 mg capsule   Yes No   Sig: Take 2 mg by mouth 4 (four) times a day as needed for diarrhea   metoprolol succinate (TOPROL-XL) 25 mg 24 hr tablet  Self Yes No   Sig: Take 12 5 mg by mouth daily   oxyCODONE (OXY-IR) 5 MG capsule   Yes No   Sig: Take 5 mg by mouth every 4 (four) hours as needed for moderate pain   Patient not taking: Reported on 2021   pantoprazole (PROTONIX) 40 mg tablet  Self Yes No   Sig: Take 40 mg by mouth daily   senna-docusate sodium (SENOKOT S) 8 6-50 mg per tablet  Self Yes No   Sig: Take 1 tablet by mouth daily      Facility-Administered Medications: None       Past Medical History:   Diagnosis Date    Cancer ([facility] )     skin cancer    Cardiac disease     Multiple falls     Neuropathy     Skin cancer     forehead    Spinal stenosis     UTI (urinary tract infection)        Past Surgical History:   Procedure Laterality Date    APPENDECTOMY      BACK SURGERY      x2    CATARACT EXTRACTION W/  INTRAOCULAR LENS IMPLANT Bilateral     CORONARY ANGIOPLASTY WITH STENT PLACEMENT  09/23/2011    NC OPEN RX FEMUR FX+INTRAMED RASHIDA Right 5/4/2021    Procedure: INSERTION NAIL IM FEMUR ANTEGRADE (TROCHANTERIC); Surgeon: Rakesh Mireles; Location:  MAIN OR;  Service: Orthopedics    REPLACEMENT TOTAL KNEE BILATERAL         History reviewed  No pertinent family history  I have reviewed and agree with the history as documented  E-Cigarette/Vaping    E-Cigarette Use Never User      E-Cigarette/Vaping Substances    Nicotine No     THC No     CBD No     Flavoring No     Other No     Unknown No      Social History     Tobacco Use    Smoking status: Never Smoker    Smokeless tobacco: Never Used   Vaping Use    Vaping Use: Never used   Substance Use Topics    Alcohol use: Yes     Comment: Occasionally    Drug use: Never       Review of Systems   Constitutional: Negative for chills and fever  HENT: Negative for ear pain, hearing loss, sore throat, trouble swallowing and voice change  Eyes: Negative for pain and discharge  Respiratory: Negative for cough, shortness of breath and wheezing  Cardiovascular: Negative for chest pain and palpitations  Gastrointestinal: Negative for abdominal pain, blood in stool, bowel incontinence, constipation, diarrhea, nausea and vomiting  Genitourinary: Negative for bladder incontinence, dysuria, flank pain, frequency and hematuria  Musculoskeletal: Positive for neck pain  Negative for joint swelling and neck stiffness  Skin: Negative for rash and wound  Neurological: Positive for weakness  Negative for dizziness, seizures, syncope, facial asymmetry, numbness and headaches  Psychiatric/Behavioral: Negative for hallucinations, self-injury and suicidal ideas  All other systems reviewed and are negative  Physical Exam  Physical Exam  Vitals and nursing note reviewed  Constitutional:       General: She is not in acute distress  Appearance: She is well-developed  HENT:      Head: Normocephalic and atraumatic  Right Ear: External ear normal       Left Ear: External ear normal    Eyes:      General: No scleral icterus  Right eye: No discharge  Left eye: No discharge  Extraocular Movements: Extraocular movements intact  Conjunctiva/sclera: Conjunctivae normal    Neck:      Comments: Tender along the left trapezius muscle  Cardiovascular:      Rate and Rhythm: Normal rate and regular rhythm  Heart sounds: Normal heart sounds  No murmur heard  Pulmonary:      Effort: Pulmonary effort is normal       Breath sounds: Normal breath sounds  No wheezing or rales  Abdominal:      General: Bowel sounds are normal  There is no distension  Palpations: Abdomen is soft  Tenderness: There is no abdominal tenderness  There is no guarding or rebound  Genitourinary:     Comments: On rectal exam is brown stool  Heme positive  Musculoskeletal:         General: No deformity  Normal range of motion  Cervical back: Normal range of motion and neck supple  Skin:     General: Skin is warm and dry  Findings: No rash  Neurological:      General: No focal deficit present  Mental Status: She is alert and oriented to person, place, and time  Cranial Nerves: No cranial nerve deficit  Psychiatric:         Mood and Affect: Mood normal          Behavior: Behavior normal          Thought Content:  Thought content normal          Judgment: Judgment normal          Vital Signs  ED Triage Vitals [07/28/22 1832]   Temperature Pulse Respirations Blood Pressure SpO2   97 9 °F (36 6 °C) 101 20 155/91 99 %      Temp Source Heart Rate Source Patient Position - Orthostatic VS BP Location FiO2 (%)   Temporal Monitor Sitting Left arm --      Pain Score       5           Vitals:    07/28/22 1832   BP: 155/91   Pulse: 101   Patient Position - Orthostatic VS: Sitting         Visual Acuity      ED Medications  Medications   ondansetron (ZOFRAN) injection 4 mg (0 mg Intravenous Hold 7/28/22 1854)   pantoprazole (PROTONIX) injection 40 mg (40 mg Intravenous Given 7/28/22 1935)       Diagnostic Studies  Results Reviewed     Procedure Component Value Units Date/Time    HS Troponin I 4hr [[id number]]     Lab Status: No result Specimen: Blood     FLU/RSV/COVID - if FLU/RSV clinically relevant [[id number]]  (Normal) Collected: 07/28/22 1839    Lab Status: Final result Specimen: Nares from Nose Updated: 07/28/22 1924     SARS-CoV-2 Negative     INFLUENZA A PCR Negative     INFLUENZA B PCR Negative     RSV PCR Negative    Narrative:      FOR PEDIATRIC PATIENTS - copy/paste COVID Guidelines URL to browser: [email or web address]  [employer]    SARS-CoV-2 assay is a Nucleic Acid Amplification assay intended for the  qualitative detection of nucleic acid from SARS-CoV-2 in nasopharyngeal  swabs  Results are for the presumptive identification of SARS-CoV-2 RNA  Positive results are indicative of infection with SARS-CoV-2, the virus  causing COVID-19, but do not rule out bacterial infection or co-infection  with other viruses  Laboratories within the [place] and its  territories are required to report all positive results to the appropriate  public health authorities  Negative results do not preclude SARS-CoV-2  infection and should not be used as the sole basis for treatment or other  patient management decisions  Negative results must be combined with  clinical observations, patient history, and epidemiological information  This test has not been FDA cleared or approved  This test has been authorized by FDA under an Emergency Use Authorization  (EUA)   This test is only authorized for the duration of time the  declaration that circumstances exist justifying the authorization of the  emergency use of an in vitro diagnostic tests for detection of SARS-CoV-2  virus and/or diagnosis of COVID-19 infection under section 564(b)(1) of  the Act, 21 U  S C  908UAL-0(P)(1), unless the authorization is terminated  or revoked sooner  The test has been validated but independent review by FDA  and CLIA is pending  Test performed using [employer] GeneXpert: This RT-PCR assay targets N2,  a region unique to SARS-CoV-2  A conserved region in the E-gene was chosen  for pan-Sarbecovirus detection which includes SARS-CoV-2  Lactic acid [[id number]]  (Abnormal) Collected: 07/28/22 1838    Lab Status: Final result Specimen: Blood from Arm, Right Updated: 07/28/22 1913     LACTIC ACID 3 4 mmol/L     Narrative:      Result may be elevated if tourniquet was used during collection      Lactic acid 2 Hours [[id number]]     Lab Status: No result Specimen: Blood     HS Troponin 0hr (reflex protocol) [[id number]]  (Normal) Collected: 07/28/22 1838    Lab Status: Final result Specimen: Blood from Arm, Right Updated: 07/28/22 1911     hs TnI 0hr 7 ng/L     HS Troponin I 2hr [[id number]]     Lab Status: No result Specimen: Blood     Comprehensive metabolic panel [[id number]]  (Abnormal) Collected: 07/28/22 1838    Lab Status: Final result Specimen: Blood from Arm, Right Updated: 07/28/22 1911     Sodium 130 mmol/L      Potassium 4 5 mmol/L      Chloride 97 mmol/L      CO2 24 mmol/L      ANION GAP 9 mmol/L      BUN 17 mg/dL      Creatinine 1 24 mg/dL      Glucose 126 mg/dL      Calcium 8 7 mg/dL      Corrected Calcium 9 2 mg/dL      AST 16 U/L      ALT 13 U/L      Alkaline Phosphatase 52 U/L      Total Protein 6 9 g/dL      Albumin 3 4 g/dL      Total Bilirubin 0 20 mg/dL      eGFR 38 ml/min/1 73sq m     Narrative:      [place] guidelines for Chronic Kidney Disease (CKD):     Stage 1 with normal or high GFR (GFR > 90 mL/min/1 73 square meters)    Stage 2 Mild CKD (GFR = 60-89 mL/min/1 73 square meters)    Stage 3A Moderate CKD (GFR = 45-59 mL/min/1 73 square meters)    Stage 3B Moderate CKD (GFR = 30-44 mL/min/1 73 square meters)    Stage 4 Severe CKD (GFR = 15-29 mL/min/1 73 square meters)    Stage 5 End Stage CKD (GFR <15 mL/min/1 73 square meters)  Note: GFR calculation is accurate only with a steady state creatinine    Lipase [[id number]]  (Normal) Collected: 07/28/22 1838    Lab Status: Final result Specimen: Blood from Arm, Right Updated: 07/28/22 1911     Lipase 143 u/L     NT-BNP PRO [[id number]]  (Abnormal) Collected: 07/28/22 1838    Lab Status: Final result Specimen: Blood from Arm, Right Updated: 07/28/22 1911     NT-proBNP 625 pg/mL     CBC and differential [[id number]]  (Abnormal) Collected: 07/28/22 1838    Lab Status: Final result Specimen: Blood from Arm, Right Updated: 07/28/22 1908     WBC 8 88 Thousand/uL      RBC 2 67 Million/uL      Hemoglobin 5 4 g/dL      Hematocrit 19 0 %      MCV 71 fL      MCH 20 2 pg      MCHC 28 4 g/dL      RDW 17 5 %      MPV 10 0 fL      Platelets 147 Thousands/uL      nRBC 0 /100 WBCs      Neutrophils Relative 47 %      Immat GRANS % 0 %      Lymphocytes Relative 44 %      Monocytes Relative 7 %      Eosinophils Relative 2 %      Basophils Relative 0 %      Neutrophils Absolute 4 07 Thousands/µL      Immature Grans Absolute 0 02 Thousand/uL      Lymphocytes Absolute 3 92 Thousands/µL      Monocytes Absolute 0 66 Thousand/µL      Eosinophils Absolute 0 18 Thousand/µL      Basophils Absolute 0 03 Thousands/µL     Protime-INR [[id number]]  (Normal) Collected: 07/28/22 1838    Lab Status: Final result Specimen: Blood from Arm, Right Updated: 07/28/22 1859     Protime 12 8 seconds      INR 0 96    APTT [[id number]]  (Normal) Collected: 07/28/22 1838    Lab Status: Final result Specimen: Blood from Arm, Right Updated: 07/28/22 1859     PTT 27 seconds     UA w Reflex to Microscopic w Reflex to Culture [[id number]]     Lab Status: No result Specimen: Urine                  CT cervical spine without contrast   Final Result by Annika Mcmillan MD (07/28 1955)      No cervical spine fracture or traumatic malalignment  Stable multilevel degenerative spondylosis most pronounced at C4-5 and C5-C6  Workstation performed: [id number]         CT chest abdomen pelvis wo contrast   Final Result by Annika Mcmillan MD (07/28 2007)      No change  No acute intrathoracic or intra-abdominal pathology  Workstation performed: [id number]                    Procedures  ECG 12 Lead Documentation Only    Date/Time: 7/28/2022 6:38 PM  Performed by: Christian Muro MD  Authorized by: Christian Muro MD     Patient location:  ED  Previous ECG:     Previous ECG:  Compared to current    Similarity:  No change  Rate:     ECG rate:  80  Rhythm:     Rhythm: sinus rhythm    Ectopy:     Ectopy: PAC    QRS:     QRS axis:  Normal             ED Course  ED Course as of 07/28/22 2019   Thu Jul 28, 2022 1915 Blood consent signed and placed on clipboard  1917 LACTIC ACID(!!): 3 4  Probably secondary to anemia  1926 Creatinine: 1 24  Creatinine was 0 9 in December  BUN only 17  SBIRT 22yo+    Flowsheet Row Most Recent Value   SBIRT (25 yo +)    In order to provide better care to our patients, we are screening all of our patients for alcohol and drug use  Would it be okay to ask you these screening questions? Yes Filed at: 07/28/2022 4502   Initial Alcohol Screen: US AUDIT-C     1  How often do you have a drink containing alcohol? 0 Filed at: 07/28/2022 1834   2  How many drinks containing alcohol do you have on a typical day you are drinking? 0 Filed at: 07/28/2022 1834   3a  Male UNDER 65: How often do you have five or more drinks on one occasion? 0 Filed at: 07/28/2022 1834   3b  FEMALE Any Age, or MALE 65+: How often do you have 4 or more drinks on one occassion?  0 Filed at: 07/28/2022 1834   Audit-C Score 0 Filed at: 07/28/2022 4459   LIZZIE: How many times in the past year have you    Used an illegal drug or used a prescription medication for non-medical reasons? Never Filed at: 07/28/2022 1834                    MDM  Number of Diagnoses or Management Options     Amount and/or Complexity of Data Reviewed  Clinical lab tests: reviewed  Review and summarize past medical records: yes        Disposition  Final diagnoses:   Symptomatic anemia   Heme positive stool     Time reflects when diagnosis was documented in both MDM as applicable and the Disposition within this note     Time User Action Codes Description Comment    7/28/2022  7:18 PM Lamonte Lee Add [D64 9] Symptomatic anemia     7/28/2022  7:19 PM Humble Melo Add [R19 5] Heme positive stool       ED Disposition     ED Disposition   Admit    Condition   Stable    Date/Time   Thu Jul 28, 2022  7:21 PM    Comment   --         Follow-up Information    None         Patient's Medications   Discharge Prescriptions    No medications on file       No discharge procedures on file      PDMP Review     None          ED Provider  Electronically Signed by           Sandra Bishop MD  07/28/22 2019

## 2022-07-29 ENCOUNTER — APPOINTMENT (OUTPATIENT)
Dept: GASTROENTEROLOGY | Facility: HOSPITAL | Age: 87
DRG: 375 | End: 2022-07-29
Payer: MEDICARE

## 2022-07-29 ENCOUNTER — ANESTHESIA (INPATIENT)
Dept: GASTROENTEROLOGY | Facility: HOSPITAL | Age: 87
DRG: 375 | End: 2022-07-29
Payer: MEDICARE

## 2022-07-29 ENCOUNTER — ANESTHESIA EVENT (INPATIENT)
Dept: GASTROENTEROLOGY | Facility: HOSPITAL | Age: 87
DRG: 375 | End: 2022-07-29
Payer: MEDICARE

## 2022-07-29 PROBLEM — D62 ACUTE BLOOD LOSS ANEMIA: Status: ACTIVE | Noted: 2022-07-28

## 2022-07-29 LAB
ANION GAP SERPL CALCULATED.3IONS-SCNC: 10 MMOL/L (ref 4–13)
ATRIAL RATE: 85 BPM
BACTERIA UR QL AUTO: NORMAL /HPF
BILIRUB UR QL STRIP: NEGATIVE
BUN SERPL-MCNC: 14 MG/DL (ref 5–25)
CALCIUM SERPL-MCNC: 8.8 MG/DL (ref 8.3–10.1)
CHLORIDE SERPL-SCNC: 99 MMOL/L (ref 96–108)
CLARITY UR: CLEAR
CO2 SERPL-SCNC: 23 MMOL/L (ref 21–32)
COLOR UR: YELLOW
CREAT SERPL-MCNC: 0.98 MG/DL (ref 0.6–1.3)
ERYTHROCYTE [DISTWIDTH] IN BLOOD BY AUTOMATED COUNT: 20.2 % (ref 11.6–15.1)
FERRITIN SERPL-MCNC: 5 NG/ML (ref 8–388)
GFR SERPL CREATININE-BSD FRML MDRD: 50 ML/MIN/1.73SQ M
GLUCOSE SERPL-MCNC: 101 MG/DL (ref 65–140)
GLUCOSE UR STRIP-MCNC: NEGATIVE MG/DL
HCT VFR BLD AUTO: 20.8 % (ref 34.8–46.1)
HCT VFR BLD AUTO: 26.1 % (ref 34.8–46.1)
HGB BLD-MCNC: 6.3 G/DL (ref 11.5–15.4)
HGB BLD-MCNC: 8 G/DL (ref 11.5–15.4)
HGB UR QL STRIP.AUTO: NEGATIVE
IRON SATN MFR SERPL: 2 % (ref 15–50)
IRON SERPL-MCNC: 8 UG/DL (ref 50–170)
KETONES UR STRIP-MCNC: NEGATIVE MG/DL
LEUKOCYTE ESTERASE UR QL STRIP: ABNORMAL
MAGNESIUM SERPL-MCNC: 1.9 MG/DL (ref 1.6–2.6)
MCH RBC QN AUTO: 22.4 PG (ref 26.8–34.3)
MCHC RBC AUTO-ENTMCNC: 30.3 G/DL (ref 31.4–37.4)
MCV RBC AUTO: 74 FL (ref 82–98)
NITRITE UR QL STRIP: NEGATIVE
NON-SQ EPI CELLS URNS QL MICRO: NORMAL /HPF
P AXIS: 82 DEGREES
PH UR STRIP.AUTO: 6 [PH]
PLATELET # BLD AUTO: 209 THOUSANDS/UL (ref 149–390)
PMV BLD AUTO: 9.7 FL (ref 8.9–12.7)
POTASSIUM SERPL-SCNC: 4 MMOL/L (ref 3.5–5.3)
PR INTERVAL: 176 MS
PROT UR STRIP-MCNC: NEGATIVE MG/DL
QRS AXIS: 74 DEGREES
QRSD INTERVAL: 76 MS
QT INTERVAL: 362 MS
QTC INTERVAL: 430 MS
RBC # BLD AUTO: 2.81 MILLION/UL (ref 3.81–5.12)
RBC #/AREA URNS AUTO: NORMAL /HPF
SODIUM SERPL-SCNC: 132 MMOL/L (ref 135–147)
SP GR UR STRIP.AUTO: 1.01 (ref 1–1.03)
T WAVE AXIS: 67 DEGREES
TIBC SERPL-MCNC: 414 UG/DL (ref 250–450)
UROBILINOGEN UR QL STRIP.AUTO: 0.2 E.U./DL
VENTRICULAR RATE: 85 BPM
WBC # BLD AUTO: 9.08 THOUSAND/UL (ref 4.31–10.16)
WBC #/AREA URNS AUTO: NORMAL /HPF

## 2022-07-29 PROCEDURE — 88342 IMHCHEM/IMCYTCHM 1ST ANTB: CPT | Performed by: PATHOLOGY

## 2022-07-29 PROCEDURE — 85018 HEMOGLOBIN: CPT | Performed by: FAMILY MEDICINE

## 2022-07-29 PROCEDURE — 83735 ASSAY OF MAGNESIUM: CPT | Performed by: NURSE PRACTITIONER

## 2022-07-29 PROCEDURE — 81001 URINALYSIS AUTO W/SCOPE: CPT | Performed by: EMERGENCY MEDICINE

## 2022-07-29 PROCEDURE — 80048 BASIC METABOLIC PNL TOTAL CA: CPT | Performed by: NURSE PRACTITIONER

## 2022-07-29 PROCEDURE — 0DB68ZX EXCISION OF STOMACH, VIA NATURAL OR ARTIFICIAL OPENING ENDOSCOPIC, DIAGNOSTIC: ICD-10-PCS | Performed by: INTERNAL MEDICINE

## 2022-07-29 PROCEDURE — 88305 TISSUE EXAM BY PATHOLOGIST: CPT | Performed by: PATHOLOGY

## 2022-07-29 PROCEDURE — 0DBL8ZX EXCISION OF TRANSVERSE COLON, VIA NATURAL OR ARTIFICIAL OPENING ENDOSCOPIC, DIAGNOSTIC: ICD-10-PCS | Performed by: INTERNAL MEDICINE

## 2022-07-29 PROCEDURE — 0DBM8ZX EXCISION OF DESCENDING COLON, VIA NATURAL OR ARTIFICIAL OPENING ENDOSCOPIC, DIAGNOSTIC: ICD-10-PCS | Performed by: INTERNAL MEDICINE

## 2022-07-29 PROCEDURE — C9113 INJ PANTOPRAZOLE SODIUM, VIA: HCPCS | Performed by: NURSE PRACTITIONER

## 2022-07-29 PROCEDURE — 30233N1 TRANSFUSION OF NONAUTOLOGOUS RED BLOOD CELLS INTO PERIPHERAL VEIN, PERCUTANEOUS APPROACH: ICD-10-PCS | Performed by: FAMILY MEDICINE

## 2022-07-29 PROCEDURE — 88112 CYTOPATH CELL ENHANCE TECH: CPT | Performed by: PATHOLOGY

## 2022-07-29 PROCEDURE — 99233 SBSQ HOSP IP/OBS HIGH 50: CPT | Performed by: FAMILY MEDICINE

## 2022-07-29 PROCEDURE — 0DBK8ZX EXCISION OF ASCENDING COLON, VIA NATURAL OR ARTIFICIAL OPENING ENDOSCOPIC, DIAGNOSTIC: ICD-10-PCS | Performed by: INTERNAL MEDICINE

## 2022-07-29 PROCEDURE — 88341 IMHCHEM/IMCYTCHM EA ADD ANTB: CPT | Performed by: PATHOLOGY

## 2022-07-29 PROCEDURE — 0DBN8ZX EXCISION OF SIGMOID COLON, VIA NATURAL OR ARTIFICIAL OPENING ENDOSCOPIC, DIAGNOSTIC: ICD-10-PCS | Performed by: INTERNAL MEDICINE

## 2022-07-29 PROCEDURE — 88313 SPECIAL STAINS GROUP 2: CPT | Performed by: PATHOLOGY

## 2022-07-29 PROCEDURE — 85014 HEMATOCRIT: CPT | Performed by: FAMILY MEDICINE

## 2022-07-29 PROCEDURE — 85027 COMPLETE CBC AUTOMATED: CPT | Performed by: NURSE PRACTITIONER

## 2022-07-29 PROCEDURE — 0DB98ZX EXCISION OF DUODENUM, VIA NATURAL OR ARTIFICIAL OPENING ENDOSCOPIC, DIAGNOSTIC: ICD-10-PCS | Performed by: INTERNAL MEDICINE

## 2022-07-29 PROCEDURE — P9016 RBC LEUKOCYTES REDUCED: HCPCS

## 2022-07-29 RX ORDER — POLYETHYLENE GLYCOL 3350 17 G/17G
17 POWDER, FOR SOLUTION ORAL ONCE
Status: COMPLETED | OUTPATIENT
Start: 2022-07-29 | End: 2022-07-29

## 2022-07-29 RX ORDER — SODIUM CHLORIDE, SODIUM LACTATE, POTASSIUM CHLORIDE, CALCIUM CHLORIDE 600; 310; 30; 20 MG/100ML; MG/100ML; MG/100ML; MG/100ML
INJECTION, SOLUTION INTRAVENOUS CONTINUOUS PRN
Status: DISCONTINUED | OUTPATIENT
Start: 2022-07-29 | End: 2022-07-29

## 2022-07-29 RX ORDER — PROPOFOL 10 MG/ML
INJECTION, EMULSION INTRAVENOUS AS NEEDED
Status: DISCONTINUED | OUTPATIENT
Start: 2022-07-29 | End: 2022-07-29

## 2022-07-29 RX ORDER — SIMETHICONE 20 MG/.3ML
EMULSION ORAL CODE/TRAUMA/SEDATION MEDICATION
Status: COMPLETED | OUTPATIENT
Start: 2022-07-29 | End: 2022-07-29

## 2022-07-29 RX ORDER — PROPOFOL 10 MG/ML
INJECTION, EMULSION INTRAVENOUS CONTINUOUS PRN
Status: DISCONTINUED | OUTPATIENT
Start: 2022-07-29 | End: 2022-07-29

## 2022-07-29 RX ORDER — LIDOCAINE HYDROCHLORIDE 20 MG/ML
INJECTION, SOLUTION EPIDURAL; INFILTRATION; INTRACAUDAL; PERINEURAL AS NEEDED
Status: DISCONTINUED | OUTPATIENT
Start: 2022-07-29 | End: 2022-07-29

## 2022-07-29 RX ADMIN — SODIUM CHLORIDE, SODIUM LACTATE, POTASSIUM CHLORIDE, AND CALCIUM CHLORIDE: .6; .31; .03; .02 INJECTION, SOLUTION INTRAVENOUS at 14:08

## 2022-07-29 RX ADMIN — METOPROLOL SUCCINATE 12.5 MG: 25 TABLET, EXTENDED RELEASE ORAL at 09:02

## 2022-07-29 RX ADMIN — PROPOFOL 50 MCG/KG/MIN: 10 INJECTION, EMULSION INTRAVENOUS at 12:32

## 2022-07-29 RX ADMIN — PANTOPRAZOLE SODIUM 40 MG: 40 INJECTION, POWDER, FOR SOLUTION INTRAVENOUS at 09:02

## 2022-07-29 RX ADMIN — FLUTICASONE FUROATE AND VILANTEROL TRIFENATATE 1 PUFF: 100; 25 POWDER RESPIRATORY (INHALATION) at 09:02

## 2022-07-29 RX ADMIN — LIDOCAINE HYDROCHLORIDE 100 MG: 20 INJECTION, SOLUTION EPIDURAL; INFILTRATION; INTRACAUDAL at 12:31

## 2022-07-29 RX ADMIN — PANTOPRAZOLE SODIUM 40 MG: 40 INJECTION, POWDER, FOR SOLUTION INTRAVENOUS at 21:19

## 2022-07-29 RX ADMIN — SODIUM CHLORIDE, SODIUM LACTATE, POTASSIUM CHLORIDE, AND CALCIUM CHLORIDE: .6; .31; .03; .02 INJECTION, SOLUTION INTRAVENOUS at 12:20

## 2022-07-29 RX ADMIN — Medication 40 MG: at 12:39

## 2022-07-29 RX ADMIN — PROPOFOL 50 MG: 10 INJECTION, EMULSION INTRAVENOUS at 12:31

## 2022-07-29 RX ADMIN — POLYETHYLENE GLYCOL 3350 17 G: 17 POWDER, FOR SOLUTION ORAL at 09:19

## 2022-07-29 NOTE — ASSESSMENT & PLAN NOTE
· Denies chest pain  · History cardiac stenting 2011  · Aspirin on hold  · Continue metoprolol succinate daily  · Outpatient cardiac follow-up with [facility]

## 2022-07-29 NOTE — CASE MANAGEMENT
Case Management Assessment & Discharge Planning Note    Patient name Dae Bain  Location /-01 MRN [id number]  : 1932 Date 2022       Current Admission Date: 2022  Current Admission Diagnosis:Symptomatic anemia   Patient Active Problem List    Diagnosis Date Noted    Symptomatic anemia 2022    Hypocalcemia 2021    PONV (postoperative nausea and vomiting) 2021    Spinal stenosis     Stage 3 chronic kidney disease ([facility] )     Multiple falls     Acute on chronic anemia 2021    [facility]    Fall 2021    Pain in right foot 2021    Closed nondisplaced fracture of proximal phalanx of right great toe 2021    Coronary artery disease 2020    Postoperative hypotension 2020    COPD not affecting current episode of care ([place] [address] ) 2020    Dizziness 2020      LOS (days): 1  Geometric Mean LOS (GMLOS) (days): 2 70  Days to GMLOS:2 1     OBJECTIVE:    Risk of Unplanned Readmission Score: 11 86         Current admission status: Inpatient       Preferred Pharmacy:   [address] [facility], [address] [facility]  [address] [address]  Phone: [phone] Fax: [phone]  [address]  [address] [facility] [address]  Phone: [phone] Fax: [phone]    [address] [place] [address]  Phone: [phone] Fax: [phone]    Primary Care Provider: Karena Pizarro MD    Primary Insurance: MEDICARE  Secondary Insurance: [employer] [employer] SHIELD    ASSESSMENT:  Reba Loco [employer]     [facility] Representative - Son   Primary Phone: [phone] (Mobile)               Advance Directives  Does patient have a [address]?: Yes (son, Shira De La Torre and Eaglekevin George, Lonza Lunch)  Does patient have Advance Directives?: Yes  Advance Directives: Power of  for health care  Primary Contact: Hakeem denis List              Patient Information  Admitted from[de-identified] Home  Mental Status: Alert  During Assessment patient was accompanied by: Not accompanied during assessment  Assessment information provided by[de-identified] Patient, Other - please comment (chart)  Primary Caregiver: Self  Support Systems: Children, Psychiatrist  Home entry access options   Select all that apply : Stairs  Number of steps to enter home : 1  Type of Current Residence: [place]  In the last 12 months, was there a time when you were not able to pay the mortgage or rent on time?: No  In the last 12 months, how many places have you lived?: 1  In the last 12 months, was there a time when you did not have a steady place to sleep or slept in a shelter (including now)?: No  Living Arrangements: Lives Alone    Activities of Daily Living Prior to Admission  Functional Status: Independent  Completes ADLs independently?: No  Level of ADL dependence: Assistance  Ambulates independently?: Yes  Does patient use assisted devices?: Yes  Assisted Devices (DME) used: Rollator, Straight Cane  Does patient currently own DME?: Yes  What DME does the patient currently own?: Straight Cane, Rollator, Other (Comment) (electric scooter)  Does patient have a history of Outpatient Therapy (PT/OT)?: No  Does the patient have a history of Short-Term Rehab?: Yes ([facility])  Does patient have a history of HHC?: Yes ([facility])  Does patient currently have [facility]?: No    Current Home Health Care  Type of Current Home Care Services: Home health aide    Patient Information Continued  Income Source: Pension/[facility]  Does patient have prescription coverage?: Yes  Within the past 12 months, you worried that your food would run out before you got the money to buy more : Never true  Within the past 12 months, the food you bought just didn't last and you didn't have money to get more : Never true  Food insecurity resource given?: No  Does patient receive dialysis treatments?: No  Does patient have a history of substance abuse?: No  Does patient have a history of Mental Health Diagnosis?: No         Means of Transportation  Means of Transport to Appts[de-identified] Family transport  In the past 12 months, has lack of transportation kept you from medical appointments or from getting medications?: No  In the past 12 months, has lack of transportation kept you from meetings, work, or from getting things needed for daily living?: No        DISCHARGE DETAILS:       Freedom of Choice: Yes                   Contacts  Patient Contacts: Renetta Healy, son  Relationship to Patient[de-identified] Family                   Would you like to participate in our [facility] service program?  : No - Declined              pt sts she has home health aides that come to her house daily from [id number]

## 2022-07-29 NOTE — H&P
[address]  H&P- Tawana Franc Truongm 1/11/1932, 80 y o  female MRN: [id number]  Unit/Bed#: -01 Encounter: [id number]  Primary Care Provider: Becca Fregoso MD   Date and time admitted to hospital: 7/28/2022  6:29 PM    * Symptomatic anemia  Assessment & Plan  · POA with hemoglobin 5 4  · Baseline hemoglobin 10 2  · Endorses heartburn with spicy foods, intermittent melena  · FOBT positive in ED  · Transfuse for hemoglobin >7 5  · Asa 81 mg on hold- LD 7/27  · Consultation gastroenterology  · Plan for EGD/colonoscopy in a m  · Trend hemoglobin  · Protonix  · Stool diary    Stage 3 chronic kidney disease [facility])  Assessment & Plan  Lab Results   Component Value Date    EGFR 38 07/28/2022    EGFR 54 05/07/2021    EGFR 54 05/06/2021    CREATININE 1 24 07/28/2022    CREATININE 0 94 05/07/2021    CREATININE 0 94 05/06/2021     · CKD 3 hx   · CR 1 24 above baseline of 0 94-0 97 in 2021  Does not meet criteria for SHIVANI  · Pre renal due to symptomatic anemia  · Trend CR  · Renally dose medications    Coronary artery disease  Assessment & Plan  · Denies chest pain  · History cardiac stenting 2018  · Aspirin on hold  · Continue metoprolol succinate daily    VTE Pharmacologic Prophylaxis: VTE Score: 3 Moderate Risk (Score 3-4) - Pharmacological DVT Prophylaxis Contraindicated  Sequential Compression Devices Ordered  Code Status: Level 3 - DNAR and DNI   Discussion with family: Requested not to call son until 7:00 a m  In the morning  Anticipated Length of Stay: Patient will be admitted on an inpatient basis with an anticipated length of stay of greater than 2 midnights secondary to Symptomatic anemia  Total Time for Visit, including Counseling / Coordination of Care: 30 minutes Greater than 50% of this total time spent on direct patient counseling and coordination of care      Chief Complaint:  Neck pain  History of Present Illness:  Manas Smith is a very pleasant 80 y o  female appearing much younger than stated age with a PMH of CAD status post cardiac stenting in 2011, COPD, right femur fracture status post repair in 2021, recent right upper extremity cellulitis who presents with vague neck pain and dyspnea to the emergency department  In the emergency department patient found to have hemoglobin 5 4 and FOBT positive stools by ED physician  History of GERD and follows with Dr Whitlock in [place] for upper and lower endoscopies  Endorses severe heartburn with spicy foods and intermittent melena over the past few weeks  Patient states she lives alone, has caregivers that come in from 8:00 - 1:00 daily  States her son lives in [place] and daughter in [place], requesting I do not call her son until the morning  Review of Systems:  Review of Systems   Constitutional: Negative for chills and fever  HENT: Negative for ear pain and sore throat  Eyes: Negative for pain and visual disturbance  Respiratory: Positive for shortness of breath  Negative for cough  Cardiovascular: Negative for chest pain and palpitations  Gastrointestinal: Negative for abdominal pain and vomiting  Genitourinary: Negative for dysuria and hematuria  Musculoskeletal: Positive for neck pain  Negative for arthralgias and back pain  Skin: Negative for color change and rash  Neurological: Negative for seizures and syncope  All other systems reviewed and are negative        Past Medical and Surgical History:   Past Medical History:   Diagnosis Date    Cancer [facility])     skin cancer    Cardiac disease     Multiple falls     Neuropathy     Skin cancer     forehead    Spinal stenosis     UTI (urinary tract infection)        Past Surgical History:   Procedure Laterality Date    APPENDECTOMY      BACK SURGERY      x2    CATARACT EXTRACTION W/  INTRAOCULAR LENS IMPLANT Bilateral     CORONARY ANGIOPLASTY WITH STENT PLACEMENT  09/23/2011    TN OPEN RX FEMUR FX+INTRAMED RASHIDA Right 5/4/2021 Procedure: INSERTION NAIL IM FEMUR ANTEGRADE (TROCHANTERIC); Surgeon: Leif Phillips; Location: [facility];  Service: Orthopedics    REPLACEMENT TOTAL KNEE BILATERAL         Meds/Allergies:  Prior to Admission medications    Medication Sig Start Date End Date Taking? Authorizing Provider   acetaminophen (TYLENOL) 325 mg tablet Take 2 tablets (650 mg total) by mouth every 6 (six) hours as needed for mild pain, headaches or fever 5/7/21   Rama Eaton MD   albuterol (PROVENTIL HFA,VENTOLIN HFA) 90 mcg/act inhaler Inhale 2 puffs every 6 (six) hours as needed for wheezing    Historical Provider, MD   aspirin (ECOTRIN LOW STRENGTH) 81 mg EC tablet Take 1 tablet (81 mg total) by mouth daily 5/14/21   Rama Eaton MD   bisacodyl (Dulcolax) 10 mg suppository Insert 10 mg into the rectum daily  Patient not taking: Reported on 7/9/2021    Historical Provider, MD   bisacodyl (DULCOLAX) 5 mg EC tablet Take 5 mg by mouth daily as needed for constipation  Patient not taking: Reported on 7/9/2021    Historical Provider, MD   budesonide-formoterol (SYMBICORT) 80-4 5 MCG/ACT inhaler Inhale 2 puffs 2 (two) times a day Rinse mouth after use    Patient not taking: Reported on 7/9/2021    Historical Provider, MD   cyanocobalamin (VITAMIN B-12) 100 MCG tablet Take 100 mcg by mouth daily    Historical Provider, MD   denosumab (PROLIA) 60 mg/mL Inject 60 mg under the skin once    Historical Provider, MD   docusate sodium (COLACE) 100 mg capsule Take 1 capsule (100 mg total) by mouth 2 (two) times a day 5/7/21   Rama Etaon MD   famotidine (PEPCID) 20 mg tablet Take 20 mg by mouth 2 (two) times a day    Historical Provider, MD   fluticasone (FLONASE) 50 mcg/act nasal spray 1 spray into each nostril daily    Historical Provider, MD   fluticasone-vilanterol (Breo Ellipta) 100-25 mcg/inh inhaler     Historical Provider, MD   Folplex 2 2 2 2-25-0 5 MG TABS  10/11/21   Historical Provider, MD   loperamide (IMODIUM) 2 mg capsule Take 2 mg by mouth 4 (four) times a day as needed for diarrhea    Historical Provider, MD   Magnesium Hydroxide (MILK OF MAGNESIA PO) Take by mouth    Historical Provider, MD   MAGNESIUM SULFATE PO Take 100 mg by mouth daily  Patient not taking: Reported on 7/9/2021    Historical Provider, MD   metoprolol succinate (TOPROL-XL) 25 mg 24 hr tablet Take 12 5 mg by mouth daily    Historical Provider, MD   Multiple Vitamins-Minerals (CENTRUM SILVER 50+WOMEN PO) Take 1 tablet by mouth    Historical Provider, MD   oxyCODONE (OXY-IR) 5 MG capsule Take 5 mg by mouth every 4 (four) hours as needed for moderate pain  Patient not taking: Reported on 7/9/2021    Historical Provider, MD   pantoprazole (PROTONIX) 40 mg tablet Take 40 mg by mouth daily    Historical Provider, MD   senna-docusate sodium (SENOKOT S) 8 6-50 mg per tablet Take 1 tablet by mouth daily    Historical Provider, MD     I have reviewed home medications with patient personally  Allergies: Allergies   Allergen Reactions    Ace Inhibitors Cough    Acetaminophen Vomiting    Angiotensin Receptor Blockers      Other reaction(s): WEAK AND SOB    Atorvastatin      Other reaction(s): NOT SURE    Ezetimibe      Other reaction(s): N&V    Formoterol      Other reaction(s): NOT SURE, SHAKINESS    Gabapentin      Other reaction(s): SWELLING    Hydrocodone-Acetaminophen Vomiting     Other reaction(s): Nausea and Vomiting    Lovastatin      Other reaction(s): MUSCLE PAIN    Methylprednisolone      Other reaction(s): FLUSHING    Mometasone Furoate      Other reaction(s): HOARSENESS    Niacin      Other reaction(s): NOT SURE    Nsaids     Other      abx - pt cant remember name - got body aches and headache    Oxycodone GI Intolerance    Oxytetracycline      Other reaction(s): NOT SURE    Penicillin G Hives    Pravastatin     Rosuvastatin      Other reaction(s): MUSCLE PAIN    Statins      Other reaction(s):  Other (See Comments)  "bone pain"    Tramadol      Other reaction(s): N&V    Latex Other (See Comments) and Rash     "skin peeling"    Penicillins Rash and Hives     Other reaction(s): RASH       Social History:  Marital Status:    Occupation:  Retired  Patient Pre-hospital Living Situation: Home  Patient Pre-hospital Level of Mobility: unable to be assessed at time of evaluation  Patient Pre-hospital Diet Restrictions:  None  Substance Use History:   Social History     Substance and Sexual Activity   Alcohol Use Yes    Comment: Occasionally     Social History     Tobacco Use   Smoking Status Never Smoker   Smokeless Tobacco Never Used     Social History     Substance and Sexual Activity   Drug Use Never       Family History:  History reviewed  No pertinent family history  Physical Exam:     Vitals:   Blood Pressure: 138/52 (07/28/22 2210)  Pulse: 83 (07/28/22 2210)  Temperature: 98 2 °F (36 8 °C) (07/28/22 2210)  Temp Source: Temporal (07/28/22 1832)  Respirations: 20 (07/28/22 2210)  Height: 5' 5" (165 1 cm) (07/28/22 1832)  Weight - Scale: 56 8 kg (125 lb 3 5 oz) (07/28/22 1832)  SpO2: 97 % (07/28/22 2102)    Physical Exam  Vitals and nursing note reviewed  Constitutional:       General: She is not in acute distress  Appearance: She is well-developed and underweight  HENT:      Head: Normocephalic and atraumatic  Mouth/Throat:      Mouth: Mucous membranes are dry  Eyes:      Conjunctiva/sclera: Conjunctivae normal    Cardiovascular:      Rate and Rhythm: Normal rate and regular rhythm  Heart sounds: No murmur heard  Pulmonary:      Effort: Pulmonary effort is normal  No respiratory distress  Breath sounds: Normal breath sounds  Abdominal:      Palpations: Abdomen is soft  Tenderness: There is no abdominal tenderness  Musculoskeletal:      Cervical back: Neck supple  Skin:     General: Skin is warm and dry  Neurological:      General: No focal deficit present        Mental Status: She is alert and oriented to person, place, and time  Psychiatric:         Mood and Affect: Mood normal          Behavior: Behavior normal         Additional Data:     Lab Results:  Results from last 7 days   Lab Units 07/28/22  1838   WBC Thousand/uL 8 88   HEMOGLOBIN g/dL 5 4*   HEMATOCRIT % 19 0*   PLATELETS Thousands/uL 266   NEUTROS PCT % 47   LYMPHS PCT % 44   MONOS PCT % 7   EOS PCT % 2     Results from last 7 days   Lab Units 07/28/22  1838   SODIUM mmol/L 130*   POTASSIUM mmol/L 4 5   CHLORIDE mmol/L 97   CO2 mmol/L 24   BUN mg/dL 17   CREATININE mg/dL 1 24   ANION GAP mmol/L 9   CALCIUM mg/dL 8 7   ALBUMIN g/dL 3 4*   TOTAL BILIRUBIN mg/dL 0 20   ALK PHOS U/L 52   ALT U/L 13   AST U/L 16   GLUCOSE RANDOM mg/dL 126     Results from last 7 days   Lab Units 07/28/22  1838   INR  0 96             Results from last 7 days   Lab Units 07/28/22  2125 07/28/22  1838   LACTIC ACID mmol/L 0 8 3 4*       Imaging: Reviewed radiology reports from this admission including: CT cervical spine, CT chest abdomen pelvis  CT cervical spine without contrast   Final Result by Aubrey Ross MD (07/28 1955)      No cervical spine fracture or traumatic malalignment  Stable multilevel degenerative spondylosis most pronounced at C4-5 and C5-C6  Workstation performed: [id number]         CT chest abdomen pelvis wo contrast   Final Result by Aubrey Ross MD (07/28 2007)      No change  No acute intrathoracic or intra-abdominal pathology  Workstation performed: [id number]             EKG and Other Studies Reviewed on Admission:   · EKG: NSR  HR 85     ** Please Note: This note has been constructed using a voice recognition system   **

## 2022-07-29 NOTE — PLAN OF CARE
Problem: Nutrition/Hydration-ADULT  Goal: Nutrient/Hydration intake appropriate for improving, restoring or maintaining nutritional needs  Description: Monitor and assess patient's nutrition/hydration status for malnutrition  Collaborate with interdisciplinary team and initiate plan and interventions as ordered  Monitor patient's weight and dietary intake as ordered or per policy  Utilize nutrition screening tool and intervene as necessary  Determine patient's food preferences and provide high-protein, high-caloric foods as appropriate       INTERVENTIONS:  - Monitor oral intake, urinary output, labs, and treatment plans  - Assess nutrition and hydration status and recommend course of action  - Evaluate amount of meals eaten  - Assist patient with eating if necessary   - Allow adequate time for meals  - Recommend/ encourage appropriate diets, oral nutritional supplements, and vitamin/mineral supplements  - Order, calculate, and assess calorie counts as needed  - Recommend, monitor, and adjust tube feedings and TPN/PPN based on assessed needs  - Assess need for intravenous fluids  - Provide specific nutrition/hydration education as appropriate  - Include patient/family/caregiver in decisions related to nutrition  Outcome: Progressing     Problem: MOBILITY - ADULT  Goal: Maintain or return to baseline ADL function  Description: INTERVENTIONS:  -  Assess patient's ability to carry out ADLs; assess patient's baseline for ADL function and identify physical deficits which impact ability to perform ADLs (bathing, care of mouth/teeth, toileting, grooming, dressing, etc )  - Assess/evaluate cause of self-care deficits   - Assess range of motion  - Assess patient's mobility; develop plan if impaired  - Assess patient's need for assistive devices and provide as appropriate  - Encourage maximum independence but intervene and supervise when necessary  - Involve family in performance of ADLs  - Assess for home care needs following discharge   - Consider OT consult to assist with ADL evaluation and planning for discharge  - Provide patient education as appropriate  Outcome: Progressing     Problem: Knowledge Deficit  Goal: Patient/family/caregiver demonstrates understanding of disease process, treatment plan, medications, and discharge instructions  Description: Complete learning assessment and assess knowledge base    Interventions:  - Provide teaching at level of understanding  - Provide teaching via preferred learning methods  Outcome: Progressing

## 2022-07-29 NOTE — ASSESSMENT & PLAN NOTE
Lab Results   Component Value Date    EGFR 50 07/29/2022    EGFR 38 07/28/2022    EGFR 54 05/07/2021    CREATININE 0 98 07/29/2022    CREATININE 1 24 07/28/2022    CREATININE 0 94 05/07/2021     · CKD 3 hx   · CR 1 24 above baseline of 0 94-0 97 in 2021   Does not meet criteria for SHIVANI  · Pre renal due to symptomatic anemia  · Trend CR  · Renally dose medications

## 2022-07-29 NOTE — ASSESSMENT & PLAN NOTE
· Denies chest pain  · History cardiac stenting 2011  · Aspirin on hold  · Continue metoprolol succinate daily  · Outpatient cardiac follow-up with [facility]

## 2022-07-29 NOTE — PROGRESS NOTES
[address]  Progress Note Kelin Grant Mimm 1/11/1932, 80 y o  female MRN: [id number]  Unit/Bed#: -01 Encounter: [id number]  Primary Care Provider: Kuldeep Rodriguez MD   Date and time admitted to hospital: 7/28/2022  6:29 PM    * Acute blood loss anemia  Assessment & Plan  · POA with hemoglobin 5 4  · Baseline hemoglobin 10 2  · Endorses heartburn with spicy foods, intermittent melena  · FOBT positive in ED  · Currently ordered 2 units of PRBC  Repeat H&H 2 hours after transfusion done  · Asa 81 mg on hold- LD 7/27  · Consultation gastroenterology  · Plan for EGD/colonoscopy today  · Protonix  · Noted to have iron deficiency anemia  Transfuse if hemoglobin less than 7 5 and also will give a dose of Venofer    Stage 3 chronic kidney disease [facility])  Assessment & Plan  Lab Results   Component Value Date    EGFR 50 07/29/2022    EGFR 38 07/28/2022    EGFR 54 05/07/2021    CREATININE 0 98 07/29/2022    CREATININE 1 24 07/28/2022    CREATININE 0 94 05/07/2021     · CKD 3 hx   · CR 1 24 above baseline of 0 94-0 97 in 2021  Does not meet criteria for SHIVANI  · Pre renal due to symptomatic anemia  · Trend CR  · Renally dose medications    Coronary artery disease  Assessment & Plan  · Denies chest pain  · History cardiac stenting 2011  · Aspirin on hold  · Continue metoprolol succinate daily  · Outpatient cardiac follow-up with [facility]      VTE Pharmacologic Prophylaxis:   Pharmacologic: Pharmacologic VTE Prophylaxis contraindicated due to Acute blood loss anemia  Mechanical VTE Prophylaxis in Place: Yes    Patient Centered Rounds: I have performed bedside rounds with nursing staff today  Discussions with Specialists or Other Care Team Provider:  Will discuss with GI    Education and Discussions with Family / Patient:  Discussed with patient at bedside and will update family    Time Spent for Care: 45 minutes    More than 50% of total time spent on counseling and coordination of care as described above  Current Length of Stay: 1 day(s)    Current Patient Status: Inpatient   Certification Statement: The patient will continue to require additional inpatient hospital stay due to Acute blood loss anemia    Discharge Plan:  Plan for EGD and colonoscopy today and further disposition pending results    Code Status: Level 3 - DNAR and DNI      Subjective:   Patient denies any chest pain or shortness of breath or abdominal pain today  Complains of feeling some fatigue    Objective:     Vitals:   Temp (24hrs), Av 2 °F (36 8 °C), Min:97 9 °F (36 6 °C), Max:98 6 °F (37 °C)    Temp:  [97 9 °F (36 6 °C)-98 6 °F (37 °C)] 98 °F (36 7 °C)  HR:  [] 79  Resp:  [17-20] 18  BP: (106-155)/(52-91) 118/68  SpO2:  [93 %-99 %] 93 %  Body mass index is 20 84 kg/m²  Input and Output Summary (last 24 hours): Intake/Output Summary (Last 24 hours) at 2022 1125  Last data filed at 2022 0710  Gross per 24 hour   Intake 540 ml   Output 250 ml   Net 290 ml       Physical Exam:     Physical Exam  Vitals and nursing note reviewed  Constitutional:       Appearance: Normal appearance  HENT:      Head: Normocephalic and atraumatic  Right Ear: External ear normal       Left Ear: External ear normal       Nose: Nose normal       Mouth/Throat:      Pharynx: Oropharynx is clear  Eyes:      Pupils: Pupils are equal, round, and reactive to light  Cardiovascular:      Rate and Rhythm: Normal rate and regular rhythm  Heart sounds: Normal heart sounds  Pulmonary:      Effort: Pulmonary effort is normal       Breath sounds: Normal breath sounds  Abdominal:      General: Bowel sounds are normal       Palpations: Abdomen is soft  Tenderness: There is no abdominal tenderness  Musculoskeletal:         General: Normal range of motion  Cervical back: Normal range of motion and neck supple  Skin:     General: Skin is warm and dry        Capillary Refill: Capillary refill takes less than 2 seconds  Neurological:      General: No focal deficit present  Mental Status: She is alert and oriented to person, place, and time  Psychiatric:         Mood and Affect: Mood normal            Additional Data:     Labs:    Results from last 7 days   Lab Units 07/29/22  0558 07/28/22  1838   WBC Thousand/uL 9 08 8 88   HEMOGLOBIN g/dL 6 3* 5 4*   HEMATOCRIT % 20 8* 19 0*   PLATELETS Thousands/uL 209 266   NEUTROS PCT %  --  47   LYMPHS PCT %  --  44   MONOS PCT %  --  7   EOS PCT %  --  2     Results from last 7 days   Lab Units 07/29/22  0535 07/28/22  1838   SODIUM mmol/L 132* 130*   POTASSIUM mmol/L 4 0 4 5   CHLORIDE mmol/L 99 97   CO2 mmol/L 23 24   BUN mg/dL 14 17   CREATININE mg/dL 0 98 1 24   ANION GAP mmol/L 10 9   CALCIUM mg/dL 8 8 8 7   ALBUMIN g/dL  --  3 4*   TOTAL BILIRUBIN mg/dL  --  0 20   ALK PHOS U/L  --  52   ALT U/L  --  13   AST U/L  --  16   GLUCOSE RANDOM mg/dL 101 126     Results from last 7 days   Lab Units 07/28/22  1838   INR  0 96             Results from last 7 days   Lab Units 07/28/22  2125 07/28/22  1838   LACTIC ACID mmol/L 0 8 3 4*           * I Have Reviewed All Lab Data Listed Above  * Additional Pertinent Lab Tests Reviewed:  Deepali 66 Admission Reviewed    Imaging:    Imaging Reports Reviewed Today Include:  CT chest abdomen pelvis  Imaging Personally Reviewed by Myself Includes:  CT chest abdomen pelvis    Recent Cultures (last 7 days):           Last 24 Hours Medication List:   Current Facility-Administered Medications   Medication Dose Route Frequency Provider Last Rate    fluticasone-vilanterol  1 puff Inhalation Daily Barbara S Charley, CRNP      metoprolol succinate  12 5 mg Oral Daily Barbara S Charley, CRNP      ondansetron  4 mg Intravenous Once Reji Cintron MD      ondansetron  4 mg Intravenous Q6H PRN Barbara S Charley, CRNP      pantoprazole  40 mg Intravenous Q12H Barbara S Charley, CRNP          Today, Patient Was Seen By: Rhiannon Alexander, MD    ** Please Note: Dictation voice to text software may have been used in the creation of this document   **

## 2022-07-29 NOTE — CONSULTS
Consultation - [address] [place] [address] [facility]  Ida Kelsey Mimm 80 y o  female MRN: [id number]  Unit/Bed#: -01 Encounter: [id number]        Inpatient consult to gastroenterology  Consult performed by: NARA Weinstein  Consult ordered by: Jeanie Saxena          Reason for Consult / Principal Problem:   Symptomatic acute on chronic microcytic anemia  Dysphagia to solids  unintentional weight loss of 30lbs 6 months  Nausea and vomiting  GERD  Hx esophageal dilations  Hx of gastroparesis  NSAID use    ASSESSMENT AND PLAN:    Symptomatic acute on chronic microcytic anemia  Dysphagia to solids  unintentional weight loss of 30lbs 6 months  Nausea and vomiting  GERD  Hx esophageal dilations  Hx of gastroparesis  NSAID use    hgb 5 4 on admission, 6 3 this am, was 10 2 05/07/2022, and baseline of 7 1-11 since 2021  Awaiting transfusion as per primary team  Iron panel pending, if iron deficiency recommend IV iron   Transfuse to keep hgb >8 given hx of PCI    Cont PPI 40BID IV  Avoid NSAIDs  Baby aspirin on hold since 07/28/2022  Cont NPO except sips with meds    Denies any blood or melena, but certainly concerning for GI blood loss given hgb and symptoms, normal bun/creat, may be gastritis, AVM, diverticular bleed, given weight loss may also be malignancy    Last EGD 09/2021 Dr Oscar Bowles with reported esophageal dilation and was told it would not be repeated again, unable to open report in care everywhere  Last colonoscopy 2009-Dr Whitlock- reported at [facility], unable to open report in care everywhere    Plan for EGD and colonoscopy today    Watery brown stool this am after prep-give additional 6 packets miralax in 24oz fluid, if still brown give enema 1 hour prior to procedure      Lizabeth CACERES   [facility]  Gastroenterology    Patient plan of care formulated with Dr Elizabeth Vazquez     ______________________________________________________________________    HPI:    Patient is a 60-year-old female with past medical history significant for gastroparesis, history of esophageal dilation, CAD status post PCI 2008 on daily baby aspirin, COPD, stage 3 chronic kidney disease, history of right femur fracture 2021, with recent right upper extremity cellulitis, who presents with increasing shortness of breath, fatigue, occasional chest pain and lightheadedness, with worsening reflux, nausea, vomiting, early satiety, and dysphagia to solids in the past 2 weeks  Also admits to an unintentional 30 lb weight loss in the past 6-7 months  Denies any abdominal pain  Has been moving her bowels daily to every other day which is baseline for her  She denies any blood or melena  Says that the only vomiting she had was since starting antibiotics for her right upper arm cellulitis, and was only bile no blood or coffee-ground emesis  History of esophageal dilation last completed September 2021 by Dr Minna Duffy, unable to open report to see details patient is unsure as to why this was done, but was told that it could not be completed again and she said she did not feel right after this was done  Last colonoscopy 2009 with Dr Minna Duffy reported within normal limits, unable to open report Care everywhere  Admits to taking ibuprofen often due to pain, but says lately she has not been taking nearly as often as she was  Denies any blood thinners, but is on baby aspirin for history of PCI since 2008  History of appendectomy many years ago  Says that she believes she completed the entire prep this morning, last reported bowel movement for bedside staff was watery and brown  Patient denies any tobacco drug use  Does admit to occasional alcohol use, but nothing recently  Denies any family history of any GI cancers, liver problems, or bowel disorders  REVIEW OF SYSTEMS:    Review of Systems   Constitutional: Positive for appetite change and unexpected weight change  HENT: Positive for trouble swallowing      Respiratory: Positive for shortness of breath  Gastrointestinal: Positive for nausea and vomiting  Negative for abdominal distention, abdominal pain, anal bleeding, blood in stool, constipation, diarrhea and rectal pain  Neurological: Positive for light-headedness  Negative for syncope  Psychiatric/Behavioral: Negative for confusion  All other systems reviewed and are negative  Historical Information   Past Medical History:   Diagnosis Date    Cancer [facility])     skin cancer    Cardiac disease     Multiple falls     Neuropathy     Skin cancer     forehead    Spinal stenosis     UTI (urinary tract infection)      Past Surgical History:   Procedure Laterality Date    APPENDECTOMY      BACK SURGERY      x2    CATARACT EXTRACTION W/  INTRAOCULAR LENS IMPLANT Bilateral     CORONARY ANGIOPLASTY WITH STENT PLACEMENT  09/23/2011    [place] OPEN RX FEMUR FX+INTRAMED RASHIDA Right 5/4/2021    Procedure: INSERTION NAIL IM FEMUR ANTEGRADE (TROCHANTERIC); Surgeon: Fabrizio Noble; Location:  MAIN OR;  Service: Orthopedics    REPLACEMENT TOTAL KNEE BILATERAL       Social History   Social History     Substance and Sexual Activity   Alcohol Use Yes    Comment: Occasionally     Social History     Substance and Sexual Activity   Drug Use Never     Social History     Tobacco Use   Smoking Status Never Smoker   Smokeless Tobacco Never Used     History reviewed  No pertinent family history      Meds/Allergies     Medications Prior to Admission   Medication    acetaminophen (TYLENOL) 325 mg tablet    albuterol (PROVENTIL HFA,VENTOLIN HFA) 90 mcg/act inhaler    aspirin (ECOTRIN LOW STRENGTH) 81 mg EC tablet    bisacodyl (Dulcolax) 10 mg suppository    bisacodyl (DULCOLAX) 5 mg EC tablet    budesonide-formoterol (SYMBICORT) 80-4 5 MCG/ACT inhaler    cyanocobalamin (VITAMIN B-12) 100 MCG tablet    denosumab (PROLIA) 60 mg/mL    docusate sodium (COLACE) 100 mg capsule    famotidine (PEPCID) 20 mg tablet    fluticasone (FLONASE) 50 mcg/act nasal spray    fluticasone-vilanterol (Breo Ellipta) 100-25 mcg/inh inhaler    Folplex 2 2 2 2-25-0 5 MG TABS    loperamide (IMODIUM) 2 mg capsule    Magnesium Hydroxide (MILK OF MAGNESIA PO)    MAGNESIUM SULFATE PO    metoprolol succinate (TOPROL-XL) 25 mg 24 hr tablet    Multiple Vitamins-Minerals (CENTRUM SILVER 50+WOMEN PO)    oxyCODONE (OXY-IR) 5 MG capsule    pantoprazole (PROTONIX) 40 mg tablet    senna-docusate sodium (SENOKOT S) 8 6-50 mg per tablet     Current Facility-Administered Medications   Medication Dose Route Frequency    fluticasone-vilanterol (BREO ELLIPTA) 100-25 mcg/inh inhaler 1 puff  1 puff Inhalation Daily    metoprolol succinate (TOPROL-XL) 24 hr tablet 12 5 mg  12 5 mg Oral Daily    ondansetron (ZOFRAN) injection 4 mg  4 mg Intravenous Once    ondansetron (ZOFRAN) injection 4 mg  4 mg Intravenous Q6H PRN    pantoprazole (PROTONIX) injection 40 mg  40 mg Intravenous Q12H       Allergies   Allergen Reactions    Ace Inhibitors Cough    Acetaminophen Vomiting    Angiotensin Receptor Blockers      Other reaction(s): WEAK AND SOB    Atorvastatin      Other reaction(s): NOT SURE    Ezetimibe      Other reaction(s): N&V    Formoterol      Other reaction(s): NOT SURE, SHAKINESS    Gabapentin      Other reaction(s): SWELLING    Hydrocodone-Acetaminophen Vomiting     Other reaction(s): Nausea and Vomiting    Lovastatin      Other reaction(s): MUSCLE PAIN    Methylprednisolone      Other reaction(s): FLUSHING    Mometasone Furoate      Other reaction(s): HOARSENESS    Niacin      Other reaction(s): NOT SURE    Nsaids     Other      abx - pt cant remember name - got body aches and headache    Oxycodone GI Intolerance    Oxytetracycline      Other reaction(s): NOT SURE    Penicillin G Hives    Pravastatin     Rosuvastatin      Other reaction(s): MUSCLE PAIN    Statins      Other reaction(s):  Other (See Comments)  "bone pain"    Tramadol      Other reaction(s): N&V    Latex Other (See Comments) and Rash     "skin peeling"    Penicillins Rash and Hives     Other reaction(s): RASH           Objective     Blood pressure 133/55, pulse 69, temperature 98 °F (36 7 °C), temperature source Oral, resp  rate 17, height 5' 5" (1 651 m), weight 56 8 kg (125 lb 3 5 oz), SpO2 98 %  Body mass index is 20 84 kg/m²  Intake/Output Summary (Last 24 hours) at 7/29/2022 0804  Last data filed at 7/29/2022 0710  Gross per 24 hour   Intake 540 ml   Output 250 ml   Net 290 ml         PHYSICAL EXAM:      Physical Exam  Vitals and nursing note reviewed  Constitutional:       General: She is not in acute distress  Appearance: Normal appearance  She is not ill-appearing, toxic-appearing or diaphoretic  HENT:      Head: Normocephalic and atraumatic  Mouth/Throat:      Mouth: Mucous membranes are dry  Pharynx: Oropharynx is clear  No oropharyngeal exudate  Eyes:      General: No scleral icterus  Conjunctiva/sclera: Conjunctivae normal    Cardiovascular:      Rate and Rhythm: Normal rate  Pulmonary:      Effort: Pulmonary effort is normal    Abdominal:      General: There is no distension  Palpations: Abdomen is soft  There is no mass  Tenderness: There is no abdominal tenderness  There is no guarding or rebound  Hernia: No hernia is present  Skin:     General: Skin is warm and dry  Coloration: Skin is not jaundiced  Findings: No rash  Neurological:      General: No focal deficit present  Mental Status: She is alert and oriented to person, place, and time     Psychiatric:         Mood and Affect: Mood normal          Behavior: Behavior normal              Lab Results:   Admission on 07/28/2022   Component Date Value    WBC 07/28/2022 8 88     RBC 07/28/2022 2 67 (A)    Hemoglobin 07/28/2022 5 4 (A)    Hematocrit 07/28/2022 19 0 (A)    MCV 07/28/2022 71 (A)    MCH 07/28/2022 20 2 (A)    MCHC 07/28/2022 28 4 (A)    RDW 07/28/2022 17 5 (A)    MPV 07/28/2022 10 0     Platelets 86/74/4523 266     nRBC 07/28/2022 0     Neutrophils Relative 07/28/2022 47     Immat GRANS % 07/28/2022 0     Lymphocytes Relative 07/28/2022 44     Monocytes Relative 07/28/2022 7     Eosinophils Relative 07/28/2022 2     Basophils Relative 07/28/2022 0     Neutrophils Absolute 07/28/2022 4 07     Immature Grans Absolute 07/28/2022 0 02     Lymphocytes Absolute 07/28/2022 3 92     Monocytes Absolute 07/28/2022 0 66     Eosinophils Absolute 07/28/2022 0 18     Basophils Absolute 07/28/2022 0 03     Protime 07/28/2022 12 8     INR 07/28/2022 0 96     PTT 07/28/2022 27     SARS-CoV-2 07/28/2022 Negative     INFLUENZA A PCR 07/28/2022 Negative     INFLUENZA B PCR 07/28/2022 Negative     RSV PCR 07/28/2022 Negative     Sodium 07/28/2022 130 (A)    Potassium 07/28/2022 4 5     Chloride 07/28/2022 97     CO2 07/28/2022 24     ANION GAP 07/28/2022 9     BUN 07/28/2022 17     Creatinine 07/28/2022 1 24     Glucose 07/28/2022 126     Calcium 07/28/2022 8 7     Corrected Calcium 07/28/2022 9 2     AST 07/28/2022 16     ALT 07/28/2022 13     Alkaline Phosphatase 07/28/2022 52     Total Protein 07/28/2022 6 9     Albumin 07/28/2022 3 4 (A)    Total Bilirubin 07/28/2022 0 20     eGFR 07/28/2022 38     Lipase 07/28/2022 143     LACTIC ACID 07/28/2022 3 4 (A)    hs TnI 0hr 07/28/2022 7     NT-proBNP 07/28/2022 625 (A)    Ventricular Rate 07/28/2022 85     Atrial Rate 07/28/2022 85     VA Interval 07/28/2022 176     QRSD Interval 07/28/2022 76     QT Interval 07/28/2022 362     QTC Interval 07/28/2022 430     P Axis 07/28/2022 82     QRS Axis 07/28/2022 74     T Wave Axis 07/28/2022 67     Color, UA 07/29/2022 Yellow     Clarity, UA 07/29/2022 Clear     Specific Gravity, UA 07/29/2022 1 010     pH, UA 07/29/2022 6 0     Leukocytes, UA 07/29/2022 Small (A)    Nitrite, UA 07/29/2022 Negative     Protein, UA 07/29/2022 Negative     Glucose, UA 07/29/2022 Negative     Ketones, UA 07/29/2022 Negative     Urobilinogen, UA 07/29/2022 0 2     Bilirubin, UA 07/29/2022 Negative     Occult Blood, UA 07/29/2022 Negative     hs TnI 2hr 07/28/2022 8     Delta 2hr hsTnI 07/28/2022 1     ABO Grouping 07/28/2022 O     Rh Factor 07/28/2022 Positive     Antibody Screen 07/28/2022 Negative     Specimen Expiration Date 07/28/2022 [id number]     Unit Product Code 07/29/2022 [id number]     Unit Number 07/29/2022 [id number]     Unit ABO 07/29/2022 O     Unit RH 07/29/2022 POS     Crossmatch 07/29/2022 Compatible     Unit Dispense Status 07/29/2022 Presumed Trans     Unit Product Volume 07/29/2022 350     Unit Product Code 07/29/2022 A3652P02     Unit Number 07/29/2022 [id number]     Unit ABO 07/29/2022 O     Unit RH 07/29/2022 POS     Crossmatch 07/29/2022 Compatible     Unit Dispense Status 07/29/2022 Crossmatched     Unit Product Volume 07/29/2022 350     LACTIC ACID 07/28/2022 0 8     hs TnI 4hr 07/28/2022 8     Delta 4hr hsTnI 07/28/2022 1     WBC 07/29/2022 9 08     RBC 07/29/2022 2 81 (A)    Hemoglobin 07/29/2022 6 3 (A)    Hematocrit 07/29/2022 20 8 (A)    MCV 07/29/2022 74 (A)    MCH 07/29/2022 22 4 (A)    MCHC 07/29/2022 30 3 (A)    RDW 07/29/2022 20 2 (A)    Platelets 67/64/5252 209     MPV 07/29/2022 9 7     Sodium 07/29/2022 132 (A)    Potassium 07/29/2022 4 0     Chloride 07/29/2022 99     CO2 07/29/2022 23     ANION GAP 07/29/2022 10     BUN 07/29/2022 14     Creatinine 07/29/2022 0 98     Glucose 07/29/2022 101     Calcium 07/29/2022 8 8     eGFR 07/29/2022 50     Magnesium 07/29/2022 1 9     RBC, UA 07/29/2022 0-1     WBC, UA 07/29/2022 1-2     Epithelial Cells 07/29/2022 Occasional     Bacteria, UA 07/29/2022 Occasional        Imaging Studies: I have personally reviewed pertinent imaging studies    07/28/2022:CT CHEST, ABDOMEN AND PELVIS WITHOUT IV CONTRAST     INDICATION: Short of breath and nausea      COMPARISON:  CT dated 4/17/2021      TECHNIQUE: CT examination of the chest, abdomen and pelvis was performed without intravenous contrast  Axial, sagittal, and coronal 2D reformatted images were created from the source data and submitted for interpretation      Radiation dose length product (DLP) for this visit:  811 mGy-cm   This examination, like all CT scans performed in the [facility], was performed utilizing techniques to minimize radiation dose exposure, including the use of iterative   reconstruction and automated exposure control       Enteric contrast was administered       FINDINGS:     CHEST     LUNGS:  Stable biapical scarring  4 x 5 mm left lower lobe nodule on image 89 of series 3 is not significantly changed  No new nodule, mass or consolidation  The central bronchi are patent       PLEURA:  Unremarkable      HEART/GREAT VESSELS: Coronary artery calcifications  No thoracic aortic aneurysm  Blood pool is hypodense with respect to the myocardium suggesting anemia      MEDIASTINUM AND ADAMS:  Unremarkable      CHEST WALL AND LOWER NECK:  Unremarkable      ABDOMEN     LIVER/BILIARY TREE:  Unremarkable      GALLBLADDER:  No calcified gallstones  No pericholecystic inflammatory change      SPLEEN:  Unremarkable      PANCREAS:  Unremarkable      ADRENAL GLANDS:  Unremarkable      KIDNEYS/URETERS:  Unremarkable  No hydronephrosis      STOMACH AND BOWEL:  Diverticulosis without diverticulitis  No obstruction      APPENDIX:  No findings to suggest appendicitis      ABDOMINOPELVIC CAVITY:  No ascites  No pneumoperitoneum  No lymphadenopathy      VESSELS:  Atherosclerotic changes are present  No evidence of aneurysm      PELVIS     REPRODUCTIVE ORGANS:  Unremarkable for patient's age      URINARY BLADDER:  Unremarkable      ABDOMINAL WALL/INGUINAL REGIONS:  Unremarkable      OSSEOUS STRUCTURES:  Status post ORIF of right hip   Stable chronic sacral ala and left pubic rami fractures      Stable chronic T12 compression status post kyphoplasty  Transitional L5 vertebra  Anterolisthesis at L4-5 with unilateral right-sided pars defect is unchanged       IMPRESSION:     No change  No acute intrathoracic or intra-abdominal pathology  CT CERVICAL SPINE - WITHOUT CONTRAST     INDICATION:   Neck pain, chronic  Neck pain      COMPARISON:  CT dated 4/17/2021      TECHNIQUE:  CT examination of the cervical spine was performed without intravenous contrast   Contiguous axial images were obtained  Sagittal and coronal reconstructions were performed        Radiation dose length product (DLP) for this visit:  270 mGy-cm   This examination, like all CT scans performed in the [facility], was performed utilizing techniques to minimize radiation dose exposure, including the use of iterative   reconstruction and automated exposure control        IMAGE QUALITY:  Diagnostic      FINDINGS:     ALIGNMENT:  Normal alignment of the cervical spine  No subluxation      VERTEBRAL BODIES:  No fracture      DEGENERATIVE CHANGES:  Stable severe degenerative changes at C1-C2      Disc space narrowing most severe at C5-C6 and C6-7  Multilevel disc osteophyte complexes with mild and moderate canal stenosis that is most pronounced at C4-5 and C5-C6   Multilevel foraminal stenosis due to facet and uncovertebral hypertrophy     PREVERTEBRAL AND PARASPINAL SOFT TISSUES:  Unremarkable      THORACIC INLET:  Stable biapical scarring     IMPRESSION:     No cervical spine fracture or traumatic malalignment      Stable multilevel degenerative spondylosis most pronounced at C4-5 and C5-C6

## 2022-07-29 NOTE — PROGRESS NOTES
Patient transferred to [facility] for EGD and colonoscopy accompanied by charge nurse  Blood transfusion infusing as ordered  Next assessment for transfusion reaction due at 1135, report given to Christian Mtz with same information

## 2022-07-29 NOTE — ASSESSMENT & PLAN NOTE
Lab Results   Component Value Date    EGFR 38 07/28/2022    EGFR 54 05/07/2021    EGFR 54 05/06/2021    CREATININE 1 24 07/28/2022    CREATININE 0 94 05/07/2021    CREATININE 0 94 05/06/2021     · CKD 3 hx   · CR 1 24 above baseline of 0 94-0 97 in 2021   Does not meet criteria for SHIVANI  · Pre renal due to symptomatic anemia  · Trend CR  · Renally dose medications

## 2022-07-29 NOTE — ANESTHESIA POSTPROCEDURE EVALUATION
Post-Op Assessment Note    CV Status:  Stable    Pain management: adequate     Mental Status:  Sleepy   Hydration Status:  Stable   PONV Controlled:  Controlled   Airway Patency:  Patent      Post Op Vitals Reviewed: Yes      Staff: Anesthesiologist, CRNA         No complications documented      BP   141/81   Temp      Pulse (!) 108 (07/29/22 1430)   Resp  24   SpO2  99

## 2022-07-29 NOTE — ANESTHESIA PREPROCEDURE EVALUATION
Procedure:  COLONOSCOPY  EGD    Relevant Problems   ANESTHESIA   (+) PONV (postoperative nausea and vomiting)      CARDIO   (+) Coronary artery disease      /RENAL   (+) Stage 3 chronic kidney disease (HCC)      HEMATOLOGY   (+) Acute blood loss anemia   (+) Acute on chronic anemia      PULMONARY   (+) COPD not affecting current episode of care [facility])     Sinus rhythm with Premature atrial complexes in a pattern of bigeminy  Nonspecific ST abnormality  Abnormal ECG  When compared with ECG of 07-MAY-2021 09:16,  No significant change was found  Confirmed by Subhash Beasley ([id number]) on 7/29/2022 8:05:38 AM      Physical Exam    Airway    Mallampati score: I  TM Distance: >3 FB  Neck ROM: full     Dental   lower dentures and upper dentures,     Cardiovascular  Rhythm: regular, Rate: normal,     Pulmonary      Other Findings        Anesthesia Plan  ASA Score- 3 Emergent    Anesthesia Type- IV sedation with anesthesia with ASA Monitors  Additional Monitors:   Airway Plan:           Plan Factors-Exercise tolerance (METS): <4 METS  Chart reviewed  Existing labs reviewed  Patient summary reviewed  Patient is not a current smoker  Induction- intravenous  Postoperative Plan-     Informed Consent- Anesthetic plan and risks discussed with patient  I personally reviewed this patient with the CRNA  Discussed and agreed on the Anesthesia Plan with the CRNA  Sussy Cedeno

## 2022-07-29 NOTE — PROGRESS NOTES
Received call from [facility] at  stating EGD and colonscopy to be scheduled for 1100  Tap water enema given as ordered at this time for cloudy light brown tinged liquid  [facility] updated

## 2022-07-29 NOTE — ASSESSMENT & PLAN NOTE
· POA with hemoglobin 5 4  · Baseline hemoglobin 10 2  · Endorses heartburn with spicy foods, intermittent melena  · FOBT positive in ED  · Currently ordered 2 units of PRBC  Repeat H&H 2 hours after transfusion done  · Asa 81 mg on hold- LD 7/27  · Consultation gastroenterology  · Plan for EGD/colonoscopy today  · Protonix  · Noted to have iron deficiency anemia    Transfuse if hemoglobin less than 7 5 and also will give a dose of Venofer

## 2022-07-29 NOTE — SEDATION DOCUMENTATION
6 5 ml Spot Tattoo instilled into ascending colon by dr Donnie Sky along with 8 ml Nss for saline bleb

## 2022-07-29 NOTE — QUICK NOTE
Quick note:    From EGD/colonoscopy today for evaluation of acute anemia with iron deficiency  · Abnormal mucosa with plaque in the esophagus; performed cold forceps biopsy; collected sample to send to pathology with brush  · The stomach appeared normal  Performed random biopsy using biopsy forceps to rule out eosinophilic esophagitis  Diminutive gastric polyp seen in fundus likely representing fundic gland polyps  · The duodenal bulb and 2nd part of the duodenum appeared normal  Performed random biopsy using biopsy forceps to rule out celiac disease  · Malignant-appearing, multilobular and ulcerated mass (traversable) in the ascending colon, covering one quarter of the circumference,; bleeding occurred after intervention; performed cold forceps biopsy; tattooed 5 cm distal to the finding with 8 mL of carbon black  · Six sessile polyps measuring 5-9 mm in the transverse colon, descending colon and sigmoid colon; removed by cold snare  · Ten or more semi-pedunculated polyps in the sigmoid colon; completely removed en bloc by hot snare and retrieved specimen; tattooed 3 cm distal to the finding with 1 mL of carbon black  Await pathology results  Consider empiric treatment with 14 day course of fluconazole particular patient is symptomatic  Proceed with previously scheduled colonoscopy     Await pathology results    Will need staging of disease  CEA level  If no evidence of metastatic disease and patient wants definitive treatment consider outpatient general surgery referral  May resume diet  Continue monitor hemoglobin and transfuse for Hgb <7  Return to floor for ongoing care as per primary team    Discussed results with patient who was not opposed to undergoing treatment but is unsure  Updated adeel Rhodes

## 2022-07-29 NOTE — PLAN OF CARE
Problem: Potential for Falls  Goal: Patient will remain free of falls  Description: INTERVENTIONS:  - Educate patient/family on patient safety including physical limitations  - Instruct patient to call for assistance with activity   - Consult OT/PT to assist with strengthening/mobility   - Keep Call bell within reach  - Keep bed low and locked with side rails adjusted as appropriate  - Keep care items and personal belongings within reach  - Initiate and maintain comfort rounds    - Apply yellow socks and bracelet for high fall risk patients  - Consider moving patient to room near nurses station  Outcome: Progressing     Problem: Prexisting or High Potential for Compromised Skin Integrity  Goal: Skin integrity is maintained or improved  Description: INTERVENTIONS:  - Identify patients at risk for skin breakdown  - Assess and monitor skin integrity  - Assess and monitor nutrition and hydration status  - Monitor labs   - Assess for incontinence   - Turn and reposition patient  - Assist with mobility/ambulation  - Relieve pressure over bony prominences  - Avoid friction and shearing  - Provide appropriate hygiene as needed including keeping skin clean and dry  - Evaluate need for skin moisturizer/barrier cream  - Collaborate with interdisciplinary team   - Patient/family teaching  - Consider wound care consult   Outcome: Progressing     Problem: PAIN - ADULT  Goal: Verbalizes/displays adequate comfort level or baseline comfort level  Description: Interventions:  - Encourage patient to monitor pain and request assistance  - Assess pain using appropriate pain scale  - Administer analgesics based on type and severity of pain and evaluate response  - Implement non-pharmacological measures as appropriate and evaluate response  - Consider cultural and social influences on pain and pain management  - Notify physician/advanced practitioner if interventions unsuccessful or patient reports new pain  Outcome: Progressing Problem: INFECTION - ADULT  Goal: Absence or prevention of progression during hospitalization  Description: INTERVENTIONS:  - Assess and monitor for signs and symptoms of infection  - Monitor lab/diagnostic results  - Monitor all insertion sites, i e  indwelling lines, tubes, and drains  - Monitor endotracheal if appropriate and nasal secretions for changes in amount and color  - [place] appropriate cooling/warming therapies per order  - Administer medications as ordered  - Instruct and encourage patient and family to use good hand hygiene technique  - Identify and instruct in appropriate isolation precautions for identified infection/condition  Outcome: Progressing     Problem: SAFETY ADULT  Goal: Patient will remain free of falls  Description: INTERVENTIONS:  - Educate patient/family on patient safety including physical limitations  - Instruct patient to call for assistance with activity   - Consult OT/PT to assist with strengthening/mobility   - Keep Call bell within reach  - Keep bed low and locked with side rails adjusted as appropriate  - Keep care items and personal belongings within reach  - Initiate and maintain comfort rounds  - Make Fall Risk Sign visible to staff    - Apply yellow socks and bracelet for high fall risk patients  - Consider moving patient to room near nurses station  Outcome: Progressing     Problem: DISCHARGE PLANNING  Goal: Discharge to home or other facility with appropriate resources  Description: INTERVENTIONS:  - Identify barriers to discharge w/patient and caregiver  - Arrange for needed discharge resources and transportation as appropriate  - Identify discharge learning needs (meds, wound care, etc )  - Arrange for interpretive services to assist at discharge as needed  - Refer to Case Management Department for coordinating discharge planning if the patient needs post-hospital services based on physician/advanced practitioner order or complex needs related to functional status, cognitive ability, or social support system  Outcome: Progressing     Problem: Knowledge Deficit  Goal: Patient/family/caregiver demonstrates understanding of disease process, treatment plan, medications, and discharge instructions  Description: Complete learning assessment and assess knowledge base    Interventions:  - Provide teaching at level of understanding  - Provide teaching via preferred learning methods  Outcome: Progressing

## 2022-07-29 NOTE — ASSESSMENT & PLAN NOTE
· POA with hemoglobin 5 4  · Baseline hemoglobin 10 2  · Endorses heartburn with spicy foods, intermittent melena  · FOBT positive in ED  · Transfuse for hemoglobin >7 5  · Asa 81 mg on hold- LD 7/27  · Consultation gastroenterology  · Plan for EGD/colonoscopy in a m    · Trend hemoglobin  · Protonix  · Stool diary

## 2022-07-30 PROBLEM — C18.9 COLON CANCER (HCC): Status: ACTIVE | Noted: 2022-07-30

## 2022-07-30 LAB
ABO GROUP BLD BPU: NORMAL
ABO GROUP BLD BPU: NORMAL
ANION GAP SERPL CALCULATED.3IONS-SCNC: 6 MMOL/L (ref 4–13)
BPU ID: NORMAL
BPU ID: NORMAL
BUN SERPL-MCNC: 9 MG/DL (ref 5–25)
CALCIUM SERPL-MCNC: 8.2 MG/DL (ref 8.3–10.1)
CHLORIDE SERPL-SCNC: 100 MMOL/L (ref 96–108)
CO2 SERPL-SCNC: 27 MMOL/L (ref 21–32)
CREAT SERPL-MCNC: 0.8 MG/DL (ref 0.6–1.3)
CROSSMATCH: NORMAL
CROSSMATCH: NORMAL
ERYTHROCYTE [DISTWIDTH] IN BLOOD BY AUTOMATED COUNT: 21.2 % (ref 11.6–15.1)
GFR SERPL CREATININE-BSD FRML MDRD: 65 ML/MIN/1.73SQ M
GLUCOSE SERPL-MCNC: 97 MG/DL (ref 65–140)
HCT VFR BLD AUTO: 25.1 % (ref 34.8–46.1)
HCT VFR BLD AUTO: 25.8 % (ref 34.8–46.1)
HGB BLD-MCNC: 7.7 G/DL (ref 11.5–15.4)
HGB BLD-MCNC: 7.8 G/DL (ref 11.5–15.4)
MCH RBC QN AUTO: 23.8 PG (ref 26.8–34.3)
MCHC RBC AUTO-ENTMCNC: 30.7 G/DL (ref 31.4–37.4)
MCV RBC AUTO: 78 FL (ref 82–98)
PLATELET # BLD AUTO: 194 THOUSANDS/UL (ref 149–390)
PMV BLD AUTO: 10.4 FL (ref 8.9–12.7)
POTASSIUM SERPL-SCNC: 3.8 MMOL/L (ref 3.5–5.3)
RBC # BLD AUTO: 3.24 MILLION/UL (ref 3.81–5.12)
SODIUM SERPL-SCNC: 133 MMOL/L (ref 135–147)
UNIT DISPENSE STATUS: NORMAL
UNIT DISPENSE STATUS: NORMAL
UNIT PRODUCT CODE: NORMAL
UNIT PRODUCT CODE: NORMAL
UNIT PRODUCT VOLUME: 350 ML
UNIT PRODUCT VOLUME: 350 ML
UNIT RH: NORMAL
UNIT RH: NORMAL
WBC # BLD AUTO: 9.24 THOUSAND/UL (ref 4.31–10.16)

## 2022-07-30 PROCEDURE — 85014 HEMATOCRIT: CPT | Performed by: FAMILY MEDICINE

## 2022-07-30 PROCEDURE — C9113 INJ PANTOPRAZOLE SODIUM, VIA: HCPCS | Performed by: NURSE PRACTITIONER

## 2022-07-30 PROCEDURE — 99232 SBSQ HOSP IP/OBS MODERATE 35: CPT | Performed by: FAMILY MEDICINE

## 2022-07-30 PROCEDURE — 80048 BASIC METABOLIC PNL TOTAL CA: CPT | Performed by: FAMILY MEDICINE

## 2022-07-30 PROCEDURE — 85018 HEMOGLOBIN: CPT | Performed by: FAMILY MEDICINE

## 2022-07-30 PROCEDURE — 85027 COMPLETE CBC AUTOMATED: CPT | Performed by: FAMILY MEDICINE

## 2022-07-30 RX ORDER — FLUTICASONE PROPIONATE 50 MCG
1 SPRAY, SUSPENSION (ML) NASAL DAILY
Status: DISCONTINUED | OUTPATIENT
Start: 2022-07-30 | End: 2022-07-31 | Stop reason: HOSPADM

## 2022-07-30 RX ORDER — ALBUTEROL SULFATE 90 UG/1
2 AEROSOL, METERED RESPIRATORY (INHALATION) EVERY 6 HOURS PRN
Status: DISCONTINUED | OUTPATIENT
Start: 2022-07-30 | End: 2022-07-31 | Stop reason: HOSPADM

## 2022-07-30 RX ADMIN — PANTOPRAZOLE SODIUM 40 MG: 40 INJECTION, POWDER, FOR SOLUTION INTRAVENOUS at 20:45

## 2022-07-30 RX ADMIN — IRON SUCROSE 200 MG: 20 INJECTION, SOLUTION INTRAVENOUS at 12:57

## 2022-07-30 RX ADMIN — METOPROLOL SUCCINATE 12.5 MG: 25 TABLET, EXTENDED RELEASE ORAL at 08:22

## 2022-07-30 RX ADMIN — FLUTICASONE FUROATE AND VILANTEROL TRIFENATATE 1 PUFF: 100; 25 POWDER RESPIRATORY (INHALATION) at 08:22

## 2022-07-30 RX ADMIN — FLUTICASONE PROPIONATE 1 SPRAY: 50 SPRAY, METERED NASAL at 12:56

## 2022-07-30 RX ADMIN — CYANOCOBALAMIN TAB 500 MCG 1000 MCG: 500 TAB at 12:57

## 2022-07-30 RX ADMIN — PANTOPRAZOLE SODIUM 40 MG: 40 INJECTION, POWDER, FOR SOLUTION INTRAVENOUS at 08:22

## 2022-07-30 NOTE — ASSESSMENT & PLAN NOTE
Patient diagnosed with colon mass this admission concerning for colon cancer  Biopsies taken during colonoscopy  CT chest abdomen pelvis does not show any evidence of metastasis  Seen by General surgery and discussed in regards to surgical options    Will discharge home tomorrow if clinically stable with outpatient follow-up with General surgery

## 2022-07-30 NOTE — PROGRESS NOTES
[address]  Progress Note Kathleen Borden [facility] 1/11/1932, 80 y o  female MRN: [id number]  Unit/Bed#: -01 Encounter: [id number]  Primary Care Provider: Saul Domínguez MD   Date and time admitted to hospital: 7/28/2022  6:29 PM    * Acute blood loss anemia  Assessment & Plan  · POA with hemoglobin 5 4 now improved to 7 7 after 2 units PRBC  · Baseline hemoglobin 10 2  · Asa 81 mg on hold- LD 7/27  EGD and colonoscopy done 07/29/2022  EGD showed No active bleeding or stigmata of recent hemorrhage  Pan esophageal white plaques concerning for underlying final impression; biopsies and brushings taken  Colonoscopy showed Malignant-appearing 4-5 ascending colon mass 1/4 circumferential; biopsied   · Continue Protonix  · Noted to have iron deficiency anemia  Will give 1 dose of IV Venofer 200 mg x 1 today  · Repeat CBC tomorrow and monitor stools    Colon cancer [facility])  Assessment & Plan  Patient diagnosed with colon mass this admission concerning for colon cancer  Biopsies taken during colonoscopy  CT chest abdomen pelvis does not show any evidence of metastasis  Seen by General surgery and discussed in regards to surgical options  Will discharge home tomorrow if clinically stable with outpatient follow-up with General surgery    Stage 3 chronic kidney disease [facility])  Assessment & Plan  Lab Results   Component Value Date    EGFR 65 07/30/2022    EGFR 50 07/29/2022    EGFR 38 07/28/2022    CREATININE 0 80 07/30/2022    CREATININE 0 98 07/29/2022    CREATININE 1 24 07/28/2022     · CKD 3 hx   · CR 1 24 above baseline of 0 94-0 97 in 2021   Does not meet criteria for SHIVANI  · Pre renal due to symptomatic anemia  · Trend CR  · Renally dose medications    Coronary artery disease  Assessment & Plan  · Denies chest pain  · History cardiac stenting 2011  · Aspirin on hold  · Continue metoprolol succinate daily  · Outpatient cardiac follow-up with [facility]      VTE Pharmacologic Prophylaxis: Pharmacologic: Pharmacologic VTE Prophylaxis contraindicated due to Acute on chronic anemia  Mechanical VTE Prophylaxis in Place: Yes    Patient Centered Rounds: I have performed bedside rounds with nursing staff today  Discussions with Specialists or Other Care Team Provider:  Discussed with general surgery    Education and Discussions with Family / Patient:  Discussed with patient at bedside and will update family    Time Spent for Care: 30 minutes  More than 50% of total time spent on counseling and coordination of care as described above  Current Length of Stay: 2 day(s)    Current Patient Status: Inpatient   Certification Statement: The patient will continue to require additional inpatient hospital stay due to Acute on chronic anemia    Discharge Plan:  Anticipate discharge home tomorrow    Code Status: Level 3 - DNAR and DNI      Subjective:   Patient denies any chest pain or shortness of breath or abdominal pain  Feeling better today  Understands that she possibly has colon cancer  Noted to have some dark stools yesterday  Will monitor for now    Objective:     Vitals:   Temp (24hrs), Av 3 °F (36 8 °C), Min:98 °F (36 7 °C), Max:98 7 °F (37 1 °C)    Temp:  [98 °F (36 7 °C)-98 7 °F (37 1 °C)] 98 °F (36 7 °C)  HR:  [] 72  Resp:  [16-20] 20  BP: ()/(46-89) 118/51  SpO2:  [92 %-97 %] 95 %  Body mass index is 19 97 kg/m²  Input and Output Summary (last 24 hours): Intake/Output Summary (Last 24 hours) at 2022 1204  Last data filed at 2022 1748  Gross per 24 hour   Intake 2747 5 ml   Output --   Net 2747 5 ml       Physical Exam:     Physical Exam  Vitals and nursing note reviewed  Constitutional:       Appearance: Normal appearance  HENT:      Head: Normocephalic and atraumatic  Right Ear: External ear normal       Left Ear: External ear normal       Nose: Nose normal       Mouth/Throat:      Pharynx: Oropharynx is clear     Cardiovascular:      Rate and Rhythm: Normal rate and regular rhythm  Heart sounds: Normal heart sounds  Pulmonary:      Effort: Pulmonary effort is normal       Breath sounds: Normal breath sounds  Abdominal:      General: Bowel sounds are normal       Palpations: Abdomen is soft  Tenderness: There is no abdominal tenderness  Musculoskeletal:         General: Normal range of motion  Cervical back: Normal range of motion and neck supple  Skin:     General: Skin is warm and dry  Capillary Refill: Capillary refill takes less than 2 seconds  Neurological:      General: No focal deficit present  Mental Status: She is alert and oriented to person, place, and time  Psychiatric:         Mood and Affect: Mood normal            Additional Data:     Labs:    Results from last 7 days   Lab Units 07/30/22  0537 07/29/22  0558 07/28/22  1838   WBC Thousand/uL 9 24   < > 8 88   HEMOGLOBIN g/dL 7 7*   < > 5 4*   HEMATOCRIT % 25 1*   < > 19 0*   PLATELETS Thousands/uL 194   < > 266   NEUTROS PCT %  --   --  47   LYMPHS PCT %  --   --  44   MONOS PCT %  --   --  7   EOS PCT %  --   --  2    < > = values in this interval not displayed  Results from last 7 days   Lab Units 07/30/22  0537 07/29/22  0535 07/28/22  1838   SODIUM mmol/L 133*   < > 130*   POTASSIUM mmol/L 3 8   < > 4 5   CHLORIDE mmol/L 100   < > 97   CO2 mmol/L 27   < > 24   BUN mg/dL 9   < > 17   CREATININE mg/dL 0 80   < > 1 24   ANION GAP mmol/L 6   < > 9   CALCIUM mg/dL 8 2*   < > 8 7   ALBUMIN g/dL  --   --  3 4*   TOTAL BILIRUBIN mg/dL  --   --  0 20   ALK PHOS U/L  --   --  52   ALT U/L  --   --  13   AST U/L  --   --  16   GLUCOSE RANDOM mg/dL 97   < > 126    < > = values in this interval not displayed  Results from last 7 days   Lab Units 07/28/22  1838   INR  0 96             Results from last 7 days   Lab Units 07/28/22 2125 07/28/22  1838   LACTIC ACID mmol/L 0 8 3 4*           * I Have Reviewed All Lab Data Listed Above    * Additional Pertinent Lab Tests Reviewed: [facility]    Imaging:    Imaging Reports Reviewed Today Include:  CT chest abdomen pelvis  Imaging Personally Reviewed by Myself Includes:  CT chest abdomen pelvis    Recent Cultures (last 7 days):           Last 24 Hours Medication List:   Current Facility-Administered Medications   Medication Dose Route Frequency Provider Last Rate    albuterol  2 puff Inhalation Q6H PRN Lois Newman MD      cyanocobalamin  1,000 mcg Oral Daily Lois Newman MD      fluticasone  1 spray Nasal Daily Lois Newman MD      fluticasone-vilanterol  1 puff Inhalation Daily Barbara S Charley, CRNP      iron sucrose  200 mg Intravenous Once Lois Newman MD      metoprolol succinate  12 5 mg Oral Daily Barbara S Charley, CRNP      ondansetron  4 mg Intravenous Once Toan Ralph MD      ondansetron  4 mg Intravenous Q6H PRN Barbara S Charley, CRNP      pantoprazole  40 mg Intravenous Q12H Barbara S Charley, CRNP          Today, Patient Was Seen By: Lois Newman MD    ** Please Note: Dictation voice to text software may have been used in the creation of this document   **

## 2022-07-30 NOTE — ASSESSMENT & PLAN NOTE
Lab Results   Component Value Date    EGFR 65 07/30/2022    EGFR 50 07/29/2022    EGFR 38 07/28/2022    CREATININE 0 80 07/30/2022    CREATININE 0 98 07/29/2022    CREATININE 1 24 07/28/2022     · CKD 3 hx   · CR 1 24 above baseline of 0 94-0 97 in 2021   Does not meet criteria for SHIVANI  · Pre renal due to symptomatic anemia  · Trend CR  · Renally dose medications

## 2022-07-30 NOTE — ASSESSMENT & PLAN NOTE
· POA with hemoglobin 5 4 now improved to 7 7 after 2 units PRBC  · Baseline hemoglobin 10 2  · Asa 81 mg on hold- LD 7/27  EGD and colonoscopy done 07/29/2022  EGD showed No active bleeding or stigmata of recent hemorrhage  Pan esophageal white plaques concerning for underlying final impression; biopsies and brushings taken  Colonoscopy showed Malignant-appearing 4-5 ascending colon mass 1/4 circumferential; biopsied   · Continue Protonix  · Noted to have iron deficiency anemia    Will give 1 dose of IV Venofer 200 mg x 1 today  · Repeat CBC tomorrow and monitor stools

## 2022-07-30 NOTE — PLAN OF CARE
Problem: Nutrition/Hydration-ADULT  Goal: Nutrient/Hydration intake appropriate for improving, restoring or maintaining nutritional needs  Description: Monitor and assess patient's nutrition/hydration status for malnutrition  Collaborate with interdisciplinary team and initiate plan and interventions as ordered  Monitor patient's weight and dietary intake as ordered or per policy  Utilize nutrition screening tool and intervene as necessary  Determine patient's food preferences and provide high-protein, high-caloric foods as appropriate       INTERVENTIONS:  - Monitor oral intake, urinary output, labs, and treatment plans  - Assess nutrition and hydration status and recommend course of action  - Evaluate amount of meals eaten  - Assist patient with eating if necessary   - Allow adequate time for meals  - Recommend/ encourage appropriate diets, oral nutritional supplements, and vitamin/mineral supplements  - Order, calculate, and assess calorie counts as needed  - Recommend, monitor, and adjust tube feedings and TPN/PPN based on assessed needs  - Assess need for intravenous fluids  - Provide specific nutrition/hydration education as appropriate  - Include patient/family/caregiver in decisions related to nutrition  Outcome: Progressing     Problem: PAIN - ADULT  Goal: Verbalizes/displays adequate comfort level or baseline comfort level  Description: Interventions:  - Encourage patient to monitor pain and request assistance  - Assess pain using appropriate pain scale  - Administer analgesics based on type and severity of pain and evaluate response  - Implement non-pharmacological measures as appropriate and evaluate response  - Consider cultural and social influences on pain and pain management  - Notify physician/advanced practitioner if interventions unsuccessful or patient reports new pain  Outcome: Progressing     Problem: INFECTION - ADULT  Goal: Absence or prevention of progression during hospitalization  Description: INTERVENTIONS:  - Assess and monitor for signs and symptoms of infection  - Monitor lab/diagnostic results  - Monitor all insertion sites, i e  indwelling lines, tubes, and drains  - Monitor endotracheal if appropriate and nasal secretions for changes in amount and color  - [place] appropriate cooling/warming therapies per order  - Administer medications as ordered  - Instruct and encourage patient and family to use good hand hygiene technique  - Identify and instruct in appropriate isolation precautions for identified infection/condition  Outcome: Progressing     Problem: DISCHARGE PLANNING  Goal: Discharge to home or other facility with appropriate resources  Description: INTERVENTIONS:  - Identify barriers to discharge w/patient and caregiver  - Arrange for needed discharge resources and transportation as appropriate  - Identify discharge learning needs (meds, wound care, etc )  - Arrange for interpretive services to assist at discharge as needed  - Refer to Case Management Department for coordinating discharge planning if the patient needs post-hospital services based on physician/advanced practitioner order or complex needs related to functional status, cognitive ability, or social support system  Outcome: Progressing     Problem: Knowledge Deficit  Goal: Patient/family/caregiver demonstrates understanding of disease process, treatment plan, medications, and discharge instructions  Description: Complete learning assessment and assess knowledge base    Interventions:  - Provide teaching at level of understanding  - Provide teaching via preferred learning methods  Outcome: Progressing

## 2022-07-30 NOTE — CONSULTS
Consultation - General Surgery   Jolanta Smith 80 y o  female MRN: [id number]  Unit/Bed#: -01 Encounter: [id number]    Assessment/Plan     Assessment:  81 y/o female who presented with dyspnea  She was found to have a hemoglobin of 5 4  Yesterday, endoscopy was completed and showed a malignant-appearing ulcerated mass in the ascending colon  There were also multiple polyps throughout the entire colon which were removed  Pathology is pending  The patient is now doing well  Her hemoglobin is stable at this time  Staging CT scan does not show any evidence of metastatic disease  Plan:  Treatment of colon cancer was discussed with the patient  I explained that surgery is usually the 1st step in management of non metastatic colon cancer  She expressed understanding  We are awaiting pathology results of the mass as well as the multiple polyps  At this time, in my opinion the patient is stable for discharge home  We will follow with her closely to plan for elective right hemicolectomy once colonoscopy pathology results are received  The patient's breast understanding and seems agreeable to proceed with surgical intervention  Would recommend more emergent intervention if the patient's hemoglobin continues to drop  History of Present Illness     HPI:  Cha Merrill is a 80 y o  female with a history of coronary artery disease who presents to the hospital due to dyspnea  She was found to be significantly anemic  The patient states she has been having shortness of breath on and off  She also has been having some difficulty eating  Today, she denies any nausea or vomiting  She tells me she is eating without difficulty  She also states she feels very well today  Yesterday, colonoscopy was completed which showed a mass of the ascending colon which is likely the source of her anemia  There are also other multiple polyps present throughout the colon for which pathology is pending    Staging CT scan did not show any sign of metastatic disease         Review of Systems   Constitutional: Positive for appetite change  HENT: Negative  Respiratory: Positive for shortness of breath  Cardiovascular: Negative  Gastrointestinal: Positive for blood in stool  Genitourinary: Negative  Musculoskeletal: Negative  Skin: Negative  Neurological: Negative  Hematological: Negative  Historical Information   Past Medical History:   Diagnosis Date    Cancer [facility])     skin cancer    Cardiac disease     Multiple falls     Neuropathy     Skin cancer     forehead    Spinal stenosis     UTI (urinary tract infection)      Past Surgical History:   Procedure Laterality Date    APPENDECTOMY      BACK SURGERY      x2    CATARACT EXTRACTION W/  INTRAOCULAR LENS IMPLANT Bilateral     CORONARY ANGIOPLASTY WITH STENT PLACEMENT  09/23/2011    HI OPEN RX FEMUR FX+INTRAMED RASHIDA Right 5/4/2021    Procedure: INSERTION NAIL IM FEMUR ANTEGRADE (TROCHANTERIC); Surgeon: Nesha Hansen;   Location:  MAIN OR;  Service: Orthopedics    REPLACEMENT TOTAL KNEE BILATERAL       Social History   Social History     Substance and Sexual Activity   Alcohol Use Yes    Comment: Occasionally     Social History     Substance and Sexual Activity   Drug Use Never     E-Cigarette/Vaping    E-Cigarette Use Never User      E-Cigarette/Vaping Substances    Nicotine No     THC No     CBD No     Flavoring No     Other No     Unknown No      Social History     Tobacco Use   Smoking Status Never Smoker   Smokeless Tobacco Never Used     Family History: non-contributory    Meds/Allergies   all current active meds have been reviewed  Allergies   Allergen Reactions    Ace Inhibitors Cough    Acetaminophen Vomiting    Angiotensin Receptor Blockers      Other reaction(s): WEAK AND SOB    Atorvastatin      Other reaction(s): NOT SURE    Ezetimibe      Other reaction(s): N&V    Formoterol      Other reaction(s): NOT SURE, SHAKINESS    Gabapentin      Other reaction(s): SWELLING    Hydrocodone-Acetaminophen Vomiting     Other reaction(s): Nausea and Vomiting    Lovastatin      Other reaction(s): MUSCLE PAIN    Methylprednisolone      Other reaction(s): FLUSHING    Mometasone Furoate      Other reaction(s): HOARSENESS    Niacin      Other reaction(s): NOT SURE    Nsaids     Other      abx - pt cant remember name - got body aches and headache    Oxycodone GI Intolerance    Oxytetracycline      Other reaction(s): NOT SURE    Penicillin G Hives    Pravastatin     Rosuvastatin      Other reaction(s): MUSCLE PAIN    Statins      Other reaction(s):  Other (See Comments)  "bone pain"    Tramadol      Other reaction(s): N&V    Latex Other (See Comments) and Rash     "skin peeling"    Penicillins Rash and Hives     Other reaction(s): RASH       Objective   First Vitals:   Blood Pressure: 155/91 (07/28/22 1832)  Pulse: 101 (07/28/22 1832)  Temperature: 97 9 °F (36 6 °C) (07/28/22 1832)  Temp Source: Temporal (07/28/22 1832)  Respirations: 20 (07/28/22 1832)  Height: 5' 5" (165 1 cm) (07/28/22 1832)  Weight - Scale: 56 8 kg (125 lb 3 5 oz) (07/28/22 1832)  SpO2: 99 % (07/28/22 1832)    Current Vitals:   Blood Pressure: 118/51 (07/30/22 0720)  Pulse: 72 (07/30/22 0822)  Temperature: 98 °F (36 7 °C) (07/30/22 0720)  Temp Source: Oral (07/29/22 1130)  Respirations: 20 (07/30/22 0720)  Height: 5' 5" (165 1 cm) (07/29/22 1130)  Weight - Scale: 54 4 kg (120 lb) (07/29/22 1130)  SpO2: 95 % (07/30/22 0720)      Intake/Output Summary (Last 24 hours) at 7/30/2022 1129  Last data filed at 7/29/2022 1748  Gross per 24 hour   Intake 2747 5 ml   Output --   Net 2747 5 ml       Invasive Devices  Report    Peripheral Intravenous Line  Duration           Peripheral IV 07/28/22 Right Forearm 1 day    Peripheral IV 07/29/22 Left Antecubital <1 day          Drain  Duration           External Urinary Catheter 1 day                Physical Exam  Constitutional:       Appearance: Normal appearance  HENT:      Head: Normocephalic and atraumatic  Mouth/Throat:      Mouth: Mucous membranes are moist    Cardiovascular:      Rate and Rhythm: Normal rate and regular rhythm  Pulmonary:      Effort: Pulmonary effort is normal    Abdominal:      General: There is no distension  Palpations: Abdomen is soft  Tenderness: There is no abdominal tenderness  Comments: + scar RLQ   Skin:     General: Skin is warm and dry  Neurological:      General: No focal deficit present  Mental Status: She is alert  Lab Results:   CBC:   Lab Results   Component Value Date    WBC 9 24 07/30/2022    HGB 7 7 (L) 07/30/2022    HCT 25 1 (L) 07/30/2022    MCV 78 (L) 07/30/2022     07/30/2022    MCH 23 8 (L) 07/30/2022    MCHC 30 7 (L) 07/30/2022    RDW 21 2 (H) 07/30/2022    MPV 10 4 07/30/2022   , CMP:   Lab Results   Component Value Date    SODIUM 133 (L) 07/30/2022    K 3 8 07/30/2022     07/30/2022    CO2 27 07/30/2022    BUN 9 07/30/2022    CREATININE 0 80 07/30/2022    CALCIUM 8 2 (L) 07/30/2022    EGFR 65 07/30/2022     Imaging: CT chest/abdomen and pelvis:   FINDINGS:     CHEST     LUNGS:  Stable biapical scarring  4 x 5 mm left lower lobe nodule on image 89 of series 3 is not significantly changed  No new nodule, mass or consolidation  The central bronchi are patent       PLEURA:  Unremarkable      HEART/GREAT VESSELS: Coronary artery calcifications  No thoracic aortic aneurysm  Blood pool is hypodense with respect to the myocardium suggesting anemia      MEDIASTINUM AND ADAMS:  Unremarkable      CHEST WALL AND LOWER NECK:  Unremarkable      ABDOMEN     LIVER/BILIARY TREE:  Unremarkable      GALLBLADDER:  No calcified gallstones  No pericholecystic inflammatory change      SPLEEN:  Unremarkable      PANCREAS:  Unremarkable      ADRENAL GLANDS:  Unremarkable      KIDNEYS/URETERS:  Unremarkable   No hydronephrosis      STOMACH AND BOWEL:  Diverticulosis without diverticulitis  No obstruction      APPENDIX:  No findings to suggest appendicitis      ABDOMINOPELVIC CAVITY:  No ascites  No pneumoperitoneum  No lymphadenopathy      VESSELS:  Atherosclerotic changes are present  No evidence of aneurysm      PELVIS     REPRODUCTIVE ORGANS:  Unremarkable for patient's age      URINARY BLADDER:  Unremarkable      ABDOMINAL WALL/INGUINAL REGIONS:  Unremarkable      OSSEOUS STRUCTURES:  Status post ORIF of right hip  Stable chronic sacral ala and left pubic rami fractures      Stable chronic T12 compression status post kyphoplasty  Transitional L5 vertebra  Anterolisthesis at L4-5 with unilateral right-sided pars defect is unchanged       IMPRESSION:     No change  No acute intrathoracic or intra-abdominal pathology      EKG, Pathology, and Other Studies: I have personally reviewed pertinent reports

## 2022-07-30 NOTE — PLAN OF CARE
Problem: Potential for Falls  Goal: Patient will remain free of falls  Description: INTERVENTIONS:  - Educate patient/family on patient safety including physical limitations  - Instruct patient to call for assistance with activity   - Consult OT/PT to assist with strengthening/mobility   - Keep Call bell within reach  - Keep bed low and locked with side rails adjusted as appropriate  - Keep care items and personal belongings within reach  - Initiate and maintain comfort rounds  - Make Fall Risk Sign visible to staff  - Offer Toileting every  Hours, in advance of need  - Initiate/Maintain alarm  - Obtain necessary fall risk management equipment:   - Apply yellow socks and bracelet for high fall risk patients  - Consider moving patient to room near nurses station  Outcome: Progressing     Problem: Nutrition/Hydration-ADULT  Goal: Nutrient/Hydration intake appropriate for improving, restoring or maintaining nutritional needs  Description: Monitor and assess patient's nutrition/hydration status for malnutrition  Collaborate with interdisciplinary team and initiate plan and interventions as ordered  Monitor patient's weight and dietary intake as ordered or per policy  Utilize nutrition screening tool and intervene as necessary  Determine patient's food preferences and provide high-protein, high-caloric foods as appropriate       INTERVENTIONS:  - Monitor oral intake, urinary output, labs, and treatment plans  - Assess nutrition and hydration status and recommend course of action  - Evaluate amount of meals eaten  - Assist patient with eating if necessary   - Allow adequate time for meals  - Recommend/ encourage appropriate diets, oral nutritional supplements, and vitamin/mineral supplements  - Order, calculate, and assess calorie counts as needed  - Recommend, monitor, and adjust tube feedings and TPN/PPN based on assessed needs  - Assess need for intravenous fluids  - Provide specific nutrition/hydration education as appropriate  - Include patient/family/caregiver in decisions related to nutrition  Outcome: Progressing     Problem: Prexisting or High Potential for Compromised Skin Integrity  Goal: Skin integrity is maintained or improved  Description: INTERVENTIONS:  - Identify patients at risk for skin breakdown  - Assess and monitor skin integrity  - Assess and monitor nutrition and hydration status  - Monitor labs   - Assess for incontinence   - Turn and reposition patient  - Assist with mobility/ambulation  - Relieve pressure over bony prominences  - Avoid friction and shearing  - Provide appropriate hygiene as needed including keeping skin clean and dry  - Evaluate need for skin moisturizer/barrier cream  - Collaborate with interdisciplinary team   - Patient/family teaching  - Consider wound care consult   Outcome: Progressing     Problem: MOBILITY - ADULT  Goal: Maintain or return to baseline ADL function  Description: INTERVENTIONS:  -  Assess patient's ability to carry out ADLs; assess patient's baseline for ADL function and identify physical deficits which impact ability to perform ADLs (bathing, care of mouth/teeth, toileting, grooming, dressing, etc )  - Assess/evaluate cause of self-care deficits   - Assess range of motion  - Assess patient's mobility; develop plan if impaired  - Assess patient's need for assistive devices and provide as appropriate  - Encourage maximum independence but intervene and supervise when necessary  - Involve family in performance of ADLs  - Assess for home care needs following discharge   - Consider OT consult to assist with ADL evaluation and planning for discharge  - Provide patient education as appropriate  Outcome: Progressing  Goal: Maintains/Returns to pre admission functional level  Description: INTERVENTIONS:  - Perform BMAT or MOVE assessment daily    - Set and communicate daily mobility goal to care team and patient/family/caregiver     - Collaborate with rehabilitation services on mobility goals if consulted  - Perform Range of Motion  times a day  - Reposition patient every  hours    - Dangle patient  times a day  - Stand patient  times a day  - Ambulate patient  times a day  - Out of bed to chair  times a day   - Out of bed for meals  times a day  - Out of bed for toileting  - Record patient progress and toleration of activity level   Outcome: Progressing     Problem: PAIN - ADULT  Goal: Verbalizes/displays adequate comfort level or baseline comfort level  Description: Interventions:  - Encourage patient to monitor pain and request assistance  - Assess pain using appropriate pain scale  - Administer analgesics based on type and severity of pain and evaluate response  - Implement non-pharmacological measures as appropriate and evaluate response  - Consider cultural and social influences on pain and pain management  - Notify physician/advanced practitioner if interventions unsuccessful or patient reports new pain  Outcome: Progressing     Problem: INFECTION - ADULT  Goal: Absence or prevention of progression during hospitalization  Description: INTERVENTIONS:  - Assess and monitor for signs and symptoms of infection  - Monitor lab/diagnostic results  - Monitor all insertion sites, i e  indwelling lines, tubes, and drains  - Monitor endotracheal if appropriate and nasal secretions for changes in amount and color  - [place] appropriate cooling/warming therapies per order  - Administer medications as ordered  - Instruct and encourage patient and family to use good hand hygiene technique  - Identify and instruct in appropriate isolation precautions for identified infection/condition  Outcome: Progressing     Problem: SAFETY ADULT  Goal: Patient will remain free of falls  Description: INTERVENTIONS:  - Educate patient/family on patient safety including physical limitations  - Instruct patient to call for assistance with activity   - Consult OT/PT to assist with strengthening/mobility   - Keep Call bell within reach  - Keep bed low and locked with side rails adjusted as appropriate  - Keep care items and personal belongings within reach  - Initiate and maintain comfort rounds  - Make Fall Risk Sign visible to staff  - Offer Toileting every  Hours, in advance of need  - Initiate/Maintain alarm  - Obtain necessary fall risk management equipment:   - Apply yellow socks and bracelet for high fall risk patients  - Consider moving patient to room near nurses station  Outcome: Progressing  Goal: Maintain or return to baseline ADL function  Description: INTERVENTIONS:  -  Assess patient's ability to carry out ADLs; assess patient's baseline for ADL function and identify physical deficits which impact ability to perform ADLs (bathing, care of mouth/teeth, toileting, grooming, dressing, etc )  - Assess/evaluate cause of self-care deficits   - Assess range of motion  - Assess patient's mobility; develop plan if impaired  - Assess patient's need for assistive devices and provide as appropriate  - Encourage maximum independence but intervene and supervise when necessary  - Involve family in performance of ADLs  - Assess for home care needs following discharge   - Consider OT consult to assist with ADL evaluation and planning for discharge  - Provide patient education as appropriate  Outcome: Progressing  Goal: Maintains/Returns to pre admission functional level  Description: INTERVENTIONS:  - Perform BMAT or MOVE assessment daily    - Set and communicate daily mobility goal to care team and patient/family/caregiver  - Collaborate with rehabilitation services on mobility goals if consulted  - Perform Range of Motion  times a day  - Reposition patient every  hours    - Dangle patient  times a day  - Stand patient  times a day  - Ambulate patient  times a day  - Out of bed to chair  times a day   - Out of bed for meals times a day  - Out of bed for toileting  - Record patient progress and toleration of activity level Outcome: Progressing     Problem: Knowledge Deficit  Goal: Patient/family/caregiver demonstrates understanding of disease process, treatment plan, medications, and discharge instructions  Description: Complete learning assessment and assess knowledge base    Interventions:  - Provide teaching at level of understanding  - Provide teaching via preferred learning methods  Outcome: Progressing

## 2022-07-30 NOTE — ASSESSMENT & PLAN NOTE
· Denies chest pain  · History cardiac stenting 2011  · Aspirin on hold  · Continue metoprolol succinate daily  · Outpatient cardiac follow-up with [facility]

## 2022-07-31 VITALS
DIASTOLIC BLOOD PRESSURE: 42 MMHG | WEIGHT: 120 LBS | HEIGHT: 65 IN | RESPIRATION RATE: 21 BRPM | OXYGEN SATURATION: 98 % | HEART RATE: 89 BPM | BODY MASS INDEX: 19.99 KG/M2 | SYSTOLIC BLOOD PRESSURE: 96 MMHG | TEMPERATURE: 97.9 F

## 2022-07-31 PROBLEM — E87.1 HYPONATREMIA: Status: ACTIVE | Noted: 2022-07-31

## 2022-07-31 LAB
ANION GAP SERPL CALCULATED.3IONS-SCNC: 4 MMOL/L (ref 4–13)
BUN SERPL-MCNC: 9 MG/DL (ref 5–25)
CALCIUM SERPL-MCNC: 8.7 MG/DL (ref 8.3–10.1)
CHLORIDE SERPL-SCNC: 98 MMOL/L (ref 96–108)
CO2 SERPL-SCNC: 30 MMOL/L (ref 21–32)
CREAT SERPL-MCNC: 0.93 MG/DL (ref 0.6–1.3)
ERYTHROCYTE [DISTWIDTH] IN BLOOD BY AUTOMATED COUNT: 21.7 % (ref 11.6–15.1)
GFR SERPL CREATININE-BSD FRML MDRD: 54 ML/MIN/1.73SQ M
GLUCOSE SERPL-MCNC: 98 MG/DL (ref 65–140)
HCT VFR BLD AUTO: 26.5 % (ref 34.8–46.1)
HGB BLD-MCNC: 8.1 G/DL (ref 11.5–15.4)
MCH RBC QN AUTO: 23.7 PG (ref 26.8–34.3)
MCHC RBC AUTO-ENTMCNC: 30.6 G/DL (ref 31.4–37.4)
MCV RBC AUTO: 78 FL (ref 82–98)
PLATELET # BLD AUTO: 206 THOUSANDS/UL (ref 149–390)
PMV BLD AUTO: 10 FL (ref 8.9–12.7)
POTASSIUM SERPL-SCNC: 4 MMOL/L (ref 3.5–5.3)
RBC # BLD AUTO: 3.42 MILLION/UL (ref 3.81–5.12)
SODIUM SERPL-SCNC: 132 MMOL/L (ref 135–147)
WBC # BLD AUTO: 7.94 THOUSAND/UL (ref 4.31–10.16)

## 2022-07-31 PROCEDURE — 99239 HOSP IP/OBS DSCHRG MGMT >30: CPT | Performed by: FAMILY MEDICINE

## 2022-07-31 PROCEDURE — 85027 COMPLETE CBC AUTOMATED: CPT | Performed by: FAMILY MEDICINE

## 2022-07-31 PROCEDURE — 80048 BASIC METABOLIC PNL TOTAL CA: CPT | Performed by: FAMILY MEDICINE

## 2022-07-31 PROCEDURE — C9113 INJ PANTOPRAZOLE SODIUM, VIA: HCPCS | Performed by: NURSE PRACTITIONER

## 2022-07-31 RX ORDER — FERROUS SULFATE TAB EC 324 MG (65 MG FE EQUIVALENT) 324 (65 FE) MG
324 TABLET DELAYED RESPONSE ORAL
Qty: 30 TABLET | Refills: 0 | Status: SHIPPED | OUTPATIENT
Start: 2022-07-31 | End: 2022-08-30

## 2022-07-31 RX ORDER — AMOXICILLIN 250 MG
1 CAPSULE ORAL 2 TIMES DAILY
Qty: 60 TABLET | Refills: 0 | Status: SHIPPED | OUTPATIENT
Start: 2022-07-31 | End: 2022-08-30

## 2022-07-31 RX ADMIN — FLUTICASONE FUROATE AND VILANTEROL TRIFENATATE 1 PUFF: 100; 25 POWDER RESPIRATORY (INHALATION) at 08:43

## 2022-07-31 RX ADMIN — FLUTICASONE PROPIONATE 1 SPRAY: 50 SPRAY, METERED NASAL at 08:43

## 2022-07-31 RX ADMIN — PANTOPRAZOLE SODIUM 40 MG: 40 INJECTION, POWDER, FOR SOLUTION INTRAVENOUS at 08:42

## 2022-07-31 RX ADMIN — CYANOCOBALAMIN TAB 500 MCG 1000 MCG: 500 TAB at 08:42

## 2022-07-31 RX ADMIN — IRON SUCROSE 200 MG: 20 INJECTION, SOLUTION INTRAVENOUS at 09:37

## 2022-07-31 NOTE — PLAN OF CARE
Problem: Potential for Falls  Goal: Patient will remain free of falls  Description: INTERVENTIONS:  - Educate patient/family on patient safety including physical limitations  - Instruct patient to call for assistance with activity   - Consult OT/PT to assist with strengthening/mobility   - Keep Call bell within reach  - Keep bed low and locked with side rails adjusted as appropriate  - Keep care items and personal belongings within reach  - Initiate and maintain comfort rounds  - Make Fall Risk Sign visible to staff  - Offer Toileting every   Hours, in advance of need  - Initiate/Maintain   alarm  - Obtain necessary fall risk management equipment:   - Apply yellow socks and bracelet for high fall risk patients  - Consider moving patient to room near nurses station  Outcome: Progressing     Problem: Nutrition/Hydration-ADULT  Goal: Nutrient/Hydration intake appropriate for improving, restoring or maintaining nutritional needs  Description: Monitor and assess patient's nutrition/hydration status for malnutrition  Collaborate with interdisciplinary team and initiate plan and interventions as ordered  Monitor patient's weight and dietary intake as ordered or per policy  Utilize nutrition screening tool and intervene as necessary  Determine patient's food preferences and provide high-protein, high-caloric foods as appropriate       INTERVENTIONS:  - Monitor oral intake, urinary output, labs, and treatment plans  - Assess nutrition and hydration status and recommend course of action  - Evaluate amount of meals eaten  - Assist patient with eating if necessary   - Allow adequate time for meals  - Recommend/ encourage appropriate diets, oral nutritional supplements, and vitamin/mineral supplements  - Order, calculate, and assess calorie counts as needed  - Recommend, monitor, and adjust tube feedings and TPN/PPN based on assessed needs  - Assess need for intravenous fluids  - Provide specific nutrition/hydration education as appropriate  - Include patient/family/caregiver in decisions related to nutrition  Outcome: Progressing     Problem: Prexisting or High Potential for Compromised Skin Integrity  Goal: Skin integrity is maintained or improved  Description: INTERVENTIONS:  - Identify patients at risk for skin breakdown  - Assess and monitor skin integrity  - Assess and monitor nutrition and hydration status  - Monitor labs   - Assess for incontinence   - Turn and reposition patient  - Assist with mobility/ambulation  - Relieve pressure over bony prominences  - Avoid friction and shearing  - Provide appropriate hygiene as needed including keeping skin clean and dry  - Evaluate need for skin moisturizer/barrier cream  - Collaborate with interdisciplinary team   - Patient/family teaching  - Consider wound care consult   Outcome: Progressing     Problem: MOBILITY - ADULT  Goal: Maintain or return to baseline ADL function  Description: INTERVENTIONS:  -  Assess patient's ability to carry out ADLs; assess patient's baseline for ADL function and identify physical deficits which impact ability to perform ADLs (bathing, care of mouth/teeth, toileting, grooming, dressing, etc )  - Assess/evaluate cause of self-care deficits   - Assess range of motion  - Assess patient's mobility; develop plan if impaired  - Assess patient's need for assistive devices and provide as appropriate  - Encourage maximum independence but intervene and supervise when necessary  - Involve family in performance of ADLs  - Assess for home care needs following discharge   - Consider OT consult to assist with ADL evaluation and planning for discharge  - Provide patient education as appropriate  Outcome: Progressing  Goal: Maintains/Returns to pre admission functional level  Description: INTERVENTIONS:  - Perform BMAT or MOVE assessment daily    - Set and communicate daily mobility goal to care team and patient/family/caregiver     - Collaborate with rehabilitation services on mobility goals if consulted  - Perform Range of Motion   times a day  - Reposition patient every   hours  - Dangle patient   times a day  - Stand patient   times a day  - Ambulate patient   times a day  - Out of bed to chair   times a day   - Out of bed for meals    times a day  - Out of bed for toileting  - Record patient progress and toleration of activity level   Outcome: Progressing     Problem: PAIN - ADULT  Goal: Verbalizes/displays adequate comfort level or baseline comfort level  Description: Interventions:  - Encourage patient to monitor pain and request assistance  - Assess pain using appropriate pain scale  - Administer analgesics based on type and severity of pain and evaluate response  - Implement non-pharmacological measures as appropriate and evaluate response  - Consider cultural and social influences on pain and pain management  - Notify physician/advanced practitioner if interventions unsuccessful or patient reports new pain  Outcome: Progressing     Problem: INFECTION - ADULT  Goal: Absence or prevention of progression during hospitalization  Description: INTERVENTIONS:  - Assess and monitor for signs and symptoms of infection  - Monitor lab/diagnostic results  - Monitor all insertion sites, i e  indwelling lines, tubes, and drains  - Monitor endotracheal if appropriate and nasal secretions for changes in amount and color  - [place] appropriate cooling/warming therapies per order  - Administer medications as ordered  - Instruct and encourage patient and family to use good hand hygiene technique  - Identify and instruct in appropriate isolation precautions for identified infection/condition  Outcome: Progressing     Problem: SAFETY ADULT  Goal: Patient will remain free of falls  Description: INTERVENTIONS:  - Educate patient/family on patient safety including physical limitations  - Instruct patient to call for assistance with activity   - Consult OT/PT to assist with strengthening/mobility   - Keep Call bell within reach  - Keep bed low and locked with side rails adjusted as appropriate  - Keep care items and personal belongings within reach  - Initiate and maintain comfort rounds  - Make Fall Risk Sign visible to staff  - Offer Toileting every   Hours, in advance of need  - Initiate/Maintain   alarm  - Obtain necessary fall risk management equipment:   - Apply yellow socks and bracelet for high fall risk patients  - Consider moving patient to room near nurses station  Outcome: Progressing  Goal: Maintain or return to baseline ADL function  Description: INTERVENTIONS:  -  Assess patient's ability to carry out ADLs; assess patient's baseline for ADL function and identify physical deficits which impact ability to perform ADLs (bathing, care of mouth/teeth, toileting, grooming, dressing, etc )  - Assess/evaluate cause of self-care deficits   - Assess range of motion  - Assess patient's mobility; develop plan if impaired  - Assess patient's need for assistive devices and provide as appropriate  - Encourage maximum independence but intervene and supervise when necessary  - Involve family in performance of ADLs  - Assess for home care needs following discharge   - Consider OT consult to assist with ADL evaluation and planning for discharge  - Provide patient education as appropriate  Outcome: Progressing  Goal: Maintains/Returns to pre admission functional level  Description: INTERVENTIONS:  - Perform BMAT or MOVE assessment daily    - Set and communicate daily mobility goal to care team and patient/family/caregiver  - Collaborate with rehabilitation services on mobility goals if consulted  - Perform Range of Motion   times a day  - Reposition patient every   hours  - Dangle patient   times a day  - Stand patient   times a day  - Ambulate patient   times a day  - Out of bed to chair   times a day   - Out of bed for meals    times a day  - Out of bed for toileting  - Record patient progress and toleration of activity level   Outcome: Progressing     Problem: DISCHARGE PLANNING  Goal: Discharge to home or other facility with appropriate resources  Description: INTERVENTIONS:  - Identify barriers to discharge w/patient and caregiver  - Arrange for needed discharge resources and transportation as appropriate  - Identify discharge learning needs (meds, wound care, etc )  - Arrange for interpretive services to assist at discharge as needed  - Refer to Case Management Department for coordinating discharge planning if the patient needs post-hospital services based on physician/advanced practitioner order or complex needs related to functional status, cognitive ability, or social support system  Outcome: Progressing     Problem: Knowledge Deficit  Goal: Patient/family/caregiver demonstrates understanding of disease process, treatment plan, medications, and discharge instructions  Description: Complete learning assessment and assess knowledge base    Interventions:  - Provide teaching at level of understanding  - Provide teaching via preferred learning methods  Outcome: Progressing

## 2022-07-31 NOTE — PLAN OF CARE
Problem: Potential for Falls  Goal: Patient will remain free of falls  Description: INTERVENTIONS:  - Educate patient/family on patient safety including physical limitations  - Instruct patient to call for assistance with activity   - Consult OT/PT to assist with strengthening/mobility   - Keep Call bell within reach  - Keep bed low and locked with side rails adjusted as appropriate  - Keep care items and personal belongings within reach  - Initiate and maintain comfort rounds  - Make Fall Risk Sign visible to staff  - Offer Toileting every   Hours, in advance of need  - Initiate/Maintain   alarm  - Obtain necessary fall risk management equipment:   - Apply yellow socks and bracelet for high fall risk patients  - Consider moving patient to room near nurses station  7/31/2022 0944 by Donnie Foss RN  Outcome: Adequate for Discharge  7/31/2022 0915 by Donnie Foss RN  Outcome: Progressing     Problem: Nutrition/Hydration-ADULT  Goal: Nutrient/Hydration intake appropriate for improving, restoring or maintaining nutritional needs  Description: Monitor and assess patient's nutrition/hydration status for malnutrition  Collaborate with interdisciplinary team and initiate plan and interventions as ordered  Monitor patient's weight and dietary intake as ordered or per policy  Utilize nutrition screening tool and intervene as necessary  Determine patient's food preferences and provide high-protein, high-caloric foods as appropriate       INTERVENTIONS:  - Monitor oral intake, urinary output, labs, and treatment plans  - Assess nutrition and hydration status and recommend course of action  - Evaluate amount of meals eaten  - Assist patient with eating if necessary   - Allow adequate time for meals  - Recommend/ encourage appropriate diets, oral nutritional supplements, and vitamin/mineral supplements  - Order, calculate, and assess calorie counts as needed  - Recommend, monitor, and adjust tube feedings and TPN/PPN based on assessed needs  - Assess need for intravenous fluids  - Provide specific nutrition/hydration education as appropriate  - Include patient/family/caregiver in decisions related to nutrition  7/31/2022 0944 by Amanda Hatch RN  Outcome: Adequate for Discharge  7/31/2022 0915 by Amanda Hatch RN  Outcome: Progressing     Problem: Prexisting or High Potential for Compromised Skin Integrity  Goal: Skin integrity is maintained or improved  Description: INTERVENTIONS:  - Identify patients at risk for skin breakdown  - Assess and monitor skin integrity  - Assess and monitor nutrition and hydration status  - Monitor labs   - Assess for incontinence   - Turn and reposition patient  - Assist with mobility/ambulation  - Relieve pressure over bony prominences  - Avoid friction and shearing  - Provide appropriate hygiene as needed including keeping skin clean and dry  - Evaluate need for skin moisturizer/barrier cream  - Collaborate with interdisciplinary team   - Patient/family teaching  - Consider wound care consult   7/31/2022 0944 by Amanda Hatch RN  Outcome: Adequate for Discharge  7/31/2022 0915 by Amanda Hatch RN  Outcome: Progressing     Problem: MOBILITY - ADULT  Goal: Maintain or return to baseline ADL function  Description: INTERVENTIONS:  -  Assess patient's ability to carry out ADLs; assess patient's baseline for ADL function and identify physical deficits which impact ability to perform ADLs (bathing, care of mouth/teeth, toileting, grooming, dressing, etc )  - Assess/evaluate cause of self-care deficits   - Assess range of motion  - Assess patient's mobility; develop plan if impaired  - Assess patient's need for assistive devices and provide as appropriate  - Encourage maximum independence but intervene and supervise when necessary  - Involve family in performance of ADLs  - Assess for home care needs following discharge   - Consider OT consult to assist with ADL evaluation and planning for discharge  - Provide patient education as appropriate  7/31/2022 0944 by Angeles Palacio RN  Outcome: Adequate for Discharge  7/31/2022 0915 by Angeles Palacio RN  Outcome: Progressing  Goal: Maintains/Returns to pre admission functional level  Description: INTERVENTIONS:  - Perform BMAT or MOVE assessment daily    - Set and communicate daily mobility goal to care team and patient/family/caregiver  - Collaborate with rehabilitation services on mobility goals if consulted  - Perform Range of Motion   times a day  - Reposition patient every   hours  - Dangle patient   times a day  - Stand patient   times a day  - Ambulate patient   times a day  - Out of bed to chair   times a day   - Out of bed for meals    times a day  - Out of bed for toileting  - Record patient progress and toleration of activity level   7/31/2022 0944 by Angeles Palacio RN  Outcome: Adequate for Discharge  7/31/2022 0915 by Angeles Palacio RN  Outcome: Progressing     Problem: PAIN - ADULT  Goal: Verbalizes/displays adequate comfort level or baseline comfort level  Description: Interventions:  - Encourage patient to monitor pain and request assistance  - Assess pain using appropriate pain scale  - Administer analgesics based on type and severity of pain and evaluate response  - Implement non-pharmacological measures as appropriate and evaluate response  - Consider cultural and social influences on pain and pain management  - Notify physician/advanced practitioner if interventions unsuccessful or patient reports new pain  7/31/2022 0944 by Angeles Palacio RN  Outcome: Adequate for Discharge  7/31/2022 0915 by Angeles Palacio RN  Outcome: Progressing     Problem: INFECTION - ADULT  Goal: Absence or prevention of progression during hospitalization  Description: INTERVENTIONS:  - Assess and monitor for signs and symptoms of infection  - Monitor lab/diagnostic results  - Monitor all insertion sites, i e  indwelling lines, tubes, and drains  - Monitor endotracheal if appropriate and nasal secretions for changes in amount and color  - [place] appropriate cooling/warming therapies per order  - Administer medications as ordered  - Instruct and encourage patient and family to use good hand hygiene technique  - Identify and instruct in appropriate isolation precautions for identified infection/condition  7/31/2022 0944 by Jessica Mason RN  Outcome: Adequate for Discharge  7/31/2022 0915 by Jessica Mason RN  Outcome: Progressing     Problem: SAFETY ADULT  Goal: Patient will remain free of falls  Description: INTERVENTIONS:  - Educate patient/family on patient safety including physical limitations  - Instruct patient to call for assistance with activity   - Consult OT/PT to assist with strengthening/mobility   - Keep Call bell within reach  - Keep bed low and locked with side rails adjusted as appropriate  - Keep care items and personal belongings within reach  - Initiate and maintain comfort rounds  - Make Fall Risk Sign visible to staff  - Offer Toileting every   Hours, in advance of need  - Initiate/Maintain   alarm  - Obtain necessary fall risk management equipment:     - Apply yellow socks and bracelet for high fall risk patients  - Consider moving patient to room near nurses station  7/31/2022 0944 by Jessica Mason RN  Outcome: Adequate for Discharge  7/31/2022 0915 by Jessica Mason RN  Outcome: Progressing  Goal: Maintain or return to baseline ADL function  Description: INTERVENTIONS:  -  Assess patient's ability to carry out ADLs; assess patient's baseline for ADL function and identify physical deficits which impact ability to perform ADLs (bathing, care of mouth/teeth, toileting, grooming, dressing, etc )  - Assess/evaluate cause of self-care deficits   - Assess range of motion  - Assess patient's mobility; develop plan if impaired  - Assess patient's need for assistive devices and provide as appropriate  - Encourage maximum independence but intervene and supervise when necessary  - Involve family in performance of ADLs  - Assess for home care needs following discharge   - Consider OT consult to assist with ADL evaluation and planning for discharge  - Provide patient education as appropriate  7/31/2022 0944 by Mickey Gross RN  Outcome: Adequate for Discharge  7/31/2022 0915 by Mickey Gross RN  Outcome: Progressing  Goal: Maintains/Returns to pre admission functional level  Description: INTERVENTIONS:  - Perform BMAT or MOVE assessment daily    - Set and communicate daily mobility goal to care team and patient/family/caregiver  - Collaborate with rehabilitation services on mobility goals if consulted  - Perform Range of Motion   times a day  - Reposition patient every   hours  - Dangle patient   times a day  - Stand patient   times a day  - Ambulate patient   times a day  - Out of bed to chair   times a day   - Out of bed for meals    times a day  - Out of bed for toileting  - Record patient progress and toleration of activity level   7/31/2022 0944 by Mickey Gross RN  Outcome: Adequate for Discharge  7/31/2022 0915 by Mickey Gross RN  Outcome: Progressing     Problem: DISCHARGE PLANNING  Goal: Discharge to home or other facility with appropriate resources  Description: INTERVENTIONS:  - Identify barriers to discharge w/patient and caregiver  - Arrange for needed discharge resources and transportation as appropriate  - Identify discharge learning needs (meds, wound care, etc )  - Arrange for interpretive services to assist at discharge as needed  - Refer to Case Management Department for coordinating discharge planning if the patient needs post-hospital services based on physician/advanced practitioner order or complex needs related to functional status, cognitive ability, or social support system  7/31/2022 0944 by Mickey Gross RN  Outcome: Adequate for Discharge  7/31/2022 0915 by Mickey Gross RN  Outcome: Progressing     Problem: Knowledge Deficit  Goal: Patient/family/caregiver demonstrates understanding of disease process, treatment plan, medications, and discharge instructions  Description: Complete learning assessment and assess knowledge base    Interventions:  - Provide teaching at level of understanding  - Provide teaching via preferred learning methods  7/31/2022 0944 by Sumi Campbell RN  Outcome: Adequate for Discharge  7/31/2022 0915 by Sumi Campblel RN  Outcome: Progressing

## 2022-07-31 NOTE — PLAN OF CARE
Problem: Potential for Falls  Goal: Patient will remain free of falls  Description: INTERVENTIONS:  - Educate patient/family on patient safety including physical limitations  - Instruct patient to call for assistance with activity   - Consult OT/PT to assist with strengthening/mobility   - Keep Call bell within reach  - Keep bed low and locked with side rails adjusted as appropriate  - Keep care items and personal belongings within reach  - Initiate and maintain comfort rounds  - Make Fall Risk Sign visible to staff  - Offer Toileting every  Hours, in advance of need  - Initiate/Maintain alarm  - Obtain necessary fall risk management equipment:   - Apply yellow socks and bracelet for high fall risk patients  - Consider moving patient to room near nurses station  Outcome: Progressing     Problem: Nutrition/Hydration-ADULT  Goal: Nutrient/Hydration intake appropriate for improving, restoring or maintaining nutritional needs  Description: Monitor and assess patient's nutrition/hydration status for malnutrition  Collaborate with interdisciplinary team and initiate plan and interventions as ordered  Monitor patient's weight and dietary intake as ordered or per policy  Utilize nutrition screening tool and intervene as necessary  Determine patient's food preferences and provide high-protein, high-caloric foods as appropriate       INTERVENTIONS:  - Monitor oral intake, urinary output, labs, and treatment plans  - Assess nutrition and hydration status and recommend course of action  - Evaluate amount of meals eaten  - Assist patient with eating if necessary   - Allow adequate time for meals  - Recommend/ encourage appropriate diets, oral nutritional supplements, and vitamin/mineral supplements  - Order, calculate, and assess calorie counts as needed  - Recommend, monitor, and adjust tube feedings and TPN/PPN based on assessed needs  - Assess need for intravenous fluids  - Provide specific nutrition/hydration education as appropriate  - Include patient/family/caregiver in decisions related to nutrition  Outcome: Progressing     Problem: Prexisting or High Potential for Compromised Skin Integrity  Goal: Skin integrity is maintained or improved  Description: INTERVENTIONS:  - Identify patients at risk for skin breakdown  - Assess and monitor skin integrity  - Assess and monitor nutrition and hydration status  - Monitor labs   - Assess for incontinence   - Turn and reposition patient  - Assist with mobility/ambulation  - Relieve pressure over bony prominences  - Avoid friction and shearing  - Provide appropriate hygiene as needed including keeping skin clean and dry  - Evaluate need for skin moisturizer/barrier cream  - Collaborate with interdisciplinary team   - Patient/family teaching  - Consider wound care consult   Outcome: Progressing     Problem: MOBILITY - ADULT  Goal: Maintain or return to baseline ADL function  Description: INTERVENTIONS:  -  Assess patient's ability to carry out ADLs; assess patient's baseline for ADL function and identify physical deficits which impact ability to perform ADLs (bathing, care of mouth/teeth, toileting, grooming, dressing, etc )  - Assess/evaluate cause of self-care deficits   - Assess range of motion  - Assess patient's mobility; develop plan if impaired  - Assess patient's need for assistive devices and provide as appropriate  - Encourage maximum independence but intervene and supervise when necessary  - Involve family in performance of ADLs  - Assess for home care needs following discharge   - Consider OT consult to assist with ADL evaluation and planning for discharge  - Provide patient education as appropriate  Outcome: Progressing  Goal: Maintains/Returns to pre admission functional level  Description: INTERVENTIONS:  - Perform BMAT or MOVE assessment daily    - Set and communicate daily mobility goal to care team and patient/family/caregiver     - Collaborate with rehabilitation services on mobility goals if consulted  - Perform Range of Motion  times a day  - Reposition patient every hours    - Dangle patient  times a day  - Stand patient  times a day  - Ambulate patient  times a day  - Out of bed to chair  times a day   - Out of bed for meals  times a day  - Out of bed for toileting  - Record patient progress and toleration of activity level   Outcome: Progressing     Problem: PAIN - ADULT  Goal: Verbalizes/displays adequate comfort level or baseline comfort level  Description: Interventions:  - Encourage patient to monitor pain and request assistance  - Assess pain using appropriate pain scale  - Administer analgesics based on type and severity of pain and evaluate response  - Implement non-pharmacological measures as appropriate and evaluate response  - Consider cultural and social influences on pain and pain management  - Notify physician/advanced practitioner if interventions unsuccessful or patient reports new pain  Outcome: Progressing     Problem: INFECTION - ADULT  Goal: Absence or prevention of progression during hospitalization  Description: INTERVENTIONS:  - Assess and monitor for signs and symptoms of infection  - Monitor lab/diagnostic results  - Monitor all insertion sites, i e  indwelling lines, tubes, and drains  - Monitor endotracheal if appropriate and nasal secretions for changes in amount and color  - [place] appropriate cooling/warming therapies per order  - Administer medications as ordered  - Instruct and encourage patient and family to use good hand hygiene technique  - Identify and instruct in appropriate isolation precautions for identified infection/condition  Outcome: Progressing     Problem: SAFETY ADULT  Goal: Patient will remain free of falls  Description: INTERVENTIONS:  - Educate patient/family on patient safety including physical limitations  - Instruct patient to call for assistance with activity   - Consult OT/PT to assist with strengthening/mobility   - Keep Call bell within reach  - Keep bed low and locked with side rails adjusted as appropriate  - Keep care items and personal belongings within reach  - Initiate and maintain comfort rounds  - Make Fall Risk Sign visible to staff  - Offer Toileting every  Hours, in advance of need  - Initiate/Maintain alarm  - Obtain necessary fall risk management equipment:   - Apply yellow socks and bracelet for high fall risk patients  - Consider moving patient to room near nurses station  Outcome: Progressing  Goal: Maintain or return to baseline ADL function  Description: INTERVENTIONS:  -  Assess patient's ability to carry out ADLs; assess patient's baseline for ADL function and identify physical deficits which impact ability to perform ADLs (bathing, care of mouth/teeth, toileting, grooming, dressing, etc )  - Assess/evaluate cause of self-care deficits   - Assess range of motion  - Assess patient's mobility; develop plan if impaired  - Assess patient's need for assistive devices and provide as appropriate  - Encourage maximum independence but intervene and supervise when necessary  - Involve family in performance of ADLs  - Assess for home care needs following discharge   - Consider OT consult to assist with ADL evaluation and planning for discharge  - Provide patient education as appropriate  Outcome: Progressing  Goal: Maintains/Returns to pre admission functional level  Description: INTERVENTIONS:  - Perform BMAT or MOVE assessment daily    - Set and communicate daily mobility goal to care team and patient/family/caregiver  - Collaborate with rehabilitation services on mobility goals if consulted  - Perform Range of Motion  times a day  - Reposition patient every  hours    - Dangle patient  times a day  - Stand patient  times a day  - Ambulate patient  times a day  - Out of bed to chair  times a day   - Out of bed for meals  times a day  - Out of bed for toileting  - Record patient progress and toleration of activity level Outcome: Progressing     Problem: DISCHARGE PLANNING  Goal: Discharge to home or other facility with appropriate resources  Description: INTERVENTIONS:  - Identify barriers to discharge w/patient and caregiver  - Arrange for needed discharge resources and transportation as appropriate  - Identify discharge learning needs (meds, wound care, etc )  - Arrange for interpretive services to assist at discharge as needed  - Refer to Case Management Department for coordinating discharge planning if the patient needs post-hospital services based on physician/advanced practitioner order or complex needs related to functional status, cognitive ability, or social support system  Outcome: Progressing     Problem: Knowledge Deficit  Goal: Patient/family/caregiver demonstrates understanding of disease process, treatment plan, medications, and discharge instructions  Description: Complete learning assessment and assess knowledge base    Interventions:  - Provide teaching at level of understanding  - Provide teaching via preferred learning methods  Outcome: Progressing

## 2022-07-31 NOTE — DISCHARGE SUMMARY
[address]  Discharge- Loraine CACERES [facility] 1/11/1932, 80 y o  female MRN: [id number]  Unit/Bed#: -01 Encounter: [id number]  Primary Care Provider: Arthur Mckinney MD   Date and time admitted to hospital: 7/28/2022  6:29 PM    * Acute blood loss anemia  Assessment & Plan  · POA with hemoglobin 5 4 now improved to 7 7 after 2 units PRBC  hb 8 1 today  · Baseline hemoglobin 10 2  · Asa 81 mg on hold- LD 7/27  EGD and colonoscopy done 07/29/2022  EGD showed No active bleeding or stigmata of recent hemorrhage  Pan esophageal white plaques concerning for underlying final impression; biopsies and brushings taken  Colonoscopy showed Malignant-appearing 4-5 ascending colon mass 1/4 circumferential; biopsied   · Continue Protonix  · Noted to have iron deficiency anemia  She had 1 dose of Venofer IV yesterday and will discharge on oral iron    Hyponatremia  Assessment & Plan  Stable around 132-133  Probably secondary to underlying malignancy    Colon cancer [facility])  Assessment & Plan  Patient diagnosed with colon mass this admission concerning for colon cancer  Biopsies taken during colonoscopy  CT chest abdomen pelvis does not show any evidence of metastasis  However it was done without IV contrast and only with oral contrast   Discussed with general surgery and may repeat it prior to surgery if needed  Seen by General surgery and discussed in regards to surgical options  Patient seems agreeable to proceeding  Stage 3 chronic kidney disease [facility])  Assessment & Plan  Lab Results   Component Value Date    EGFR 54 07/31/2022    EGFR 65 07/30/2022    EGFR 50 07/29/2022    CREATININE 0 93 07/31/2022    CREATININE 0 80 07/30/2022    CREATININE 0 98 07/29/2022     · CKD 3 hx   · CR 1 24 above baseline of 0 94-0 97 in 2021   Does not meet criteria for SHIVANI  · Pre renal due to symptomatic anemia  · Trend CR  · Renally dose medications    Coronary artery disease  Assessment & Plan  · Denies chest pain  · History cardiac stenting 2011  · Aspirin on hold  · Continue metoprolol succinate daily  · Out[facility] cardiac follow[facility]up with [facility]      Discharging Physician / Practitioner: Fidelia Gallego MD  PCP: Fiordaliza Chance MD  Admission Date:   Admission Orders (From admission, onward)     Ordered        07/28/22 2019  INPATIENT ADMISSION  Once                      Discharge Date: 07/31/22    Medical Problems             Resolved Problems  Date Reviewed: 7/31/2022   None                 Consultations During Hospital Stay:  · Gi,general surgery    Procedures Performed:   · egd,colonoscopy    Significant Findings / Test Results:   EGD    Result Date: 7/29/2022  Impression: No active bleeding or stigmata of recent hemorrhage Pan esophageal white plaques concerning for underlying final impression; biopsies and brushings taken Normal appearing stomach on direct and retroflexed views except for probable gastric fundic gland polyps; random gastric biopsies taken to rule out H pylori Normal appearing duodenal bulb visualized within on; random biopsies taken out celiac disease RECOMMENDATION: Await pathology results Consider empiric treatment with 14 day course of fluconazole particular patient is symptomatic Proceed with previously scheduled colonoscopy   Mustapha Villarreal MD     Colonoscopy    Result Date: 7/29/2022  Impression: Malignant-appearing 4-5 ascending colon mass 1/4 circumferential; biopsied and tattoo 5 cm distally Six subcentimeter polyps removed with cold snare polypectomy >10mm semi pedunculated sigmoid colon polyp remov with hot snare polypectomy and site tattooed 3cm distal Ascending colon mass likely the source of her anemia RECOMMENDATION: Await pathology results  Will need staging of disease CEA level If no evidence of metastatic disease and patient wants definitive treatment consider outpatient general surgery referral May resume diet Continue monitor hemoglobin and transfuse for Hgb <7 Return to floor for ongoing care as per primary team  Jose Lugo MD     CT chest abdomen pelvis wo contrast    Result Date: 7/28/2022  Impression: No change  No acute intrathoracic or intra-abdominal pathology  Workstation performed: [id number]     CT cervical spine without contrast    Result Date: 7/28/2022  Impression: No cervical spine fracture or traumatic malalignment  Stable multilevel degenerative spondylosis most pronounced at C4-5 and C5-C6  Workstation performed: [id number]     Incidental Findings:   · none    Test Results Pending at Discharge (will require follow up):   · none     Outpatient Tests Requested:  · Cbc in 2 weeks    Complications:  none    Reason for Admission:  Generalized weakness and exertional dyspnea    Hospital Course:     Estefany Smith is a 80 y o  female patient who originally presented to the hospital on 7/28/2022 due to acute blood loss anemia with hemoglobin down to 5 4  Patient underwent EGD and colonoscopy and found to have possible colon cancer  Received 2 units PRBC and hemoglobin is now stable around 7 7  Discussed with general surgery and GI and advised patient to proceed with outpatient follow-up for surgery  Also found to have severe iron deficiency anemia and give 1 dose of IV Venofer 200 mg x 1 and will continue oral iron therapy  Recheck CBC in 2 weeks        Please see above list of diagnoses and related plan for additional information  Condition at Discharge: fair     Discharge Day Visit / Exam:     Subjective:  Patient denies any chest pain or shortness of breath or abdominal pain today  Feels well  Vitals: Blood Pressure: (!) 96/42 (07/31/22 0841)  Pulse: 89 (07/31/22 0700)  Temperature: 97 9 °F (36 6 °C) (07/31/22 0700)  Temp Source: Oral (07/31/22 0700)  Respirations: 21 (07/31/22 0700)  Height: 5' 5" (165 1 cm) (07/29/22 1130)  Weight - Scale: 54 4 kg (120 lb) (07/29/22 1130)  SpO2: 98 % (07/31/22 0700)  Exam:   Physical Exam  Vitals and nursing note reviewed  Constitutional:       Appearance: Normal appearance  HENT:      Head: Normocephalic and atraumatic  Right Ear: External ear normal       Left Ear: External ear normal       Nose: Nose normal       Mouth/Throat:      Pharynx: Oropharynx is clear  Eyes:      Pupils: Pupils are equal, round, and reactive to light  Cardiovascular:      Rate and Rhythm: Normal rate and regular rhythm  Heart sounds: Normal heart sounds  Pulmonary:      Effort: Pulmonary effort is normal       Breath sounds: Normal breath sounds  Abdominal:      General: Bowel sounds are normal       Palpations: Abdomen is soft  Tenderness: There is no abdominal tenderness  Musculoskeletal:         General: Normal range of motion  Cervical back: Normal range of motion and neck supple  Skin:     General: Skin is warm and dry  Capillary Refill: Capillary refill takes less than 2 seconds  Neurological:      General: No focal deficit present  Mental Status: She is alert and oriented to person, place, and time  Psychiatric:         Mood and Affect: Mood normal          Discussion with Family:  Discussed with son    Discharge instructions/Information to patient and family:   See after visit summary for information provided to patient and family  Provisions for Follow-Up Care:  See after visit summary for information related to follow-up care and any pertinent home health orders  Disposition:     Home    For Discharges to [facility] SNF:   · Not Applicable to this Patient - Not Applicable to this Patient    Planned Readmission:  None     Discharge Statement:  I spent 35 minutes discharging the patient  This time was spent on the day of discharge  I had direct contact with the patient on the day of discharge   Greater than 50% of the total time was spent examining patient, answering all patient questions, arranging and discussing plan of care with patient as well as directly providing post-discharge instructions  Additional time then spent on discharge activities  Discharge Medications:  See after visit summary for reconciled discharge medications provided to patient and family        ** Please Note: This note has been constructed using a voice recognition system **

## 2022-07-31 NOTE — ASSESSMENT & PLAN NOTE
Patient diagnosed with colon mass this admission concerning for colon cancer  Biopsies taken during colonoscopy  CT chest abdomen pelvis does not show any evidence of metastasis  However it was done without IV contrast and only with oral contrast   Discussed with general surgery and may repeat it prior to surgery if needed  Seen by General surgery and discussed in regards to surgical options  Patient seems agreeable to proceeding

## 2022-07-31 NOTE — ASSESSMENT & PLAN NOTE
· POA with hemoglobin 5 4 now improved to 7 7 after 2 units PRBC  hb 8 1 today  · Baseline hemoglobin 10 2  · Asa 81 mg on hold- LD 7/27  EGD and colonoscopy done 07/29/2022  EGD showed No active bleeding or stigmata of recent hemorrhage  Pan esophageal white plaques concerning for underlying final impression; biopsies and brushings taken  Colonoscopy showed Malignant-appearing 4-5 ascending colon mass 1/4 circumferential; biopsied   · Continue Protonix  · Noted to have iron deficiency anemia    She had 1 dose of Venofer IV yesterday and will discharge on oral iron

## 2022-07-31 NOTE — ASSESSMENT & PLAN NOTE
· Denies chest pain  · History cardiac stenting 2011  · Aspirin on hold  · Continue metoprolol succinate daily  · [facility]

## 2022-07-31 NOTE — ASSESSMENT & PLAN NOTE
Lab Results   Component Value Date    EGFR 54 07/31/2022    EGFR 65 07/30/2022    EGFR 50 07/29/2022    CREATININE 0 93 07/31/2022    CREATININE 0 80 07/30/2022    CREATININE 0 98 07/29/2022     · CKD 3 hx   · CR 1 24 above baseline of 0 94-0 97 in 2021   Does not meet criteria for SHIVANI  · Pre renal due to symptomatic anemia  · Trend CR  · Renally dose medications

## 2022-08-01 LAB
ABO GROUP BLD BPU: NORMAL
BPU ID: NORMAL
CROSSMATCH: NORMAL
UNIT DISPENSE STATUS: NORMAL
UNIT PRODUCT CODE: NORMAL
UNIT PRODUCT VOLUME: 350 ML
UNIT RH: NORMAL

## 2022-08-08 ENCOUNTER — HOSPITAL ENCOUNTER (OUTPATIENT)
Dept: CT IMAGING | Facility: HOSPITAL | Age: 87
Discharge: HOME | End: 2022-08-08
Attending: STUDENT IN AN ORGANIZED HEALTH CARE EDUCATION/TRAINING PROGRAM
Payer: MEDICARE

## 2022-08-08 DIAGNOSIS — C18.2 MALIGNANT NEOPLASM OF ASCENDING COLON (HCC): ICD-10-CM

## 2022-08-08 PROCEDURE — 74177 CT ABD & PELVIS W/CONTRAST: CPT

## 2022-08-08 RX ADMIN — IOHEXOL 75 ML: 350 INJECTION, SOLUTION INTRAVENOUS at 11:45

## 2022-08-18 NOTE — PROGRESS NOTES
Non-Visit Discharge     Kristopher Marquez Luis has been seen for 3 visits of PT related to neck pain and vertigo  They have not returned following their last appointment and have not responded to rescheduling attempts  They are discharged from our care at this time

## 2022-08-21 ENCOUNTER — HOSPITAL ENCOUNTER (EMERGENCY)
Facility: HOSPITAL | Age: 87
Discharge: HOME | End: 2022-08-21
Attending: EMERGENCY MEDICINE
Payer: MEDICARE

## 2022-08-21 ENCOUNTER — APPOINTMENT (OUTPATIENT)
Dept: RADIOLOGY | Facility: HOSPITAL | Age: 87
End: 2022-08-21
Payer: MEDICARE

## 2022-08-21 VITALS
BODY MASS INDEX: 19.76 KG/M2 | TEMPERATURE: 97.7 F | OXYGEN SATURATION: 97 % | SYSTOLIC BLOOD PRESSURE: 160 MMHG | HEIGHT: 65 IN | DIASTOLIC BLOOD PRESSURE: 69 MMHG | HEART RATE: 101 BPM | RESPIRATION RATE: 14 BRPM | WEIGHT: 118.61 LBS

## 2022-08-21 DIAGNOSIS — I49.1 PAC (PREMATURE ATRIAL CONTRACTION): ICD-10-CM

## 2022-08-21 DIAGNOSIS — R53.1 WEAKNESS: Primary | ICD-10-CM

## 2022-08-21 LAB
ALBUMIN SERPL BCP-MCNC: 3 G/DL (ref 3.5–5)
ALP SERPL-CCNC: 53 U/L (ref 46–116)
ALT SERPL W P-5'-P-CCNC: 11 U/L (ref 12–78)
ANION GAP SERPL CALCULATED.3IONS-SCNC: 8 MMOL/L (ref 4–13)
AST SERPL W P-5'-P-CCNC: 20 U/L (ref 5–45)
BASOPHILS # BLD AUTO: 0.07 THOUSANDS/ΜL (ref 0–0.1)
BASOPHILS NFR BLD AUTO: 1 % (ref 0–1)
BILIRUB SERPL-MCNC: 0.35 MG/DL (ref 0.2–1)
BUN SERPL-MCNC: 12 MG/DL (ref 5–25)
CALCIUM ALBUM COR SERPL-MCNC: 10.6 MG/DL (ref 8.3–10.1)
CALCIUM SERPL-MCNC: 9.8 MG/DL (ref 8.3–10.1)
CARDIAC TROPONIN I PNL SERPL HS: 6 NG/L
CHLORIDE SERPL-SCNC: 99 MMOL/L (ref 96–108)
CO2 SERPL-SCNC: 26 MMOL/L (ref 21–32)
CREAT SERPL-MCNC: 1.15 MG/DL (ref 0.6–1.3)
EOSINOPHIL # BLD AUTO: 0.29 THOUSAND/ΜL (ref 0–0.61)
EOSINOPHIL NFR BLD AUTO: 4 % (ref 0–6)
FLUAV RNA RESP QL NAA+PROBE: NEGATIVE
FLUBV RNA RESP QL NAA+PROBE: NEGATIVE
GFR SERPL CREATININE-BSD FRML MDRD: 41 ML/MIN/1.73SQ M
GLUCOSE SERPL-MCNC: 120 MG/DL (ref 65–140)
HCT VFR BLD AUTO: 32.7 % (ref 34.8–46.1)
HGB BLD-MCNC: 10 G/DL (ref 11.5–15.4)
IMM GRANULOCYTES # BLD AUTO: 0.01 THOUSAND/UL (ref 0–0.2)
IMM GRANULOCYTES NFR BLD AUTO: 0 % (ref 0–2)
LYMPHOCYTES # BLD AUTO: 3.24 THOUSANDS/ΜL (ref 0.6–4.47)
LYMPHOCYTES NFR BLD AUTO: 44 % (ref 14–44)
MCH RBC QN AUTO: 25.4 PG (ref 26.8–34.3)
MCHC RBC AUTO-ENTMCNC: 30.6 G/DL (ref 31.4–37.4)
MCV RBC AUTO: 83 FL (ref 82–98)
MONOCYTES # BLD AUTO: 0.76 THOUSAND/ΜL (ref 0.17–1.22)
MONOCYTES NFR BLD AUTO: 10 % (ref 4–12)
NEUTROPHILS # BLD AUTO: 3.05 THOUSANDS/ΜL (ref 1.85–7.62)
NEUTS SEG NFR BLD AUTO: 41 % (ref 43–75)
NRBC BLD AUTO-RTO: 0 /100 WBCS
PLATELET # BLD AUTO: 185 THOUSANDS/UL (ref 149–390)
PMV BLD AUTO: 10.5 FL (ref 8.9–12.7)
POTASSIUM SERPL-SCNC: 3.9 MMOL/L (ref 3.5–5.3)
PROT SERPL-MCNC: 6.7 G/DL (ref 6.4–8.4)
RBC # BLD AUTO: 3.93 MILLION/UL (ref 3.81–5.12)
RSV RNA RESP QL NAA+PROBE: NEGATIVE
SARS-COV-2 RNA RESP QL NAA+PROBE: NEGATIVE
SODIUM SERPL-SCNC: 133 MMOL/L (ref 135–147)
WBC # BLD AUTO: 7.42 THOUSAND/UL (ref 4.31–10.16)

## 2022-08-21 PROCEDURE — 99285 EMERGENCY DEPT VISIT HI MDM: CPT

## 2022-08-21 PROCEDURE — 84484 ASSAY OF TROPONIN QUANT: CPT | Performed by: EMERGENCY MEDICINE

## 2022-08-21 PROCEDURE — 36415 COLL VENOUS BLD VENIPUNCTURE: CPT | Performed by: EMERGENCY MEDICINE

## 2022-08-21 PROCEDURE — 0241U HB NFCT DS VIR RESP RNA 4 TRGT: CPT | Performed by: EMERGENCY MEDICINE

## 2022-08-21 PROCEDURE — 80053 COMPREHEN METABOLIC PANEL: CPT | Performed by: EMERGENCY MEDICINE

## 2022-08-21 PROCEDURE — 96365 THER/PROPH/DIAG IV INF INIT: CPT

## 2022-08-21 PROCEDURE — 99285 EMERGENCY DEPT VISIT HI MDM: CPT | Performed by: EMERGENCY MEDICINE

## 2022-08-21 PROCEDURE — 71046 X-RAY EXAM CHEST 2 VIEWS: CPT

## 2022-08-21 PROCEDURE — 93005 ELECTROCARDIOGRAM TRACING: CPT

## 2022-08-21 PROCEDURE — 85025 COMPLETE CBC W/AUTO DIFF WBC: CPT | Performed by: EMERGENCY MEDICINE

## 2022-08-21 RX ORDER — ASPIRIN 81 MG/1
81 TABLET, CHEWABLE ORAL DAILY
COMMUNITY

## 2022-08-21 RX ADMIN — SODIUM CHLORIDE, SODIUM LACTATE, POTASSIUM CHLORIDE, AND CALCIUM CHLORIDE 500 ML: .6; .31; .03; .02 INJECTION, SOLUTION INTRAVENOUS at 21:03

## 2022-08-22 LAB
ATRIAL RATE: 95 BPM
QRS AXIS: 69 DEGREES
QRSD INTERVAL: 78 MS
QT INTERVAL: 350 MS
QTC INTERVAL: 439 MS
T WAVE AXIS: 71 DEGREES
VENTRICULAR RATE: 95 BPM

## 2022-08-22 NOTE — ED PROVIDER NOTES
History  Chief Complaint   Patient presents with    Weakness - Generalized     Patient presents from home complaining of "not feeling good", increasingly weak, SOB, ambulatory dysfunction, decreased PO intake  Reports recent hospitalization, blood transfusion  History provided by:  Medical records, patient and caregiver (Jackeline Sloan [phone])  Fatigue  Severity:  Mild  Onset quality:  Gradual  Duration:  1 day  Timing:  Constant  Progression:  Unchanged  Chronicity:  New  Context comment:  Patient with recent hospitalization for weakness/anemia, required transfusion, findings of colon mass, also states she did receive zoster vaccination, today felt generally weak and felt her legs were not supporting her for ambulation  Relieved by:  Nothing  Worsened by:  Nothing  Ineffective treatments:  None tried  Associated symptoms: difficulty walking    Associated symptoms: no abdominal pain, no arthralgias, no chest pain, no cough, no diarrhea, no dizziness, no dysuria, no fever, no headaches, no nausea, no seizures, no shortness of breath and no vomiting        Prior to Admission Medications   Prescriptions Last Dose Informant Patient Reported? Taking?    Multiple Vitamins-Minerals (CENTRUM SILVER 50+WOMEN PO)  Self Yes Yes   Sig: Take 1 tablet by mouth   albuterol (PROVENTIL HFA,VENTOLIN HFA) 90 mcg/act inhaler  Self Yes Yes   Sig: Inhale 2 puffs every 6 (six) hours as needed for wheezing   aspirin 81 mg chewable tablet   Yes Yes   Sig: Chew 81 mg daily   cyanocobalamin (VITAMIN B-12) 100 MCG tablet  Self Yes Yes   Sig: Take 100 mcg by mouth daily   ferrous sulfate 324 (65 Fe) mg   No Yes   Sig: Take 1 tablet (324 mg total) by mouth daily before breakfast   fluticasone (FLONASE) 50 mcg/act nasal spray  Self Yes Yes   Si spray into each nostril daily   fluticasone-vilanterol (Breo Ellipta) 100-25 mcg/inh inhaler   Yes Yes   metoprolol succinate (TOPROL-XL) 25 mg 24 hr tablet  Self Yes Yes   Sig: Take 12 5 mg by mouth daily   pantoprazole (PROTONIX) 40 mg tablet  Self Yes Yes   Sig: Take 40 mg by mouth daily   senna-docusate sodium (SENOKOT S) 8 6-50 mg per tablet   No Yes   Sig: Take 1 tablet by mouth 2 (two) times a day      Facility-Administered Medications: None       Past Medical History:   Diagnosis Date    Cancer ([facility] [place] [address] )     skin cancer    Cardiac disease     Multiple falls     Neuropathy     Skin cancer     forehead    Spinal stenosis     UTI (urinary tract infection)        Past Surgical History:   Procedure Laterality Date    APPENDECTOMY      BACK SURGERY      x2    CATARACT EXTRACTION W/  INTRAOCULAR LENS IMPLANT Bilateral     CORONARY ANGIOPLASTY WITH STENT PLACEMENT  09/23/2011    AK OPEN RX FEMUR FX+INTRAMED RASHIDA Right 5/4/2021    Procedure: INSERTION NAIL IM FEMUR ANTEGRADE (TROCHANTERIC); Surgeon: Myrna Thrasher; Location:  MAIN OR;  Service: Orthopedics    REPLACEMENT TOTAL KNEE BILATERAL         History reviewed  No pertinent family history  I have reviewed and agree with the history as documented  E-Cigarette/Vaping    E-Cigarette Use Never User      E-Cigarette/Vaping Substances    Nicotine No     THC No     CBD No     Flavoring No     Other No     Unknown No      Social History     Tobacco Use    Smoking status: Never Smoker    Smokeless tobacco: Never Used   Vaping Use    Vaping Use: Never used   Substance Use Topics    Alcohol use: Yes     Comment: Occasionally    Drug use: Never       Review of Systems   Constitutional: Positive for activity change, appetite change and fatigue  Negative for chills and fever  HENT: Negative for ear discharge, ear pain, rhinorrhea and sore throat  Eyes: Negative for pain and visual disturbance  Respiratory: Negative for cough and shortness of breath  Cardiovascular: Negative for chest pain and palpitations  Gastrointestinal: Negative for abdominal pain, blood in stool, diarrhea, nausea and vomiting     Endocrine: Negative for polydipsia, polyphagia and polyuria  Genitourinary: Negative for difficulty urinating, dysuria, flank pain and hematuria  Musculoskeletal: Negative for arthralgias and back pain  Skin: Negative for color change and rash  Allergic/Immunologic: Negative for immunocompromised state  Neurological: Positive for tremors and weakness  Negative for dizziness, seizures, syncope and headaches  Psychiatric/Behavioral: Negative for confusion and self-injury  The patient is not nervous/anxious  All other systems reviewed and are negative  Physical Exam  Physical Exam  Constitutional:       General: She is not in acute distress  Appearance: Normal appearance  She is not ill-appearing, toxic-appearing or diaphoretic  HENT:      Head: Normocephalic and atraumatic  Nose: Nose normal  No congestion or rhinorrhea  Mouth/Throat:      Mouth: Mucous membranes are dry  Pharynx: Oropharynx is clear  No oropharyngeal exudate or posterior oropharyngeal erythema  Eyes:      General:         Right eye: No discharge  Left eye: No discharge  Cardiovascular:      Rate and Rhythm: Normal rate and regular rhythm  Pulses: Normal pulses  Heart sounds: Normal heart sounds  No murmur heard  No gallop  Pulmonary:      Effort: Pulmonary effort is normal  No respiratory distress  Breath sounds: Normal breath sounds  No stridor  No wheezing, rhonchi or rales  Chest:      Chest wall: No tenderness  Abdominal:      General: Bowel sounds are normal  There is no distension  Palpations: Abdomen is soft  There is no mass  Tenderness: There is no abdominal tenderness  There is no right CVA tenderness, left CVA tenderness, guarding or rebound  Hernia: No hernia is present  Musculoskeletal:         General: Normal range of motion  Cervical back: Normal range of motion and neck supple  Right lower leg: Edema present  Left lower leg: Edema present  Comments: Mild nonpitting ankle swelling bilaterally   Skin:     General: Skin is warm and dry  Capillary Refill: Capillary refill takes less than 2 seconds  Neurological:      General: No focal deficit present  Mental Status: She is alert and oriented to person, place, and time  Cranial Nerves: No cranial nerve deficit  Sensory: No sensory deficit  Motor: No weakness  Coordination: Coordination normal       Gait: Gait normal       Deep Tendon Reflexes: Reflexes normal       Comments: Generalized weakness   Psychiatric:         Mood and Affect: Mood normal          Behavior: Behavior normal          Thought Content: Thought content normal          Judgment: Judgment normal          Vital Signs  ED Triage Vitals [08/21/22 2048]   Temperature Pulse Respirations Blood Pressure SpO2   97 7 °F (36 5 °C) 101 14 160/69 97 %      Temp Source Heart Rate Source Patient Position - Orthostatic VS BP Location FiO2 (%)   Temporal Monitor Lying Left arm --      Pain Score       4           Vitals:    08/21/22 2048   BP: 160/69   Pulse: 101   Patient Position - Orthostatic VS: Lying         Visual Acuity      ED Medications  Medications   lactated ringers bolus 500 mL (0 mL Intravenous Stopped 8/21/22 2208)       Diagnostic Studies  Results Reviewed     Procedure Component Value Units Date/Time    HS Troponin I 4hr [[id number]]     Lab Status: No result Specimen: Blood     FLU/RSV/COVID - if FLU/RSV clinically relevant [[id number]]  (Normal) Collected: 08/21/22 2103    Lab Status: Final result Specimen: Nares from Nose Updated: 08/21/22 2150     SARS-CoV-2 Negative     INFLUENZA A PCR Negative     INFLUENZA B PCR Negative     RSV PCR Negative    Narrative:      FOR PEDIATRIC PATIENTS - copy/paste COVID Guidelines URL to browser: [email or web address]    SARS-CoV-2 assay is a Nucleic Acid Amplification assay intended for the  qualitative detection of nucleic acid from SARS-CoV-2 in nasopharyngeal  swabs  Results are for the presumptive identification of SARS-CoV-2 RNA  Positive results are indicative of infection with SARS-CoV-2, the virus  causing COVID-19, but do not rule out bacterial infection or co-infection  with other viruses  Laboratories within the [place] [facility] and its  territories are required to report all positive results to the appropriate  public health authorities  Negative results do not preclude SARS-CoV-2  infection and should not be used as the sole basis for treatment or other  patient management decisions  Negative results must be combined with  clinical observations, patient history, and epidemiological information  This test has not been FDA cleared or approved  This test has been authorized by FDA under an Emergency Use Authorization  (EUA)  This test is only authorized for the duration of time the  declaration that circumstances exist justifying the authorization of the  emergency use of an in vitro diagnostic tests for detection of SARS-CoV-2  virus and/or diagnosis of COVID-19 infection under section 564(b)(1) of  the Act, 21 U  S C  058OON-3(D)(8), unless the authorization is terminated  or revoked sooner  The test has been validated but independent review by FDA  and CLIA is pending  Test performed using [employer] Gene[employer]pert: This RT-PCR assay targets N2,  a region unique to SARS-CoV-2  A conserved region in the E-gene was chosen  for pan-Sarbecovirus detection which includes SARS-CoV-2      HS Troponin 0hr (reflex protocol) [[id number]]  (Normal) Collected: 08/21/22 2103    Lab Status: Final result Specimen: Blood from Arm, Left Updated: 08/21/22 2137     hs TnI 0hr 6 ng/L     HS Troponin I 2hr [[id number]]     Lab Status: No result Specimen: Blood     Comprehensive metabolic panel [[id number]]  (Abnormal) Collected: 08/21/22 2103    Lab Status: Final result Specimen: Blood from Arm, Left Updated: 08/21/22 2129     Sodium 133 mmol/L      Potassium 3 9 mmol/L      Chloride 99 mmol/L      CO2 26 mmol/L      ANION GAP 8 mmol/L      BUN 12 mg/dL      Creatinine 1 15 mg/dL      Glucose 120 mg/dL      Calcium 9 8 mg/dL      Corrected Calcium 10 6 mg/dL      AST 20 U/L      ALT 11 U/L      Alkaline Phosphatase 53 U/L      Total Protein 6 7 g/dL      Albumin 3 0 g/dL      Total Bilirubin 0 35 mg/dL      eGFR 41 ml/min/1 73sq m     Narrative:      National Kidney Disease Foundation guidelines for Chronic Kidney Disease (CKD):     Stage 1 with normal or high GFR (GFR > 90 mL/min/1 73 square meters)    Stage 2 Mild CKD (GFR = 60-89 mL/min/1 73 square meters)    Stage 3A Moderate CKD (GFR = 45-59 mL/min/1 73 square meters)    Stage 3B Moderate CKD (GFR = 30-44 mL/min/1 73 square meters)    Stage 4 Severe CKD (GFR = 15-29 mL/min/1 73 square meters)    Stage 5 End Stage CKD (GFR <15 mL/min/1 73 square meters)  Note: GFR calculation is accurate only with a steady state creatinine    CBC and differential [[id number]]  (Abnormal) Collected: 08/21/22 2103    Lab Status: Final result Specimen: Blood from Arm, Left Updated: 08/21/22 2114     WBC 7 42 Thousand/uL      RBC 3 93 Million/uL      Hemoglobin 10 0 g/dL      Hematocrit 32 7 %      MCV 83 fL      MCH 25 4 pg      MCHC 30 6 g/dL      MPV 10 5 fL      Platelets 570 Thousands/uL      nRBC 0 /100 WBCs      Neutrophils Relative 41 %      Immat GRANS % 0 %      Lymphocytes Relative 44 %      Monocytes Relative 10 %      Eosinophils Relative 4 %      Basophils Relative 1 %      Neutrophils Absolute 3 05 Thousands/µL      Immature Grans Absolute 0 01 Thousand/uL      Lymphocytes Absolute 3 24 Thousands/µL      Monocytes Absolute 0 76 Thousand/µL      Eosinophils Absolute 0 29 Thousand/µL      Basophils Absolute 0 07 Thousands/µL     UA (URINE) with reflex to Scope [[id number]]     Lab Status: No result Specimen: Urine                  XR chest 2 views    (Results Pending) Procedures  Procedures         ED Course  ED Course as of 08/22/22 Leonel Cooper Aug 21, 2022   2048 2048:  Patient appears chronically ill, vital signs reviewed  Nonfocal neurological exam   Patient complains of generalized weakness and ambulatory dysfunction for the past day, poor intake  Placed on monitor  Plan to complete basic labs, EKG, urinalysis  Mild dehydration on exam, plan to rehydrate with IV fluids  2200 2200:  Labs reviewed  Patient has remained stable throughout ED course  Offered admission for possible rehab placement  Patient declines  Prefers outpatient course of care, caregiver Cristhian Oswald willing to  for transportation  MDM    Disposition  Final diagnoses:   Weakness   PAC (premature atrial contraction)     Time reflects when diagnosis was documented in both MDM as applicable and the Disposition within this note     Time User Action Codes Description Comment    8/21/2022 10:21 PM Loni Sheriff [R53 1] Weakness     8/21/2022 10:21 PM Flakito Pizarro [I49 1] PAC (premature atrial contraction)       ED Disposition     ED Disposition   Discharge    Condition   Stable    Date/Time   Sun Aug 21, 2022 10:19 PM    Comment   Abhinav CACERES [facility] discharge to home/self care                 Follow-up Information     Follow up With Specialties Details Why Contact Info Additional Information    Teri Sadler MD Family Medicine Schedule an appointment as soon as possible for a visit   [facility] [address] Emergency Department Emergency Medicine  If symptoms worsen [phone]  [facility] Emergency Department, [address]  [address]ug[address]ella [address]ata [address] [place] [address], [id number]          Discharge Medication List as of 8/21/2022 10:22 PM      CONTINUE these medications which have NOT CHANGED    Details   albuterol (PROVENTIL HFA,VENTOLIN HFA) 90 mcg/act inhaler Inhale 2 puffs every 6 (six) hours as needed for wheezing, Historical Med      aspirin 81 mg chewable tablet Chew 81 mg daily, Historical Med      cyanocobalamin (VITAMIN B-12) 100 MCG tablet Take 100 mcg by mouth daily, Historical Med      ferrous sulfate 324 (65 Fe) mg Take 1 tablet (324 mg total) by mouth daily before breakfast, Starting Sun 7/31/2022, Until Tue 8/30/2022, Normal      fluticasone (FLONASE) 50 mcg/act nasal spray 1 spray into each nostril daily, Historical Med      fluticasone-vilanterol (Breo Ellipta) 100-25 mcg/inh inhaler Historical Med      metoprolol succinate (TOPROL-XL) 25 mg 24 hr tablet Take 12 5 mg by mouth daily, Historical Med      Multiple Vitamins-Minerals (CENTRUM SILVER 50+WOMEN PO) Take 1 tablet by mouth, Historical Med      pantoprazole (PROTONIX) 40 mg tablet Take 40 mg by mouth daily, Historical Med      senna-docusate sodium (SENOKOT S) 8 6-50 mg per tablet Take 1 tablet by mouth 2 (two) times a day, Starting Sun 7/31/2022, Until Tue 8/30/2022, Normal             No discharge procedures on file      PDMP Review     None          ED Provider  Electronically Signed by           Rachel Simmons MD  08/22/22 0274

## 2022-09-05 ENCOUNTER — HOSPITAL ENCOUNTER (EMERGENCY)
Facility: HOSPITAL | Age: 87
Discharge: HOME | End: 2022-09-05
Attending: EMERGENCY MEDICINE | Admitting: EMERGENCY MEDICINE
Payer: MEDICARE

## 2022-09-05 VITALS
HEART RATE: 55 BPM | DIASTOLIC BLOOD PRESSURE: 65 MMHG | RESPIRATION RATE: 18 BRPM | BODY MASS INDEX: 18.88 KG/M2 | TEMPERATURE: 98.4 F | OXYGEN SATURATION: 97 % | WEIGHT: 113.32 LBS | SYSTOLIC BLOOD PRESSURE: 146 MMHG | HEIGHT: 65 IN

## 2022-09-05 DIAGNOSIS — R39.9 URINARY TRACT INFECTION SYMPTOMS: Primary | ICD-10-CM

## 2022-09-05 DIAGNOSIS — N39.0 UTI (URINARY TRACT INFECTION): ICD-10-CM

## 2022-09-05 LAB
BACTERIA UR QL AUTO: ABNORMAL /HPF
BILIRUB UR QL STRIP: NEGATIVE
CLARITY UR: ABNORMAL
COLOR UR: YELLOW
GLUCOSE UR STRIP-MCNC: NEGATIVE MG/DL
HGB UR QL STRIP.AUTO: ABNORMAL
KETONES UR STRIP-MCNC: NEGATIVE MG/DL
LEUKOCYTE ESTERASE UR QL STRIP: ABNORMAL
NITRITE UR QL STRIP: NEGATIVE
NON-SQ EPI CELLS URNS QL MICRO: ABNORMAL /HPF
PH UR STRIP.AUTO: 6.5 [PH]
PROT UR STRIP-MCNC: NEGATIVE MG/DL
RBC #/AREA URNS AUTO: ABNORMAL /HPF
SP GR UR STRIP.AUTO: 1.01 (ref 1–1.03)
UROBILINOGEN UR QL STRIP.AUTO: 0.2 E.U./DL
WBC #/AREA URNS AUTO: ABNORMAL /HPF

## 2022-09-05 PROCEDURE — 81001 URINALYSIS AUTO W/SCOPE: CPT | Performed by: EMERGENCY MEDICINE

## 2022-09-05 PROCEDURE — 99283 EMERGENCY DEPT VISIT LOW MDM: CPT

## 2022-09-05 PROCEDURE — 87186 SC STD MICRODIL/AGAR DIL: CPT | Performed by: EMERGENCY MEDICINE

## 2022-09-05 PROCEDURE — 87086 URINE CULTURE/COLONY COUNT: CPT | Performed by: EMERGENCY MEDICINE

## 2022-09-05 PROCEDURE — 99284 EMERGENCY DEPT VISIT MOD MDM: CPT | Performed by: EMERGENCY MEDICINE

## 2022-09-05 PROCEDURE — 87077 CULTURE AEROBIC IDENTIFY: CPT | Performed by: EMERGENCY MEDICINE

## 2022-09-05 RX ORDER — CIPROFLOXACIN 500 MG/1
500 TABLET, FILM COATED ORAL EVERY 12 HOURS SCHEDULED
Qty: 6 TABLET | Refills: 0 | Status: SHIPPED | OUTPATIENT
Start: 2022-09-05 | End: 2022-09-07

## 2022-09-05 RX ORDER — CIPROFLOXACIN 500 MG/1
500 TABLET, FILM COATED ORAL ONCE
Status: COMPLETED | OUTPATIENT
Start: 2022-09-05 | End: 2022-09-05

## 2022-09-05 RX ADMIN — CIPROFLOXACIN HYDROCHLORIDE 500 MG: 500 TABLET, FILM COATED ORAL at 12:26

## 2022-09-05 NOTE — DISCHARGE INSTRUCTIONS
Thank you for letting us take care of you  You have been evaluated for urinary tract infection symptoms  Please use antibiotics prescribed  Please follow-up as instructed  Please return for worsening symptoms  At this time, you have no clinical evidence of symptoms or problems that will require hospitalization, however you should be evaluated soon by a primary care physician, and contact information has been provided  Follow up with your primary care physician  This is important as many medical conditions can be managed as an outpatient, in addition to routine health screening  Seeing your primary doctor often can help identify changes in the medical issue that brought you to the ED for care today  If you experience any new symptoms or acute worsening of current symptoms, please return to the ED

## 2022-09-05 NOTE — ED PROVIDER NOTES
History  Chief Complaint   Patient presents with    Possible UTI     Pts caregivers stated pt has been more altered the past week as well as unsteady gait, concerned for uti due to getting them often, they sent a urine to lab from PCP office this past Tuesday, have not heard from PCP office results  25-year-old female with past medical history pertinent for previous UTIs, neuropathy who presents to the emergency department for evaluation of concerns for UTI  Caregivers here with patient and state that patient last week was complaining of urinary symptoms and went to the PCP on Tuesday and had a UA completed but did not get results back yet  Patient is a poor historian at baseline and does not recall if she is having urinary symptoms now  Caregivers report that she normally uses a walker and felt that she was a little more unsteady when she was walking over the weekend  No falls reported  Patient is not on any antibiotics right now  States that she does use Tylenol regularly for pains  No fevers or chills  Patient does report her symptoms feel exactly like she has when she has a UTI  No other concerns  Prior to Admission Medications   Prescriptions Last Dose Informant Patient Reported? Taking?    Multiple Vitamins-Minerals (CENTRUM SILVER 50+WOMEN PO)  Self Yes No   Sig: Take 1 tablet by mouth   albuterol (PROVENTIL HFA,VENTOLIN HFA) 90 mcg/act inhaler  Self Yes No   Sig: Inhale 2 puffs every 6 (six) hours as needed for wheezing   aspirin 81 mg chewable tablet   Yes No   Sig: Chew 81 mg daily   cyanocobalamin (VITAMIN B-12) 100 MCG tablet  Self Yes No   Sig: Take 100 mcg by mouth daily   ferrous sulfate 324 (65 Fe) mg   No No   Sig: Take 1 tablet (324 mg total) by mouth daily before breakfast   fluticasone (FLONASE) 50 mcg/act nasal spray  Self Yes No   Si spray into each nostril daily   fluticasone-vilanterol (Breo Ellipta) 100-25 mcg/inh inhaler   Yes No   metoprolol succinate (TOPROL-XL) 25 mg 24 hr tablet  Self Yes No   Sig: Take 12 5 mg by mouth daily   pantoprazole (PROTONIX) 40 mg tablet  Self Yes No   Sig: Take 40 mg by mouth daily   senna-docusate sodium (SENOKOT S) 8 6-50 mg per tablet   No No   Sig: Take 1 tablet by mouth 2 (two) times a day      Facility-Administered Medications: None       Past Medical History:   Diagnosis Date    Cancer ([address] )     skin cancer    Cardiac disease     Multiple falls     Neuropathy     Skin cancer     forehead    Spinal stenosis     UTI (urinary tract infection)        Past Surgical History:   Procedure Laterality Date    APPENDECTOMY      BACK SURGERY      x2    CATARACT EXTRACTION W/  INTRAOCULAR LENS IMPLANT Bilateral     CORONARY ANGIOPLASTY WITH STENT PLACEMENT  09/23/2011    WI OPEN RX FEMUR FX+INTRAMED RASHIDA Right 5/4/2021    Procedure: INSERTION NAIL IM FEMUR ANTEGRADE (TROCHANTERIC); Surgeon: Christian Best; Location:  MAIN OR;  Service: Orthopedics    REPLACEMENT TOTAL KNEE BILATERAL         History reviewed  No pertinent family history  I have reviewed and agree with the history as documented  E-Cigarette/Vaping    E-Cigarette Use Never User      E-Cigarette/Vaping Substances    Nicotine No     THC No     CBD No     Flavoring No     Other No     Unknown No      Social History     Tobacco Use    Smoking status: Never Smoker    Smokeless tobacco: Never Used   Vaping Use    Vaping Use: Never used   Substance Use Topics    Alcohol use: Yes     Comment: Occasionally    Drug use: Never       Review of Systems   Unable to perform ROS: Dementia   Genitourinary:        Concerns for possible UTI       Physical Exam  Physical Exam  Vitals and nursing note reviewed  Constitutional:       General: She is not in acute distress  Appearance: She is well-developed  She is not diaphoretic  HENT:      Head: Normocephalic and atraumatic        Right Ear: External ear normal       Left Ear: External ear normal       Nose: Nose normal    Eyes:      Pupils: Pupils are equal, round, and reactive to light  Cardiovascular:      Rate and Rhythm: Normal rate and regular rhythm  Pulses: Normal pulses  Heart sounds: Normal heart sounds  Pulmonary:      Effort: Pulmonary effort is normal  No respiratory distress  Breath sounds: Normal breath sounds  No wheezing or rales  Abdominal:      General: Bowel sounds are normal       Palpations: Abdomen is soft  Tenderness: There is no abdominal tenderness  There is no right CVA tenderness or left CVA tenderness  Comments: No abdominal tenderness to palpation   Musculoskeletal:         General: No tenderness or deformity  Normal range of motion  Cervical back: Normal range of motion and neck supple  Skin:     General: Skin is warm and dry  Capillary Refill: Capillary refill takes less than 2 seconds  Neurological:      General: No focal deficit present  Mental Status: She is alert  Motor: No abnormal muscle tone        Coordination: Coordination normal          Vital Signs  ED Triage Vitals [09/05/22 1024]   Temperature Pulse Respirations Blood Pressure SpO2   98 4 °F (36 9 °C) (!) 50 19 164/72 96 %      Temp Source Heart Rate Source Patient Position - Orthostatic VS BP Location FiO2 (%)   Temporal Monitor Sitting Right arm --      Pain Score       No Pain           Vitals:    09/05/22 1024   BP: 164/72   Pulse: (!) 50   Patient Position - Orthostatic VS: Sitting         Visual Acuity  Visual Acuity    Flowsheet Row Most Recent Value   L Pupil Size (mm) 3   R Pupil Size (mm) 3          ED Medications  Medications   ciprofloxacin (CIPRO) tablet 500 mg (has no administration in time range)       Diagnostic Studies  Results Reviewed     Procedure Component Value Units Date/Time    Urine Microscopic [[id number]]  (Abnormal) Collected: 09/05/22 1151    Lab Status: Final result Specimen: Urine, Clean Catch Updated: 09/05/22 1214     RBC, UA 1-2 /hpf      WBC, UA Innumerable /hpf      Epithelial Cells Occasional /hpf      Bacteria, UA Occasional /hpf     Urine culture [[id number]] Collected: 09/05/22 1151    Lab Status: In process Specimen: Urine, Clean Catch Updated: 09/05/22 1214    UA w Reflex to Microscopic w Reflex to Culture [[id number]]  (Abnormal) Collected: 09/05/22 1151    Lab Status: Final result Specimen: Urine, Clean Catch Updated: 09/05/22 1202     Color, UA Yellow     Clarity, UA Cloudy     Specific Gravity, UA 1 015     pH, UA 6 5     Leukocytes, UA Large     Nitrite, UA Negative     Protein, UA Negative mg/dl      Glucose, UA Negative mg/dl      Ketones, UA Negative mg/dl      Urobilinogen, UA 0 2 E U /dl      Bilirubin, UA Negative     Occult Blood, UA Trace-Intact                 No orders to display              Procedures  Procedures         ED Course          RESULTS:  Results Reviewed     Procedure Component Value Units Date/Time    Urine Microscopic [[id number]]  (Abnormal) Collected: 09/05/22 1151    Lab Status: Final result Specimen: Urine, Clean Catch Updated: 09/05/22 1214     RBC, UA 1-2 /hpf      WBC, UA Innumerable /hpf      Epithelial Cells Occasional /hpf      Bacteria, UA Occasional /hpf     Urine culture [[id number]] Collected: 09/05/22 1151    Lab Status:  In process Specimen: Urine, Clean Catch Updated: 09/05/22 1214    UA w Reflex to Microscopic w Reflex to Culture [[id number]]  (Abnormal) Collected: 09/05/22 1151    Lab Status: Final result Specimen: Urine, Clean Catch Updated: 09/05/22 1202     Color, UA Yellow     Clarity, UA Cloudy     Specific Gravity, UA 1 015     pH, UA 6 5     Leukocytes, UA Large     Nitrite, UA Negative     Protein, UA Negative mg/dl      Glucose, UA Negative mg/dl      Ketones, UA Negative mg/dl      Urobilinogen, UA 0 2 E U /dl      Bilirubin, UA Negative     Occult Blood, UA Trace-Intact          No orders to display       Vitals:    09/05/22 1024   BP: 164/72   TempSrc: Temporal   Pulse: (!) 50   Resp: 19 Patient Position - Orthostatic VS: Sitting   Temp: 98 4 °F (36 9 °C)         MDM  Number of Diagnoses or Management Options  Urinary tract infection symptoms  UTI (urinary tract infection)  Diagnosis management comments: 77-year-old female with past medical history pertinent for previous UTIs, neuropathy who presents to the emergency department for evaluation of concerns for UTI  Vital signs on arrival here are notable for mild bradycardia with heart rate in the 50s and blood pressures in the 160s over 70s  Otherwise vital signs are non concerning  Patient has exam as above  Caregiver concern for possible UTI  Urine obtained and returned showing signs of possible UTI  Patient was treated with ciprofloxacin here and given a prescription for the same  Patient was advised following up with primary care physician and returning for worsening symptoms  Amount and/or Complexity of Data Reviewed  Clinical lab tests: ordered and reviewed  Tests in the medicine section of CPT®: reviewed and ordered  Obtain history from someone other than the patient: yes  Review and summarize past medical records: yes    Risk of Complications, Morbidity, and/or Mortality  Presenting problems: moderate  Diagnostic procedures: moderate  Management options: moderate        Disposition  Final diagnoses:   Urinary tract infection symptoms   UTI (urinary tract infection)     Time reflects when diagnosis was documented in both MDM as applicable and the Disposition within this note     Time User Action Codes Description Comment    9/5/2022 10:35 AM Tonya HALL Add [R39 9] Urinary tract infection symptoms     9/5/2022 12:12 PM Tonya HALL Add [N39 0] UTI (urinary tract infection)       ED Disposition     ED Disposition   Discharge    Condition   Stable    Date/Time   Mon Sep 5, 2022 10:35 AM    Comment   Todd Smith discharge to home/self care                 Follow-up Information     Follow up With Specialties Details Why Contact Info    Teri Sadler MD Family Medicine   [address] [place] [address]  [phone]            Patient's Medications   Discharge Prescriptions    CIPROFLOXACIN (CIPRO) 500 MG TABLET    Take 1 tablet (500 mg total) by mouth every 12 (twelve) hours for 3 days       Start Date: 9/5/2022  End Date: 9/8/2022       Order Dose: 500 mg       Quantity: 6 tablet    Refills: 0       No discharge procedures on file      PDMP Review     None          ED Provider  Electronically Signed by           Phillip Estrada MD  09/05/22 7789

## 2022-09-05 NOTE — ED NOTES
Pt could not urinate at this time pt did try bladder scan done 23ml detected pt given water      Lebron Celis  09/05/22 7761

## 2022-09-07 ENCOUNTER — TELEPHONE (OUTPATIENT)
Dept: EMERGENCY DEPT | Facility: HOSPITAL | Age: 87
End: 2022-09-07

## 2022-09-07 LAB — BACTERIA UR CULT: ABNORMAL

## 2022-09-07 RX ORDER — CEPHALEXIN 500 MG/1
500 CAPSULE ORAL EVERY 6 HOURS SCHEDULED
Qty: 28 CAPSULE | Refills: 0 | Status: SHIPPED | OUTPATIENT
Start: 2022-09-07 | End: 2022-09-14

## 2022-09-07 NOTE — TELEPHONE ENCOUNTER
Attempted to reach all 3 contacts  Message was left  Patient needs antibiotics change  Will send prescription to pharmacy for same

## 2022-09-08 ENCOUNTER — TELEPHONE (OUTPATIENT)
Dept: EMERGENCY DEPT | Facility: HOSPITAL | Age: 87
End: 2022-09-08

## 2022-09-09 NOTE — TELEPHONE ENCOUNTER
Spoke to caregiver and they will  the prescription tomorrow  They also reported the patient has follow-up next week with her PCP

## 2022-10-08 ENCOUNTER — APPOINTMENT (OUTPATIENT)
Dept: RADIOLOGY | Facility: HOSPITAL | Age: 87
End: 2022-10-08
Payer: MEDICARE

## 2022-10-08 ENCOUNTER — APPOINTMENT (EMERGENCY)
Dept: CT IMAGING | Facility: HOSPITAL | Age: 87
End: 2022-10-08
Payer: MEDICARE

## 2022-10-08 ENCOUNTER — HOSPITAL ENCOUNTER (EMERGENCY)
Facility: HOSPITAL | Age: 87
Discharge: HOME | End: 2022-10-08
Attending: EMERGENCY MEDICINE
Payer: MEDICARE

## 2022-10-08 VITALS
TEMPERATURE: 98.7 F | SYSTOLIC BLOOD PRESSURE: 129 MMHG | HEART RATE: 95 BPM | OXYGEN SATURATION: 96 % | RESPIRATION RATE: 22 BRPM | BODY MASS INDEX: 19.63 KG/M2 | WEIGHT: 117.95 LBS | DIASTOLIC BLOOD PRESSURE: 61 MMHG

## 2022-10-08 DIAGNOSIS — R06.02 SOB (SHORTNESS OF BREATH): ICD-10-CM

## 2022-10-08 DIAGNOSIS — R09.89 CHOKING EPISODE: Primary | ICD-10-CM

## 2022-10-08 LAB
2HR DELTA HS TROPONIN: 0 NG/L
ALBUMIN SERPL BCP-MCNC: 3 G/DL (ref 3.5–5)
ALP SERPL-CCNC: 73 U/L (ref 46–116)
ALT SERPL W P-5'-P-CCNC: 14 U/L (ref 12–78)
ANION GAP SERPL CALCULATED.3IONS-SCNC: 6 MMOL/L (ref 4–13)
AST SERPL W P-5'-P-CCNC: 21 U/L (ref 5–45)
BASOPHILS # BLD AUTO: 0.04 THOUSANDS/ΜL (ref 0–0.1)
BASOPHILS NFR BLD AUTO: 0 % (ref 0–1)
BILIRUB SERPL-MCNC: 0.31 MG/DL (ref 0.2–1)
BUN SERPL-MCNC: 18 MG/DL (ref 5–25)
CA-I BLD-SCNC: 1.2 MMOL/L (ref 1.12–1.32)
CALCIUM ALBUM COR SERPL-MCNC: 10.4 MG/DL (ref 8.3–10.1)
CALCIUM SERPL-MCNC: 9.6 MG/DL (ref 8.3–10.1)
CARDIAC TROPONIN I PNL SERPL HS: 9 NG/L
CARDIAC TROPONIN I PNL SERPL HS: 9 NG/L
CHLORIDE SERPL-SCNC: 100 MMOL/L (ref 96–108)
CK SERPL-CCNC: 22 U/L (ref 26–192)
CO2 SERPL-SCNC: 31 MMOL/L (ref 21–32)
CREAT SERPL-MCNC: 1.12 MG/DL (ref 0.6–1.3)
CRP SERPL QL: 9 MG/L
D DIMER PPP FEU-MCNC: 5.16 UG/ML FEU
EOSINOPHIL # BLD AUTO: 0.16 THOUSAND/ΜL (ref 0–0.61)
EOSINOPHIL NFR BLD AUTO: 1 % (ref 0–6)
ERYTHROCYTE [DISTWIDTH] IN BLOOD BY AUTOMATED COUNT: 20.5 % (ref 11.6–15.1)
FLUAV RNA RESP QL NAA+PROBE: NEGATIVE
FLUBV RNA RESP QL NAA+PROBE: NEGATIVE
GFR SERPL CREATININE-BSD FRML MDRD: 43 ML/MIN/1.73SQ M
GLUCOSE SERPL-MCNC: 115 MG/DL (ref 65–140)
HCT VFR BLD AUTO: 36.2 % (ref 34.8–46.1)
HGB BLD-MCNC: 11.3 G/DL (ref 11.5–15.4)
IMM GRANULOCYTES # BLD AUTO: 0.04 THOUSAND/UL (ref 0–0.2)
IMM GRANULOCYTES NFR BLD AUTO: 0 % (ref 0–2)
INR PPP: 0.88 (ref 0.84–1.19)
LACTATE SERPL-SCNC: 1.3 MMOL/L (ref 0.5–2)
LYMPHOCYTES # BLD AUTO: 3.91 THOUSANDS/ΜL (ref 0.6–4.47)
LYMPHOCYTES NFR BLD AUTO: 35 % (ref 14–44)
MAGNESIUM SERPL-MCNC: 1.4 MG/DL (ref 1.6–2.6)
MCH RBC QN AUTO: 27.8 PG (ref 26.8–34.3)
MCHC RBC AUTO-ENTMCNC: 31.2 G/DL (ref 31.4–37.4)
MCV RBC AUTO: 89 FL (ref 82–98)
MONOCYTES # BLD AUTO: 0.67 THOUSAND/ΜL (ref 0.17–1.22)
MONOCYTES NFR BLD AUTO: 6 % (ref 4–12)
NEUTROPHILS # BLD AUTO: 6.5 THOUSANDS/ΜL (ref 1.85–7.62)
NEUTS SEG NFR BLD AUTO: 58 % (ref 43–75)
NRBC BLD AUTO-RTO: 0 /100 WBCS
NT-PROBNP SERPL-MCNC: 934 PG/ML
PLATELET # BLD AUTO: 249 THOUSANDS/UL (ref 149–390)
PMV BLD AUTO: 10.8 FL (ref 8.9–12.7)
POTASSIUM SERPL-SCNC: 3.7 MMOL/L (ref 3.5–5.3)
PROCALCITONIN SERPL-MCNC: 0.07 NG/ML
PROT SERPL-MCNC: 7.2 G/DL (ref 6.4–8.4)
PROTHROMBIN TIME: 12 SECONDS (ref 11.6–14.5)
RBC # BLD AUTO: 4.06 MILLION/UL (ref 3.81–5.12)
RSV RNA RESP QL NAA+PROBE: NEGATIVE
SARS-COV-2 RNA RESP QL NAA+PROBE: NEGATIVE
SODIUM SERPL-SCNC: 137 MMOL/L (ref 135–147)
WBC # BLD AUTO: 11.32 THOUSAND/UL (ref 4.31–10.16)

## 2022-10-08 PROCEDURE — 71275 CT ANGIOGRAPHY CHEST: CPT

## 2022-10-08 PROCEDURE — 96365 THER/PROPH/DIAG IV INF INIT: CPT

## 2022-10-08 PROCEDURE — 96366 THER/PROPH/DIAG IV INF ADDON: CPT

## 2022-10-08 PROCEDURE — 85610 PROTHROMBIN TIME: CPT | Performed by: EMERGENCY MEDICINE

## 2022-10-08 PROCEDURE — 71045 X-RAY EXAM CHEST 1 VIEW: CPT

## 2022-10-08 PROCEDURE — 82550 ASSAY OF CK (CPK): CPT | Performed by: EMERGENCY MEDICINE

## 2022-10-08 PROCEDURE — 85379 FIBRIN DEGRADATION QUANT: CPT | Performed by: EMERGENCY MEDICINE

## 2022-10-08 PROCEDURE — 96375 TX/PRO/DX INJ NEW DRUG ADDON: CPT

## 2022-10-08 PROCEDURE — 86140 C-REACTIVE PROTEIN: CPT | Performed by: EMERGENCY MEDICINE

## 2022-10-08 PROCEDURE — 83605 ASSAY OF LACTIC ACID: CPT | Performed by: EMERGENCY MEDICINE

## 2022-10-08 PROCEDURE — 99285 EMERGENCY DEPT VISIT HI MDM: CPT | Performed by: EMERGENCY MEDICINE

## 2022-10-08 PROCEDURE — 87040 BLOOD CULTURE FOR BACTERIA: CPT | Performed by: EMERGENCY MEDICINE

## 2022-10-08 PROCEDURE — 85025 COMPLETE CBC W/AUTO DIFF WBC: CPT | Performed by: EMERGENCY MEDICINE

## 2022-10-08 PROCEDURE — 0241U HB NFCT DS VIR RESP RNA 4 TRGT: CPT | Performed by: EMERGENCY MEDICINE

## 2022-10-08 PROCEDURE — 83735 ASSAY OF MAGNESIUM: CPT | Performed by: EMERGENCY MEDICINE

## 2022-10-08 PROCEDURE — G1004 CDSM NDSC: HCPCS

## 2022-10-08 PROCEDURE — 96361 HYDRATE IV INFUSION ADD-ON: CPT

## 2022-10-08 PROCEDURE — 36415 COLL VENOUS BLD VENIPUNCTURE: CPT | Performed by: EMERGENCY MEDICINE

## 2022-10-08 PROCEDURE — 99285 EMERGENCY DEPT VISIT HI MDM: CPT

## 2022-10-08 PROCEDURE — 80053 COMPREHEN METABOLIC PANEL: CPT | Performed by: EMERGENCY MEDICINE

## 2022-10-08 PROCEDURE — 83880 ASSAY OF NATRIURETIC PEPTIDE: CPT | Performed by: EMERGENCY MEDICINE

## 2022-10-08 PROCEDURE — 84484 ASSAY OF TROPONIN QUANT: CPT | Performed by: EMERGENCY MEDICINE

## 2022-10-08 PROCEDURE — 84145 PROCALCITONIN (PCT): CPT | Performed by: EMERGENCY MEDICINE

## 2022-10-08 PROCEDURE — 94640 AIRWAY INHALATION TREATMENT: CPT

## 2022-10-08 PROCEDURE — 82330 ASSAY OF CALCIUM: CPT | Performed by: EMERGENCY MEDICINE

## 2022-10-08 PROCEDURE — 93005 ELECTROCARDIOGRAM TRACING: CPT

## 2022-10-08 RX ORDER — MAGNESIUM HYDROXIDE/ALUMINUM HYDROXICE/SIMETHICONE 120; 1200; 1200 MG/30ML; MG/30ML; MG/30ML
30 SUSPENSION ORAL ONCE
Status: COMPLETED | OUTPATIENT
Start: 2022-10-08 | End: 2022-10-08

## 2022-10-08 RX ORDER — ALBUTEROL SULFATE 2.5 MG/3ML
2.5 SOLUTION RESPIRATORY (INHALATION) ONCE
Status: COMPLETED | OUTPATIENT
Start: 2022-10-08 | End: 2022-10-08

## 2022-10-08 RX ORDER — LIDOCAINE HYDROCHLORIDE 20 MG/ML
15 SOLUTION OROPHARYNGEAL ONCE
Status: COMPLETED | OUTPATIENT
Start: 2022-10-08 | End: 2022-10-08

## 2022-10-08 RX ORDER — METOCLOPRAMIDE HYDROCHLORIDE 5 MG/5ML
10 SOLUTION ORAL ONCE
Status: COMPLETED | OUTPATIENT
Start: 2022-10-08 | End: 2022-10-08

## 2022-10-08 RX ORDER — MAGNESIUM SULFATE HEPTAHYDRATE 40 MG/ML
2 INJECTION, SOLUTION INTRAVENOUS ONCE
Status: COMPLETED | OUTPATIENT
Start: 2022-10-08 | End: 2022-10-08

## 2022-10-08 RX ORDER — METOPROLOL TARTRATE 5 MG/5ML
5 INJECTION INTRAVENOUS ONCE
Status: COMPLETED | OUTPATIENT
Start: 2022-10-08 | End: 2022-10-08

## 2022-10-08 RX ADMIN — MAGNESIUM SULFATE HEPTAHYDRATE 2 G: 40 INJECTION, SOLUTION INTRAVENOUS at 20:05

## 2022-10-08 RX ADMIN — METOROPROLOL TARTRATE 5 MG: 5 INJECTION, SOLUTION INTRAVENOUS at 19:45

## 2022-10-08 RX ADMIN — IOHEXOL 84 ML: 350 INJECTION, SOLUTION INTRAVENOUS at 21:08

## 2022-10-08 RX ADMIN — SODIUM CHLORIDE 1000 ML: 0.9 INJECTION, SOLUTION INTRAVENOUS at 19:21

## 2022-10-08 RX ADMIN — ALBUTEROL SULFATE 2.5 MG: 2.5 SOLUTION RESPIRATORY (INHALATION) at 19:33

## 2022-10-08 RX ADMIN — ALUMINUM HYDROXIDE, MAGNESIUM HYDROXIDE, AND SIMETHICONE 30 ML: 200; 200; 20 SUSPENSION ORAL at 19:30

## 2022-10-08 RX ADMIN — LIDOCAINE HYDROCHLORIDE 15 ML: 20 SOLUTION ORAL; TOPICAL at 19:31

## 2022-10-08 RX ADMIN — METOCLOPRAMIDE HYDROCHLORIDE 10 MG: 5 SOLUTION ORAL at 19:30

## 2022-10-08 NOTE — ED PROVIDER NOTES
History  Chief Complaint   Patient presents with   • Shortness of Breath     Stated she was taking her pills and feels like there is one still stuck in her throat  That she is now feeling SOB  Is on 1L oxygen chronically  Patient is a 20-year-old female sent from skilled nursing facility secondary to choking episode, states she was taking a large white pill which caused her to choke and then cause some irritation to her throat as well as some shortness breath, patient reports initially felt like the pill was still stuck but that sensation has past, she does continue to feel chest tightness and shortness of breath, does report having a sore throat for the past 2-3 days, denies any fever, no cough or congestion, no nausea or vomiting, denies chest pain or palpitations          Prior to Admission Medications   Prescriptions Last Dose Informant Patient Reported? Taking?    Multiple Vitamins-Minerals (CENTRUM SILVER 50+WOMEN PO)  Self Yes No   Sig: Take 1 tablet by mouth   albuterol (PROVENTIL HFA,VENTOLIN HFA) 90 mcg/act inhaler  Self Yes No   Sig: Inhale 2 puffs every 6 (six) hours as needed for wheezing   aspirin 81 mg chewable tablet   Yes No   Sig: Chew 81 mg daily   cyanocobalamin (VITAMIN B-12) 100 MCG tablet  Self Yes No   Sig: Take 100 mcg by mouth daily   ferrous sulfate 324 (65 Fe) mg   No No   Sig: Take 1 tablet (324 mg total) by mouth daily before breakfast   fluticasone (FLONASE) 50 mcg/act nasal spray  Self Yes No   Si spray into each nostril daily   fluticasone-vilanterol (Breo Ellipta) 100-25 mcg/inh inhaler   Yes No   metoprolol succinate (TOPROL-XL) 25 mg 24 hr tablet  Self Yes No   Sig: Take 12 5 mg by mouth daily   pantoprazole (PROTONIX) 40 mg tablet  Self Yes No   Sig: Take 40 mg by mouth daily   senna-docusate sodium (SENOKOT S) 8 6-50 mg per tablet   No No   Sig: Take 1 tablet by mouth 2 (two) times a day      Facility-Administered Medications: None       Past Medical History:   Diagnosis Date   • Cancer [facility])     skin cancer   • Cardiac disease    • Multiple falls    • Neuropathy    • Skin cancer     forehead   • Spinal stenosis    • UTI (urinary tract infection)        Past Surgical History:   Procedure Laterality Date   • APPENDECTOMY     • BACK SURGERY      x2   • CATARACT EXTRACTION W/  INTRAOCULAR LENS IMPLANT Bilateral    • CORONARY ANGIOPLASTY WITH STENT PLACEMENT  09/23/2011   • MT OPEN RX FEMUR FX+INTRAMED RASHIDA Right 5/4/2021    Procedure: INSERTION NAIL IM FEMUR ANTEGRADE (TROCHANTERIC); Surgeon: Denzel Bullock; Location:  MAIN OR;  Service: Orthopedics   • REPLACEMENT TOTAL KNEE BILATERAL         History reviewed  No pertinent family history  I have reviewed and agree with the history as documented  E-Cigarette/Vaping   • E-Cigarette Use Never User      E-Cigarette/Vaping Substances   • Nicotine No    • THC No    • CBD No    • Flavoring No    • Other No    • Unknown No      Social History     Tobacco Use   • Smoking status: Never Smoker   • Smokeless tobacco: Never Used   Vaping Use   • Vaping Use: Never used   Substance Use Topics   • Alcohol use: Yes     Comment: Occasionally   • Drug use: Never       Review of Systems   Constitutional: Negative  HENT: Positive for sore throat and trouble swallowing  Eyes: Negative  Respiratory: Positive for chest tightness and shortness of breath  Cardiovascular: Negative  Gastrointestinal: Negative  Endocrine: Negative  Genitourinary: Negative  Musculoskeletal: Negative  Skin: Negative  Allergic/Immunologic: Negative  Neurological: Negative  Hematological: Negative  Psychiatric/Behavioral: Negative  Physical Exam  Physical Exam  Constitutional:       Appearance: She is well-developed  Comments: Frail appearing   HENT:      Head: Normocephalic and atraumatic  Mouth/Throat:      Mouth: Mucous membranes are dry  Pharynx: Posterior oropharyngeal erythema present   No oropharyngeal exudate or uvula swelling  Eyes:      Conjunctiva/sclera: Conjunctivae normal       Pupils: Pupils are equal, round, and reactive to light  Cardiovascular:      Rate and Rhythm: Tachycardia present  Pulmonary:      Effort: Pulmonary effort is normal       Breath sounds: Wheezing present  Abdominal:      Palpations: Abdomen is soft  Musculoskeletal:         General: Normal range of motion  Cervical back: Normal range of motion and neck supple  Skin:     General: Skin is warm and dry  Neurological:      Mental Status: She is alert and oriented to person, place, and time           Vital Signs  ED Triage Vitals [10/08/22 1902]   Temperature Pulse Respirations Blood Pressure SpO2   98 7 °F (37 1 °C) (!) 138 18 (!) 182/119 94 %      Temp Source Heart Rate Source Patient Position - Orthostatic VS BP Location FiO2 (%)   Temporal Monitor Sitting Right arm --      Pain Score       --           Vitals:    10/08/22 2015 10/08/22 2030 10/08/22 2130 10/08/22 2145   BP: 132/65 139/66 106/59 129/61   Pulse: 91 87 92 95   Patient Position - Orthostatic VS:                 ED Medications  Medications   sodium chloride 0 9 % bolus 1,000 mL (0 mL Intravenous Stopped 10/8/22 2051)   aluminum-magnesium hydroxide-simethicone (MYLANTA) oral suspension 30 mL (30 mL Oral Given 10/8/22 1930)   Lidocaine Viscous HCl (XYLOCAINE) 2 % mucosal solution 15 mL (15 mL Swish & Swallow Given 10/8/22 1931)   metoclopramide (REGLAN) oral solution 10 mg (10 mg Oral Given 10/8/22 1930)   albuterol inhalation solution 2 5 mg (2 5 mg Nebulization Given 10/1933)   metoprolol (LOPRESSOR) injection 5 mg (5 mg Intravenous Given 10/8/22 1945)   magnesium sulfate 2 g/50 mL IVPB (premix) 2 g (0 g Intravenous Stopped 10/8/22 2226)   iohexol (OMNIPAQUE) 350 MG/ML injection (SINGLE-DOSE) 85 mL (84 mL Intravenous Given 10/8/22 2108)       Diagnostic Studies  Results Reviewed     Procedure Component Value Units Date/Time    HS Troponin I 2hr [[id number]] (Normal) Collected: 10/08/22 2119    Lab Status: Final result Specimen: Blood from Arm, Left Updated: 10/08/22 2155     hs TnI 2hr 9 ng/L      Delta 2hr hsTnI 0 ng/L     D-dimer, quantitative [[id number]]  (Abnormal) Collected: 10/08/22 1917    Lab Status: Final result Specimen: Blood from Arm, Left Updated: 10/08/22 2019     D-Dimer, Quant 5 16 ug/ml FEU     Narrative: In the evaluation for possible pulmonary embolism, in the appropriate (Well's Score of 4 or less) patient, the age adjusted d-dimer cutoff for this patient can be calculated as:    Age x 0 01 (in ug/mL) for Age-adjusted D-dimer exclusion threshold for a patient over 50 years  FLU/RSV/COVID - if FLU/RSV clinically relevant [[id number]]  (Normal) Collected: 10/08/22 1917    Lab Status: Final result Specimen: Nares from Nose Updated: 10/08/22 2012     SARS-CoV-2 Negative     INFLUENZA A PCR Negative     INFLUENZA B PCR Negative     RSV PCR Negative    Narrative:      FOR PEDIATRIC PATIENTS - copy/paste COVID Guidelines URL to browser: [email or web address]    SARS-CoV-2 assay is a Nucleic Acid Amplification assay intended for the  qualitative detection of nucleic acid from SARS-CoV-2 in nasopharyngeal  swabs  Results are for the presumptive identification of SARS-CoV-2 RNA  Positive results are indicative of infection with SARS-CoV-2, the virus  causing COVID-19, but do not rule out bacterial infection or co-infection  with other viruses  Laboratories within the [place] and its  territories are required to report all positive results to the appropriate  public health authorities  Negative results do not preclude SARS-CoV-2  infection and should not be used as the sole basis for treatment or other  patient management decisions  Negative results must be combined with  clinical observations, patient history, and epidemiological information    This test has not been FDA cleared or approved  This test has been authorized by FDA under an Emergency Use Authorization  (EUA)  This test is only authorized for the duration of time the  declaration that circumstances exist justifying the authorization of the  emergency use of an in vitro diagnostic tests for detection of SARS-CoV-2  virus and/or diagnosis of COVID-19 infection under section 564(b)(1) of  the Act, 21 U  S C  056RRA-8(X)(2), unless the authorization is terminated  or revoked sooner  The test has been validated but independent review by FDA  and CLIA is pending  Test performed using [employer] GeneXpert: This RT-PCR assay targets N2,  a region unique to SARS-CoV-2  A conserved region in the E-gene was chosen  for pan-Sarbecovirus detection which includes SARS-CoV-2  According to CMS-2020-01-R, this platform meets the definition of high-throughput technology      HS Troponin I 4hr [[id number]]     Lab Status: No result Specimen: Blood     Procalcitonin [[id number]]  (Normal) Collected: 10/08/22 1917    Lab Status: Final result Specimen: Blood from Arm, Left Updated: 10/08/22 2001     Procalcitonin 0 07 ng/ml     Comprehensive metabolic panel [[id number]]  (Abnormal) Collected: 10/08/22 1917    Lab Status: Final result Specimen: Blood from Arm, Left Updated: 10/08/22 1959     Sodium 137 mmol/L      Potassium 3 7 mmol/L      Chloride 100 mmol/L      CO2 31 mmol/L      ANION GAP 6 mmol/L      BUN 18 mg/dL      Creatinine 1 12 mg/dL      Glucose 115 mg/dL      Calcium 9 6 mg/dL      Corrected Calcium 10 4 mg/dL      AST 21 U/L      ALT 14 U/L      Alkaline Phosphatase 73 U/L      Total Protein 7 2 g/dL      Albumin 3 0 g/dL      Total Bilirubin 0 31 mg/dL      eGFR 43 ml/min/1 73sq m     Narrative:      [place] guidelines for Chronic Kidney Disease (CKD):   •  Stage 1 with normal or high GFR (GFR > 90 mL/min/1 73 square meters)  •  Stage 2 Mild CKD (GFR = 60-89 mL/min/1 73 square meters)  •  Stage 3A Moderate CKD (GFR = 45-59 mL/min/1 73 square meters)  •  Stage 3B Moderate CKD (GFR = 30-44 mL/min/1 73 square meters)  •  Stage 4 Severe CKD (GFR = 15-29 mL/min/1 73 square meters)  •  Stage 5 End Stage CKD (GFR <15 mL/min/1 73 square meters)  Note: GFR calculation is accurate only with a steady state creatinine    Magnesium [[id number]]  (Abnormal) Collected: 10/08/22 1917    Lab Status: Final result Specimen: Blood from Arm, Left Updated: 10/08/22 1959     Magnesium 1 4 mg/dL     C-reactive protein [[id number]]  (Abnormal) Collected: 10/08/22 1917    Lab Status: Final result Specimen: Blood from Arm, Left Updated: 10/08/22 1959     CRP 9 0 mg/L     NT-BNP PRO [[id number]]  (Abnormal) Collected: 10/08/22 1917    Lab Status: Final result Specimen: Blood from Arm, Left Updated: 10/08/22 1959     NT-proBNP 934 pg/mL     CK (with reflex to MB) [[id number]]  (Abnormal) Collected: 10/08/22 1917    Lab Status: Final result Specimen: Blood from Arm, Left Updated: 10/08/22 1959     Total CK 22 U/L     Lactic acid, plasma [[id number]]  (Normal) Collected: 10/08/22 1917    Lab Status: Final result Specimen: Blood from Arm, Left Updated: 10/08/22 1959     LACTIC ACID 1 3 mmol/L     Narrative:      Result may be elevated if tourniquet was used during collection      HS Troponin 0hr (reflex protocol) [[id number]]  (Normal) Collected: 10/08/22 1917    Lab Status: Final result Specimen: Blood from Arm, Left Updated: 10/08/22 1958     hs TnI 0hr 9 ng/L     Protime-INR [[id number]]  (Normal) Collected: 10/08/22 1917    Lab Status: Final result Specimen: Blood from Arm, Left Updated: 10/08/22 1947     Protime 12 0 seconds      INR 0 88    Calcium, ionized [[id number]]  (Normal) Collected: 10/08/22 1917    Lab Status: Final result Specimen: Blood from Arm, Left Updated: 10/08/22 1935     Calcium, Ionized 1 20 mmol/L     CBC and differential [[id number]]  (Abnormal) Collected: 10/08/22 1917    Lab Status: Final result Specimen: Blood from Arm, Left Updated: 10/08/22 1934     WBC 11 32 Thousand/uL      RBC 4 06 Million/uL      Hemoglobin 11 3 g/dL      Hematocrit 36 2 %      MCV 89 fL      MCH 27 8 pg      MCHC 31 2 g/dL      RDW 20 5 %      MPV 10 8 fL      Platelets 396 Thousands/uL      nRBC 0 /100 WBCs      Neutrophils Relative 58 %      Immat GRANS % 0 %      Lymphocytes Relative 35 %      Monocytes Relative 6 %      Eosinophils Relative 1 %      Basophils Relative 0 %      Neutrophils Absolute 6 50 Thousands/µL      Immature Grans Absolute 0 04 Thousand/uL      Lymphocytes Absolute 3 91 Thousands/µL      Monocytes Absolute 0 67 Thousand/µL      Eosinophils Absolute 0 16 Thousand/µL      Basophils Absolute 0 04 Thousands/µL     Blood culture #1 [[id number]] Collected: 10/08/22 1917    Lab Status: In process Specimen: Blood from Arm, Left Updated: 10/08/22 1930    Blood culture #2 [[id number]] Collected: 10/08/22 1918    Lab Status: In process Specimen: Blood from Arm, Right Updated: 10/08/22 1930    UA w Reflex to Microscopic w Reflex to Culture [[id number]]     Lab Status: No result Specimen: Urine                  CTA ED chest PE study   Final Result by Young Dash MD (10/08 2237)      No evidence of pulmonary embolus  No evidence of acute intrathoracic pathology                    Workstation performed: [id number]         XR chest 1 view portable    (Results Pending)              Procedures  ECG 12 Lead Documentation Only    Date/Time: 10/8/2022 7:20 PM  Performed by: Lizy Gordon DO  Authorized by: Lizy Gordon DO     Indications / Diagnosis:  Tachycardia  ECG reviewed by me, the ED Provider: yes    Patient location:  ED  Previous ECG:     Previous ECG:  Unavailable    Comparison to cardiac monitor: Yes    Interpretation:     Interpretation: abnormal    Rate:     ECG rate:  132    ECG rate assessment: tachycardic    Rhythm:     Rhythm: atrial flutter    Ectopy:     Ectopy: none    QRS:     QRS intervals:  Normal             ED Course  ED Course as of 10/08/22 2247   Sat Oct 08, 2022   2246 Lab and imaging findings discussed at bedside with patient and daughter, relatively normal with mild abnormalities, magnesium was corrected with IV replacement, CT scan shows no evidence of abnormality, in patient does report feeling a much better on re-evaluation, lungs are clear to auscultation, she is smiling, appears in no acute distress, therefore discharged home with instructions for follow-up and advised to return if any additional concerns, patient and daughter acknowledged understanding and agreement with this plan                               SBIRT 22yo+    Flowsheet Row Most Recent Value   SBIRT (25 yo +)    In order to provide better care to our patients, we are screening all of our patients for alcohol and drug use  Would it be okay to ask you these screening questions? Yes Filed at: 10/08/2022 1951   Initial Alcohol Screen: US AUDIT-C     1  How often do you have a drink containing alcohol? 0 Filed at: 10/08/2022 1951   2  How many drinks containing alcohol do you have on a typical day you are drinking? 0 Filed at: 10/08/2022 1951   3a  Male UNDER 65: How often do you have five or more drinks on one occasion? 0 Filed at: 10/08/2022 1951   3b  FEMALE Any Age, or MALE 65+: How often do you have 4 or more drinks on one occassion? 0 Filed at: 10/08/2022 1951   Audit-C Score 0 Filed at: 10/08/2022 1951   LIZZIE: How many times in the past year have you    Used an illegal drug or used a prescription medication for non-medical reasons?  Never Filed at: 10/08/2022 1951                      Disposition  Final diagnoses:   Choking episode   SOB (shortness of breath)     Time reflects when diagnosis was documented in both MDM as applicable and the Disposition within this note     Time User Action Codes Description Comment    10/8/2022 10:45 PM Kvng Hammond Add [R09 89] Choking episode     10/8/2022 10:45 PM Kevin Schumacher Add [R06 02] SOB (shortness of breath) ED Disposition     ED Disposition   Discharge    Condition   Stable    Date/Time   Sat Oct 8, 2022 10:45 PM    Comment   [facility] discharge to home/self care  Follow-up Information     Follow up With Specialties Details Why Contact Info    Prabhu Kennedy MD Family Medicine  As needed [id number]  [phone]            Patient's Medications   Discharge Prescriptions    No medications on file       No discharge procedures on file      PDMP Review     None          ED Provider  Electronically Signed by           Mani Perez DO  10/08/22 4991

## 2022-10-09 LAB
ATRIAL RATE: 264 BPM
ATRIAL RATE: 264 BPM
ATRIAL RATE: 80 BPM
P AXIS: 84 DEGREES
PR INTERVAL: 156 MS
QRS AXIS: 65 DEGREES
QRS AXIS: 65 DEGREES
QRS AXIS: 72 DEGREES
QRSD INTERVAL: 82 MS
QT INTERVAL: 296 MS
QT INTERVAL: 296 MS
QT INTERVAL: 404 MS
QTC INTERVAL: 438 MS
QTC INTERVAL: 438 MS
QTC INTERVAL: 465 MS
T WAVE AXIS: -28 DEGREES
T WAVE AXIS: 234 DEGREES
T WAVE AXIS: 234 DEGREES
VENTRICULAR RATE: 132 BPM
VENTRICULAR RATE: 132 BPM
VENTRICULAR RATE: 80 BPM

## 2022-10-14 LAB
BACTERIA BLD CULT: NORMAL
BACTERIA BLD CULT: NORMAL

## 2022-11-11 ENCOUNTER — HOSPITAL ENCOUNTER (EMERGENCY)
Facility: HOSPITAL | Age: 87
Discharge: HOME | End: 2022-11-12
Attending: STUDENT IN AN ORGANIZED HEALTH CARE EDUCATION/TRAINING PROGRAM

## 2022-11-11 ENCOUNTER — APPOINTMENT (EMERGENCY)
Dept: RADIOLOGY | Facility: HOSPITAL | Age: 87
End: 2022-11-11

## 2022-11-11 DIAGNOSIS — R07.9 CHEST PAIN: Primary | ICD-10-CM

## 2022-11-11 LAB
ALBUMIN SERPL BCP-MCNC: 3 G/DL (ref 3.5–5)
ALP SERPL-CCNC: 64 U/L (ref 46–116)
ALT SERPL W P-5'-P-CCNC: 13 U/L (ref 12–78)
ANION GAP SERPL CALCULATED.3IONS-SCNC: 4 MMOL/L (ref 4–13)
AST SERPL W P-5'-P-CCNC: 20 U/L (ref 5–45)
BASOPHILS # BLD AUTO: 0.04 THOUSANDS/ÂΜL (ref 0–0.1)
BASOPHILS NFR BLD AUTO: 0 % (ref 0–1)
BILIRUB SERPL-MCNC: 0.33 MG/DL (ref 0.2–1)
BUN SERPL-MCNC: 13 MG/DL (ref 5–25)
CALCIUM ALBUM COR SERPL-MCNC: 10.2 MG/DL (ref 8.3–10.1)
CALCIUM SERPL-MCNC: 9.4 MG/DL (ref 8.3–10.1)
CARDIAC TROPONIN I PNL SERPL HS: 11 NG/L
CHLORIDE SERPL-SCNC: 97 MMOL/L (ref 96–108)
CO2 SERPL-SCNC: 33 MMOL/L (ref 21–32)
CREAT SERPL-MCNC: 1.09 MG/DL (ref 0.6–1.3)
EOSINOPHIL # BLD AUTO: 0.19 THOUSAND/ÂΜL (ref 0–0.61)
EOSINOPHIL NFR BLD AUTO: 1 % (ref 0–6)
ERYTHROCYTE [DISTWIDTH] IN BLOOD BY AUTOMATED COUNT: 14.5 % (ref 11.6–15.1)
FLUAV RNA RESP QL NAA+PROBE: NEGATIVE
FLUBV RNA RESP QL NAA+PROBE: NEGATIVE
GFR SERPL CREATININE-BSD FRML MDRD: 44 ML/MIN/1.73SQ M
GLUCOSE SERPL-MCNC: 116 MG/DL (ref 65–140)
HCT VFR BLD AUTO: 35.6 % (ref 34.8–46.1)
HGB BLD-MCNC: 11.6 G/DL (ref 11.5–15.4)
IMM GRANULOCYTES # BLD AUTO: 0.1 THOUSAND/UL (ref 0–0.2)
IMM GRANULOCYTES NFR BLD AUTO: 1 % (ref 0–2)
LIPASE SERPL-CCNC: 78 U/L (ref 73–393)
LYMPHOCYTES # BLD AUTO: 2.71 THOUSANDS/ÂΜL (ref 0.6–4.47)
LYMPHOCYTES NFR BLD AUTO: 18 % (ref 14–44)
MCH RBC QN AUTO: 28.9 PG (ref 26.8–34.3)
MCHC RBC AUTO-ENTMCNC: 32.6 G/DL (ref 31.4–37.4)
MCV RBC AUTO: 89 FL (ref 82–98)
MONOCYTES # BLD AUTO: 0.74 THOUSAND/ÂΜL (ref 0.17–1.22)
MONOCYTES NFR BLD AUTO: 5 % (ref 4–12)
NEUTROPHILS # BLD AUTO: 11.1 THOUSANDS/ÂΜL (ref 1.85–7.62)
NEUTS SEG NFR BLD AUTO: 75 % (ref 43–75)
NRBC BLD AUTO-RTO: 0 /100 WBCS
PLATELET # BLD AUTO: 238 THOUSANDS/UL (ref 149–390)
PMV BLD AUTO: 10.7 FL (ref 8.9–12.7)
POTASSIUM SERPL-SCNC: 3.7 MMOL/L (ref 3.5–5.3)
PROT SERPL-MCNC: 6.7 G/DL (ref 6.4–8.4)
RBC # BLD AUTO: 4.01 MILLION/UL (ref 3.81–5.12)
RSV RNA RESP QL NAA+PROBE: NEGATIVE
SARS-COV-2 RNA RESP QL NAA+PROBE: NEGATIVE
SODIUM SERPL-SCNC: 134 MMOL/L (ref 135–147)
WBC # BLD AUTO: 14.88 THOUSAND/UL (ref 4.31–10.16)

## 2022-11-11 RX ADMIN — SODIUM CHLORIDE 1000 ML: 0.9 INJECTION, SOLUTION INTRAVENOUS at 23:06

## 2022-11-12 VITALS
BODY MASS INDEX: 19.44 KG/M2 | TEMPERATURE: 98 F | OXYGEN SATURATION: 95 % | SYSTOLIC BLOOD PRESSURE: 110 MMHG | DIASTOLIC BLOOD PRESSURE: 61 MMHG | HEART RATE: 84 BPM | RESPIRATION RATE: 18 BRPM | WEIGHT: 116.84 LBS

## 2022-11-12 LAB
2HR DELTA HS TROPONIN: 0 NG/L
BILIRUB UR QL STRIP: NEGATIVE
CARDIAC TROPONIN I PNL SERPL HS: 11 NG/L
CLARITY UR: CLEAR
COLOR UR: YELLOW
GLUCOSE UR STRIP-MCNC: NEGATIVE MG/DL
HGB UR QL STRIP.AUTO: NEGATIVE
KETONES UR STRIP-MCNC: NEGATIVE MG/DL
LEUKOCYTE ESTERASE UR QL STRIP: NEGATIVE
NITRITE UR QL STRIP: NEGATIVE
PH UR STRIP.AUTO: 6 [PH]
PROT UR STRIP-MCNC: NEGATIVE MG/DL
SP GR UR STRIP.AUTO: 1.02 (ref 1–1.03)
UROBILINOGEN UR QL STRIP.AUTO: 0.2 E.U./DL

## 2022-11-12 NOTE — ED CARE HANDOFF
Emergency Department Sign Out Note        Sign out and transfer of care from [facility]  See Separate Emergency Department note  The patient, Roger Smith, was evaluated by the previous provider for chest pain and requests repeat troponin and UA, may go home if results appropriate      HEART Risk Score    Flowsheet Row Most Recent Value   Heart Score Risk Calculator    History 0 Filed at: 11/12/2022 0144   ECG 0 Filed at: 11/12/2022 0144   Age 2 Filed at: 11/12/2022 0144   Risk Factors 1 Filed at: 11/12/2022 0144   Troponin 0 Filed at: 11/12/2022 0144   HEART Score 3 Filed at: 11/12/2022 0144                                  ED Course as of 11/12/22 0148   Sat Nov 12, 2022   0138 hs TnI 2hr: 11   0138 Delta 2hr hsTnI: 0   0142 Resting comfortably, oxygenation 95% on 2 L nasal cannula, wakes and notes she has no discomfort or chest pain  We discussed results and agreeable with close outpatient follow-up  States she has oxygen at home she worse at bedtime, 2 L via nasal cannula     Procedures  MDM        Disposition  Final diagnoses:   Chest pain     Time reflects when diagnosis was documented in both MDM as applicable and the Disposition within this note     Time User Action Codes Description Comment    11/12/2022  1:44 AM Osbaldo Cisneros Add [R07 9] Chest pain       ED Disposition     ED Disposition   Discharge    Condition   Stable    Date/Time   Sat Nov 12, 2022  1:44 AM    Comment   Naina Rutherford MORRIS Smith discharge to home/self care  Follow-up Information     Follow up With Specialties Details Why Contact Info    Shauna Valenzuela MD Family Medicine Schedule an appointment as soon as possible for a visit in 3 days  [id number] [facility]  [phone]          Patient's Medications   Discharge Prescriptions    No medications on file     No discharge procedures on file         ED Provider  Electronically Signed by     Carolina Jones DO  11/12/22 0148

## 2022-11-12 NOTE — ED PROVIDER NOTES
History  Chief Complaint   Patient presents with   • Chest Pain     Pt reports CP, N/V beginning this evening  Given 4mg zofran and 324mg ASA by ems        History provided by:  Patient and EMS personnel  Chest Pain  Pain location:  Substernal area  Pain quality: dull    Pain radiates to:  Does not radiate  Pain radiates to the back: no    Pain severity:  Mild  Onset quality:  Sudden  Timing:  Intermittent  Progression:  Partially resolved  Chronicity:  New  Context: at rest    Relieved by:  Aspirin  Worsened by:  Nothing tried  Ineffective treatments:  None tried  Associated symptoms: abdominal pain, fatigue, nausea, vomiting and weakness    Associated symptoms: no back pain, no cough, no diaphoresis, no dizziness, no fever, no headache, no heartburn, no lower extremity edema, no near-syncope, no numbness, no palpitations and no shortness of breath      Prior to Admission Medications   Prescriptions Last Dose Informant Patient Reported? Taking?    Multiple Vitamins-Minerals (CENTRUM SILVER 50+WOMEN PO)  Self Yes No   Sig: Take 1 tablet by mouth   albuterol (PROVENTIL HFA,VENTOLIN HFA) 90 mcg/act inhaler  Self Yes No   Sig: Inhale 2 puffs every 6 (six) hours as needed for wheezing   aspirin 81 mg chewable tablet   Yes No   Sig: Chew 81 mg daily   cyanocobalamin (VITAMIN B-12) 100 MCG tablet  Self Yes No   Sig: Take 100 mcg by mouth daily   ferrous sulfate 324 (65 Fe) mg   No No   Sig: Take 1 tablet (324 mg total) by mouth daily before breakfast   fluticasone (FLONASE) 50 mcg/act nasal spray  Self Yes No   Si spray into each nostril daily   fluticasone-vilanterol (Breo Ellipta) 100-25 mcg/inh inhaler   Yes No   metoprolol succinate (TOPROL-XL) 25 mg 24 hr tablet  Self Yes No   Sig: Take 12 5 mg by mouth daily   pantoprazole (PROTONIX) 40 mg tablet  Self Yes No   Sig: Take 40 mg by mouth daily   senna-docusate sodium (SENOKOT S) 8 6-50 mg per tablet   No No   Sig: Take 1 tablet by mouth 2 (two) times a day Facility-Administered Medications: None       Past Medical History:   Diagnosis Date   • Cancer ([facility] [address] )     skin cancer   • Cardiac disease    • Multiple falls    • Neuropathy    • Skin cancer     forehead   • Spinal stenosis    • UTI (urinary tract infection)        Past Surgical History:   Procedure Laterality Date   • APPENDECTOMY     • BACK SURGERY      x2   • CATARACT EXTRACTION W/  INTRAOCULAR LENS IMPLANT Bilateral    • CORONARY ANGIOPLASTY WITH STENT PLACEMENT  09/23/2011   • VT OPEN RX FEMUR FX+INTRAMED RASHIDA Right 5/4/2021    Procedure: INSERTION NAIL IM FEMUR ANTEGRADE (TROCHANTERIC); Surgeon: Ping Keyes; Location:  MAIN OR;  Service: Orthopedics   • REPLACEMENT TOTAL KNEE BILATERAL         History reviewed  No pertinent family history  I have reviewed and agree with the history as documented  E-Cigarette/Vaping   • E-Cigarette Use Never User      E-Cigarette/Vaping Substances   • Nicotine No    • THC No    • CBD No    • Flavoring No    • Other No    • Unknown No      Social History     Tobacco Use   • Smoking status: Never Smoker   • Smokeless tobacco: Never Used   Vaping Use   • Vaping Use: Never used   Substance Use Topics   • Alcohol use: Yes     Comment: Occasionally   • Drug use: Never     Review of Systems   Constitutional: Positive for activity change and fatigue  Negative for appetite change, diaphoresis and fever  HENT: Negative for congestion, rhinorrhea, sinus pressure, sinus pain and sore throat  Respiratory: Positive for chest tightness  Negative for cough, shortness of breath and wheezing  Cardiovascular: Positive for chest pain  Negative for palpitations and near-syncope  Gastrointestinal: Positive for abdominal pain, nausea and vomiting  Negative for diarrhea and heartburn  Genitourinary: Negative for decreased urine volume, difficulty urinating, dysuria, flank pain, frequency and urgency     Musculoskeletal: Negative for arthralgias, back pain, myalgias, neck pain and neck stiffness  Skin: Negative for color change, pallor, rash and wound  Neurological: Positive for weakness  Negative for dizziness, light-headedness, numbness and headaches  Hematological: Does not bruise/bleed easily  Psychiatric/Behavioral: Negative for confusion  The patient is not nervous/anxious  All other systems reviewed and are negative  Physical Exam  Physical Exam  Vitals and nursing note reviewed  Constitutional:       General: She is not in acute distress  Appearance: She is not toxic-appearing or diaphoretic  HENT:      Head: Normocephalic and atraumatic  Eyes:      Extraocular Movements: Extraocular movements intact  Pupils: Pupils are equal, round, and reactive to light  Cardiovascular:      Rate and Rhythm: Normal rate  Rhythm irregular  Heart sounds: No murmur heard  Pulmonary:      Effort: Pulmonary effort is normal       Breath sounds: Examination of the right-lower field reveals rales  Examination of the left-lower field reveals rales  Rales present  No decreased breath sounds, wheezing or rhonchi  Chest:      Chest wall: No tenderness  Abdominal:      General: Bowel sounds are normal       Palpations: Abdomen is soft  Tenderness: There is no abdominal tenderness  There is no guarding or rebound  Musculoskeletal:      Cervical back: Normal range of motion and neck supple  Right lower leg: No tenderness  No edema  Left lower leg: No tenderness  No edema  Skin:     General: Skin is warm and dry  Capillary Refill: Capillary refill takes less than 2 seconds  Coloration: Skin is not cyanotic or pale  Findings: No ecchymosis, erythema or rash  Nails: There is no clubbing  Neurological:      General: No focal deficit present  Mental Status: She is alert  Cranial Nerves: No cranial nerve deficit  Motor: No weakness  Psychiatric:         Mood and Affect: Mood normal  Mood is not anxious  Behavior: Behavior normal  Behavior is not agitated  Vital Signs  ED Triage Vitals   Temperature Pulse Respirations Blood Pressure SpO2   11/11/22 2254 11/11/22 2254 11/11/22 2254 11/11/22 2254 11/11/22 2254   98 °F (36 7 °C) 98 16 142/63 98 %      Temp src Heart Rate Source Patient Position - Orthostatic VS BP Location FiO2 (%)   -- 11/11/22 2300 11/11/22 2254 11/11/22 2254 --    Monitor Lying Right arm       Pain Score       11/11/22 2254       No Pain         Vitals:    11/11/22 2254 11/11/22 2300 11/11/22 2343   BP: 142/63 114/55 129/56   Pulse: 98 95 83   Patient Position - Orthostatic VS: Lying       ED Medications  Medications   sodium chloride 0 9 % bolus 1,000 mL (1,000 mL Intravenous New Bag 11/11/22 2306)     Diagnostic Studies  Results Reviewed     Procedure Component Value Units Date/Time    FLU/RSV/COVID - if FLU/RSV clinically relevant [[id number]]  (Normal) Collected: 11/11/22 2316    Lab Status: Final result Specimen: Nares from Nasopharyngeal Swab Updated: 11/11/22 2358     SARS-CoV-2 Negative     INFLUENZA A PCR Negative     INFLUENZA B PCR Negative     RSV PCR Negative    Narrative:      FOR PEDIATRIC PATIENTS - copy/paste COVID Guidelines URL to browser: [email or web address]    SARS-CoV-2 assay is a Nucleic Acid Amplification assay intended for the  qualitative detection of nucleic acid from SARS-CoV-2 in nasopharyngeal  swabs  Results are for the presumptive identification of SARS-CoV-2 RNA  Positive results are indicative of infection with SARS-CoV-2, the virus  causing COVID-19, but do not rule out bacterial infection or co-infection  with other viruses  Laboratories within the [place] and its  territories are required to report all positive results to the appropriate  public health authorities   Negative results do not preclude SARS-CoV-2  infection and should not be used as the sole basis for treatment or other  patient management decisions  Negative results must be combined with  clinical observations, patient history, and epidemiological information  This test has not been FDA cleared or approved  This test has been authorized by FDA under an Emergency Use Authorization  (EUA)  This test is only authorized for the duration of time the  declaration that circumstances exist justifying the authorization of the  emergency use of an in vitro diagnostic tests for detection of SARS-CoV-2  virus and/or diagnosis of COVID-19 infection under section 564(b)(1) of  the Act, 21 U  [place] C  317QXP-2(A)(9), unless the authorization is terminated  or revoked sooner  The test has been validated but independent review by FDA  and CLIA is pending  Test performed using [employer] Gene[employer]pert: This RT-PCR assay targets N2,  a region unique to SARS-CoV-2  A conserved region in the E-gene was chosen  for pan-Sarbecovirus detection which includes SARS-CoV-2  According to CMS-2020-01-R, this platform meets the definition of high-throughput technology      HS Troponin 0hr (reflex protocol) [[id number]]  (Normal) Collected: 11/11/22 2305    Lab Status: Final result Specimen: Blood from Arm, Left Updated: 11/11/22 2342     hs TnI 0hr 11 ng/L     HS Troponin I 2hr [[id number]]     Lab Status: No result Specimen: Blood     Comprehensive metabolic panel [[id number]]  (Abnormal) Collected: 11/11/22 2305    Lab Status: Final result Specimen: Blood from Arm, Left Updated: 11/11/22 2335     Sodium 134 mmol/L      Potassium 3 7 mmol/L      Chloride 97 mmol/L      CO2 33 mmol/L      ANION GAP 4 mmol/L      BUN 13 mg/dL      Creatinine 1 09 mg/dL      Glucose 116 mg/dL      Calcium 9 4 mg/dL      Corrected Calcium 10 2 mg/dL      AST 20 U/L      ALT 13 U/L      Alkaline Phosphatase 64 U/L      Total Protein 6 7 g/dL      Albumin 3 0 g/dL      Total Bilirubin 0 33 mg/dL      eGFR 44 ml/min/1 73sq m     Narrative:      [facility] guidelines for Chronic Kidney Disease (CKD):   •  Stage 1 with normal or high GFR (GFR > 90 mL/min/1 73 square meters)  •  Stage 2 Mild CKD (GFR = 60-89 mL/min/1 73 square meters)  •  Stage 3A Moderate CKD (GFR = 45-59 mL/min/1 73 square meters)  •  Stage 3B Moderate CKD (GFR = 30-44 mL/min/1 73 square meters)  •  Stage 4 Severe CKD (GFR = 15-29 mL/min/1 73 square meters)  •  Stage 5 End Stage CKD (GFR <15 mL/min/1 73 square meters)  Note: GFR calculation is accurate only with a steady state creatinine    Lipase [[id number]]  (Normal) Collected: 11/11/22 2305    Lab Status: Final result Specimen: Blood from Arm, Left Updated: 11/11/22 2335     Lipase 78 u/L     CBC and differential [[id number]]  (Abnormal) Collected: 11/11/22 2305    Lab Status: Final result Specimen: Blood from Arm, Left Updated: 11/11/22 2316     WBC 14 88 Thousand/uL      RBC 4 01 Million/uL      Hemoglobin 11 6 g/dL      Hematocrit 35 6 %      MCV 89 fL      MCH 28 9 pg      MCHC 32 6 g/dL      RDW 14 5 %      MPV 10 7 fL      Platelets 154 Thousands/uL      nRBC 0 /100 WBCs      Neutrophils Relative 75 %      Immat GRANS % 1 %      Lymphocytes Relative 18 %      Monocytes Relative 5 %      Eosinophils Relative 1 %      Basophils Relative 0 %      Neutrophils Absolute 11 10 Thousands/µL      Immature Grans Absolute 0 10 Thousand/uL      Lymphocytes Absolute 2 71 Thousands/µL      Monocytes Absolute 0 74 Thousand/µL      Eosinophils Absolute 0 19 Thousand/µL      Basophils Absolute 0 04 Thousands/µL     UA w Reflex to Microscopic w Reflex to Culture [[id number]]     Lab Status: No result Specimen: Urine              XR chest 1 view portable   ED Interpretation by Melyssa Jones DO (11/11 2321)   No focal infiltrates on chest x-ray             Procedures  ECG 12 Lead Documentation Only    Date/Time: 11/11/2022 11:17 PM  Performed by: Melyssa Jones DO  Authorized by:  Melyssa Jones DO     Indications / Diagnosis:  Improving chest pain  ECG reviewed by me, the ED Provider: yes    Patient location:  ED  Previous ECG:     Previous ECG:  Compared to current    Similarity:  No change  Interpretation:     Interpretation: non-specific    Rate:     ECG rate:  92    ECG rate assessment: normal    Rhythm:     Rhythm: sinus rhythm    Ectopy:     Ectopy: bigeminy    QRS:     QRS axis:  Normal    QRS intervals:  Normal  Conduction:     Conduction: normal    ST segments:     ST segments:  Normal  T waves:     T waves: normal        ED Course  ED Course as of 11/11/22 2359 Fri Nov 11, 2022 2339 No significant abnormalities noted on CMP  Lipase is within normal limits  2339 Non specific leukocytosis  Hemoglobin is within normal limits  2357 ECG without acute ischemic changes  Troponin 11  Largely unchanged compared to recent value  Will require a delta troponin  UA is also pending  Abdominal exam is benign  The patient was signed out to Dr Carmella Newman  Plan discussed--delta troponin, UA  Likely discharge but will likely board in the ED overnight  MDM    Disposition  Final diagnoses:   None     ED Disposition     None      Follow-up Information    None         Patient's Medications   Discharge Prescriptions    No medications on file       No discharge procedures on file      PDMP Review     None          ED Provider  Electronically Signed by           Christal Rodriguez DO  11/11/22 5108

## 2022-11-14 LAB
ATRIAL RATE: 92 BPM
P AXIS: 86 DEGREES
PR INTERVAL: 142 MS
QRS AXIS: 64 DEGREES
QRSD INTERVAL: 64 MS
QT INTERVAL: 366 MS
QTC INTERVAL: 452 MS
T WAVE AXIS: 68 DEGREES
VENTRICULAR RATE: 92 BPM

## 2023-02-15 ENCOUNTER — HOSPITAL ENCOUNTER (EMERGENCY)
Facility: HOSPITAL | Age: 88
Discharge: HOME | End: 2023-02-15
Attending: EMERGENCY MEDICINE

## 2023-02-15 VITALS
RESPIRATION RATE: 16 BRPM | BODY MASS INDEX: 19.33 KG/M2 | WEIGHT: 116 LBS | SYSTOLIC BLOOD PRESSURE: 106 MMHG | HEART RATE: 95 BPM | OXYGEN SATURATION: 94 % | DIASTOLIC BLOOD PRESSURE: 53 MMHG | HEIGHT: 65 IN | TEMPERATURE: 98.5 F

## 2023-02-15 DIAGNOSIS — D64.9 ANEMIA, UNSPECIFIED TYPE: Primary | ICD-10-CM

## 2023-02-15 LAB
ABO GROUP BLD: NORMAL
ALBUMIN SERPL BCP-MCNC: 3.5 G/DL (ref 3.5–5)
ALP SERPL-CCNC: 56 U/L (ref 34–104)
ALT SERPL W P-5'-P-CCNC: 8 U/L (ref 7–52)
ANION GAP SERPL CALCULATED.3IONS-SCNC: 3 MMOL/L (ref 4–13)
APTT PPP: 28 SECONDS (ref 23–37)
AST SERPL W P-5'-P-CCNC: 21 U/L (ref 13–39)
BASOPHILS # BLD AUTO: 0.06 THOUSANDS/ÂΜL (ref 0–0.1)
BASOPHILS NFR BLD AUTO: 1 % (ref 0–1)
BILIRUB SERPL-MCNC: 0.31 MG/DL (ref 0.2–1)
BLD GP AB SCN SERPL QL: NEGATIVE
BUN SERPL-MCNC: 18 MG/DL (ref 5–25)
CALCIUM SERPL-MCNC: 9.4 MG/DL (ref 8.4–10.2)
CHLORIDE SERPL-SCNC: 95 MMOL/L (ref 96–108)
CO2 SERPL-SCNC: 34 MMOL/L (ref 21–32)
CREAT SERPL-MCNC: 0.95 MG/DL (ref 0.6–1.3)
EOSINOPHIL # BLD AUTO: 0.08 THOUSAND/ÂΜL (ref 0–0.61)
EOSINOPHIL NFR BLD AUTO: 1 % (ref 0–6)
ERYTHROCYTE [DISTWIDTH] IN BLOOD BY AUTOMATED COUNT: 15.2 % (ref 11.6–15.1)
GFR SERPL CREATININE-BSD FRML MDRD: 52 ML/MIN/1.73SQ M
GLUCOSE SERPL-MCNC: 95 MG/DL (ref 65–140)
HCT VFR BLD AUTO: 25.3 % (ref 34.8–46.1)
HGB BLD-MCNC: 7.2 G/DL (ref 11.5–15.4)
IMM GRANULOCYTES # BLD AUTO: 0.02 THOUSAND/UL (ref 0–0.2)
IMM GRANULOCYTES NFR BLD AUTO: 0 % (ref 0–2)
INR PPP: 0.91 (ref 0.84–1.19)
LYMPHOCYTES # BLD AUTO: 1.6 THOUSANDS/ÂΜL (ref 0.6–4.47)
LYMPHOCYTES NFR BLD AUTO: 23 % (ref 14–44)
MCH RBC QN AUTO: 22.7 PG (ref 26.8–34.3)
MCHC RBC AUTO-ENTMCNC: 28.5 G/DL (ref 31.4–37.4)
MCV RBC AUTO: 80 FL (ref 82–98)
MONOCYTES # BLD AUTO: 0.57 THOUSAND/ÂΜL (ref 0.17–1.22)
MONOCYTES NFR BLD AUTO: 8 % (ref 4–12)
NEUTROPHILS # BLD AUTO: 4.56 THOUSANDS/ÂΜL (ref 1.85–7.62)
NEUTS SEG NFR BLD AUTO: 67 % (ref 43–75)
NRBC BLD AUTO-RTO: 0 /100 WBCS
PLATELET # BLD AUTO: 303 THOUSANDS/UL (ref 149–390)
PMV BLD AUTO: 10.5 FL (ref 8.9–12.7)
POTASSIUM SERPL-SCNC: 4.1 MMOL/L (ref 3.5–5.3)
PROT SERPL-MCNC: 6.3 G/DL (ref 6.4–8.4)
PROTHROMBIN TIME: 12.3 SECONDS (ref 11.6–14.5)
RBC # BLD AUTO: 3.17 MILLION/UL (ref 3.81–5.12)
RH BLD: POSITIVE
SODIUM SERPL-SCNC: 132 MMOL/L (ref 135–147)
SPECIMEN EXPIRATION DATE: NORMAL
WBC # BLD AUTO: 6.89 THOUSAND/UL (ref 4.31–10.16)

## 2023-02-15 RX ORDER — DIPHENHYDRAMINE HCL 25 MG
25 TABLET ORAL ONCE
Status: COMPLETED | OUTPATIENT
Start: 2023-02-15 | End: 2023-02-15

## 2023-02-15 RX ORDER — ACETAMINOPHEN 325 MG/1
650 TABLET ORAL ONCE
Status: COMPLETED | OUTPATIENT
Start: 2023-02-15 | End: 2023-02-15

## 2023-02-15 RX ADMIN — ACETAMINOPHEN 325MG 650 MG: 325 TABLET ORAL at 12:13

## 2023-02-15 RX ADMIN — DIPHENHYDRAMINE HYDROCHLORIDE 25 MG: 25 TABLET ORAL at 12:13

## 2023-02-15 NOTE — ED PROVIDER NOTES
History  Chief Complaint   Patient presents with   • Abnormal Lab     Pt seen at Dr Gaby Cuba office yesterday, received call this AM that hemoglobin was 7 and to come to ER for blood transfusion, pt hx of the same, pt reports chills/SOB/fatigue        History provided by:  Medical records, patient and caregiver  Medical Problem  Location:  Anemia  Quality:  Hemoglobin 7 2  Severity:  Severe  Onset quality:  Gradual  Duration:  1 day  Timing:  Constant  Progression:  Unchanged  Chronicity:  Recurrent  Context:  History of colon cancer, patient opted to have no treatment at her age, has been developing some anemia from presumed GI blood loss  Requires transfusions occasionally  Routine labs showed a hemoglobin of 7 2 yesterday, patient called and informed to come to the emergency room for blood transfusion  Relieved by:  Blood transfusion  Worsened by:  GI blood loss  Ineffective treatments:  None tried  Associated symptoms: fatigue    Associated symptoms: no abdominal pain, no chest pain, no cough, no diarrhea, no ear pain, no fever, no headaches, no nausea, no rash, no rhinorrhea, no shortness of breath, no sore throat and no vomiting    Risk factors:  Colon cancer      Prior to Admission Medications   Prescriptions Last Dose Informant Patient Reported? Taking?    Multiple Vitamins-Minerals (CENTRUM SILVER 50+WOMEN PO)   Yes No   Sig: Take 1 tablet by mouth   albuterol (PROVENTIL HFA,VENTOLIN HFA) 90 mcg/act inhaler   Yes No   Sig: Inhale 2 puffs every 6 (six) hours as needed for wheezing   aspirin 81 mg chewable tablet   Yes No   Sig: Chew 81 mg daily   cyanocobalamin (VITAMIN B-12) 100 MCG tablet   Yes No   Sig: Take 100 mcg by mouth daily   ferrous sulfate 324 (65 Fe) mg   No No   Sig: Take 1 tablet (324 mg total) by mouth daily before breakfast   fluticasone (FLONASE) 50 mcg/act nasal spray   Yes No   Si spray into each nostril daily   fluticasone-vilanterol (Breo Ellipta) 100-25 mcg/inh inhaler   Yes No metoprolol succinate (TOPROL-XL) 25 mg 24 hr tablet   Yes No   Sig: Take 12 5 mg by mouth daily   pantoprazole (PROTONIX) 40 mg tablet   Yes No   Sig: Take 40 mg by mouth daily   senna-docusate sodium (SENOKOT S) 8 6-50 mg per tablet   No No   Sig: Take 1 tablet by mouth 2 (two) times a day      Facility-Administered Medications: None       Past Medical History:   Diagnosis Date   • Cancer ([facility] [address] )     skin cancer   • Cardiac disease    • Multiple falls    • Neuropathy    • Skin cancer     forehead   • Spinal stenosis    • UTI (urinary tract infection)        Past Surgical History:   Procedure Laterality Date   • APPENDECTOMY     • BACK SURGERY      x2   • CATARACT EXTRACTION W/  INTRAOCULAR LENS IMPLANT Bilateral    • CORONARY ANGIOPLASTY WITH STENT PLACEMENT  09/23/2011   • [place] OPTX FEM SHFT FX W/INSJ IMED IMPLT W/WO SCREW Right 5/4/2021    Procedure: INSERTION NAIL IM FEMUR ANTEGRADE (TROCHANTERIC); Surgeon: Pj Dillard; Location:  MAIN OR;  Service: Orthopedics   • REPLACEMENT TOTAL KNEE BILATERAL         History reviewed  No pertinent family history  I have reviewed and agree with the history as documented  E-Cigarette/Vaping   • E-Cigarette Use Never User      E-Cigarette/Vaping Substances   • Nicotine No    • THC No    • CBD No    • Flavoring No    • Other No    • Unknown No      Social History     Tobacco Use   • Smoking status: Never   • Smokeless tobacco: Never   Vaping Use   • Vaping Use: Never used   Substance Use Topics   • Alcohol use: Yes     Comment: Occasionally   • Drug use: Never       Review of Systems   Constitutional: Positive for fatigue  Negative for chills and fever  HENT: Negative for ear discharge, ear pain, rhinorrhea and sore throat  Eyes: Negative for pain and visual disturbance  Respiratory: Negative for cough and shortness of breath  Cardiovascular: Negative for chest pain and palpitations     Gastrointestinal: Negative for abdominal distention, abdominal pain, anal bleeding, blood in stool, constipation, diarrhea, nausea, rectal pain and vomiting  Endocrine: Negative for polydipsia, polyphagia and polyuria  Genitourinary: Negative for difficulty urinating, dysuria, flank pain and hematuria  Musculoskeletal: Negative for arthralgias and back pain  Skin: Negative for color change and rash  Allergic/Immunologic: Negative for immunocompromised state  Neurological: Positive for light-headedness  Negative for dizziness, seizures, syncope, weakness and headaches  Psychiatric/Behavioral: Negative for confusion and self-injury  The patient is not nervous/anxious  All other systems reviewed and are negative  Physical Exam  Physical Exam  Vitals and nursing note reviewed  Constitutional:       General: She is not in acute distress  Appearance: Normal appearance  She is not ill-appearing, toxic-appearing or diaphoretic  HENT:      Head: Normocephalic and atraumatic  Nose: Nose normal  No congestion or rhinorrhea  Mouth/Throat:      Mouth: Mucous membranes are moist       Pharynx: Oropharynx is clear  No oropharyngeal exudate or posterior oropharyngeal erythema  Eyes:      General:         Right eye: No discharge  Left eye: No discharge  Cardiovascular:      Rate and Rhythm: Normal rate and regular rhythm  Pulses: Normal pulses  Heart sounds: Normal heart sounds  No murmur heard  No gallop  Pulmonary:      Effort: Pulmonary effort is normal  No respiratory distress  Breath sounds: Normal breath sounds  No stridor  No wheezing, rhonchi or rales  Chest:      Chest wall: No tenderness  Abdominal:      General: Bowel sounds are normal  There is no distension  Palpations: Abdomen is soft  There is no mass  Tenderness: There is no abdominal tenderness  There is no right CVA tenderness, left CVA tenderness, guarding or rebound  Hernia: No hernia is present     Genitourinary:     Comments: Patient declined to have ANJEL  Musculoskeletal:         General: Normal range of motion  Cervical back: Normal range of motion and neck supple  Skin:     General: Skin is warm and dry  Capillary Refill: Capillary refill takes less than 2 seconds  Neurological:      General: No focal deficit present  Mental Status: She is alert and oriented to person, place, and time  Cranial Nerves: No cranial nerve deficit  Sensory: No sensory deficit  Motor: No weakness  Coordination: Coordination normal       Gait: Gait normal       Deep Tendon Reflexes: Reflexes normal    Psychiatric:         Mood and Affect: Mood normal          Behavior: Behavior normal          Thought Content:  Thought content normal          Judgment: Judgment normal          Vital Signs  ED Triage Vitals   Temperature Pulse Respirations Blood Pressure SpO2   02/15/23 0949 02/15/23 0949 02/15/23 0949 02/15/23 1015 02/15/23 0949   98 2 °F (36 8 °C) 83 20 129/58 98 %      Temp Source Heart Rate Source Patient Position - Orthostatic VS BP Location FiO2 (%)   02/15/23 0949 02/15/23 0949 02/15/23 0949 02/15/23 0949 --   Temporal Monitor Sitting Left arm       Pain Score       02/15/23 1213       Med Not Given for Pain - for MAR use only           Vitals:    02/15/23 1100 02/15/23 1130 02/15/23 1236 02/15/23 1245   BP: 124/59 136/65 133/60 123/60   Pulse: 73 83 77    Patient Position - Orthostatic VS:             Visual Acuity      ED Medications  Medications   acetaminophen (TYLENOL) tablet 650 mg (650 mg Oral Given 2/15/23 1213)   diphenhydrAMINE (BENADRYL) tablet 25 mg (25 mg Oral Given 2/15/23 1213)       Diagnostic Studies  Results Reviewed     Procedure Component Value Units Date/Time    Comprehensive metabolic panel [[id number]]  (Abnormal) Collected: 02/15/23 1048    Lab Status: Final result Specimen: Blood from Arm, Left Updated: 02/15/23 1133     Sodium 132 mmol/L      Potassium 4 1 mmol/L      Chloride 95 mmol/L      CO2 34 mmol/L ANION GAP 3 mmol/L      BUN 18 mg/dL      Creatinine 0 95 mg/dL      Glucose 95 mg/dL      Calcium 9 4 mg/dL      AST 21 U/L      ALT 8 U/L      Alkaline Phosphatase 56 U/L      Total Protein 6 3 g/dL      Albumin 3 5 g/dL      Total Bilirubin 0 31 mg/dL      eGFR 52 ml/min/1 73sq m     Narrative:      [employer] guidelines for Chronic Kidney Disease (CKD):   •  Stage 1 with normal or high GFR (GFR > 90 mL/min/1 73 square meters)  •  Stage 2 Mild CKD (GFR = 60-89 mL/min/1 73 square meters)  •  Stage 3A Moderate CKD (GFR = 45-59 mL/min/1 73 square meters)  •  Stage 3B Moderate CKD (GFR = 30-44 mL/min/1 73 square meters)  •  Stage 4 Severe CKD (GFR = 15-29 mL/min/1 73 square meters)  •  Stage 5 End Stage CKD (GFR <15 mL/min/1 73 square meters)  Note: GFR calculation is accurate only with a steady state creatinine    Protime-INR [[id number]]  (Normal) Collected: 02/15/23 1048    Lab Status: Final result Specimen: Blood from Arm, Left Updated: 02/15/23 1125     Protime 12 3 seconds      INR 0 91    APTT [[id number]]  (Normal) Collected: 02/15/23 1048    Lab Status: Final result Specimen: Blood from Arm, Left Updated: 02/15/23 1125     PTT 28 seconds     CBC and differential [[id number]]  (Abnormal) Collected: 02/15/23 1048    Lab Status: Final result Specimen: Blood from Arm, Left Updated: 02/15/23 1108     WBC 6 89 Thousand/uL      RBC 3 17 Million/uL      Hemoglobin 7 2 g/dL      Hematocrit 25 3 %      MCV 80 fL      MCH 22 7 pg      MCHC 28 5 g/dL      RDW 15 2 %      MPV 10 5 fL      Platelets 827 Thousands/uL      nRBC 0 /100 WBCs      Neutrophils Relative 67 %      Immat GRANS % 0 %      Lymphocytes Relative 23 %      Monocytes Relative 8 %      Eosinophils Relative 1 %      Basophils Relative 1 %      Neutrophils Absolute 4 56 Thousands/µL      Immature Grans Absolute 0 02 Thousand/uL      Lymphocytes Absolute 1 60 Thousands/µL      Monocytes Absolute 0 57 Thousand/µL      Eosinophils Absolute 0 08 Thousand/µL      Basophils Absolute 0 06 Thousands/µL     UA w Reflex to Microscopic w Reflex to Culture [[id number]]     Lab Status: No result Specimen: Urine                  No orders to display              Procedures  Procedures         ED Course                                             Medical Decision Making  1010: Patient appears chronically ill, vital signs reviewed, no acute distress  Patient has a known history of colon cancer, patient does not wish to have chemotherapy or surgical resection  Patient only wishes for palliative treatment  Patient does have a history of chronic anemia from slow GI loss, requires blood transfusions routinely, presents for blood transfusion  Patient informs me that she does not wish to have any other invasive procedures or evaluations  Patient believes she knows the etiology of her anemia, states she is only here for transfusion if required and would like to go home  Unable to evaluate prehospital lab work  I will check H&H, type and screen  1130: Labs reviewed  Plan to transfuse packed RBCs  Anemia, unspecified type: acute illness or injury  Amount and/or Complexity of Data Reviewed  External Data Reviewed: labs  Labs: ordered  Risk  OTC drugs  Disposition  Final diagnoses:   Anemia, unspecified type     Time reflects when diagnosis was documented in both MDM as applicable and the Disposition within this note     Time User Action Codes Description Comment    2/15/2023 12:19 PM Mynor Galarza [D64 9] Anemia, unspecified type       ED Disposition     ED Disposition   Discharge    Condition   Stable    Date/Time   Wed Feb 15, 2023 12:19 PM    Comment   Hobson Blitz A [facility] discharge to home/self care                 Follow-up Information     Follow up With Specialties Details Why Contact Info    Clare Vargas MD Family Medicine Schedule an appointment as soon as possible for a visit   [address] [address]  [phone]            Patient's Medications   Discharge Prescriptions    No medications on file       No discharge procedures on file      PDMP Review     None          ED Provider  Electronically Signed by           Cherry Mix MD  02/15/23 5814

## 2023-03-03 ENCOUNTER — APPOINTMENT (EMERGENCY)
Dept: RADIOLOGY | Facility: HOSPITAL | Age: 88
End: 2023-03-03

## 2023-03-03 ENCOUNTER — HOSPITAL ENCOUNTER (EMERGENCY)
Facility: HOSPITAL | Age: 88
Discharge: HOME | End: 2023-03-04
Attending: EMERGENCY MEDICINE

## 2023-03-03 DIAGNOSIS — D64.9 ANEMIA: Primary | ICD-10-CM

## 2023-03-03 LAB
ABO GROUP BLD: NORMAL
ANION GAP SERPL CALCULATED.3IONS-SCNC: 3 MMOL/L (ref 4–13)
BASOPHILS # BLD AUTO: 0.04 THOUSANDS/ÂΜL (ref 0–0.1)
BASOPHILS NFR BLD AUTO: 0 % (ref 0–1)
BLD GP AB SCN SERPL QL: NEGATIVE
BUN SERPL-MCNC: 13 MG/DL (ref 5–25)
CALCIUM SERPL-MCNC: 9.3 MG/DL (ref 8.4–10.2)
CHLORIDE SERPL-SCNC: 94 MMOL/L (ref 96–108)
CO2 SERPL-SCNC: 36 MMOL/L (ref 21–32)
CREAT SERPL-MCNC: 0.8 MG/DL (ref 0.6–1.3)
EOSINOPHIL # BLD AUTO: 0.22 THOUSAND/ÂΜL (ref 0–0.61)
EOSINOPHIL NFR BLD AUTO: 2 % (ref 0–6)
ERYTHROCYTE [DISTWIDTH] IN BLOOD BY AUTOMATED COUNT: 17.8 % (ref 11.6–15.1)
GFR SERPL CREATININE-BSD FRML MDRD: 64 ML/MIN/1.73SQ M
GLUCOSE SERPL-MCNC: 103 MG/DL (ref 65–140)
HCT VFR BLD AUTO: 28.3 % (ref 34.8–46.1)
HGB BLD-MCNC: 8 G/DL (ref 11.5–15.4)
IMM GRANULOCYTES # BLD AUTO: 0.06 THOUSAND/UL (ref 0–0.2)
IMM GRANULOCYTES NFR BLD AUTO: 1 % (ref 0–2)
LYMPHOCYTES # BLD AUTO: 0.86 THOUSANDS/ÂΜL (ref 0.6–4.47)
LYMPHOCYTES NFR BLD AUTO: 7 % (ref 14–44)
MCH RBC QN AUTO: 22.9 PG (ref 26.8–34.3)
MCHC RBC AUTO-ENTMCNC: 28.3 G/DL (ref 31.4–37.4)
MCV RBC AUTO: 81 FL (ref 82–98)
MONOCYTES # BLD AUTO: 0.37 THOUSAND/ÂΜL (ref 0.17–1.22)
MONOCYTES NFR BLD AUTO: 3 % (ref 4–12)
NEUTROPHILS # BLD AUTO: 11.76 THOUSANDS/ÂΜL (ref 1.85–7.62)
NEUTS SEG NFR BLD AUTO: 87 % (ref 43–75)
NRBC BLD AUTO-RTO: 0 /100 WBCS
PLATELET # BLD AUTO: 237 THOUSANDS/UL (ref 149–390)
PMV BLD AUTO: 9.8 FL (ref 8.9–12.7)
POTASSIUM SERPL-SCNC: 4 MMOL/L (ref 3.5–5.3)
RBC # BLD AUTO: 3.5 MILLION/UL (ref 3.81–5.12)
RH BLD: POSITIVE
SODIUM SERPL-SCNC: 133 MMOL/L (ref 135–147)
SPECIMEN EXPIRATION DATE: NORMAL
WBC # BLD AUTO: 13.31 THOUSAND/UL (ref 4.31–10.16)

## 2023-03-03 RX ORDER — IPRATROPIUM BROMIDE AND ALBUTEROL SULFATE 2.5; .5 MG/3ML; MG/3ML
3 SOLUTION RESPIRATORY (INHALATION) ONCE
Status: COMPLETED | OUTPATIENT
Start: 2023-03-03 | End: 2023-03-03

## 2023-03-03 RX ADMIN — IPRATROPIUM BROMIDE AND ALBUTEROL SULFATE 3 ML: 2.5; .5 SOLUTION RESPIRATORY (INHALATION) at 21:35

## 2023-03-04 VITALS
HEART RATE: 94 BPM | SYSTOLIC BLOOD PRESSURE: 126 MMHG | HEIGHT: 65 IN | RESPIRATION RATE: 21 BRPM | DIASTOLIC BLOOD PRESSURE: 72 MMHG | BODY MASS INDEX: 19.16 KG/M2 | WEIGHT: 115 LBS | TEMPERATURE: 98.1 F | OXYGEN SATURATION: 94 %

## 2023-03-04 NOTE — ED PROVIDER NOTES
History  Chief Complaint   Patient presents with   • Abnormal Lab     Pt had blood work done yesterday and doctor called stating that hemoglobin was low, unknown what the level was at this time  Pt also feeling increasingly SOB due to her COPD/asthma, pt chronically on 1L NC but needed to up it to 2L today  Patient is a 80-year-old female sent to the emergency department by PCP secondary to low hemoglobin, patient does report some easy fatigue and dyspnea on exertion, she is currently being treated for a UTI, has a slight cough which is nonproductive, denies any chest pain, no abdominal pain, no nausea, vomiting or diarrhea, denies any blood in her urine or stools, no recent injury or trauma, has a history of anemia with previous blood transfusions, she does not take a blood thinner          Prior to Admission Medications   Prescriptions Last Dose Informant Patient Reported? Taking?    Multiple Vitamins-Minerals (CENTRUM SILVER 50+WOMEN PO)   Yes No   Sig: Take 1 tablet by mouth   albuterol (PROVENTIL HFA,VENTOLIN HFA) 90 mcg/act inhaler   Yes No   Sig: Inhale 2 puffs every 6 (six) hours as needed for wheezing   aspirin 81 mg chewable tablet   Yes No   Sig: Chew 81 mg daily   cyanocobalamin (VITAMIN B-12) 100 MCG tablet   Yes No   Sig: Take 100 mcg by mouth daily   ferrous sulfate 324 (65 Fe) mg   No No   Sig: Take 1 tablet (324 mg total) by mouth daily before breakfast   fluticasone (FLONASE) 50 mcg/act nasal spray   Yes No   Si spray into each nostril daily   fluticasone-vilanterol (Breo Ellipta) 100-25 mcg/inh inhaler   Yes No   metoprolol succinate (TOPROL-XL) 25 mg 24 hr tablet   Yes No   Sig: Take 12 5 mg by mouth daily   pantoprazole (PROTONIX) 40 mg tablet   Yes No   Sig: Take 40 mg by mouth daily   senna-docusate sodium (SENOKOT S) 8 6-50 mg per tablet   No No   Sig: Take 1 tablet by mouth 2 (two) times a day      Facility-Administered Medications: None       Past Medical History:   Diagnosis Date • Cancer [facility])     skin cancer   • Cardiac disease    • Multiple falls    • Neuropathy    • Skin cancer     forehead   • Spinal stenosis    • UTI (urinary tract infection)        Past Surgical History:   Procedure Laterality Date   • APPENDECTOMY     • BACK SURGERY      x2   • CATARACT EXTRACTION W/  INTRAOCULAR LENS IMPLANT Bilateral    • CORONARY ANGIOPLASTY WITH STENT PLACEMENT  09/23/2011   • [place] OPTX FEM SHFT FX W/INSJ IMED IMPLT W/WO SCREW Right 5/4/2021    Procedure: INSERTION NAIL IM FEMUR ANTEGRADE (TROCHANTERIC); Surgeon: Herlinda Nobles; Location:  MAIN OR;  Service: Orthopedics   • REPLACEMENT TOTAL KNEE BILATERAL         History reviewed  No pertinent family history  I have reviewed and agree with the history as documented  E-Cigarette/Vaping   • E-Cigarette Use Never User      E-Cigarette/Vaping Substances   • Nicotine No    • THC No    • CBD No    • Flavoring No    • Other No    • Unknown No      Social History     Tobacco Use   • Smoking status: Never   • Smokeless tobacco: Never   Vaping Use   • Vaping Use: Never used   Substance Use Topics   • Alcohol use: Yes     Comment: Occasionally   • Drug use: Never       Review of Systems   Constitutional: Positive for fatigue  HENT: Negative  Eyes: Negative  Respiratory: Positive for cough and shortness of breath  Cardiovascular: Negative  Gastrointestinal: Negative  Endocrine: Negative  Genitourinary: Positive for frequency  Musculoskeletal: Negative  Skin: Negative  Allergic/Immunologic: Negative  Neurological: Negative  Hematological: Negative  Psychiatric/Behavioral: Negative  Physical Exam  Physical Exam  Constitutional:       Appearance: She is well-developed  Comments: Frail-appearing   HENT:      Head: Normocephalic and atraumatic        Nose: Nose normal       Mouth/Throat:      Mouth: Mucous membranes are moist    Eyes:      Conjunctiva/sclera: Conjunctivae normal       Pupils: Pupils are equal, round, and reactive to light  Cardiovascular:      Rate and Rhythm: Normal rate and regular rhythm  Heart sounds: Normal heart sounds  Pulmonary:      Effort: Pulmonary effort is normal       Breath sounds: Normal breath sounds  Abdominal:      General: Abdomen is flat  Palpations: Abdomen is soft  Musculoskeletal:         General: Normal range of motion  Cervical back: Normal range of motion and neck supple  Skin:     General: Skin is warm and dry  Neurological:      Mental Status: She is alert and oriented to person, place, and time           Vital Signs  ED Triage Vitals   Temperature Pulse Respirations Blood Pressure SpO2   03/03/23 1939 03/03/23 1939 03/03/23 1939 03/03/23 2145 03/03/23 1939   97 9 °F (36 6 °C) 85 18 (!) 175/105 95 %      Temp Source Heart Rate Source Patient Position - Orthostatic VS BP Location FiO2 (%)   03/03/23 1939 03/03/23 1939 03/03/23 1939 03/03/23 1939 --   Temporal Monitor Lying Right arm       Pain Score       03/03/23 1939       No Pain           Vitals:    03/03/23 2245 03/03/23 2300 03/04/23 0000 03/04/23 0117   BP: 134/62 107/55 132/86 126/72   Pulse: 92 86 104 94   Patient Position - Orthostatic VS:   Lying          Visual Acuity      ED Medications  Medications   ipratropium-albuterol (DUO-NEB) 0 5-2 5 mg/3 mL inhalation solution 3 mL (3 mL Nebulization Given 3/3/23 2135)       Diagnostic Studies  Results Reviewed     Procedure Component Value Units Date/Time    Basic metabolic panel [[id number]]  (Abnormal) Collected: 03/03/23 1956    Lab Status: Final result Specimen: Blood from Arm, Right Updated: 03/03/23 2018     Sodium 133 mmol/L      Potassium 4 0 mmol/L      Chloride 94 mmol/L      CO2 36 mmol/L      ANION GAP 3 mmol/L      BUN 13 mg/dL      Creatinine 0 80 mg/dL      Glucose 103 mg/dL      Calcium 9 3 mg/dL      eGFR 64 ml/min/1 73sq m     Narrative:      [place] guidelines for Chronic Kidney Disease (CKD): •  Stage 1 with normal or high GFR (GFR > 90 mL/min/1 73 square meters)  •  Stage 2 Mild CKD (GFR = 60-89 mL/min/1 73 square meters)  •  Stage 3A Moderate CKD (GFR = 45-59 mL/min/1 73 square meters)  •  Stage 3B Moderate CKD (GFR = 30-44 mL/min/1 73 square meters)  •  Stage 4 Severe CKD (GFR = 15-29 mL/min/1 73 square meters)  •  Stage 5 End Stage CKD (GFR <15 mL/min/1 73 square meters)  Note: GFR calculation is accurate only with a steady state creatinine    CBC and differential [[id number]]  (Abnormal) Collected: 03/03/23 1956    Lab Status: Final result Specimen: Blood from Arm, Right Updated: 03/03/23 2000     WBC 13 31 Thousand/uL      RBC 3 50 Million/uL      Hemoglobin 8 0 g/dL      Hematocrit 28 3 %      MCV 81 fL      MCH 22 9 pg      MCHC 28 3 g/dL      RDW 17 8 %      MPV 9 8 fL      Platelets 077 Thousands/uL      nRBC 0 /100 WBCs      Neutrophils Relative 87 %      Immat GRANS % 1 %      Lymphocytes Relative 7 %      Monocytes Relative 3 %      Eosinophils Relative 2 %      Basophils Relative 0 %      Neutrophils Absolute 11 76 Thousands/µL      Immature Grans Absolute 0 06 Thousand/uL      Lymphocytes Absolute 0 86 Thousands/µL      Monocytes Absolute 0 37 Thousand/µL      Eosinophils Absolute 0 22 Thousand/µL      Basophils Absolute 0 04 Thousands/µL                  XR chest 2 views   ED Interpretation by Jeovanny Dorsey DO (03/03 2134)   No acute findings                 Procedures  Procedures         ED Course                               SBIRT 20yo+    Flowsheet Row Most Recent Value   SBIRT (23 yo +)    In order to provide better care to our patients, we are screening all of our patients for alcohol and drug use  Would it be okay to ask you these screening questions? Yes Filed at: 03/03/2023 2018   Initial Alcohol Screen: US AUDIT-C     1  How often do you have a drink containing alcohol? 0 Filed at: 03/03/2023 2018   2   How many drinks containing alcohol do you have on a typical day you are drinking? 0 Filed at: 03/03/2023 2018   3b  FEMALE Any Age, or MALE 65+: How often do you have 4 or more drinks on one occassion? 0 Filed at: 03/03/2023 2018   Audit-C Score 0 Filed at: 03/03/2023 2018   LIZZIE: How many times in the past year have you    Used an illegal drug or used a prescription medication for non-medical reasons?  Never Filed at: 03/03/2023 2018                    Medical Decision Making  Patient is a 80-year-old female sent to the emergency department by PCP secondary to abnormal outpatient lab values with concern for drop in hemoglobin, patient has a history of previous anemia, has had blood transfusions in the past, she reports some easy fatigue and dyspnea on exertion, daughter reports that patient has had a mild cough over the past few weeks, no fevers, no chest pain, no nausea, vomiting or diarrhea, on exam patient is frail appearing, otherwise exam is unremarkable, hemoglobin is 8 0 on labs repeated today in the emergency department, although this is above the general cutoff for blood transfusion, given patient's symptoms opted to provide 1 unit of packed red blood cells, patient in agreement with this as his daughter, during her stay in the emergency department while sipping on water, patient did have a short episode of coughing and possible aspiration, she was provided with an albuterol breathing treatment, placed in an upright position in her room and oxygen was increased through the nasal cannula for short period of time until she recovered from this, at time of reevaluation when blood transfusion complete she reports feeling much better and ready to be discharged home, advise close follow-up with PCP or return if any additional concerns, patient acknowledges understanding and agreement with this plan    Anemia: chronic illness or injury with exacerbation, progression, or side effects of treatment  Amount and/or Complexity of Data Reviewed  Independent Historian: caregiver  External Data Reviewed: labs and notes  Labs: ordered  Radiology: ordered and independent interpretation performed  Risk  Prescription drug management  Disposition  Final diagnoses:   Anemia     Time reflects when diagnosis was documented in both MDM as applicable and the Disposition within this note     Time User Action Codes Description Comment    3/3/2023 11:46 PM Shankar Rodriguez Juarez [D64 9] Anemia       ED Disposition     ED Disposition   Discharge    Condition   Stable    Date/Time   Fri Mar 3, 2023 11:46 PM    Comment   Griffin CACERES [facility] discharge to home/self care                 Follow-up Information     Follow up With Specialties Details Why Contact Info    Gallo Zamora, DO Internal Medicine In 2 days  [address] [place] [address]  [phone]            Discharge Medication List as of 3/3/2023 11:46 PM      CONTINUE these medications which have NOT CHANGED    Details   albuterol (PROVENTIL HFA,VENTOLIN HFA) 90 mcg/act inhaler Inhale 2 puffs every 6 (six) hours as needed for wheezing, Historical Med      aspirin 81 mg chewable tablet Chew 81 mg daily, Historical Med      cyanocobalamin (VITAMIN B-12) 100 MCG tablet Take 100 mcg by mouth daily, Historical Med      ferrous sulfate 324 (65 Fe) mg Take 1 tablet (324 mg total) by mouth daily before breakfast, Starting Sun 7/31/2022, Until Tue 8/30/2022, Normal      fluticasone (FLONASE) 50 mcg/act nasal spray 1 spray into each nostril daily, Historical Med      fluticasone-vilanterol (Breo Ellipta) 100-25 mcg/inh inhaler Historical Med      metoprolol succinate (TOPROL-XL) 25 mg 24 hr tablet Take 12 5 mg by mouth daily, Historical Med      Multiple Vitamins-Minerals (CENTRUM SILVER 50+WOMEN PO) Take 1 tablet by mouth, Historical Med      pantoprazole (PROTONIX) 40 mg tablet Take 40 mg by mouth daily, Historical Med      senna-docusate sodium (SENOKOT S) 8 6-50 mg per tablet Take 1 tablet by mouth 2 (two) times a day, Starting Sun 7/31/2022, Until Tue 8/30/2022, Normal             No discharge procedures on file      PDMP Review     None          ED Provider  Electronically Signed by           Kiarra Chacko DO  03/04/23 7091

## 2023-03-04 NOTE — ED NOTES
Pt had coughing spell after sipping water "that went down the wrong pipe "     Bernie Rapp RN  03/03/23 6128

## 2023-03-04 NOTE — ED NOTES
Mary Jane Song is the caretaker for patient, her phone number is [phone] to call upon discharge or if any concerns        Kimberly Samuel RN  03/03/23 [phone]

## (undated) DEVICE — DRESSING MEPILEX AG BORDER 4 X 8 IN

## (undated) DEVICE — TUBING SUCTION 5MM X 12 FT

## (undated) DEVICE — POSITIONER HANA TABLE PACK

## (undated) DEVICE — 6617 IOBAN II PATIENT ISOLATION DRAPE 5/BX,4BX/CS: Brand: STERI-DRAPE™ IOBAN™ 2

## (undated) DEVICE — GROUNDING PAD UNIVERSAL SLW

## (undated) DEVICE — BETHLEHEM UNIVERSAL MINOR GEN: Brand: CARDINAL HEALTH

## (undated) DEVICE — HEAVY DUTY TABLE COVER: Brand: CONVERTORS

## (undated) DEVICE — CHLORAPREP HI-LITE 26ML ORANGE

## (undated) DEVICE — VIAL DECANTER

## (undated) DEVICE — SINGLE PORT MANIFOLD: Brand: NEPTUNE 2

## (undated) DEVICE — SUT VICRYL 2-0 CP-1 27 IN J266H

## (undated) DEVICE — GAUZE SPONGES,16 PLY: Brand: CURITY

## (undated) DEVICE — ADHESIVE SKIN HIGH VISCOSITY EXOFIN 1ML

## (undated) DEVICE — DRAPE EQUIPMENT RF WAND

## (undated) DEVICE — 4.2MM THREE-FLUTED DRILL BIT QC/NEEDLE POINT/145MM

## (undated) DEVICE — SYRINGE 20ML LL

## (undated) DEVICE — INTENDED FOR TISSUE SEPARATION, AND OTHER PROCEDURES THAT REQUIRE A SHARP SURGICAL BLADE TO PUNCTURE OR CUT.: Brand: BARD-PARKER SAFETY BLADES SIZE 10, STERILE

## (undated) DEVICE — SPONGE LAP 18 X 18 IN STRL RFD

## (undated) DEVICE — DRAPE SHEET THREE QUARTER

## (undated) DEVICE — COBAN 6 IN STERILE

## (undated) DEVICE — ASTOUND STANDARD SURGICAL GOWN, XL: Brand: CONVERTORS

## (undated) DEVICE — CURITY NON-ADHERENT STRIPS: Brand: CURITY

## (undated) DEVICE — SUT VICRYL 0 CT-1 27 IN J260H

## (undated) DEVICE — MEDI-VAC YANK SUCT HNDL W/TPRD BULBOUS TIP: Brand: CARDINAL HEALTH

## (undated) DEVICE — 3.2MM GUIDE WIRE 400MM

## (undated) DEVICE — BASIC SINGLE BASIN-LF: Brand: MEDLINE INDUSTRIES, INC.

## (undated) DEVICE — PLUMEPEN PRO 10FT

## (undated) DEVICE — NEEDLE 18 G X 1 1/2 SAFETY

## (undated) DEVICE — 2.5MM REAMING ROD WITH BALL TIP/950MM-STERILE